# Patient Record
Sex: MALE | Race: WHITE | NOT HISPANIC OR LATINO | URBAN - METROPOLITAN AREA
[De-identification: names, ages, dates, MRNs, and addresses within clinical notes are randomized per-mention and may not be internally consistent; named-entity substitution may affect disease eponyms.]

---

## 2023-07-20 ENCOUNTER — TELEPHONE (OUTPATIENT)
Dept: HEMATOLOGY/ONCOLOGY | Facility: CLINIC | Age: 68
End: 2023-07-20
Payer: MEDICARE

## 2023-07-24 ENCOUNTER — TELEPHONE (OUTPATIENT)
Dept: HEMATOLOGY/ONCOLOGY | Facility: CLINIC | Age: 68
End: 2023-07-24
Payer: MEDICARE

## 2023-07-24 DIAGNOSIS — C15.9 ESOPHAGEAL ADENOCARCINOMA: Primary | ICD-10-CM

## 2023-07-24 NOTE — NURSING
Spoke with patient and scheduled appointment for 07/31/2023 with Natalya Hampton and Enmanuel; Provided patient with appointment time and date, address of [facility] facility and direct line to navigator. All questions and concerns addressed.

## 2023-07-27 NOTE — PROGRESS NOTES
"MEDICAL ONCOLOGY - NEW PATIENT VISIT    Reason for visit: esophageal adenocarcinoma     Best Contact Phone Number(s): There are no phone numbers on file.     Cancer/Stage/TNM:    Cancer Staging   No matching staging information was found for the patient.     Oncology History    No history exists.      HPI:   67 y.o. male with HTN, HLD, tobacco use, dysphagia who presents for initial oncologic management of recently diagnosed esophageal adenocarcinoma. Presented to PCP a few months ago with dysphagia. Symptoms have improved since esophageal dilation during EUS. He has been avoiding large bites of steak and meat, and notes having trouble with Chinese egg fu mariaa. No issues with liquids. Does report frequently spitting up saliva, but generally does not regurgitate foods. No weight loss reported. Does not report any history of Puri's esophagus or symptomatic reflux.     He is co-partner of [employer]. 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 Currently smokes 1 ppd.     Reports his mother had breast cancer (caught early per patient) and "something in her throat" that was treated with chemoRT. History has been obtained by chart review and discussion with the patient.    ROS:   Review of Systems   Constitutional:  Negative for fever, malaise/fatigue and weight loss.   HENT:  Negative for nosebleeds.    Respiratory:  Negative for shortness of breath.    Cardiovascular:  Negative for chest pain and palpitations.   Gastrointestinal:  Negative for blood in stool, constipation, diarrhea, heartburn, nausea and vomiting.   Genitourinary:  Negative for hematuria.   Musculoskeletal:  Negative for falls.   Skin:  Negative for itching and rash.   Neurological:  Negative for dizziness, tingling, sensory change and weakness.   Psychiatric/Behavioral:  The patient is not nervous/anxious.      Past Medical History:   Past " "Medical History:   Diagnosis Date    HTN (hypertension)      Past Surgical History:   Past Surgical History:   Procedure Laterality Date    LAPAROSCOPIC REPAIR OF INGUINAL HERNIA Right      Family History:   History reviewed. No pertinent family history.     Social History:   Social History     Tobacco Use    Smoking status: Every Day     Current packs/day: 1.00     Types: Cigarettes    Smokeless tobacco: Never   Substance Use Topics    Alcohol use: Not on file      I have reviewed and updated the patient's past medical, surgical, family and social histories.    Allergies:   Review of patient's allergies indicates:  No Known Allergies     Medications:   Current Outpatient Medications   Medication Sig Dispense Refill    azelastine (ASTELIN) 137 mcg (0.1 %) nasal spray SPRAY 1 SPRAY BY NASAL ROUTE 2 TIMES DAILY USE IN EACH NOSTRIL AS DIRECTED.      etodolac (LODINE) 400 MG tablet Take 1 tablet by mouth 2 (two) times daily.      amLODIPine (NORVASC) 10 MG tablet Take 10 mg by mouth.      famotidine (PEPCID) 20 MG tablet Take 20 mg by mouth 2 (two) times daily.      fluticasone propionate (FLONASE) 50 mcg/actuation nasal spray SMARTSIG:3 Spray(s) Both Nares Every Morning      gabapentin (NEURONTIN) 300 MG capsule Take 300 mg by mouth.      pravastatin (PRAVACHOL) 40 MG tablet Take 40 mg by mouth.       No current facility-administered medications for this visit.      Physical Exam:   BP (!) 180/85 (BP Location: Left arm, Patient Position: Sitting, BP Method: Medium (Automatic))   Pulse 74   Temp 98 °F (36.7 °C) (Oral)   Resp 18   Ht 5' 7" (1.702 m)   Wt 77.7 kg (171 lb 6.5 oz)   SpO2 99%   BMI 26.85 kg/m²      ECOG Performance status: 0       Physical Exam  Vitals reviewed.   Constitutional:       General: He is not in acute distress.     Appearance: Normal appearance. He is not ill-appearing, toxic-appearing or diaphoretic.   HENT:      Head: Normocephalic and atraumatic.      Right Ear: External ear normal.     "  Left Ear: External ear normal.      Nose: Nose normal. No congestion.      Mouth/Throat:      Pharynx: Oropharynx is clear. No oropharyngeal exudate.   Eyes:      General: No scleral icterus.     Conjunctiva/sclera: Conjunctivae normal.   Cardiovascular:      Rate and Rhythm: Normal rate.   Pulmonary:      Effort: Pulmonary effort is normal. No respiratory distress.      Breath sounds: No wheezing.   Abdominal:      General: There is no distension.   Musculoskeletal:      Cervical back: Normal range of motion.      Right lower leg: No edema.      Left lower leg: No edema.   Skin:     General: Skin is warm and dry.      Coloration: Skin is not jaundiced or pale.      Findings: No bruising, erythema or rash.   Neurological:      General: No focal deficit present.      Mental Status: He is alert and oriented to person, place, and time.      Cranial Nerves: No cranial nerve deficit.      Motor: No weakness.      Gait: Gait normal.   Psychiatric:         Mood and Affect: Mood normal.         Behavior: Behavior normal.         Labs:   Recent Results (from the past 48 hour(s))   Comprehensive Metabolic Panel    Collection Time: 07/31/23  9:05 AM   Result Value Ref Range    Sodium 142 136 - 145 mmol/L    Potassium 3.4 (L) 3.5 - 5.1 mmol/L    Chloride 108 95 - 110 mmol/L    CO2 22 (L) 23 - 29 mmol/L    Glucose 115 (H) 70 - 110 mg/dL    BUN 16 8 - 23 mg/dL    Creatinine 1.0 0.5 - 1.4 mg/dL    Calcium 9.9 8.7 - 10.5 mg/dL    Total Protein 6.9 6.0 - 8.4 g/dL    Albumin 4.0 3.5 - 5.2 g/dL    Total Bilirubin 0.6 0.1 - 1.0 mg/dL    Alkaline Phosphatase 102 55 - 135 U/L    AST 26 10 - 40 U/L    ALT 40 10 - 44 U/L    eGFR >60.0 >60 mL/min/1.73 m^2    Anion Gap 12 8 - 16 mmol/L   CBC Auto Differential    Collection Time: 07/31/23  9:05 AM   Result Value Ref Range    WBC 9.36 3.90 - 12.70 K/uL    RBC 4.75 4.60 - 6.20 M/uL    Hemoglobin 14.7 14.0 - 18.0 g/dL    Hematocrit 41.5 40.0 - 54.0 %    MCV 87 82 - 98 fL    MCH 30.9 27.0 - 31.0  pg    MCHC 35.4 32.0 - 36.0 g/dL    RDW 12.3 11.5 - 14.5 %    Platelets 253 150 - 450 K/uL    MPV 9.4 9.2 - 12.9 fL    Immature Granulocytes 0.5 0.0 - 0.5 %    Gran # (ANC) 6.9 1.8 - 7.7 K/uL    Immature Grans (Abs) 0.05 (H) 0.00 - 0.04 K/uL    Lymph # 1.6 1.0 - 4.8 K/uL    Mono # 0.6 0.3 - 1.0 K/uL    Eos # 0.1 0.0 - 0.5 K/uL    Baso # 0.04 0.00 - 0.20 K/uL    nRBC 0 0 /100 WBC    Gran % 73.8 (H) 38.0 - 73.0 %    Lymph % 17.5 (L) 18.0 - 48.0 %    Mono % 6.7 4.0 - 15.0 %    Eosinophil % 1.1 0.0 - 8.0 %    Basophil % 0.4 0.0 - 1.9 %    Differential Method Automated    Prealbumin    Collection Time: 07/31/23  9:05 AM   Result Value Ref Range    Prealbumin 29 20 - 43 mg/dL      I have reviewed the pertinent labs. CBC, CMP unremarkable.     Imaging:    Esophagram - 6/15/2023  Impression:  - Irregularity and narrowing of the distal esophagus. Findings are suspicious for underlying neoplasm versus varices. Recommend endoscopy.  - Mild presbyesophagus     CT Chest, CT A/P - 7/3/2023   1.   There is an eccentric mass in the lower thoracic esophagus extending in close proximity to the gastric cardia.   2.   There is no evidence of metastatic disease in the thorax.   3.   There is a tiny subcapsular hypodensity in the right hepatic lobe which is too small to characterize. This likely represents a cyst, although a metastatic lesion is not excluded. There is also a hyperenhancing mass in the right hepatic lobe which likely reflects a hemangioma but is also nonspecific. A short-term follow-up exam or an MRI of the abdomen would likely be of benefit.   4.   There is an apparent 1.8 cm mass in the upper pole of the left kidney anteriorly. This does not have the appearance of a simple cyst. There are no precontrast images to compare and evaluate for definite enhancement although this appears to enhance. This could simply represent normal renal parenchyma, although a solid tumor is suspected, concerning for renal cell carcinoma.  This could be evaluated further with a dedicated renal mass CT versus an MRI of the abdomen without and with contrast.     EUS - 7/11/2023  Impression:   - Normal first portion of the duodenum and second portion of the duodenum. No specimens collected.   - Mucosal nodule found in the duodenum. Biopsied.   - Normal stomach. No specimens collected.   - Partially obstructing, likely malignant esophageal tumor was found in the distal esophagus. Biopsied.   - A mass was found in the thoracic esophagus. Fine need biopsy performed.   - Malignant-appearing esophageal stenosis. Dilated.   - Normal cricopharyngeus, upper third of esophagus and middle third of esophagus. No specimens collected.     I have personally reviewed the imaging which is notable for distal esophageal tumor and dilation.     Path:   7/11/2023 -   DIAGNOSIS:                                    1. 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 ESOPHAGUS, DISTAL MASS, BIOPSY:   - INVASIVE, MODERATELY DIFFERENTIATED ADENOCARCINOMA WITH MUCINOUS DIFFERENTIATION                                    3. ESOPHAGUS MASS, FINE NEEDLE ASPIRATION:   - ADENOCARCINOMA WITH MUCINOUS DIFFERENTIATION    Assessment:       1. Esophageal adenocarcinoma    2. Immunodeficiency secondary to neoplasm    3. Hypertension, unspecified type    4. Hyperlipidemia, unspecified hyperlipidemia type    5. Tobacco use        Plan:        # Esophageal adenocarcinoma  Mr. Lugo is a 67 y.o. male who presents to clinic for evaluation of newly diagnosed esophageal cancer. He initially presented with dysphagia, now improved s/p esophageal dilation during EUS. Biopsy confirmed adenocarcinoma.     We reviewed his diagnosis, prognosis, and treatment options with him during our initial visit today. Discussed with him the high likelihood that cancer would recur if he was taken straight to surgery without neoadjuvant  treatment. Our recommendation is to proceed with weekly carboplatin + taxol in conjunction with daily radiation (M-F, no holidays) for a total of 5-5.5 weeks. Handouts provided to patient on both chemotherapy medications.     Plan to repeat imaging ~4 weeks after completion of chemoRT followed by potential esophagectomy. Should there be evidence of residual cancer after surgery, we reviewed the SOC recommendation for adjuvant nivolumab. He would prefer to have treatments in the afternoon to accommodate his work schedule.     He will need PET/CT to complete staging, scheduled August 8th.   Will need a consult visit with rad onc as well. Referral placed.     # HTN, HLD  BP elevated in clinic. Asymptomatic.   Continue medical management.   Following with PCP.    # Tobacco use  Encouraged cessation prior to potential esophagectomy.     Follow up: 2-3 weeks to start chemoRT     Patient is in agreement with the proposed treatment plan. All questions were answered to the patient's satisfaction. Pt knows to call clinic if anything is needed before the next clinic visit.    Patient discussed with and also seen by collaborating physician, Dr. Singer.    At least 60 minutes were spent today on this encounter including face to face time with the patient, data gathering/interpretation and documentation.       Aleshia Giraldo, MSN, APRN, ACCNS-AG  Hematology and Medical Oncology  Clinical Nurse Specialist to Dr. Singer, Dr. Wheeler & Dr. Petersen Onc Chart Routing  Urgent    Follow up with physician . Rtc in 2-3 weeks with labs (CBC CMP) to see Dr. Singer to consent for/receive cycle 1 carbo/taxol and radiation   Follow up with [facility]    Infusion scheduling note New or changed treatment   weekly carbo/taxol - needs to start with radiation (pending consult visit) - please help with insurance authorization for treatment   Injection scheduling note    Labs CBC and CMP   Scheduling:  Preferred lab:  Lab interval: once a week      Imaging    Pharmacy appointment    Other referrals     Additional referrals needed  referred to rad onc - needs to see dr. morrow for treatment planning in order to start chemo with radiation

## 2023-07-31 ENCOUNTER — LAB VISIT (OUTPATIENT)
Dept: LAB | Facility: HOSPITAL | Age: 68
End: 2023-07-31
Attending: STUDENT IN AN ORGANIZED HEALTH CARE EDUCATION/TRAINING PROGRAM
Payer: MEDICARE

## 2023-07-31 ENCOUNTER — OFFICE VISIT (OUTPATIENT)
Dept: SURGERY | Facility: CLINIC | Age: 68
End: 2023-07-31
Payer: MEDICARE

## 2023-07-31 ENCOUNTER — OFFICE VISIT (OUTPATIENT)
Dept: HEMATOLOGY/ONCOLOGY | Facility: CLINIC | Age: 68
End: 2023-07-31
Payer: MEDICARE

## 2023-07-31 VITALS
BODY MASS INDEX: 26.91 KG/M2 | WEIGHT: 171.44 LBS | OXYGEN SATURATION: 98 % | DIASTOLIC BLOOD PRESSURE: 87 MMHG | HEART RATE: 81 BPM | SYSTOLIC BLOOD PRESSURE: 181 MMHG | HEIGHT: 67 IN

## 2023-07-31 VITALS
HEART RATE: 74 BPM | DIASTOLIC BLOOD PRESSURE: 85 MMHG | SYSTOLIC BLOOD PRESSURE: 180 MMHG | TEMPERATURE: 98 F | HEIGHT: 67 IN | RESPIRATION RATE: 18 BRPM | BODY MASS INDEX: 26.91 KG/M2 | OXYGEN SATURATION: 99 % | WEIGHT: 171.44 LBS

## 2023-07-31 DIAGNOSIS — E78.5 HYPERLIPIDEMIA, UNSPECIFIED HYPERLIPIDEMIA TYPE: ICD-10-CM

## 2023-07-31 DIAGNOSIS — I10 HYPERTENSION, UNSPECIFIED TYPE: ICD-10-CM

## 2023-07-31 DIAGNOSIS — D84.81 IMMUNODEFICIENCY SECONDARY TO NEOPLASM: ICD-10-CM

## 2023-07-31 DIAGNOSIS — C15.9 ESOPHAGEAL ADENOCARCINOMA: Primary | ICD-10-CM

## 2023-07-31 DIAGNOSIS — C15.9 ESOPHAGEAL ADENOCARCINOMA: ICD-10-CM

## 2023-07-31 DIAGNOSIS — D49.9 IMMUNODEFICIENCY SECONDARY TO NEOPLASM: ICD-10-CM

## 2023-07-31 DIAGNOSIS — Z72.0 TOBACCO USE: ICD-10-CM

## 2023-07-31 LAB
ALBUMIN SERPL BCP-MCNC: 4 G/DL (ref 3.5–5.2)
ALP SERPL-CCNC: 102 U/L (ref 55–135)
ALT SERPL W/O P-5'-P-CCNC: 40 U/L (ref 10–44)
ANION GAP SERPL CALC-SCNC: 12 MMOL/L (ref 8–16)
AST SERPL-CCNC: 26 U/L (ref 10–40)
BASOPHILS # BLD AUTO: 0.04 K/UL (ref 0–0.2)
BASOPHILS NFR BLD: 0.4 % (ref 0–1.9)
BILIRUB SERPL-MCNC: 0.6 MG/DL (ref 0.1–1)
BUN SERPL-MCNC: 16 MG/DL (ref 8–23)
CALCIUM SERPL-MCNC: 9.9 MG/DL (ref 8.7–10.5)
CHLORIDE SERPL-SCNC: 108 MMOL/L (ref 95–110)
CO2 SERPL-SCNC: 22 MMOL/L (ref 23–29)
CREAT SERPL-MCNC: 1 MG/DL (ref 0.5–1.4)
DIFFERENTIAL METHOD: ABNORMAL
EOSINOPHIL # BLD AUTO: 0.1 K/UL (ref 0–0.5)
EOSINOPHIL NFR BLD: 1.1 % (ref 0–8)
ERYTHROCYTE [DISTWIDTH] IN BLOOD BY AUTOMATED COUNT: 12.3 % (ref 11.5–14.5)
EST. GFR  (NO RACE VARIABLE): >60 ML/MIN/1.73 M^2
GLUCOSE SERPL-MCNC: 115 MG/DL (ref 70–110)
HCT VFR BLD AUTO: 41.5 % (ref 40–54)
HGB BLD-MCNC: 14.7 G/DL (ref 14–18)
IMM GRANULOCYTES # BLD AUTO: 0.05 K/UL (ref 0–0.04)
IMM GRANULOCYTES NFR BLD AUTO: 0.5 % (ref 0–0.5)
LYMPHOCYTES # BLD AUTO: 1.6 K/UL (ref 1–4.8)
LYMPHOCYTES NFR BLD: 17.5 % (ref 18–48)
MCH RBC QN AUTO: 30.9 PG (ref 27–31)
MCHC RBC AUTO-ENTMCNC: 35.4 G/DL (ref 32–36)
MCV RBC AUTO: 87 FL (ref 82–98)
MONOCYTES # BLD AUTO: 0.6 K/UL (ref 0.3–1)
MONOCYTES NFR BLD: 6.7 % (ref 4–15)
NEUTROPHILS # BLD AUTO: 6.9 K/UL (ref 1.8–7.7)
NEUTROPHILS NFR BLD: 73.8 % (ref 38–73)
NRBC BLD-RTO: 0 /100 WBC
PLATELET # BLD AUTO: 253 K/UL (ref 150–450)
PMV BLD AUTO: 9.4 FL (ref 9.2–12.9)
POTASSIUM SERPL-SCNC: 3.4 MMOL/L (ref 3.5–5.1)
PREALB SERPL-MCNC: 29 MG/DL (ref 20–43)
PROT SERPL-MCNC: 6.9 G/DL (ref 6–8.4)
RBC # BLD AUTO: 4.75 M/UL (ref 4.6–6.2)
SODIUM SERPL-SCNC: 142 MMOL/L (ref 136–145)
WBC # BLD AUTO: 9.36 K/UL (ref 3.9–12.7)

## 2023-07-31 PROCEDURE — 85025 COMPLETE CBC W/AUTO DIFF WBC: CPT | Performed by: STUDENT IN AN ORGANIZED HEALTH CARE EDUCATION/TRAINING PROGRAM

## 2023-07-31 PROCEDURE — 99213 OFFICE O/P EST LOW 20 MIN: CPT | Mod: PBBFAC | Performed by: STUDENT IN AN ORGANIZED HEALTH CARE EDUCATION/TRAINING PROGRAM

## 2023-07-31 PROCEDURE — 36415 COLL VENOUS BLD VENIPUNCTURE: CPT | Performed by: STUDENT IN AN ORGANIZED HEALTH CARE EDUCATION/TRAINING PROGRAM

## 2023-07-31 PROCEDURE — 99214 OFFICE O/P EST MOD 30 MIN: CPT | Mod: PBBFAC,27 | Performed by: INTERNAL MEDICINE

## 2023-07-31 PROCEDURE — 99999 PR PBB SHADOW E&M-EST. PATIENT-LVL IV: ICD-10-PCS | Mod: PBBFAC,,, | Performed by: INTERNAL MEDICINE

## 2023-07-31 PROCEDURE — 99999 PR PBB SHADOW E&M-EST. PATIENT-LVL III: ICD-10-PCS | Mod: PBBFAC,,, | Performed by: STUDENT IN AN ORGANIZED HEALTH CARE EDUCATION/TRAINING PROGRAM

## 2023-07-31 PROCEDURE — 99999 PR PBB SHADOW E&M-EST. PATIENT-LVL III: CPT | Mod: PBBFAC,,, | Performed by: STUDENT IN AN ORGANIZED HEALTH CARE EDUCATION/TRAINING PROGRAM

## 2023-07-31 PROCEDURE — 99205 OFFICE O/P NEW HI 60 MIN: CPT | Mod: S$PBB,,, | Performed by: INTERNAL MEDICINE

## 2023-07-31 PROCEDURE — 99999 PR PBB SHADOW E&M-EST. PATIENT-LVL IV: CPT | Mod: PBBFAC,,, | Performed by: INTERNAL MEDICINE

## 2023-07-31 PROCEDURE — 84134 ASSAY OF PREALBUMIN: CPT | Performed by: STUDENT IN AN ORGANIZED HEALTH CARE EDUCATION/TRAINING PROGRAM

## 2023-07-31 PROCEDURE — 99205 PR OFFICE/OUTPT VISIT, NEW, LEVL V, 60-74 MIN: ICD-10-PCS | Mod: S$PBB,,, | Performed by: STUDENT IN AN ORGANIZED HEALTH CARE EDUCATION/TRAINING PROGRAM

## 2023-07-31 PROCEDURE — 99205 OFFICE O/P NEW HI 60 MIN: CPT | Mod: S$PBB,,, | Performed by: STUDENT IN AN ORGANIZED HEALTH CARE EDUCATION/TRAINING PROGRAM

## 2023-07-31 PROCEDURE — 99205 PR OFFICE/OUTPT VISIT, NEW, LEVL V, 60-74 MIN: ICD-10-PCS | Mod: S$PBB,,, | Performed by: INTERNAL MEDICINE

## 2023-07-31 PROCEDURE — 80053 COMPREHEN METABOLIC PANEL: CPT | Performed by: STUDENT IN AN ORGANIZED HEALTH CARE EDUCATION/TRAINING PROGRAM

## 2023-07-31 RX ORDER — ETODOLAC 400 MG/1
1 TABLET, FILM COATED ORAL 2 TIMES DAILY
COMMUNITY
Start: 2023-05-02

## 2023-07-31 RX ORDER — FLUTICASONE PROPIONATE 50 MCG
SPRAY, SUSPENSION (ML) NASAL
COMMUNITY
Start: 2023-06-28

## 2023-07-31 RX ORDER — AZELASTINE 1 MG/ML
SPRAY, METERED NASAL
COMMUNITY
Start: 2023-05-17

## 2023-07-31 RX ORDER — GABAPENTIN 300 MG/1
300 CAPSULE ORAL
COMMUNITY
Start: 2023-07-25

## 2023-07-31 RX ORDER — AMLODIPINE BESYLATE 10 MG/1
10 TABLET ORAL
COMMUNITY
Start: 2023-07-30

## 2023-07-31 RX ORDER — PRAVASTATIN SODIUM 40 MG/1
40 TABLET ORAL
COMMUNITY
Start: 2023-07-16

## 2023-07-31 RX ORDER — FAMOTIDINE 20 MG/1
20 TABLET, FILM COATED ORAL 2 TIMES DAILY
COMMUNITY
Start: 2023-06-01

## 2023-07-31 NOTE — PROGRESS NOTES
SURGICAL ONCOLOGY CLINIC NOTE    Jone Lugo is a 67 y.o. male with Hx of HTN (on amlodipine) who presents to clinic for evaluation for esophageal adenocarcinoma. Patient states that he initially presented to his PCP a few months ago with difficulty swallowing. He notes that he had trouble swallowing and would feel the need to spit up, has only had trouble with solids. Had the sensation of choking. He underwent an EGD (7/11/23) which found a mass at the distal portion of his esophagus. He follows with Dr. Lacey Hampton in GI. The mass was biopsied. He did have a dilation of his esophagus and his swallowing has improved since that time.    No history of MI or stroke. Not on anticoagulation. He is able to walk 2 blocks and up 1 flight of stairs without getting short of breath.    PSH: R inguinal hernia repair (laparoscopic, Jan 2023), laparoscopic cholecystectomy  Family history: mother with breast cancer  Social: current smoker (1ppd), denies EtOH use, denies drug use      Review of Systems   Constitutional:  Negative for chills, fever and weight loss.   HENT:          Difficulty swallowing, frequent spit ups   Respiratory:  Negative for shortness of breath.    Cardiovascular:  Negative for chest pain.   Gastrointestinal:  Negative for nausea and vomiting.   Genitourinary:  Negative for dysuria and hematuria.   Musculoskeletal:  Negative for myalgias.   Neurological:  Negative for dizziness and headaches.   Psychiatric/Behavioral:  Negative for depression.        Past Medical History:   Diagnosis Date    HTN (hypertension)        Past Surgical History:   Procedure Laterality Date    LAPAROSCOPIC REPAIR OF INGUINAL HERNIA Right        Social History     Socioeconomic History    Marital status: Single       Review of patient's allergies indicates:  Not on File      PHYSICAL EXAM:  Vitals:    07/31/23 0915   BP: (!) 181/87   Pulse: 81       Physical Exam  Constitutional:       Appearance: Normal appearance.    HENT:      Head: Normocephalic and atraumatic.   Cardiovascular:      Rate and Rhythm: Normal rate.   Pulmonary:      Effort: Pulmonary effort is normal. No respiratory distress.   Abdominal:      General: There is no distension.      Palpations: Abdomen is soft.      Tenderness: There is no abdominal tenderness.      Hernia: A hernia (umbilical hernia) is present.      Comments: Well healed incisional scars   Skin:     General: Skin is warm and dry.      Capillary Refill: Capillary refill takes less than 2 seconds.   Neurological:      General: No focal deficit present.      Mental Status: He is alert.     PERTINENT LABS:  CBC, CMP, prealbumin pending      PERTINENT IMAGING:  Reviewed.   EGD 7/11/23      CT chest/abdomen 7/3/23:  Findings:   Thorax:   There is an eccentric mass along the right lateral wall of the lower thoracic esophagus just proximal to the GE junction. This causes fairly significant luminal narrowing. The mass extends over a length of approximately 5 cm. There is no upstream dilatation or air-fluid level to suggest obstruction. This appears to extend in close proximity to the proximal stomach.     There is calcified and noncalcified plaque throughout the thoracic aorta. There is no aneurysmal dilatation or dissection.   The heart size is normal. There is no pericardial effusion. There is prominent coronary artery calcification.   There is no central pulmonary embolus.   There is no mediastinal or hilar adenopathy.     The lungs are well aerated. There are calcified granulomata in both lungs. There are no suspicious pulmonary parenchymal nodules.   There is no focal lung consolidation. There is no pleural effusion or pneumothorax.   There are no suspicious osseous lesions.     Abdomen:   The background liver parenchyma appears normal. There is no evidence of cirrhosis.   There is a small subcapsular hypodensity in the posterior right hepatic lobe measuring 8 mm. This is too small to accurately  characterize.   There is a hyperenhancing mass in segment VI measuring 1.4 cm. The CT appearance is nonspecific. No additional liver lesions are seen.     The gallbladder is surgically absent. There is no biliary ductal dilatation.   The spleen is normal. The pancreas is atrophic.   The adrenal glands are normal.     There is a contour deformity along the anterior medial upper pole of the left kidney measuring approximately 1.8 cm, axial image 32, sagittal image 89. This has the appearance of a cortical mass appearing similar in attenuation to the remainder of the left kidney. The attenuation is 143 Hounsfield units.   There are tiny cortical cysts in the lower pole of the right kidney.     The distal stomach is normal. There is a tiny periampullary duodenal diverticulum.   The upper abdominal mesenteric small bowel is normal. There are scattered colonic diverticula.   There is no ascites or adenopathy.   There is extensive calcified plaque throughout the abdominal aorta.     Pelvis:   The urinary bladder is mildly thick-walled but incompletely distended.   The prostate gland and seminal vesicles are normal.     The rectum is normal. There is sigmoid diverticulosis.   The pelvic small bowel and appendix are normal. There is no pelvic ascites.   There is a fat-containing umbilical hernia. There are bilateral fat-containing inguinal hernias.    PATHOLOGY:  DIAGNOSIS:                                    1. 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 ESOPHAGUS, DISTAL MASS, BIOPSY:   - INVASIVE, MODERATELY DIFFERENTIATED ADENOCARCINOMA WITH MUCINOUS    DIFFERENTIATION                                    3. ESOPHAGUS MASS, FINE NEEDLE ASPIRATION:   - ADENOCARCINOMA WITH MUCINOUS DIFFERENTIATION     ASSESSMENT/PLAN:  67 y.o. male with newly diagnosed thoracic esophageal mass, biopsy positive for adenocarcinoma    - patient to follow up with  medical oncology for neoadjuvant chemotherapy, has appointment today; referral to radiation oncology for neoadjuvant radiation therapy  - will order PET to complete staging, has two prominent paraesophageal nodes and liver lesion  - will restage after neoadjuvant chemotherapy  - referral to smoking cessation clinic  - follow up in clinic after restaging for further surgical planning      Chray Webster, PGY-5  General Surgery  [phone]  7/31/2023 9:22 AM

## 2023-08-04 LAB
ONEOME COMMENT: NORMAL
ONEOME METHOD: NORMAL

## 2023-08-08 ENCOUNTER — HOSPITAL ENCOUNTER (OUTPATIENT)
Dept: RADIOLOGY | Facility: HOSPITAL | Age: 68
Discharge: HOME | End: 2023-08-08
Attending: STUDENT IN AN ORGANIZED HEALTH CARE EDUCATION/TRAINING PROGRAM
Payer: MEDICARE

## 2023-08-08 DIAGNOSIS — C15.9 ESOPHAGEAL ADENOCARCINOMA: ICD-10-CM

## 2023-08-08 LAB — POCT GLUCOSE: 62 MG/DL (ref 70–110)

## 2023-08-08 PROCEDURE — 78815 NM PET CT ROUTINE: ICD-10-PCS | Mod: 26,PI,, | Performed by: STUDENT IN AN ORGANIZED HEALTH CARE EDUCATION/TRAINING PROGRAM

## 2023-08-08 PROCEDURE — A9552 F18 FDG: HCPCS

## 2023-08-08 PROCEDURE — 78815 PET IMAGE W/CT SKULL-THIGH: CPT | Mod: 26,PI,, | Performed by: STUDENT IN AN ORGANIZED HEALTH CARE EDUCATION/TRAINING PROGRAM

## 2023-08-09 ENCOUNTER — OFFICE VISIT (OUTPATIENT)
Dept: RADIATION ONCOLOGY | Facility: CLINIC | Age: 68
End: 2023-08-09
Payer: MEDICARE

## 2023-08-09 VITALS
WEIGHT: 170.38 LBS | RESPIRATION RATE: 20 BRPM | OXYGEN SATURATION: 97 % | SYSTOLIC BLOOD PRESSURE: 162 MMHG | BODY MASS INDEX: 26.69 KG/M2 | HEART RATE: 89 BPM | DIASTOLIC BLOOD PRESSURE: 74 MMHG

## 2023-08-09 DIAGNOSIS — C15.9 ESOPHAGEAL ADENOCARCINOMA: ICD-10-CM

## 2023-08-09 PROCEDURE — 99205 OFFICE O/P NEW HI 60 MIN: CPT | Mod: S$PBB,,, | Performed by: STUDENT IN AN ORGANIZED HEALTH CARE EDUCATION/TRAINING PROGRAM

## 2023-08-09 PROCEDURE — 99999 PR PBB SHADOW E&M-EST. PATIENT-LVL III: ICD-10-PCS | Mod: PBBFAC,,, | Performed by: STUDENT IN AN ORGANIZED HEALTH CARE EDUCATION/TRAINING PROGRAM

## 2023-08-09 PROCEDURE — 99999 PR PBB SHADOW E&M-EST. PATIENT-LVL III: CPT | Mod: PBBFAC,,, | Performed by: STUDENT IN AN ORGANIZED HEALTH CARE EDUCATION/TRAINING PROGRAM

## 2023-08-09 PROCEDURE — 99205 PR OFFICE/OUTPT VISIT, NEW, LEVL V, 60-74 MIN: ICD-10-PCS | Mod: S$PBB,,, | Performed by: STUDENT IN AN ORGANIZED HEALTH CARE EDUCATION/TRAINING PROGRAM

## 2023-08-09 PROCEDURE — 99213 OFFICE O/P EST LOW 20 MIN: CPT | Mod: PBBFAC | Performed by: STUDENT IN AN ORGANIZED HEALTH CARE EDUCATION/TRAINING PROGRAM

## 2023-08-09 NOTE — PROGRESS NOTES
Radiation Oncology Consult Note        Date of Service: 8/9/2023     Chief Complaint: adenocarcinoma of the distal 1/3 esophagus     Reason for visit: consideration for radiation to the abdomen     Referring Physician: Dr Hampton (surgery)     Implantable devices: denies     Therapy to Date:  No radiation     Diagnosis/Assessment:   Jone Lugo is a 67 y.o. man with Stage III (cT2, cN1, cM0, G2) adenocarcinoma of the distal 1/3 esophagus, s/p upper EUS, biopsy and dilatation ([place], 7/11/2023 Dr Rowley), no evidence of distant mets on PET/CT.    PMH of HTN, HLD, and pack a day smoker    ECOG 0    Dysphagia has improved since esophageal dilation during EUS, but is worsening again now  Seen by Dr Hampton, surgical candidate.    Plan   We discussed treatment options per NCCN guidelines v2023  - preoperative chemoradiation followed by surgery +/- adjuvant nivolumab (residual yp disease)  - definitive chemoradiation       Best outcome is with therapy involving surgery using preop CRT.   EBRT would consist of daily radiation treatments M-F, to the esophagus and involved regional lymph nodes to a dose of 41.4-50.4 Gy in 23-28 fractions at 1.8 Gy per fraction delivered daily, using IMRT.  Risks, benefits, and side effects of radiotherapy were discussed.   Side effects include but are not limited to fatigue, dysphagia, and pneumonitis.        The patient signed RT informed consent after I answered questions to their apparent satisfaction.    - RT consent signed today  - CT sim 8/10/2023 NPO x 4hrs     HPI:   Jone Lugo is a 67 y.o. man with recent diagnosis of esophageal cancer after presenting with dysphagia    Oncologic history:    6/23/2023 upper EGD (Dr Berta Stewart)             7/11/2023 upper EUS (Dr Halley Stewart)          Pathology   ESOPHAGUS, DISTAL MASS, BIOPSY: INVASIVE, MODERATELY DIFFERENTIATED ADENOCARCINOMA WITH MUCINOUS DIFFERENTIATION               ESOPHAGUS MASS, FNA:  "ADENOCARCINOMA WITH MUCINOUS DIFFERENTIATION     7/31/2023 PET CT  hypermetabolic wall thickening of distal esophagus extending to GE junction SUVm  5.7  no pathologically enlarged or hypermetabolic lymph nodes         7/31/2023 surgery (Dr Hampton)  recommend proceeding with neoadjuvant chemoradiation     7/31/2023 medond visit (Aleshia Giraldo NP/ Dr. Singer)   weekly carboplatin + taxol in conjunction with daily radiation (M-F, no holidays) for a total of 5-5.5 weeks.     Subjective:   In clinic the patient is alone    The patient reports feeling OK overall, very nervous but happy that his PET showed no evidence of mets.  He reports that his dysphagia which had improved after dilation, is now slowly worsening again.  He otherwise denies any GERD, N/V, abdominal pain, chest pain, SOB, diarrhea, constipation or blood in his stools      The patient denies other major complaints.     The patient denies any history of radiation therapy, implantable cardiac devices, or connective tissue disease.      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 Currently smokes 1 ppd.     Family history  Mother had breast cancer (caught early per patient) and "something in her throat" that was treated with chemoRT    Current Outpatient Medications on File Prior to Visit   Medication Sig Dispense Refill    amLODIPine (NORVASC) 10 MG tablet Take 10 mg by mouth.      azelastine (ASTELIN) 137 mcg (0.1 %) nasal spray SPRAY 1 SPRAY BY NASAL ROUTE 2 TIMES DAILY USE IN EACH NOSTRIL AS DIRECTED.      etodolac (LODINE) 400 MG tablet Take 1 tablet by mouth 2 (two) times daily.      famotidine (PEPCID) 20 MG tablet Take 20 mg by mouth 2 (two) times daily.      fluticasone propionate (FLONASE) 50 mcg/actuation nasal spray SMARTSIG:3 Spray(s) Both Nares Every Morning      gabapentin (NEURONTIN) 300 MG " capsule Take 300 mg by mouth.      pravastatin (PRAVACHOL) 40 MG tablet Take 40 mg by mouth.       No current facility-administered medications on file prior to visit.       Review of patient's allergies indicates:  No Known Allergies        Past Surgical History:   Procedure Laterality Date    LAPAROSCOPIC REPAIR OF INGUINAL HERNIA Right        Past Medical History:   Diagnosis Date    HTN (hypertension)      Review of Systems   Negative unless as stated above    Objective:   Physical Exam  Vitals reviewed.     Constitutional:       Appearance: Normal appearance.   HENT:      Head: Normocephalic and atraumatic.   Pulmonary:      Effort: Pulmonary effort is normal.   Abdominal:      General: There is no distension.   Musculoskeletal:         General: Normal range of motion.   Neurological:      General: No focal deficit present.      Mental Status: Alert and oriented  Psychiatric:         Mood and Affect: Mood normal.         Behavior: Behavior normal.      Imaging: I have personally reviewed the patient's available images and reports and summarized pertinent findings above in HPI.      Pathology: I have personally reviewed the patient's available pathology and summarized pertinent findings above in HPI.      I spent approximately 60 minutes reviewing the available records and evaluating the patient, out of which over 50% of the time was spent face to face with the patient in counseling and coordinating this patient's care.        Anshul Neri MD/PhD

## 2023-08-10 ENCOUNTER — HOSPITAL ENCOUNTER (OUTPATIENT)
Dept: RADIATION THERAPY | Facility: HOSPITAL | Age: 68
Discharge: HOME | End: 2023-08-10
Attending: STUDENT IN AN ORGANIZED HEALTH CARE EDUCATION/TRAINING PROGRAM
Payer: MEDICARE

## 2023-08-10 ENCOUNTER — PATIENT MESSAGE (OUTPATIENT)
Dept: SURGERY | Facility: CLINIC | Age: 68
End: 2023-08-10
Payer: MEDICARE

## 2023-08-10 PROCEDURE — 77263 PR  RADIATION THERAPY PLAN COMPLEX: ICD-10-PCS | Mod: ,,, | Performed by: STUDENT IN AN ORGANIZED HEALTH CARE EDUCATION/TRAINING PROGRAM

## 2023-08-10 PROCEDURE — 77014 PR  CT GUIDANCE PLACEMENT RAD THERAPY FIELDS: CPT | Mod: 26,,, | Performed by: STUDENT IN AN ORGANIZED HEALTH CARE EDUCATION/TRAINING PROGRAM

## 2023-08-10 PROCEDURE — 77014 PR  CT GUIDANCE PLACEMENT RAD THERAPY FIELDS: ICD-10-PCS | Mod: 26,,, | Performed by: STUDENT IN AN ORGANIZED HEALTH CARE EDUCATION/TRAINING PROGRAM

## 2023-08-10 PROCEDURE — 77263 THER RADIOLOGY TX PLNG CPLX: CPT | Mod: ,,, | Performed by: STUDENT IN AN ORGANIZED HEALTH CARE EDUCATION/TRAINING PROGRAM

## 2023-08-10 PROCEDURE — 77334 RADIATION TREATMENT AID(S): CPT | Mod: TC | Performed by: STUDENT IN AN ORGANIZED HEALTH CARE EDUCATION/TRAINING PROGRAM

## 2023-08-10 PROCEDURE — 77334 PR  RADN TREATMENT AID(S) COMPLX: ICD-10-PCS | Mod: 26,,, | Performed by: STUDENT IN AN ORGANIZED HEALTH CARE EDUCATION/TRAINING PROGRAM

## 2023-08-10 PROCEDURE — 77334 RADIATION TREATMENT AID(S): CPT | Mod: 26,,, | Performed by: STUDENT IN AN ORGANIZED HEALTH CARE EDUCATION/TRAINING PROGRAM

## 2023-08-10 PROCEDURE — 77014 HC CT GUIDANCE RADIATION THERAPY FLDS PLACEMENT: CPT | Mod: TC | Performed by: STUDENT IN AN ORGANIZED HEALTH CARE EDUCATION/TRAINING PROGRAM

## 2023-08-11 DIAGNOSIS — C15.9 ESOPHAGEAL ADENOCARCINOMA: Primary | ICD-10-CM

## 2023-08-15 ENCOUNTER — OFFICE VISIT (OUTPATIENT)
Dept: SURGERY | Facility: CLINIC | Age: 68
End: 2023-08-15
Payer: MEDICARE

## 2023-08-15 VITALS
SYSTOLIC BLOOD PRESSURE: 167 MMHG | HEIGHT: 67 IN | BODY MASS INDEX: 26.8 KG/M2 | WEIGHT: 170.75 LBS | OXYGEN SATURATION: 91 % | HEART RATE: 83 BPM | DIASTOLIC BLOOD PRESSURE: 73 MMHG

## 2023-08-15 DIAGNOSIS — C15.9 ESOPHAGEAL ADENOCARCINOMA: Primary | ICD-10-CM

## 2023-08-15 DIAGNOSIS — Z87.820 H/O TRAUMATIC BRAIN INJURY: ICD-10-CM

## 2023-08-15 DIAGNOSIS — Z72.0 TOBACCO ABUSE: ICD-10-CM

## 2023-08-15 PROCEDURE — 99215 PR OFFICE/OUTPT VISIT, EST, LEVL V, 40-54 MIN: ICD-10-PCS | Mod: S$PBB,,, | Performed by: SURGERY

## 2023-08-15 PROCEDURE — 99213 OFFICE O/P EST LOW 20 MIN: CPT | Mod: PBBFAC | Performed by: SURGERY

## 2023-08-15 PROCEDURE — 99999 PR PBB SHADOW E&M-EST. PATIENT-LVL III: ICD-10-PCS | Mod: PBBFAC,,, | Performed by: SURGERY

## 2023-08-15 PROCEDURE — 99999 PR PBB SHADOW E&M-EST. PATIENT-LVL III: CPT | Mod: PBBFAC,,, | Performed by: SURGERY

## 2023-08-15 PROCEDURE — 99215 OFFICE O/P EST HI 40 MIN: CPT | Mod: S$PBB,,, | Performed by: SURGERY

## 2023-08-15 RX ORDER — OMEPRAZOLE 40 MG/1
1 CAPSULE, DELAYED RELEASE ORAL EVERY MORNING
COMMUNITY
Start: 2023-06-28 | End: 2024-06-27

## 2023-08-15 RX ORDER — UBIDECARENONE 30 MG
30 CAPSULE ORAL 3 TIMES DAILY
COMMUNITY

## 2023-08-15 NOTE — Clinical Note
Anshul/Tom--I think we should use definitive dose chemoRT, I think higher chance he won't be a candidate than he will.  Maral--prehab project, he needs to get off the smokes and understand what's going on.  Prob needs psych too.  Use all your juarez.

## 2023-08-16 PROBLEM — Z72.0 TOBACCO ABUSE: Status: ACTIVE | Noted: 2023-08-16

## 2023-08-16 PROBLEM — Z87.820 H/O TRAUMATIC BRAIN INJURY: Status: ACTIVE | Noted: 2023-08-16

## 2023-08-16 PROCEDURE — 77301 PR  INTEN MOD RADIOTHER PLAN W/DOSE VOL HIST: ICD-10-PCS | Mod: 26,,, | Performed by: STUDENT IN AN ORGANIZED HEALTH CARE EDUCATION/TRAINING PROGRAM

## 2023-08-16 PROCEDURE — 77301 RADIOTHERAPY DOSE PLAN IMRT: CPT | Mod: 26,,, | Performed by: STUDENT IN AN ORGANIZED HEALTH CARE EDUCATION/TRAINING PROGRAM

## 2023-08-16 PROCEDURE — 77301 RADIOTHERAPY DOSE PLAN IMRT: CPT | Mod: TC | Performed by: STUDENT IN AN ORGANIZED HEALTH CARE EDUCATION/TRAINING PROGRAM
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
## 2023-08-17 PROCEDURE — 77300 RADIATION THERAPY DOSE PLAN: CPT | Mod: TC | Performed by: STUDENT IN AN ORGANIZED HEALTH CARE EDUCATION/TRAINING PROGRAM

## 2023-08-17 PROCEDURE — 77338 DESIGN MLC DEVICE FOR IMRT: CPT | Mod: TC | Performed by: STUDENT IN AN ORGANIZED HEALTH CARE EDUCATION/TRAINING PROGRAM

## 2023-08-17 PROCEDURE — 77470 SPECIAL RADIATION TREATMENT: CPT | Mod: 26,59,, | Performed by: STUDENT IN AN ORGANIZED HEALTH CARE EDUCATION/TRAINING PROGRAM

## 2023-08-17 PROCEDURE — 77470 PR  SPECIAL RADIATION TREATMENT: ICD-10-PCS | Mod: 26,59,, | Performed by: STUDENT IN AN ORGANIZED HEALTH CARE EDUCATION/TRAINING PROGRAM

## 2023-08-17 PROCEDURE — 77338 PR  MLC IMRT DESIGN & CONSTRUCTION PER IMRT PLAN: ICD-10-PCS | Mod: 26,,, | Performed by: STUDENT IN AN ORGANIZED HEALTH CARE EDUCATION/TRAINING PROGRAM

## 2023-08-17 PROCEDURE — 77300 PR RADIATION THERAPY,DOSIMETRY PLAN: ICD-10-PCS | Mod: 26,,, | Performed by: STUDENT IN AN ORGANIZED HEALTH CARE EDUCATION/TRAINING PROGRAM

## 2023-08-17 PROCEDURE — 77300 RADIATION THERAPY DOSE PLAN: CPT | Mod: 26,,, | Performed by: STUDENT IN AN ORGANIZED HEALTH CARE EDUCATION/TRAINING PROGRAM

## 2023-08-17 PROCEDURE — 77338 DESIGN MLC DEVICE FOR IMRT: CPT | Mod: 26,,, | Performed by: STUDENT IN AN ORGANIZED HEALTH CARE EDUCATION/TRAINING PROGRAM

## 2023-08-17 PROCEDURE — 77470 SPECIAL RADIATION TREATMENT: CPT | Mod: 59,TC | Performed by: STUDENT IN AN ORGANIZED HEALTH CARE EDUCATION/TRAINING PROGRAM

## 2023-08-23 ENCOUNTER — CLINICAL SUPPORT (OUTPATIENT)
Dept: SMOKING CESSATION | Facility: CLINIC | Age: 68
End: 2023-08-23

## 2023-08-23 DIAGNOSIS — F17.200 NICOTINE DEPENDENCE: Primary | ICD-10-CM

## 2023-08-23 PROCEDURE — 99404 PR PREVENT COUNSEL,INDIV,60 MIN: ICD-10-PCS | Mod: S$GLB,,,

## 2023-08-23 PROCEDURE — 99999 PR PBB SHADOW E&M-EST. PATIENT-LVL II: CPT | Mod: PBBFAC,,,

## 2023-08-23 PROCEDURE — 99999 PR PBB SHADOW E&M-EST. PATIENT-LVL II: ICD-10-PCS | Mod: PBBFAC,,,

## 2023-08-23 PROCEDURE — 99404 PREV MED CNSL INDIV APPRX 60: CPT | Mod: S$GLB,,,

## 2023-08-23 RX ORDER — DM/P-EPHED/ACETAMINOPH/DOXYLAM 30-7.5/3
2 LIQUID (ML) ORAL
Qty: 72 LOZENGE | Refills: 0 | Status: SHIPPED | OUTPATIENT
Start: 2023-08-23

## 2023-08-23 RX ORDER — IBUPROFEN 200 MG
1 TABLET ORAL DAILY
Qty: 14 PATCH | Refills: 0 | Status: SHIPPED | OUTPATIENT
Start: 2023-08-23

## 2023-08-23 NOTE — PROGRESS NOTES
Patient was seen in clinic today and reports smoking a little over a pack of cigarettes per day. He will begin biweekly tobacco cessation sessions and a tobacco cessation medication regimen of 21mg nicotine patch and 2mg nicotine gum. Patient reports smoking within his home, suggested that patient attempt to smoke outside moving forward. Discussed other tips and strategies to assist with quit attempt. The patient will continue with  therapy sessions and medication monitoring by CTTS. Prescribed medication management will be by physician.

## 2023-08-29 ENCOUNTER — CLINICAL SUPPORT (OUTPATIENT)
Dept: HEMATOLOGY/ONCOLOGY | Facility: CLINIC | Age: 68
End: 2023-08-29
Payer: MEDICARE

## 2023-08-29 ENCOUNTER — DOCUMENTATION ONLY (OUTPATIENT)
Dept: RADIATION ONCOLOGY | Facility: CLINIC | Age: 68
End: 2023-08-29
Payer: MEDICARE

## 2023-08-29 VITALS
HEIGHT: 67 IN | RESPIRATION RATE: 18 BRPM | BODY MASS INDEX: 26.75 KG/M2 | TEMPERATURE: 98 F | HEART RATE: 76 BPM | SYSTOLIC BLOOD PRESSURE: 157 MMHG | WEIGHT: 170.44 LBS | OXYGEN SATURATION: 96 % | DIASTOLIC BLOOD PRESSURE: 74 MMHG

## 2023-08-29 DIAGNOSIS — D84.81 IMMUNODEFICIENCY SECONDARY TO NEOPLASM: ICD-10-CM

## 2023-08-29 DIAGNOSIS — D49.9 IMMUNODEFICIENCY SECONDARY TO NEOPLASM: ICD-10-CM

## 2023-08-29 DIAGNOSIS — C15.9 ESOPHAGEAL ADENOCARCINOMA: Primary | ICD-10-CM

## 2023-08-29 DIAGNOSIS — I10 HYPERTENSION, UNSPECIFIED TYPE: ICD-10-CM

## 2023-08-29 DIAGNOSIS — E78.5 HYPERLIPIDEMIA, UNSPECIFIED HYPERLIPIDEMIA TYPE: ICD-10-CM

## 2023-08-29 PROCEDURE — 99999 PR PBB SHADOW E&M-EST. PATIENT-LVL III: CPT | Mod: PBBFAC,,, | Performed by: PHYSICIAN ASSISTANT

## 2023-08-29 PROCEDURE — 99999 PR PBB SHADOW E&M-EST. PATIENT-LVL III: ICD-10-PCS | Mod: PBBFAC,,, | Performed by: PHYSICIAN ASSISTANT

## 2023-08-29 PROCEDURE — 99215 PR OFFICE/OUTPT VISIT, EST, LEVL V, 40-54 MIN: ICD-10-PCS | Mod: S$PBB,,, | Performed by: PHYSICIAN ASSISTANT

## 2023-08-29 PROCEDURE — 99215 OFFICE O/P EST HI 40 MIN: CPT | Mod: S$PBB,,, | Performed by: PHYSICIAN ASSISTANT

## 2023-08-29 PROCEDURE — 99213 OFFICE O/P EST LOW 20 MIN: CPT | Mod: PBBFAC | Performed by: PHYSICIAN ASSISTANT

## 2023-08-29 NOTE — PROGRESS NOTES
I just spoke to the patient over the phone who told me he is very apprehensive of esophagectomy which he discussed in details both with Dr Hampton and with Dr Velez.    He told me he is declining surgical intervention, therefore we have to plan for definitive chemoRT which is done to 50Gy in 25fx instead of 41.3Gy in 23fx (preop CROSS protocol).  We are re planning his radiation treatment as I am writing this documentation..    His chemo is scheduled for Friday 9/1/2023, not ideal at all since labor day weekend is 3 days and chemoRT should always begin early in the week.  I will notify Dr Singer and Jenna Funk to help reschedule early in the week , hopefully 9/5/2023    Thanks  Anshul Neri MD/PhD  Radiation Oncology

## 2023-08-29 NOTE — PROGRESS NOTES
MEDICAL ONCOLOGY - NEW PATIENT VISIT    Reason for visit: esophageal adenocarcinoma     Best Contact Phone Number(s): There are no phone numbers on file.     Cancer/Stage/TNM:    Cancer Staging   Esophageal adenocarcinoma  Staging form: Esophagus - Adenocarcinoma, AJCC 8th Edition  - Clinical: Stage III (cT2, cN1, cM0, G2) - Signed by Miguel Angel Hampton Jr., MD on 7/31/2023       Oncology History   Esophageal adenocarcinoma   7/31/2023 Initial Diagnosis    Esophageal adenocarcinoma     7/31/2023 Cancer Staged    Staging form: Esophagus - Adenocarcinoma, AJCC 8th Edition  - Clinical: Stage III (cT2, cN1, cM0, G2)     8/15/2023 -  Chemotherapy    Treatment Summary   Plan Name: OP ESOPHAGEAL PACLITAXEL CARBOPLATIN WEEKLY  Treatment Goal: Curative  Status: Active  Start Date: 8/15/2023 (Planned)  End Date: 9/12/2023 (Planned)  Provider: Sahil Singer MD  Chemotherapy: CARBOplatin (PARAPLATIN) in sodium chloride 0.9% 250 mL chemo infusion,  (original dose ), Intravenous, Clinic/HOD 1 time, 0 of 1 cycle  Dose modification:   (Cycle 1)  PACLitaxeL (TAXOL) 50 mg/m2 = 96 mg in sodium chloride 0.9% 250 mL chemo infusion, 50 mg/m2, Intravenous, Clinic/HOD 1 time, 0 of 1 cycle        Oncological History copied from medical chart:   67 y.o. male with HTN, HLD, tobacco use, dysphagia who presents for initial oncologic management of recently diagnosed esophageal adenocarcinoma. Presented to PCP a few months ago with dysphagia. Symptoms have improved since esophageal dilation during EUS. He has been avoiding large bites of steak and meat, and notes having trouble with Chinese egg fu mariaa. No issues with liquids. Does report frequently spitting up saliva, but generally does not regurgitate foods. No weight loss reported. Does not report any history of Puri's esophagus or symptomatic reflux.     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 Currently smokes 1 ppd.     Reports his mother had breast cancer (caught early per patient) and "something in her throat" that was treated with chemoRT. History has been obtained by chart review and   discussion with the patient.    Interval History: Mr. Lugo returns to the clinic for chemotherapy education for Carbo/Taxol. He is due to start chemoRT with Dr. Neri next week.     ROS:   Review of Systems   Constitutional:  Positive for weight loss. Negative for fever and malaise/fatigue.   HENT:  Negative for nosebleeds.         Dysphagia, odynophagia   Respiratory:  Negative for shortness of breath.    Cardiovascular:  Negative for chest pain and palpitations.   Gastrointestinal:  Negative for blood in stool, constipation, diarrhea, heartburn, nausea and vomiting.   Genitourinary:  Negative for hematuria.   Musculoskeletal:  Negative for falls.   Skin:  Negative for itching and rash.   Neurological:  Negative for dizziness, tingling, sensory change and weakness.   Psychiatric/Behavioral:  The patient is not nervous/anxious.      Past Medical History:   Past Medical History:   Diagnosis Date    HTN (hypertension)      Past Surgical History:   Past Surgical History:   Procedure Laterality Date    LAPAROSCOPIC REPAIR OF INGUINAL HERNIA Right      Family History:   No family history on file.     Social History:   Social History     Tobacco Use    Smoking status: Every Day     Current packs/day: 1.00     Types: Cigarettes    Smokeless tobacco: Never   Substance Use Topics    Alcohol use: Not on file      I have reviewed and updated the patient's past medical, surgical, family and social histories.    Allergies:   Review of patient's allergies indicates:  No Known Allergies     Medications:   Current Outpatient Medications   Medication Sig Dispense Refill    amLODIPine (NORVASC) 10 MG tablet Take 10 mg by mouth.      azelastine (ASTELIN) 137 mcg (0.1 %) nasal " "spray SPRAY 1 SPRAY BY NASAL ROUTE 2 TIMES DAILY USE IN EACH NOSTRIL AS DIRECTED.      co-enzyme Q-10 30 mg capsule Take 30 mg by mouth 3 (three) times daily.      etodolac (LODINE) 400 MG tablet Take 1 tablet by mouth 2 (two) times daily.      famotidine (PEPCID) 20 MG tablet Take 20 mg by mouth 2 (two) times daily.      fluticasone propionate (FLONASE) 50 mcg/actuation nasal spray SMARTSIG:3 Spray(s) Both Nares Every Morning      gabapentin (NEURONTIN) 300 MG capsule Take 300 mg by mouth.      nicotine (NICODERM CQ) 21 mg/24 hr Place 1 patch onto the skin once daily. 14 patch 0    nicotine polacrilex 2 MG Lozg Take 1 lozenge (2 mg total) by mouth as needed (in place of a cigarette). 72 lozenge 0    omeprazole (PRILOSEC) 40 MG capsule Take 1 capsule by mouth every morning.      pravastatin (PRAVACHOL) 40 MG tablet Take 40 mg by mouth.       No current facility-administered medications for this visit.      Physical Exam:   BP (!) 157/74 (BP Location: Left arm, Patient Position: Sitting, BP Method: Medium (Automatic))   Pulse 76   Temp 98 °F (36.7 °C) (Oral)   Resp 18   Ht 5' 7" (1.702 m)   Wt 77.3 kg (170 lb 6.7 oz)   SpO2 96%   BMI 26.69 kg/m²      ECOG Performance status: 0       Physical Exam  Vitals reviewed.   Constitutional:       General: He is not in acute distress.     Appearance: Normal appearance. He is not ill-appearing, toxic-appearing or diaphoretic.   HENT:      Head: Normocephalic and atraumatic.      Right Ear: External ear normal.      Left Ear: External ear normal.      Nose: Nose normal. No congestion.      Mouth/Throat:      Pharynx: Oropharynx is clear. No oropharyngeal exudate.   Eyes:      General: No scleral icterus.     Conjunctiva/sclera: Conjunctivae normal.   Cardiovascular:      Rate and Rhythm: Normal rate.   Pulmonary:      Effort: Pulmonary effort is normal. No respiratory distress.      Breath sounds: No wheezing.   Abdominal:      General: There is no distension. "   Musculoskeletal:      Cervical back: Normal range of motion.      Right lower leg: No edema.      Left lower leg: No edema.   Skin:     General: Skin is warm and dry.      Coloration: Skin is not jaundiced or pale.      Findings: No bruising, erythema or rash.   Neurological:      General: No focal deficit present.      Mental Status: He is alert and oriented to person, place, and time.      Cranial Nerves: No cranial nerve deficit.      Motor: No weakness.      Gait: Gait normal.   Psychiatric:         Mood and Affect: Mood normal.         Behavior: Behavior normal.         Labs:      I have reviewed the pertinent labs. CBC, CMP unremarkable.     Imaging:    Esophagram - 6/15/2023  Impression:  - Irregularity and narrowing of the distal esophagus. Findings are suspicious for underlying neoplasm versus varices. Recommend endoscopy.  - Mild presbyesophagus     CT Chest, CT A/P - 7/3/2023   1.   There is an eccentric mass in the lower thoracic esophagus extending in close proximity to the gastric cardia.   2.   There is no evidence of metastatic disease in the thorax.   3.   There is a tiny subcapsular hypodensity in the right hepatic lobe which is too small to characterize. This likely represents a cyst, although a metastatic lesion is not excluded. There is also a hyperenhancing mass in the right hepatic lobe which likely reflects a hemangioma but is also nonspecific. A short-term follow-up exam or an MRI of the abdomen would likely be of benefit.   4.   There is an apparent 1.8 cm mass in the upper pole of the left kidney anteriorly. This does not have the appearance of a simple cyst. There are no precontrast images to compare and evaluate for definite enhancement although this appears to enhance. This could simply represent normal renal parenchyma, although a solid tumor is suspected, concerning for renal cell carcinoma. This could be evaluated further with a dedicated renal mass CT versus an MRI of the abdomen  without and with contrast.     EUS - 7/11/2023  Impression:   - Normal first portion of the duodenum and second portion of the duodenum. No specimens collected.   - Mucosal nodule found in the duodenum. Biopsied.   - Normal stomach. No specimens collected.   - Partially obstructing, likely malignant esophageal tumor was found in the distal esophagus. Biopsied.   - A mass was found in the thoracic esophagus. Fine need biopsy performed.   - Malignant-appearing esophageal stenosis. Dilated.   - Normal cricopharyngeus, upper third of esophagus and middle third of esophagus. No specimens collected.     I have personally reviewed the imaging which is notable for distal esophageal tumor and dilation.     Path:   7/11/2023 -   DIAGNOSIS:                                    1. 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 ESOPHAGUS, DISTAL MASS, BIOPSY:   - INVASIVE, MODERATELY DIFFERENTIATED ADENOCARCINOMA WITH MUCINOUS DIFFERENTIATION                                    3. ESOPHAGUS MASS, FINE NEEDLE ASPIRATION:   - ADENOCARCINOMA WITH MUCINOUS DIFFERENTIATION    Assessment:       1. Esophageal adenocarcinoma    2. Immunodeficiency secondary to neoplasm    3. Hypertension, unspecified type    4. Hyperlipidemia, unspecified hyperlipidemia type          Plan:        # Esophageal adenocarcinoma  Mr. Lguo is a 67 y.o. male who presents to clinic for evaluation of newly diagnosed esophageal cancer. He initially presented with dysphagia, now improved s/p esophageal dilation during EUS. Biopsy confirmed adenocarcinoma.     We reviewed his diagnosis, prognosis, and treatment options with him during our initial visit today. Discussed with him the high likelihood that cancer would recur if he was taken straight to surgery without neoadjuvant treatment. Our recommendation is to proceed with weekly carboplatin + taxol in conjunction with daily radiation  (M-F, no holidays) for a total of 5-5.5 weeks. Handouts provided to patient on both chemotherapy medications.     Plan to repeat imaging ~4 weeks after completion of chemoRT followed by potential esophagectomy. Should there be evidence of residual cancer after surgery, we reviewed the SOC recommendation for adjuvant nivolumab. He would prefer to have treatments in the afternoon to accommodate his work schedule.     He will need PET/CT to complete staging, scheduled August 8th.   Had a very in-depth discussion with the patient and starting chemoRT with carbo/taxol. However, he would like to discuss his treatment plan further with Dr. Singer and Dr. Neri prior to signing consent for chemotherapy today. Side effects were not discussed in-depth due to patient concerns regarding his treatment plan  Clinically, he is stable. He does have dysphagia to meat and dense foods. He is able to tolerate liquids and very soft foods. He is not having significant pain at this time. After discussion, I feel that it is reasonable to have him see Dr. Neri and Dr. Singer to solidify his treatment plan. He is scheduled to see Dr. Singer this Thursday to potentially start chemotherapy on Friday; however he is not scheduled to start RT yet. Will consider rescheduling his chemotherapy to next week pending start date of RT. Pt is agreeable.   Will discuss further with Dr. Singer and Dr. Neri.     RTC as scheduled to see Dr. Singer on Thursday with lab work    # HTN, HLD  BP elevated in clinic. Asymptomatic.   Continue medical management.   Following with PCP.    # Tobacco use  Encouraged cessation prior to potential esophagectomy.     Follow up: RTC on Thursday for consent and treatment discussion. RTC in 1 week to start chemoRT     Patient is in agreement with the proposed treatment plan. All questions were answered to the patient's satisfaction. Pt knows to call clinic if anything is needed before the next clinic  visit.    Patient discussed with and also seen by collaborating physician, Dr. Singer.    At least 70 minutes were spent today on this encounter including face to face time with the patient, data gathering/interpretation and documentation.       Med Onc Chart Routing      Follow up with physician . See Dr Singer as scheduled with lab work and to start cycle 1 of weekly chemoRT with carboplatin plus taxol   Follow up with [facility]    Infusion scheduling note    Injection scheduling note    Labs CBC and CMP   Scheduling:  Preferred lab:  Lab interval: once a week     Imaging    Pharmacy appointment    Other referrals

## 2023-08-30 ENCOUNTER — PATIENT MESSAGE (OUTPATIENT)
Dept: RADIATION ONCOLOGY | Facility: CLINIC | Age: 68
End: 2023-08-30
Payer: MEDICARE

## 2023-08-30 DIAGNOSIS — C15.9 ESOPHAGEAL ADENOCARCINOMA: Primary | ICD-10-CM

## 2023-08-31 ENCOUNTER — OFFICE VISIT (OUTPATIENT)
Dept: HEMATOLOGY/ONCOLOGY | Facility: CLINIC | Age: 68
End: 2023-08-31
Payer: MEDICARE

## 2023-08-31 ENCOUNTER — LAB VISIT (OUTPATIENT)
Dept: LAB | Facility: HOSPITAL | Age: 68
End: 2023-08-31
Payer: MEDICARE

## 2023-08-31 VITALS
HEART RATE: 75 BPM | WEIGHT: 170.44 LBS | RESPIRATION RATE: 18 BRPM | HEIGHT: 67 IN | BODY MASS INDEX: 26.75 KG/M2 | SYSTOLIC BLOOD PRESSURE: 159 MMHG | OXYGEN SATURATION: 96 % | TEMPERATURE: 98 F | DIASTOLIC BLOOD PRESSURE: 77 MMHG

## 2023-08-31 DIAGNOSIS — Z72.0 TOBACCO USE: ICD-10-CM

## 2023-08-31 DIAGNOSIS — C15.9 ESOPHAGEAL ADENOCARCINOMA: Primary | ICD-10-CM

## 2023-08-31 DIAGNOSIS — I10 HYPERTENSION, UNSPECIFIED TYPE: ICD-10-CM

## 2023-08-31 DIAGNOSIS — D49.9 IMMUNODEFICIENCY SECONDARY TO NEOPLASM: ICD-10-CM

## 2023-08-31 DIAGNOSIS — D84.81 IMMUNODEFICIENCY SECONDARY TO NEOPLASM: ICD-10-CM

## 2023-08-31 DIAGNOSIS — C15.9 ESOPHAGEAL ADENOCARCINOMA: ICD-10-CM

## 2023-08-31 DIAGNOSIS — E78.5 HYPERLIPIDEMIA, UNSPECIFIED HYPERLIPIDEMIA TYPE: ICD-10-CM

## 2023-08-31 LAB
ALBUMIN SERPL BCP-MCNC: 4.2 G/DL (ref 3.5–5.2)
ALP SERPL-CCNC: 91 U/L (ref 55–135)
ALT SERPL W/O P-5'-P-CCNC: 49 U/L (ref 10–44)
ANION GAP SERPL CALC-SCNC: 12 MMOL/L (ref 8–16)
AST SERPL-CCNC: 33 U/L (ref 10–40)
BILIRUB SERPL-MCNC: 0.8 MG/DL (ref 0.1–1)
BUN SERPL-MCNC: 15 MG/DL (ref 8–23)
CALCIUM SERPL-MCNC: 9.9 MG/DL (ref 8.7–10.5)
CHLORIDE SERPL-SCNC: 107 MMOL/L (ref 95–110)
CO2 SERPL-SCNC: 23 MMOL/L (ref 23–29)
CREAT SERPL-MCNC: 1.1 MG/DL (ref 0.5–1.4)
ERYTHROCYTE [DISTWIDTH] IN BLOOD BY AUTOMATED COUNT: 12 % (ref 11.5–14.5)
EST. GFR  (NO RACE VARIABLE): >60 ML/MIN/1.73 M^2
GLUCOSE SERPL-MCNC: 97 MG/DL (ref 70–110)
HCT VFR BLD AUTO: 44.7 % (ref 40–54)
HGB BLD-MCNC: 15.6 G/DL (ref 14–18)
IMM GRANULOCYTES # BLD AUTO: 0.04 K/UL (ref 0–0.04)
MCH RBC QN AUTO: 30.1 PG (ref 27–31)
MCHC RBC AUTO-ENTMCNC: 34.9 G/DL (ref 32–36)
MCV RBC AUTO: 86 FL (ref 82–98)
NEUTROPHILS # BLD AUTO: 6.9 K/UL (ref 1.8–7.7)
PLATELET # BLD AUTO: 234 K/UL (ref 150–450)
PMV BLD AUTO: 9.1 FL (ref 9.2–12.9)
POTASSIUM SERPL-SCNC: 3.6 MMOL/L (ref 3.5–5.1)
PROT SERPL-MCNC: 7 G/DL (ref 6–8.4)
RBC # BLD AUTO: 5.18 M/UL (ref 4.6–6.2)
SODIUM SERPL-SCNC: 142 MMOL/L (ref 136–145)
WBC # BLD AUTO: 9.62 K/UL (ref 3.9–12.7)

## 2023-08-31 PROCEDURE — 99214 OFFICE O/P EST MOD 30 MIN: CPT | Mod: PBBFAC | Performed by: INTERNAL MEDICINE

## 2023-08-31 PROCEDURE — 85027 COMPLETE CBC AUTOMATED: CPT | Performed by: PHYSICIAN ASSISTANT

## 2023-08-31 PROCEDURE — 99999 PR PBB SHADOW E&M-EST. PATIENT-LVL IV: ICD-10-PCS | Mod: PBBFAC,,, | Performed by: INTERNAL MEDICINE

## 2023-08-31 PROCEDURE — 99215 PR OFFICE/OUTPT VISIT, EST, LEVL V, 40-54 MIN: ICD-10-PCS | Mod: S$PBB,,, | Performed by: INTERNAL MEDICINE

## 2023-08-31 PROCEDURE — 99215 OFFICE O/P EST HI 40 MIN: CPT | Mod: S$PBB,,, | Performed by: INTERNAL MEDICINE

## 2023-08-31 PROCEDURE — 99999 PR PBB SHADOW E&M-EST. PATIENT-LVL IV: CPT | Mod: PBBFAC,,, | Performed by: INTERNAL MEDICINE

## 2023-08-31 PROCEDURE — 80053 COMPREHEN METABOLIC PANEL: CPT | Performed by: PHYSICIAN ASSISTANT

## 2023-08-31 PROCEDURE — 36415 COLL VENOUS BLD VENIPUNCTURE: CPT | Performed by: PHYSICIAN ASSISTANT

## 2023-08-31 RX ORDER — FAMOTIDINE 10 MG/ML
20 INJECTION INTRAVENOUS
Status: CANCELLED | OUTPATIENT
Start: 2023-08-31 | End: 2023-08-31

## 2023-08-31 RX ORDER — SODIUM CHLORIDE 0.9 % (FLUSH) 0.9 %
10 SYRINGE (ML) INJECTION
Status: CANCELLED | OUTPATIENT
Start: 2023-08-31

## 2023-08-31 RX ORDER — ONDANSETRON HYDROCHLORIDE 8 MG/1
8 TABLET, FILM COATED ORAL EVERY 8 HOURS PRN
Qty: 60 TABLET | Refills: 2 | Status: SHIPPED | OUTPATIENT
Start: 2023-08-31 | End: 2023-12-13

## 2023-08-31 RX ORDER — EPINEPHRINE 0.3 MG/.3ML
0.3 INJECTION SUBCUTANEOUS ONCE AS NEEDED
Status: CANCELLED | OUTPATIENT
Start: 2023-08-31

## 2023-08-31 RX ORDER — DIPHENHYDRAMINE HYDROCHLORIDE 50 MG/ML
50 INJECTION INTRAMUSCULAR; INTRAVENOUS ONCE AS NEEDED
Status: CANCELLED | OUTPATIENT
Start: 2023-08-31

## 2023-08-31 RX ORDER — HEPARIN 100 UNIT/ML
500 SYRINGE INTRAVENOUS
Status: CANCELLED | OUTPATIENT
Start: 2023-08-31

## 2023-08-31 NOTE — PROGRESS NOTES
MEDICAL ONCOLOGY - ESTABLISHED PATIENT VISIT    Reason for visit: esophageal adenocarcinoma     Best Contact Phone Number(s): There are no phone numbers on file.     Cancer/Stage/TNM:    Cancer Staging   Esophageal adenocarcinoma  Staging form: Esophagus - Adenocarcinoma, AJCC 8th Edition  - Clinical: Stage III (cT2, cN1, cM0, G2) - Signed by Miguel Angel Hampton Jr., MD on 7/31/2023       Oncology History   Esophageal adenocarcinoma   7/31/2023 Initial Diagnosis    Esophageal adenocarcinoma     7/31/2023 Cancer Staged    Staging form: Esophagus - Adenocarcinoma, AJCC 8th Edition  - Clinical: Stage III (cT2, cN1, cM0, G2)     8/15/2023 -  Chemotherapy    Treatment Summary   Plan Name: OP ESOPHAGEAL PACLITAXEL CARBOPLATIN WEEKLY  Treatment Goal: Curative  Status: Active  Start Date: 8/15/2023 (Planned)  End Date: 9/12/2023 (Planned)  Provider: Sahil Singer MD  Chemotherapy: CARBOplatin (PARAPLATIN) 195 mg in sodium chloride 0.9% 269.5 mL chemo infusion, 195 mg (original dose ), Intravenous, Clinic/HOD 1 time, 0 of 1 cycle  Dose modification:   (Cycle 1)  PACLitaxeL (TAXOL) 50 mg/m2 = 96 mg in sodium chloride 0.9% 250 mL chemo infusion, 50 mg/m2 = 96 mg, Intravenous, Clinic/HOD 1 time, 0 of 1 cycle        Interim History:  67 y.o. male with esophageal adenocarcinoma who presents for follow-up prior to starting definitive chemoradiation. His swallowing is stable.  Denies odynophagia.     ROS:   Review of Systems   Constitutional:  Negative for fever, malaise/fatigue and weight loss.   HENT:  Negative for nosebleeds.         Dysphagia   Respiratory:  Negative for shortness of breath.    Cardiovascular:  Negative for chest pain and palpitations.   Gastrointestinal:  Negative for blood in stool, constipation, diarrhea, heartburn, nausea and vomiting.   Genitourinary:  Negative for hematuria.   Musculoskeletal:  Negative for falls.   Skin:  Negative for itching and rash.   Neurological:  Negative for dizziness,  tingling, sensory change and weakness.   Psychiatric/Behavioral:  The patient is not nervous/anxious.      Past Medical History:   Past Medical History:   Diagnosis Date    HTN (hypertension)      Past Surgical History:   Past Surgical History:   Procedure Laterality Date    LAPAROSCOPIC REPAIR OF INGUINAL HERNIA Right      Family History:   No family history on file.     Social History:   Social History     Tobacco Use    Smoking status: Every Day     Current packs/day: 1.00     Types: Cigarettes    Smokeless tobacco: Never   Substance Use Topics    Alcohol use: Not on file      I have reviewed and updated the patient's past medical, surgical, family and social histories.    Allergies:   Review of patient's allergies indicates:  No Known Allergies     Medications:   Current Outpatient Medications   Medication Sig Dispense Refill    amLODIPine (NORVASC) 10 MG tablet Take 10 mg by mouth.      azelastine (ASTELIN) 137 mcg (0.1 %) nasal spray SPRAY 1 SPRAY BY NASAL ROUTE 2 TIMES DAILY USE IN EACH NOSTRIL AS DIRECTED.      co-enzyme Q-10 30 mg capsule Take 30 mg by mouth 3 (three) times daily.      etodolac (LODINE) 400 MG tablet Take 1 tablet by mouth 2 (two) times daily.      famotidine (PEPCID) 20 MG tablet Take 20 mg by mouth 2 (two) times daily.      fluticasone propionate (FLONASE) 50 mcg/actuation nasal spray SMARTSIG:3 Spray(s) Both Nares Every Morning      gabapentin (NEURONTIN) 300 MG capsule Take 300 mg by mouth.      nicotine (NICODERM CQ) 21 mg/24 hr Place 1 patch onto the skin once daily. 14 patch 0    nicotine polacrilex 2 MG Lozg Take 1 lozenge (2 mg total) by mouth as needed (in place of a cigarette). 72 lozenge 0    omeprazole (PRILOSEC) 40 MG capsule Take 1 capsule by mouth every morning.      ondansetron (ZOFRAN) 8 MG tablet Take 1 tablet (8 mg total) by mouth every 8 (eight) hours as needed for Nausea. 60 tablet 2    pravastatin (PRAVACHOL) 40 MG tablet Take 40 mg by mouth.       No current  "facility-administered medications for this visit.      Physical Exam:   BP (!) 159/77 (BP Location: Left arm, Patient Position: Sitting, BP Method: Medium (Automatic))   Pulse 75   Temp 98.4 °F (36.9 °C) (Oral)   Resp 18   Ht 5' 7" (1.702 m)   Wt 77.3 kg (170 lb 6.7 oz)   SpO2 96%   BMI 26.69 kg/m²      ECOG Performance status: 0       Physical Exam  Vitals reviewed.   Constitutional:       General: He is not in acute distress.     Appearance: Normal appearance. He is not ill-appearing, toxic-appearing or diaphoretic.   HENT:      Head: Normocephalic and atraumatic.      Right Ear: External ear normal.      Left Ear: External ear normal.      Nose: Nose normal. No congestion.      Mouth/Throat:      Pharynx: Oropharynx is clear. No oropharyngeal exudate.   Eyes:      General: No scleral icterus.     Conjunctiva/sclera: Conjunctivae normal.   Cardiovascular:      Rate and Rhythm: Normal rate.   Pulmonary:      Effort: Pulmonary effort is normal. No respiratory distress.      Breath sounds: No wheezing.   Abdominal:      General: There is no distension.   Musculoskeletal:      Cervical back: Normal range of motion.      Right lower leg: No edema.      Left lower leg: No edema.   Skin:     General: Skin is warm and dry.      Coloration: Skin is not jaundiced or pale.      Findings: No bruising, erythema or rash.   Neurological:      General: No focal deficit present.      Mental Status: He is alert and oriented to person, place, and time.      Cranial Nerves: No cranial nerve deficit.      Motor: No weakness.      Gait: Gait normal.   Psychiatric:         Mood and Affect: Mood normal.         Behavior: Behavior normal.         Labs:      I have reviewed the pertinent labs from today. CBC, CMP unremarkable.     Imaging:    Esophagram - 6/15/2023  Impression:  - Irregularity and narrowing of the distal esophagus. Findings are suspicious for underlying neoplasm versus varices. Recommend endoscopy.  - Mild " presbyesophagus     CT Chest, CT A/P - 7/3/2023   1.   There is an eccentric mass in the lower thoracic esophagus extending in close proximity to the gastric cardia.   2.   There is no evidence of metastatic disease in the thorax.   3.   There is a tiny subcapsular hypodensity in the right hepatic lobe which is too small to characterize. This likely represents a cyst, although a metastatic lesion is not excluded. There is also a hyperenhancing mass in the right hepatic lobe which likely reflects a hemangioma but is also nonspecific. A short-term follow-up exam or an MRI of the abdomen would likely be of benefit.   4.   There is an apparent 1.8 cm mass in the upper pole of the left kidney anteriorly. This does not have the appearance of a simple cyst. There are no precontrast images to compare and evaluate for definite enhancement although this appears to enhance. This could simply represent normal renal parenchyma, although a solid tumor is suspected, concerning for renal cell carcinoma. This could be evaluated further with a dedicated renal mass CT versus an MRI of the abdomen without and with contrast.     EUS - 7/11/2023  Impression:   - Normal first portion of the duodenum and second portion of the duodenum. No specimens collected.   - Mucosal nodule found in the duodenum. Biopsied.   - Normal stomach. No specimens collected.   - Partially obstructing, likely malignant esophageal tumor was found in the distal esophagus. Biopsied.   - A mass was found in the thoracic esophagus. Fine need biopsy performed.   - Malignant-appearing esophageal stenosis. Dilated.   - Normal cricopharyngeus, upper third of esophagus and middle third of esophagus. No specimens collected.     I have personally reviewed the imaging which is notable for distal esophageal tumor and dilation.     Path:   7/11/2023 -   DIAGNOSIS:                                    1. DUODENAL BULB NODULE, BIOPSY:   - DUODENAL VILLOUS MUCOSA WITH GASTRIC  FOVEOLAR METAPLASIA                                    2. ESOPHAGUS, DISTAL MASS, BIOPSY:   - INVASIVE, MODERATELY DIFFERENTIATED ADENOCARCINOMA WITH MUCINOUS DIFFERENTIATION                                    3. ESOPHAGUS MASS, FINE NEEDLE ASPIRATION:   - ADENOCARCINOMA WITH MUCINOUS DIFFERENTIATION    Assessment:       1. Esophageal adenocarcinoma    2. Immunodeficiency secondary to neoplasm    3. Hypertension, unspecified type    4. Hyperlipidemia, unspecified hyperlipidemia type    5. Tobacco use            Plan:        # Esophageal adenocarcinoma  Mr. Lguo is a 67 y.o. male who presents to clinic for evaluation of newly diagnosed esophageal cancer. He initially presented with dysphagia, now improved s/p esophageal dilation during EUS. Biopsy confirmed adenocarcinoma.     We reviewed his diagnosis, prognosis, and treatment options with him during our initial visit today. Discussed with him the high likelihood that cancer would recur if he was taken straight to surgery without neoadjuvant treatment. Our recommendation is to proceed with weekly carboplatin + taxol in conjunction with daily radiation (M-F, no holidays) for a total of 5-5.5 weeks. Handouts provided to patient on both chemotherapy medications.     Plan to repeat imaging ~4 weeks after completion of chemoRT followed by potential esophagectomy. Should there be evidence of residual cancer after surgery, we reviewed the SOC recommendation for adjuvant nivolumab. He would prefer to have treatments in the afternoon to accommodate his work schedule.     PET/CT on 8/8/23 shows hypermetabolic distal esophageal tumor without clear evidence of metastatic disease.  He is quite reluctant to commit to surgery so he has opted for definitive chemoradiation. Discussed with Dr. Velez and Dr. Neri.    He is scheduled to start chemoradiation with weekly carboplatin + paclitaxel on 9/5/23.  Carboplatin AUC 2 + paclitaxel 50 mg/m2.  Plan for 5 treatments.  Labs today  adequate for treatment.  Consent obtained.  RTC in one week prior to cycle 2 of weekly chemotherapy.    # HTN, HLD  BP elevated in clinic. Asymptomatic.   Continue medical management.   Following with PCP.    # Tobacco use  Encouraged cessation.    Follow up: RTC 1 week.    Patient is in agreement with the proposed treatment plan. All questions were answered to the patient's satisfaction. Pt knows to call clinic if anything is needed before the next clinic visit.    Sahil Singer MD  Hematology/Oncology  [facility]        Med Onc Chart Routing      Follow up with physician 2 weeks. as scheduled for carboplatin + taxol   Follow up with [facility] 1 week. as scheduled for carboplatin + taxol   Infusion scheduling note    Injection scheduling note    Labs CBC and CMP   Scheduling:  Preferred lab:  Lab interval:     Imaging    Pharmacy appointment    Other referrals

## 2023-09-01 ENCOUNTER — HOSPITAL ENCOUNTER (OUTPATIENT)
Dept: RADIATION THERAPY | Facility: HOSPITAL | Age: 68
Discharge: HOME | End: 2023-09-01
Attending: STUDENT IN AN ORGANIZED HEALTH CARE EDUCATION/TRAINING PROGRAM
Payer: MEDICARE

## 2023-09-05 ENCOUNTER — INFUSION (OUTPATIENT)
Dept: INFUSION THERAPY | Facility: HOSPITAL | Age: 68
End: 2023-09-05
Payer: MEDICARE

## 2023-09-05 VITALS
TEMPERATURE: 98 F | BODY MASS INDEX: 26.75 KG/M2 | RESPIRATION RATE: 18 BRPM | HEART RATE: 86 BPM | DIASTOLIC BLOOD PRESSURE: 77 MMHG | OXYGEN SATURATION: 96 % | SYSTOLIC BLOOD PRESSURE: 142 MMHG | WEIGHT: 170.44 LBS | HEIGHT: 67 IN

## 2023-09-05 DIAGNOSIS — C15.9 ESOPHAGEAL ADENOCARCINOMA: Primary | ICD-10-CM

## 2023-09-05 PROCEDURE — 77427 RADIATION TX MANAGEMENT X5: CPT | Mod: ,,, | Performed by: STUDENT IN AN ORGANIZED HEALTH CARE EDUCATION/TRAINING PROGRAM

## 2023-09-05 PROCEDURE — 96367 TX/PROPH/DG ADDL SEQ IV INF: CPT

## 2023-09-05 PROCEDURE — 25000003 PHARM REV CODE 250: Performed by: INTERNAL MEDICINE

## 2023-09-05 PROCEDURE — 63600175 PHARM REV CODE 636 W HCPCS: Performed by: INTERNAL MEDICINE

## 2023-09-05 PROCEDURE — 77427 PR CHG RADIATION,MANGEMENT,5 TX'S: ICD-10-PCS | Mod: ,,, | Performed by: STUDENT IN AN ORGANIZED HEALTH CARE EDUCATION/TRAINING PROGRAM

## 2023-09-05 PROCEDURE — 77014 PR  CT GUIDANCE PLACEMENT RAD THERAPY FIELDS: ICD-10-PCS | Mod: 26,,, | Performed by: STUDENT IN AN ORGANIZED HEALTH CARE EDUCATION/TRAINING PROGRAM

## 2023-09-05 PROCEDURE — 96413 CHEMO IV INFUSION 1 HR: CPT

## 2023-09-05 PROCEDURE — 77014 PR  CT GUIDANCE PLACEMENT RAD THERAPY FIELDS: CPT | Mod: 26,,, | Performed by: STUDENT IN AN ORGANIZED HEALTH CARE EDUCATION/TRAINING PROGRAM

## 2023-09-05 PROCEDURE — 96415 CHEMO IV INFUSION ADDL HR: CPT

## 2023-09-05 PROCEDURE — 77386 HC IMRT, COMPLEX: CPT | Performed by: STUDENT IN AN ORGANIZED HEALTH CARE EDUCATION/TRAINING PROGRAM

## 2023-09-05 PROCEDURE — 96375 TX/PRO/DX INJ NEW DRUG ADDON: CPT

## 2023-09-05 PROCEDURE — 96417 CHEMO IV INFUS EACH ADDL SEQ: CPT

## 2023-09-05 RX ORDER — DIPHENHYDRAMINE HYDROCHLORIDE 50 MG/ML
50 INJECTION INTRAMUSCULAR; INTRAVENOUS ONCE AS NEEDED
Status: DISCONTINUED | OUTPATIENT
Start: 2023-09-05 | End: 2023-09-05 | Stop reason: HOSPADM

## 2023-09-05 RX ORDER — HEPARIN 100 UNIT/ML
500 SYRINGE INTRAVENOUS
Status: DISCONTINUED | OUTPATIENT
Start: 2023-09-05 | End: 2023-09-05 | Stop reason: HOSPADM

## 2023-09-05 RX ORDER — SODIUM CHLORIDE 0.9 % (FLUSH) 0.9 %
10 SYRINGE (ML) INJECTION
Status: DISCONTINUED | OUTPATIENT
Start: 2023-09-05 | End: 2023-09-05 | Stop reason: HOSPADM

## 2023-09-05 RX ORDER — EPINEPHRINE 0.3 MG/.3ML
0.3 INJECTION SUBCUTANEOUS ONCE AS NEEDED
Status: DISCONTINUED | OUTPATIENT
Start: 2023-09-05 | End: 2023-09-05 | Stop reason: HOSPADM

## 2023-09-05 RX ORDER — LORAZEPAM 0.5 MG/1
0.5 TABLET ORAL EVERY 6 HOURS PRN
Status: COMPLETED | OUTPATIENT
Start: 2023-09-05 | End: 2023-09-05

## 2023-09-05 RX ORDER — FAMOTIDINE 10 MG/ML
20 INJECTION INTRAVENOUS
Status: COMPLETED | OUTPATIENT
Start: 2023-09-05 | End: 2023-09-05

## 2023-09-05 RX ADMIN — DEXAMETHASONE SODIUM PHOSPHATE 0.25 MG: 4 INJECTION, SOLUTION INTRA-ARTICULAR; INTRALESIONAL; INTRAMUSCULAR; INTRAVENOUS; SOFT TISSUE at 08:09

## 2023-09-05 RX ADMIN — CARBOPLATIN 195 MG: 10 INJECTION, SOLUTION INTRAVENOUS at 10:09

## 2023-09-05 RX ADMIN — DIPHENHYDRAMINE HYDROCHLORIDE 50 MG: 50 INJECTION, SOLUTION INTRAMUSCULAR; INTRAVENOUS at 08:09

## 2023-09-05 RX ADMIN — PACLITAXEL 96 MG: 6 INJECTION, SOLUTION, CONCENTRATE INTRAVENOUS at 08:09

## 2023-09-05 RX ADMIN — LORAZEPAM 0.5 MG: 0.5 TABLET ORAL at 09:09

## 2023-09-05 RX ADMIN — SODIUM CHLORIDE: 0.9 INJECTION, SOLUTION INTRAVENOUS at 07:09

## 2023-09-05 RX ADMIN — FAMOTIDINE 20 MG: 10 INJECTION INTRAVENOUS at 08:09

## 2023-09-05 NOTE — PLAN OF CARE
Pt received Day 1 Taxol & Carboplatin today and tolerated well, without complications. VSS throughout infusion. Educated patient about Taxol & Carboplatin, & Day1  (indications, side effects, possible reactions, chemotherapy precautions) and verbalized understanding. PIV positive for blood return, saline locked and removed prior to DC, catheter tip intact. Pt DC with no distress noted, ambulated off of unit, via self, w/o event, pleased. States now hungry.

## 2023-09-05 NOTE — NURSING
Pt left, and went to radiation tx. Pt states he told the techs he feels dizzy. They instructed him to return to chemo. Pt states slightly dizzy, VSS. Dr Singer notified. Discussed the effects of Ativan with pt. States he has never had it before. He states he is still anxious after having 1st infusion and radiation txs, even though done. States he thinks maybe he needs to eat. Instructed we can get him a WC to go to cafe' and have lunch. Thankful. Pleased. Also instructed any further issues to be seen in the ER. Ambulated off unit w/ steady gait.

## 2023-09-06 PROCEDURE — 77014 PR  CT GUIDANCE PLACEMENT RAD THERAPY FIELDS: CPT | Mod: 26,,, | Performed by: STUDENT IN AN ORGANIZED HEALTH CARE EDUCATION/TRAINING PROGRAM

## 2023-09-06 PROCEDURE — 77386 HC IMRT, COMPLEX: CPT | Performed by: STUDENT IN AN ORGANIZED HEALTH CARE EDUCATION/TRAINING PROGRAM

## 2023-09-06 PROCEDURE — 77014 PR  CT GUIDANCE PLACEMENT RAD THERAPY FIELDS: ICD-10-PCS | Mod: 26,,, | Performed by: STUDENT IN AN ORGANIZED HEALTH CARE EDUCATION/TRAINING PROGRAM

## 2023-09-07 ENCOUNTER — CLINICAL SUPPORT (OUTPATIENT)
Dept: SMOKING CESSATION | Facility: CLINIC | Age: 68
End: 2023-09-07

## 2023-09-07 DIAGNOSIS — F17.200 NICOTINE DEPENDENCE: Primary | ICD-10-CM

## 2023-09-07 PROCEDURE — 77386 HC IMRT, COMPLEX: CPT | Performed by: STUDENT IN AN ORGANIZED HEALTH CARE EDUCATION/TRAINING PROGRAM

## 2023-09-07 PROCEDURE — 77014 PR  CT GUIDANCE PLACEMENT RAD THERAPY FIELDS: CPT | Mod: 26,,, | Performed by: STUDENT IN AN ORGANIZED HEALTH CARE EDUCATION/TRAINING PROGRAM

## 2023-09-07 PROCEDURE — 77014 PR  CT GUIDANCE PLACEMENT RAD THERAPY FIELDS: ICD-10-PCS | Mod: 26,,, | Performed by: STUDENT IN AN ORGANIZED HEALTH CARE EDUCATION/TRAINING PROGRAM

## 2023-09-07 PROCEDURE — 99404 PR PREVENT COUNSEL,INDIV,60 MIN: ICD-10-PCS | Mod: S$GLB,,,

## 2023-09-07 PROCEDURE — 99404 PREV MED CNSL INDIV APPRX 60: CPT | Mod: S$GLB,,,

## 2023-09-07 PROCEDURE — 99999 PR PBB SHADOW E&M-EST. PATIENT-LVL I: ICD-10-PCS | Mod: PBBFAC,,,

## 2023-09-07 PROCEDURE — 99999 PR PBB SHADOW E&M-EST. PATIENT-LVL I: CPT | Mod: PBBFAC,,,

## 2023-09-07 NOTE — PROGRESS NOTES
Individual Follow-Up Form    9/7/2023    Quit Date:     Clinical Status of Patient: Outpatient    Continuing Medication: yes  Patches or Nicotine gum    Other Medications: none     Target Symptoms: Withdrawal and medication side effects. The following were  rated moderate (3) to severe (4) on TCRS:  Moderate (3): none  Severe (4): none    Comments: Patient was seen in clinic today and reports smoking 15-18 cigarettes per day. He remains on a tobacco cessation medication regimen of 21mg nicotine patch and 2mg nicotine gum. Patient states that he purchases his patches OTC and doesn't need a refill. No abnormal behaviors or mental changes reported at this time. Patient reports that he currently is going through Chemotherapy and Radiation and  is expecting in a few weeks that his throat will be too sore to smoke. Encouraged patient to continue to try his best to cut down as much as possible before that starts to happen. Suggested that patient not purchase any more cartons of cigarettes, and just get one pack at a time. Also, suggested that patient take 1 cigarette when going out to smoke. He says that he has gotten better with going out to smoke instead of smoking in his house. Reviewed strategies, cues, and triggers. Introduced the negative impact of tobacco on health, the health advantages of discontinuing the use of tobacco, time line improved health changes after a quit, withdrawal issues to expect from nicotine and habit, and ways to achieve the goal of a quit. The patient will continue with  therapy sessions and medication monitoring by CTTS. Prescribed medication management will be by physician.     Diagnosis: F17.200    Next Visit: 9/27/2023

## 2023-09-08 ENCOUNTER — DOCUMENTATION ONLY (OUTPATIENT)
Dept: RADIATION ONCOLOGY | Facility: CLINIC | Age: 68
End: 2023-09-08
Payer: MEDICARE

## 2023-09-08 PROCEDURE — 77014 PR  CT GUIDANCE PLACEMENT RAD THERAPY FIELDS: ICD-10-PCS | Mod: 26,,, | Performed by: STUDENT IN AN ORGANIZED HEALTH CARE EDUCATION/TRAINING PROGRAM

## 2023-09-08 PROCEDURE — 77014 PR  CT GUIDANCE PLACEMENT RAD THERAPY FIELDS: CPT | Mod: 26,,, | Performed by: STUDENT IN AN ORGANIZED HEALTH CARE EDUCATION/TRAINING PROGRAM

## 2023-09-08 PROCEDURE — 77386 HC IMRT, COMPLEX: CPT | Performed by: STUDENT IN AN ORGANIZED HEALTH CARE EDUCATION/TRAINING PROGRAM

## 2023-09-11 PROCEDURE — 77336 RADIATION PHYSICS CONSULT: CPT | Performed by: STUDENT IN AN ORGANIZED HEALTH CARE EDUCATION/TRAINING PROGRAM

## 2023-09-11 PROCEDURE — 77014 PR  CT GUIDANCE PLACEMENT RAD THERAPY FIELDS: CPT | Mod: 26,,, | Performed by: STUDENT IN AN ORGANIZED HEALTH CARE EDUCATION/TRAINING PROGRAM

## 2023-09-11 PROCEDURE — 77014 PR  CT GUIDANCE PLACEMENT RAD THERAPY FIELDS: ICD-10-PCS | Mod: 26,,, | Performed by: STUDENT IN AN ORGANIZED HEALTH CARE EDUCATION/TRAINING PROGRAM

## 2023-09-11 PROCEDURE — 77386 HC IMRT, COMPLEX: CPT | Performed by: STUDENT IN AN ORGANIZED HEALTH CARE EDUCATION/TRAINING PROGRAM

## 2023-09-12 ENCOUNTER — INFUSION (OUTPATIENT)
Dept: INFUSION THERAPY | Facility: HOSPITAL | Age: 68
End: 2023-09-12
Payer: MEDICARE

## 2023-09-12 ENCOUNTER — OFFICE VISIT (OUTPATIENT)
Dept: HEMATOLOGY/ONCOLOGY | Facility: CLINIC | Age: 68
End: 2023-09-12
Payer: MEDICARE

## 2023-09-12 VITALS
DIASTOLIC BLOOD PRESSURE: 68 MMHG | HEART RATE: 81 BPM | BODY MASS INDEX: 26.8 KG/M2 | SYSTOLIC BLOOD PRESSURE: 145 MMHG | TEMPERATURE: 98 F | RESPIRATION RATE: 18 BRPM | HEIGHT: 67 IN | WEIGHT: 170.75 LBS

## 2023-09-12 VITALS
SYSTOLIC BLOOD PRESSURE: 152 MMHG | BODY MASS INDEX: 26.8 KG/M2 | RESPIRATION RATE: 18 BRPM | DIASTOLIC BLOOD PRESSURE: 72 MMHG | HEART RATE: 76 BPM | HEIGHT: 67 IN | OXYGEN SATURATION: 98 % | WEIGHT: 170.75 LBS | TEMPERATURE: 99 F

## 2023-09-12 DIAGNOSIS — E78.5 HYPERLIPIDEMIA, UNSPECIFIED HYPERLIPIDEMIA TYPE: ICD-10-CM

## 2023-09-12 DIAGNOSIS — C15.9 ESOPHAGEAL ADENOCARCINOMA: Primary | ICD-10-CM

## 2023-09-12 DIAGNOSIS — D49.9 IMMUNODEFICIENCY SECONDARY TO NEOPLASM: ICD-10-CM

## 2023-09-12 DIAGNOSIS — I10 HYPERTENSION, UNSPECIFIED TYPE: ICD-10-CM

## 2023-09-12 DIAGNOSIS — Z72.0 TOBACCO USE: ICD-10-CM

## 2023-09-12 DIAGNOSIS — D84.81 IMMUNODEFICIENCY SECONDARY TO NEOPLASM: ICD-10-CM

## 2023-09-12 PROCEDURE — 99999 PR PBB SHADOW E&M-EST. PATIENT-LVL IV: ICD-10-PCS | Mod: PBBFAC,,, | Performed by: PHYSICIAN ASSISTANT

## 2023-09-12 PROCEDURE — 99999 PR PBB SHADOW E&M-EST. PATIENT-LVL IV: CPT | Mod: PBBFAC,,, | Performed by: PHYSICIAN ASSISTANT

## 2023-09-12 PROCEDURE — 99215 PR OFFICE/OUTPT VISIT, EST, LEVL V, 40-54 MIN: ICD-10-PCS | Mod: S$PBB,,, | Performed by: PHYSICIAN ASSISTANT

## 2023-09-12 PROCEDURE — 77014 PR  CT GUIDANCE PLACEMENT RAD THERAPY FIELDS: CPT | Mod: 26,,, | Performed by: STUDENT IN AN ORGANIZED HEALTH CARE EDUCATION/TRAINING PROGRAM

## 2023-09-12 PROCEDURE — 77014 PR  CT GUIDANCE PLACEMENT RAD THERAPY FIELDS: ICD-10-PCS | Mod: 26,,, | Performed by: STUDENT IN AN ORGANIZED HEALTH CARE EDUCATION/TRAINING PROGRAM

## 2023-09-12 PROCEDURE — 77427 RADIATION TX MANAGEMENT X5: CPT | Mod: ,,, | Performed by: STUDENT IN AN ORGANIZED HEALTH CARE EDUCATION/TRAINING PROGRAM

## 2023-09-12 PROCEDURE — 77386 HC IMRT, COMPLEX: CPT | Performed by: STUDENT IN AN ORGANIZED HEALTH CARE EDUCATION/TRAINING PROGRAM

## 2023-09-12 PROCEDURE — 63600175 PHARM REV CODE 636 W HCPCS: Performed by: PHYSICIAN ASSISTANT

## 2023-09-12 PROCEDURE — 99215 OFFICE O/P EST HI 40 MIN: CPT | Mod: S$PBB,,, | Performed by: PHYSICIAN ASSISTANT

## 2023-09-12 PROCEDURE — 25000003 PHARM REV CODE 250: Performed by: PHYSICIAN ASSISTANT

## 2023-09-12 PROCEDURE — 96413 CHEMO IV INFUSION 1 HR: CPT

## 2023-09-12 PROCEDURE — 77427 PR CHG RADIATION,MANGEMENT,5 TX'S: ICD-10-PCS | Mod: ,,, | Performed by: STUDENT IN AN ORGANIZED HEALTH CARE EDUCATION/TRAINING PROGRAM

## 2023-09-12 PROCEDURE — 96375 TX/PRO/DX INJ NEW DRUG ADDON: CPT

## 2023-09-12 PROCEDURE — 99214 OFFICE O/P EST MOD 30 MIN: CPT | Mod: PBBFAC,25 | Performed by: PHYSICIAN ASSISTANT

## 2023-09-12 PROCEDURE — 96417 CHEMO IV INFUS EACH ADDL SEQ: CPT

## 2023-09-12 PROCEDURE — 96367 TX/PROPH/DG ADDL SEQ IV INF: CPT

## 2023-09-12 RX ORDER — FAMOTIDINE 10 MG/ML
20 INJECTION INTRAVENOUS
Status: CANCELLED | OUTPATIENT
Start: 2023-09-12 | End: 2023-09-12

## 2023-09-12 RX ORDER — SODIUM CHLORIDE 0.9 % (FLUSH) 0.9 %
10 SYRINGE (ML) INJECTION
Status: DISCONTINUED | OUTPATIENT
Start: 2023-09-12 | End: 2023-09-12 | Stop reason: HOSPADM

## 2023-09-12 RX ORDER — HEPARIN 100 UNIT/ML
500 SYRINGE INTRAVENOUS
Status: DISCONTINUED | OUTPATIENT
Start: 2023-09-12 | End: 2023-09-12 | Stop reason: HOSPADM

## 2023-09-12 RX ORDER — FAMOTIDINE 10 MG/ML
20 INJECTION INTRAVENOUS
Status: COMPLETED | OUTPATIENT
Start: 2023-09-12 | End: 2023-09-12

## 2023-09-12 RX ORDER — HEPARIN 100 UNIT/ML
500 SYRINGE INTRAVENOUS
Status: CANCELLED | OUTPATIENT
Start: 2023-09-12

## 2023-09-12 RX ORDER — EPINEPHRINE 0.3 MG/.3ML
0.3 INJECTION SUBCUTANEOUS ONCE AS NEEDED
Status: CANCELLED | OUTPATIENT
Start: 2023-09-12

## 2023-09-12 RX ORDER — DIPHENHYDRAMINE HYDROCHLORIDE 50 MG/ML
50 INJECTION INTRAMUSCULAR; INTRAVENOUS ONCE AS NEEDED
Status: DISCONTINUED | OUTPATIENT
Start: 2023-09-12 | End: 2023-09-12 | Stop reason: HOSPADM

## 2023-09-12 RX ORDER — SODIUM CHLORIDE 0.9 % (FLUSH) 0.9 %
10 SYRINGE (ML) INJECTION
Status: CANCELLED | OUTPATIENT
Start: 2023-09-12

## 2023-09-12 RX ORDER — EPINEPHRINE 0.3 MG/.3ML
0.3 INJECTION SUBCUTANEOUS ONCE AS NEEDED
Status: DISCONTINUED | OUTPATIENT
Start: 2023-09-12 | End: 2023-09-12 | Stop reason: HOSPADM

## 2023-09-12 RX ORDER — DIPHENHYDRAMINE HYDROCHLORIDE 50 MG/ML
50 INJECTION INTRAMUSCULAR; INTRAVENOUS ONCE AS NEEDED
Status: CANCELLED | OUTPATIENT
Start: 2023-09-12

## 2023-09-12 RX ADMIN — DIPHENHYDRAMINE HYDROCHLORIDE 50 MG: 50 INJECTION, SOLUTION INTRAMUSCULAR; INTRAVENOUS at 09:09

## 2023-09-12 RX ADMIN — PACLITAXEL 96 MG: 6 INJECTION, SOLUTION, CONCENTRATE INTRAVENOUS at 10:09

## 2023-09-12 RX ADMIN — FAMOTIDINE 20 MG: 10 INJECTION INTRAVENOUS at 09:09

## 2023-09-12 RX ADMIN — DEXAMETHASONE SODIUM PHOSPHATE 0.25 MG: 4 INJECTION, SOLUTION INTRA-ARTICULAR; INTRALESIONAL; INTRAMUSCULAR; INTRAVENOUS; SOFT TISSUE at 09:09

## 2023-09-12 RX ADMIN — CARBOPLATIN 225 MG: 10 INJECTION, SOLUTION INTRAVENOUS at 11:09

## 2023-09-12 RX ADMIN — SODIUM CHLORIDE: 9 INJECTION, SOLUTION INTRAVENOUS at 09:09

## 2023-09-12 NOTE — PROGRESS NOTES
Oncology Nutrition Assessment for Medical Nutrition Therapy  Initial Visit    Jone Lugo   1955    Referring Provider: Sahil Singer MD      Reason for Visit: nutrition counseling and education    The patient location is: [place]  The chief complaint leading to consultation is: nutrition referral    Visit type: audiovisual    Face to Face time with patient: 11 minutes  15 minutes of total time spent on the encounter, which includes face to face time and non-face to face time preparing to see the patient (eg, review of tests), Obtaining and/or reviewing separately obtained history, Documenting clinical information in the electronic or other health record, Independently interpreting results (not separately reported) and communicating results to the patient/family/caregiver, or Care coordination (not separately reported).     Each patient to whom he or she provides medical services by telemedicine is:  (1) informed of the relationship between the physician and patient and the respective role of any other health care provider with respect to management of the patient; and (2) notified that he or she may decline to receive medical services by telemedicine and may withdraw from such care at any time.    PMHx:   Past Medical History:   Diagnosis Date    HTN (hypertension)        Nutrition Assessment    This is a 67 y.o.male with a medical diagnosis of esophageal adenocarcinoma, currently getting cycle 2 neoadjuvant chemoradiation with Taxol + Carboplatin. He reports that his appetite is pretty good but is struggling with early satiety sometimes. He reports having pretty well balanced meals, including protein with most meals (likes all kinds). He is trying to snack some, hadn't before. He reports working to drink more water. He reports he loves to drink milk. He reports he recently purchased Boost and [place] Instant Breakfast.    Weight: 77.4 kg (170 lb 11.9 oz) - 9/12 clinic weight  Height: 5'  "7" (1.702 m)   BMI: 26.7    Usual BW: 170lb  Weight Change: stable    Allergies: Patient has no known allergies.    Current Medications:  Current Outpatient Medications:     amLODIPine (NORVASC) 10 MG tablet, Take 10 mg by mouth., Disp: , Rfl:     azelastine (ASTELIN) 137 mcg (0.1 %) nasal spray, SPRAY 1 SPRAY BY NASAL ROUTE 2 TIMES DAILY USE IN EACH NOSTRIL AS DIRECTED., Disp: , Rfl:     co-enzyme Q-10 30 mg capsule, Take 30 mg by mouth 3 (three) times daily., Disp: , Rfl:     etodolac (LODINE) 400 MG tablet, Take 1 tablet by mouth 2 (two) times daily., Disp: , Rfl:     famotidine (PEPCID) 20 MG tablet, Take 20 mg by mouth 2 (two) times daily., Disp: , Rfl:     fluticasone propionate (FLONASE) 50 mcg/actuation nasal spray, SMARTSIG:3 Spray(s) Both Nares Every Morning, Disp: , Rfl:     gabapentin (NEURONTIN) 300 MG capsule, Take 300 mg by mouth., Disp: , Rfl:     nicotine (NICODERM CQ) 21 mg/24 hr, Place 1 patch onto the skin once daily., Disp: 14 patch, Rfl: 0    nicotine polacrilex 2 MG Lozg, Take 1 lozenge (2 mg total) by mouth as needed (in place of a cigarette)., Disp: 72 lozenge, Rfl: 0    omeprazole (PRILOSEC) 40 MG capsule, Take 1 capsule by mouth every morning., Disp: , Rfl:     ondansetron (ZOFRAN) 8 MG tablet, Take 1 tablet (8 mg total) by mouth every 8 (eight) hours as needed for Nausea., Disp: 60 tablet, Rfl: 2    pravastatin (PRAVACHOL) 40 MG tablet, Take 40 mg by mouth., Disp: , Rfl:   No current facility-administered medications for this visit.    Facility-Administered Medications Ordered in Other Visits:     alteplase injection 2 mg, 2 mg, Intra-Catheter, PRN, Jenna Funk PA-C    diphenhydrAMINE injection 50 mg, 50 mg, Intravenous, Once PRN, Jenna Funk PA-C    EPINEPHrine (EPIPEN) 0.3 mg/0.3 mL pen injection 0.3 mg, 0.3 mg, Intramuscular, Once PRN, Jenna Funk, DAMIEN    heparin, porcine (PF) 100 unit/mL injection flush 500 Units, 500 Units, Intravenous, PRN, Jenna Funk, PASANJUANA   "  hydrocortisone sodium succinate injection 100 mg, 100 mg, Intravenous, Once PRN, Jenna Funk PA-C    sodium chloride 0.9% flush 10 mL, 10 mL, Intravenous, PRN, Jenna Funk PA-C    Food/medication interactions noted: none    Vitamins/Supplements: MVI    Labs: Reviewed    Nutrition Diagnosis    Problem: nutrition-related knowledge deficit  Etiology: lack of prior need for nutrition education  Signs/Symptoms: new esophageal cancer diagnosis    Nutrition Intervention    Nutrition Prescription   1935 kcals (25kcal/kg)  85g protein (1.1g/kg)   1935mL fluid (25mL/kg)    Recommendations:  Eat small frequent meals/snacks  Make meals/snacks high in calories and protein - examples reviewed  Drink at least 64oz fluid/day  Being supplementing with Boost/Ensure if appetite decreases    Materials Provided/Reviewed: none    Nutrition Monitoring and Evaluation    Monitor: diet education needs, energy intake, and weight status    Goals: weight maintenance    Follow up: Patient provided with contact information and advised to call/message with questions or to make future appointment if further intervention is needed.    Communication to referring provider/care team: note available in chart    Counseling time: 11 minutes    Karina Floey MS, RD, LDN

## 2023-09-12 NOTE — PLAN OF CARE
DAY 4 of outpatient radiation to esophagus. Information/teaching sheet given. Denies N/V. Tolerating treatment.

## 2023-09-12 NOTE — PROGRESS NOTES
MEDICAL ONCOLOGY - ESTABLISHED PATIENT VISIT    Reason for visit: esophageal adenocarcinoma     Best Contact Phone Number(s): There are no phone numbers on file.     Cancer/Stage/TNM:    Cancer Staging   Esophageal adenocarcinoma  Staging form: Esophagus - Adenocarcinoma, AJCC 8th Edition  - Clinical: Stage III (cT2, cN1, cM0, G2) - Signed by Miguel Angel Hampton Jr., MD on 7/31/2023       Oncology History   Esophageal adenocarcinoma   7/31/2023 Initial Diagnosis    Esophageal adenocarcinoma     7/31/2023 Cancer Staged    Staging form: Esophagus - Adenocarcinoma, AJCC 8th Edition  - Clinical: Stage III (cT2, cN1, cM0, G2)     9/5/2023 -  Chemotherapy    Treatment Summary   Plan Name: OP ESOPHAGEAL PACLITAXEL CARBOPLATIN WEEKLY  Treatment Goal: Curative  Status: Active  Start Date: 9/5/2023  End Date: 9/27/2023 (Planned)  Provider: Sahil Singer MD  Chemotherapy: CARBOplatin (PARAPLATIN) 195 mg in sodium chloride 0.9% 304.5 mL chemo infusion, 195 mg (100 % of original dose 193.4 mg), Intravenous, Clinic/HOD 1 time, 1 of 1 cycle  Dose modification:   (original dose 193.4 mg, Cycle 1), 193.4 mg (original dose 193.4 mg, Cycle 1),   (original dose 193.4 mg, Cycle 1, Reason: MD Discretion)  Administration: 195 mg (9/5/2023)  PACLitaxeL (TAXOL) 50 mg/m2 = 96 mg in sodium chloride 0.9% 250 mL chemo infusion, 50 mg/m2 = 96 mg, Intravenous, Clinic/HOD 1 time, 1 of 1 cycle  Administration: 96 mg (9/5/2023)        Interim History:  67 y.o. male with esophageal adenocarcinoma who presents for follow-up prior to starting definitive chemoradiation. He is s/p his first week of chemoRT with carbo/taxol. He tolerated his first cycle fairly well without significant complication. His swallowing is stable.  Denies odynophagia. No neuropathy at this time. No nausea, vomiting, diarrhea, fever, constipation.     Presents alone today. ECOG status is 1.     ROS:   Review of Systems   Constitutional:  Negative for fever, malaise/fatigue  and weight loss.   HENT:  Negative for nosebleeds.         Dysphagia   Respiratory:  Negative for shortness of breath.    Cardiovascular:  Negative for chest pain and palpitations.   Gastrointestinal:  Negative for blood in stool, constipation, diarrhea, heartburn, nausea and vomiting.   Genitourinary:  Negative for hematuria.   Musculoskeletal:  Negative for falls.   Skin:  Negative for itching and rash.   Neurological:  Negative for dizziness, tingling, sensory change and weakness.   Psychiatric/Behavioral:  The patient is not nervous/anxious.      Past Medical History:   Past Medical History:   Diagnosis Date    HTN (hypertension)      Past Surgical History:   Past Surgical History:   Procedure Laterality Date    LAPAROSCOPIC REPAIR OF INGUINAL HERNIA Right      Family History:   History reviewed. No pertinent family history.     Social History:   Social History     Tobacco Use    Smoking status: Every Day     Current packs/day: 1.00     Types: Cigarettes    Smokeless tobacco: Never   Substance Use Topics    Alcohol use: Not on file      I have reviewed and updated the patient's past medical, surgical, family and social histories.    Allergies:   Review of patient's allergies indicates:  No Known Allergies     Medications:   Current Outpatient Medications   Medication Sig Dispense Refill    amLODIPine (NORVASC) 10 MG tablet Take 10 mg by mouth.      azelastine (ASTELIN) 137 mcg (0.1 %) nasal spray SPRAY 1 SPRAY BY NASAL ROUTE 2 TIMES DAILY USE IN EACH NOSTRIL AS DIRECTED.      co-enzyme Q-10 30 mg capsule Take 30 mg by mouth 3 (three) times daily.      etodolac (LODINE) 400 MG tablet Take 1 tablet by mouth 2 (two) times daily.      famotidine (PEPCID) 20 MG tablet Take 20 mg by mouth 2 (two) times daily.      fluticasone propionate (FLONASE) 50 mcg/actuation nasal spray SMARTSIG:3 Spray(s) Both Nares Every Morning      gabapentin (NEURONTIN) 300 MG capsule Take 300 mg by mouth.      nicotine (NICODERM CQ) 21  mg/24 hr Place 1 patch onto the skin once daily. 14 patch 0    nicotine polacrilex 2 MG Lozg Take 1 lozenge (2 mg total) by mouth as needed (in place of a cigarette). 72 lozenge 0    omeprazole (PRILOSEC) 40 MG capsule Take 1 capsule by mouth every morning.      ondansetron (ZOFRAN) 8 MG tablet Take 1 tablet (8 mg total) by mouth every 8 (eight) hours as needed for Nausea. 60 tablet 2    pravastatin (PRAVACHOL) 40 MG tablet Take 40 mg by mouth.       No current facility-administered medications for this visit.     Facility-Administered Medications Ordered in Other Visits   Medication Dose Route Frequency Provider Last Rate Last Admin    alteplase injection 2 mg  2 mg Intra-Catheter PRN Jenna Funk PA-C        CARBOplatin (PARAPLATIN) 225 mg in sodium chloride 0.9% 307.5 mL chemo infusion  225 mg Intravenous 1 time in Clinic/HOD Jenna Funk PA-C        diphenhydrAMINE (BENADRYL) 50 mg in NS 50 mL IVPB  50 mg Intravenous 1 time in Clinic/HOD Jenna Funk PA-C        diphenhydrAMINE injection 50 mg  50 mg Intravenous Once PRN Jenna Funk PA-C        EPINEPHrine (EPIPEN) 0.3 mg/0.3 mL pen injection 0.3 mg  0.3 mg Intramuscular Once PRN Jenna Funk PA-C        heparin, porcine (PF) 100 unit/mL injection flush 500 Units  500 Units Intravenous PRN Jenna Funk PA-C        hydrocortisone sodium succinate injection 100 mg  100 mg Intravenous Once PRN Jenna Funk PA-C        PACLitaxeL (TAXOL) 50 mg/m2 = 96 mg in sodium chloride 0.9% 250 mL chemo infusion  50 mg/m2 (Treatment Plan Recorded) Intravenous 1 time in Clinic/HOD Jenna Funk PA-C        palonosetron 0.25mg/dexAMETHasone 12mg in NS IVPB 0.25 mg 50 mL  0.25 mg Intravenous 1 time in Clinic/HOD Jenna Funk PA-C 150 mL/hr at 09/12/23 0932 0.25 mg at 09/12/23 0932    sodium chloride 0.9% flush 10 mL  10 mL Intravenous PRN Jenna Funk, PAAfshanC          Physical Exam:   BP (!) 152/72 (BP Location: Left arm, Patient  "Position: Sitting, BP Method: Large (Automatic))   Pulse 76   Temp 98.6 °F (37 °C) (Oral)   Resp 18   Ht 5' 7" (1.702 m)   Wt 77.4 kg (170 lb 11.9 oz)   SpO2 98%   BMI 26.74 kg/m²      ECOG Performance status: 0       Physical Exam  Vitals reviewed.   Constitutional:       General: He is not in acute distress.     Appearance: Normal appearance. He is not ill-appearing, toxic-appearing or diaphoretic.   HENT:      Head: Normocephalic and atraumatic.      Right Ear: External ear normal.      Left Ear: External ear normal.      Nose: Nose normal. No congestion.      Mouth/Throat:      Pharynx: Oropharynx is clear. No oropharyngeal exudate.   Eyes:      General: No scleral icterus.     Conjunctiva/sclera: Conjunctivae normal.   Cardiovascular:      Rate and Rhythm: Normal rate.   Pulmonary:      Effort: Pulmonary effort is normal. No respiratory distress.      Breath sounds: No wheezing.   Abdominal:      General: There is no distension.   Musculoskeletal:      Cervical back: Normal range of motion.      Right lower leg: No edema.      Left lower leg: No edema.   Skin:     General: Skin is warm and dry.      Coloration: Skin is not jaundiced or pale.      Findings: No bruising, erythema or rash.   Neurological:      General: No focal deficit present.      Mental Status: He is alert and oriented to person, place, and time.      Cranial Nerves: No cranial nerve deficit.      Motor: No weakness.      Gait: Gait normal.   Psychiatric:         Mood and Affect: Mood normal.         Behavior: Behavior normal.         Labs:      I have reviewed the pertinent labs from today. CBC, CMP unremarkable.     Imaging:    Esophagram - 6/15/2023  Impression:  - Irregularity and narrowing of the distal esophagus. Findings are suspicious for underlying neoplasm versus varices. Recommend endoscopy.  - Mild presbyesophagus     CT Chest, CT A/P - 7/3/2023   1.   There is an eccentric mass in the lower thoracic esophagus extending in " close proximity to the gastric cardia.   2.   There is no evidence of metastatic disease in the thorax.   3.   There is a tiny subcapsular hypodensity in the right hepatic lobe which is too small to characterize. This likely represents a cyst, although a metastatic lesion is not excluded. There is also a hyperenhancing mass in the right hepatic lobe which likely reflects a hemangioma but is also nonspecific. A short-term follow-up exam or an MRI of the abdomen would likely be of benefit.   4.   There is an apparent 1.8 cm mass in the upper pole of the left kidney anteriorly. This does not have the appearance of a simple cyst. There are no precontrast images to compare and evaluate for definite enhancement although this appears to enhance. This could simply represent normal renal parenchyma, although a solid tumor is suspected, concerning for renal cell carcinoma. This could be evaluated further with a dedicated renal mass CT versus an MRI of the abdomen without and with contrast.     EUS - 7/11/2023  Impression:   - Normal first portion of the duodenum and second portion of the duodenum. No specimens collected.   - Mucosal nodule found in the duodenum. Biopsied.   - Normal stomach. No specimens collected.   - Partially obstructing, likely malignant esophageal tumor was found in the distal esophagus. Biopsied.   - A mass was found in the thoracic esophagus. Fine need biopsy performed.   - Malignant-appearing esophageal stenosis. Dilated.   - Normal cricopharyngeus, upper third of esophagus and middle third of esophagus. No specimens collected.     I have personally reviewed the imaging which is notable for distal esophageal tumor and dilation.     Path:   7/11/2023 -   DIAGNOSIS:                                    1. 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 ESOPHAGUS, DISTAL MASS, BIOPSY:   - INVASIVE, MODERATELY DIFFERENTIATED  ADENOCARCINOMA WITH MUCINOUS DIFFERENTIATION                                    3. ESOPHAGUS MASS, FINE NEEDLE ASPIRATION:   - ADENOCARCINOMA WITH MUCINOUS DIFFERENTIATION    Assessment:       1. Esophageal adenocarcinoma    2. Immunodeficiency secondary to neoplasm    3. Hypertension, unspecified type    4. Hyperlipidemia, unspecified hyperlipidemia type    5. Tobacco use              Plan:        # Esophageal adenocarcinoma  Mr. Lugo is a 67 y.o. male who presents to clinic for evaluation of newly diagnosed esophageal cancer. He initially presented with dysphagia, now improved s/p esophageal dilation during EUS. Biopsy confirmed adenocarcinoma.     We reviewed his diagnosis, prognosis, and treatment options with him during our initial visit today. Discussed with him the high likelihood that cancer would recur if he was taken straight to surgery without neoadjuvant treatment. Our recommendation is to proceed with weekly carboplatin + taxol in conjunction with daily radiation (M-F, no holidays) for a total of 5-5.5 weeks. Handouts provided to patient on both chemotherapy medications.     Plan to repeat imaging ~4 weeks after completion of chemoRT followed by potential esophagectomy. Should there be evidence of residual cancer after surgery, we reviewed the SOC recommendation for adjuvant nivolumab. He would prefer to have treatments in the afternoon to accommodate his work schedule.     PET/CT on 8/8/23 shows hypermetabolic distal esophageal tumor without clear evidence of metastatic disease.  He is quite reluctant to commit to surgery so he has opted for definitive chemoradiation. Discussed with Dr. Velez and Dr. Neri.    Carboplatin AUC 2 + paclitaxel 50 mg/m2.  Plan for 5 treatments. He is s/p cycle 1 and did very well.   He feels fairly well today. His swallowing is stable and lab work is adequate to proceed today.   Proceed with cycle 2 today of chemoRT    RTC in one week prior to cycle 3 of weekly  chemotherapy.    # HTN, HLD  BP elevated in clinic. Asymptomatic.   Continue medical management.   Following with PCP.    # Tobacco use  Encouraged cessation.    Follow up: RTC 1 week.    Patient is in agreement with the proposed treatment plan. All questions were answered to the patient's satisfaction. Pt knows to call clinic if anything is needed before the next clinic visit.      LUIS F Michelle, PA-C  Physician Assistant Certified  Dept of Hematology/Oncology  PA-C to Dr. Cooper, Dr. Singer and Dr. Waite       Med Onc Chart Routing      Follow up with physician 1 week. RTC in 1 week for cycle 3 of chemoRT   Follow up with [facility] 2 weeks. See [facility] in 2 weeks for cycle 4 of chemoRT   Infusion scheduling note    Injection scheduling note    Labs CBC and CMP   Scheduling:  Preferred lab:  Lab interval: once a week     Imaging    Pharmacy appointment    Other referrals

## 2023-09-12 NOTE — PLAN OF CARE
Patient tolerated Taxol/Carbo with no complications. VSS. Pt instructed to call MD with any problems. Pt discharged home independently.

## 2023-09-13 ENCOUNTER — CLINICAL SUPPORT (OUTPATIENT)
Dept: HEMATOLOGY/ONCOLOGY | Facility: CLINIC | Age: 68
End: 2023-09-13
Payer: MEDICARE

## 2023-09-13 DIAGNOSIS — Z71.3 NUTRITIONAL COUNSELING: Primary | ICD-10-CM

## 2023-09-13 DIAGNOSIS — C15.9 ESOPHAGEAL ADENOCARCINOMA: ICD-10-CM

## 2023-09-13 PROCEDURE — 77014 PR  CT GUIDANCE PLACEMENT RAD THERAPY FIELDS: CPT | Mod: 26,,, | Performed by: STUDENT IN AN ORGANIZED HEALTH CARE EDUCATION/TRAINING PROGRAM

## 2023-09-13 PROCEDURE — 97802 MEDICAL NUTRITION INDIV IN: CPT | Mod: 95,,, | Performed by: DIETITIAN, REGISTERED

## 2023-09-13 PROCEDURE — 77014 PR  CT GUIDANCE PLACEMENT RAD THERAPY FIELDS: ICD-10-PCS | Mod: 26,,, | Performed by: STUDENT IN AN ORGANIZED HEALTH CARE EDUCATION/TRAINING PROGRAM

## 2023-09-13 PROCEDURE — 77386 HC IMRT, COMPLEX: CPT | Performed by: STUDENT IN AN ORGANIZED HEALTH CARE EDUCATION/TRAINING PROGRAM

## 2023-09-13 PROCEDURE — 97802 PR MED NUTR THER, 1ST, INDIV, EA 15 MIN: ICD-10-PCS | Mod: 95,,, | Performed by: DIETITIAN, REGISTERED

## 2023-09-14 PROCEDURE — 77386 HC IMRT, COMPLEX: CPT | Performed by: STUDENT IN AN ORGANIZED HEALTH CARE EDUCATION/TRAINING PROGRAM

## 2023-09-14 PROCEDURE — 77014 PR  CT GUIDANCE PLACEMENT RAD THERAPY FIELDS: CPT | Mod: 26,,, | Performed by: STUDENT IN AN ORGANIZED HEALTH CARE EDUCATION/TRAINING PROGRAM

## 2023-09-14 PROCEDURE — 77014 PR  CT GUIDANCE PLACEMENT RAD THERAPY FIELDS: ICD-10-PCS | Mod: 26,,, | Performed by: STUDENT IN AN ORGANIZED HEALTH CARE EDUCATION/TRAINING PROGRAM

## 2023-09-15 ENCOUNTER — DOCUMENTATION ONLY (OUTPATIENT)
Dept: RADIATION ONCOLOGY | Facility: CLINIC | Age: 68
End: 2023-09-15
Payer: MEDICARE

## 2023-09-15 PROCEDURE — 77386 HC IMRT, COMPLEX: CPT | Performed by: STUDENT IN AN ORGANIZED HEALTH CARE EDUCATION/TRAINING PROGRAM

## 2023-09-15 PROCEDURE — 77014 PR  CT GUIDANCE PLACEMENT RAD THERAPY FIELDS: ICD-10-PCS | Mod: 26,,, | Performed by: STUDENT IN AN ORGANIZED HEALTH CARE EDUCATION/TRAINING PROGRAM

## 2023-09-15 PROCEDURE — 77014 PR  CT GUIDANCE PLACEMENT RAD THERAPY FIELDS: CPT | Mod: 26,,, | Performed by: STUDENT IN AN ORGANIZED HEALTH CARE EDUCATION/TRAINING PROGRAM

## 2023-09-15 NOTE — PLAN OF CARE
Day 9 of outpatient radiation to esophagus. States he is fatigued and have a stomach ache. Have  prescription for nausea meds (Zofran) States some diarrhea and occasionally burping.

## 2023-09-18 PROCEDURE — 77386 HC IMRT, COMPLEX: CPT | Performed by: STUDENT IN AN ORGANIZED HEALTH CARE EDUCATION/TRAINING PROGRAM

## 2023-09-18 PROCEDURE — 77014 PR  CT GUIDANCE PLACEMENT RAD THERAPY FIELDS: CPT | Mod: 26,,, | Performed by: STUDENT IN AN ORGANIZED HEALTH CARE EDUCATION/TRAINING PROGRAM

## 2023-09-18 PROCEDURE — 77014 PR  CT GUIDANCE PLACEMENT RAD THERAPY FIELDS: ICD-10-PCS | Mod: 26,,, | Performed by: STUDENT IN AN ORGANIZED HEALTH CARE EDUCATION/TRAINING PROGRAM

## 2023-09-19 ENCOUNTER — OFFICE VISIT (OUTPATIENT)
Dept: HEMATOLOGY/ONCOLOGY | Facility: CLINIC | Age: 68
End: 2023-09-19
Payer: MEDICARE

## 2023-09-19 ENCOUNTER — INFUSION (OUTPATIENT)
Dept: INFUSION THERAPY | Facility: HOSPITAL | Age: 68
End: 2023-09-19
Payer: MEDICARE

## 2023-09-19 VITALS
HEART RATE: 75 BPM | WEIGHT: 170.44 LBS | OXYGEN SATURATION: 98 % | SYSTOLIC BLOOD PRESSURE: 149 MMHG | DIASTOLIC BLOOD PRESSURE: 70 MMHG | HEIGHT: 67 IN | RESPIRATION RATE: 18 BRPM | BODY MASS INDEX: 26.75 KG/M2 | TEMPERATURE: 98 F

## 2023-09-19 VITALS
HEIGHT: 67 IN | WEIGHT: 170.44 LBS | OXYGEN SATURATION: 97 % | SYSTOLIC BLOOD PRESSURE: 135 MMHG | RESPIRATION RATE: 18 BRPM | BODY MASS INDEX: 26.75 KG/M2 | HEART RATE: 76 BPM | DIASTOLIC BLOOD PRESSURE: 75 MMHG | TEMPERATURE: 98 F

## 2023-09-19 DIAGNOSIS — T45.1X5A CHEMOTHERAPY-INDUCED NAUSEA: ICD-10-CM

## 2023-09-19 DIAGNOSIS — Z79.899 IMMUNODEFICIENCY SECONDARY TO CHEMOTHERAPY: ICD-10-CM

## 2023-09-19 DIAGNOSIS — C15.9 ESOPHAGEAL ADENOCARCINOMA: Primary | ICD-10-CM

## 2023-09-19 DIAGNOSIS — D84.821 IMMUNODEFICIENCY SECONDARY TO CHEMOTHERAPY: ICD-10-CM

## 2023-09-19 DIAGNOSIS — I10 HYPERTENSION, UNSPECIFIED TYPE: ICD-10-CM

## 2023-09-19 DIAGNOSIS — R11.0 CHEMOTHERAPY-INDUCED NAUSEA: ICD-10-CM

## 2023-09-19 DIAGNOSIS — D84.81 IMMUNODEFICIENCY SECONDARY TO NEOPLASM: ICD-10-CM

## 2023-09-19 DIAGNOSIS — Z72.0 TOBACCO USE: ICD-10-CM

## 2023-09-19 DIAGNOSIS — D49.9 IMMUNODEFICIENCY SECONDARY TO NEOPLASM: ICD-10-CM

## 2023-09-19 DIAGNOSIS — T45.1X5A IMMUNODEFICIENCY SECONDARY TO CHEMOTHERAPY: ICD-10-CM

## 2023-09-19 DIAGNOSIS — E78.5 HYPERLIPIDEMIA, UNSPECIFIED HYPERLIPIDEMIA TYPE: ICD-10-CM

## 2023-09-19 PROCEDURE — 63600175 PHARM REV CODE 636 W HCPCS: Performed by: REGISTERED NURSE

## 2023-09-19 PROCEDURE — 96417 CHEMO IV INFUS EACH ADDL SEQ: CPT

## 2023-09-19 PROCEDURE — 99999 PR PBB SHADOW E&M-EST. PATIENT-LVL IV: ICD-10-PCS | Mod: PBBFAC,,, | Performed by: REGISTERED NURSE

## 2023-09-19 PROCEDURE — 96367 TX/PROPH/DG ADDL SEQ IV INF: CPT

## 2023-09-19 PROCEDURE — 77427 PR CHG RADIATION,MANGEMENT,5 TX'S: ICD-10-PCS | Mod: ,,, | Performed by: STUDENT IN AN ORGANIZED HEALTH CARE EDUCATION/TRAINING PROGRAM

## 2023-09-19 PROCEDURE — 77336 RADIATION PHYSICS CONSULT: CPT | Performed by: STUDENT IN AN ORGANIZED HEALTH CARE EDUCATION/TRAINING PROGRAM

## 2023-09-19 PROCEDURE — 99214 OFFICE O/P EST MOD 30 MIN: CPT | Mod: PBBFAC,25 | Performed by: REGISTERED NURSE

## 2023-09-19 PROCEDURE — 99215 OFFICE O/P EST HI 40 MIN: CPT | Mod: S$PBB,,, | Performed by: REGISTERED NURSE

## 2023-09-19 PROCEDURE — 25000003 PHARM REV CODE 250: Performed by: REGISTERED NURSE

## 2023-09-19 PROCEDURE — 77014 PR  CT GUIDANCE PLACEMENT RAD THERAPY FIELDS: ICD-10-PCS | Mod: 26,,, | Performed by: STUDENT IN AN ORGANIZED HEALTH CARE EDUCATION/TRAINING PROGRAM

## 2023-09-19 PROCEDURE — 99215 PR OFFICE/OUTPT VISIT, EST, LEVL V, 40-54 MIN: ICD-10-PCS | Mod: S$PBB,,, | Performed by: REGISTERED NURSE

## 2023-09-19 PROCEDURE — 77427 RADIATION TX MANAGEMENT X5: CPT | Mod: ,,, | Performed by: STUDENT IN AN ORGANIZED HEALTH CARE EDUCATION/TRAINING PROGRAM

## 2023-09-19 PROCEDURE — 96413 CHEMO IV INFUSION 1 HR: CPT

## 2023-09-19 PROCEDURE — 77014 PR  CT GUIDANCE PLACEMENT RAD THERAPY FIELDS: CPT | Mod: 26,,, | Performed by: STUDENT IN AN ORGANIZED HEALTH CARE EDUCATION/TRAINING PROGRAM

## 2023-09-19 PROCEDURE — 99999 PR PBB SHADOW E&M-EST. PATIENT-LVL IV: CPT | Mod: PBBFAC,,, | Performed by: REGISTERED NURSE

## 2023-09-19 PROCEDURE — 96376 TX/PRO/DX INJ SAME DRUG ADON: CPT

## 2023-09-19 PROCEDURE — 77386 HC IMRT, COMPLEX: CPT | Performed by: STUDENT IN AN ORGANIZED HEALTH CARE EDUCATION/TRAINING PROGRAM

## 2023-09-19 RX ORDER — SODIUM CHLORIDE 0.9 % (FLUSH) 0.9 %
10 SYRINGE (ML) INJECTION
Status: DISCONTINUED | OUTPATIENT
Start: 2023-09-19 | End: 2023-09-19 | Stop reason: HOSPADM

## 2023-09-19 RX ORDER — EPINEPHRINE 0.3 MG/.3ML
0.3 INJECTION SUBCUTANEOUS ONCE AS NEEDED
Status: DISCONTINUED | OUTPATIENT
Start: 2023-09-19 | End: 2023-09-19 | Stop reason: HOSPADM

## 2023-09-19 RX ORDER — EPINEPHRINE 0.3 MG/.3ML
0.3 INJECTION SUBCUTANEOUS ONCE AS NEEDED
Status: CANCELLED | OUTPATIENT
Start: 2023-09-19

## 2023-09-19 RX ORDER — DIPHENHYDRAMINE HYDROCHLORIDE 50 MG/ML
50 INJECTION INTRAMUSCULAR; INTRAVENOUS ONCE AS NEEDED
Status: CANCELLED | OUTPATIENT
Start: 2023-09-19

## 2023-09-19 RX ORDER — FAMOTIDINE 10 MG/ML
20 INJECTION INTRAVENOUS
Status: COMPLETED | OUTPATIENT
Start: 2023-09-19 | End: 2023-09-19

## 2023-09-19 RX ORDER — FAMOTIDINE 10 MG/ML
20 INJECTION INTRAVENOUS
Status: CANCELLED | OUTPATIENT
Start: 2023-09-19 | End: 2023-09-19

## 2023-09-19 RX ORDER — HEPARIN 100 UNIT/ML
500 SYRINGE INTRAVENOUS
Status: DISCONTINUED | OUTPATIENT
Start: 2023-09-19 | End: 2023-09-19 | Stop reason: HOSPADM

## 2023-09-19 RX ORDER — DIPHENHYDRAMINE HYDROCHLORIDE 50 MG/ML
50 INJECTION INTRAMUSCULAR; INTRAVENOUS ONCE AS NEEDED
Status: DISCONTINUED | OUTPATIENT
Start: 2023-09-19 | End: 2023-09-19 | Stop reason: HOSPADM

## 2023-09-19 RX ORDER — SODIUM CHLORIDE 0.9 % (FLUSH) 0.9 %
10 SYRINGE (ML) INJECTION
Status: CANCELLED | OUTPATIENT
Start: 2023-09-19

## 2023-09-19 RX ORDER — HEPARIN 100 UNIT/ML
500 SYRINGE INTRAVENOUS
Status: CANCELLED | OUTPATIENT
Start: 2023-09-19

## 2023-09-19 RX ADMIN — PACLITAXEL 96 MG: 6 INJECTION, SOLUTION INTRAVENOUS at 09:09

## 2023-09-19 RX ADMIN — CARBOPLATIN 210 MG: 10 INJECTION, SOLUTION INTRAVENOUS at 11:09

## 2023-09-19 RX ADMIN — FAMOTIDINE 20 MG: 10 INJECTION INTRAVENOUS at 08:09

## 2023-09-19 RX ADMIN — SODIUM CHLORIDE: 9 INJECTION, SOLUTION INTRAVENOUS at 08:09

## 2023-09-19 RX ADMIN — DEXAMETHASONE SODIUM PHOSPHATE 0.25 MG: 4 INJECTION, SOLUTION INTRA-ARTICULAR; INTRALESIONAL; INTRAMUSCULAR; INTRAVENOUS; SOFT TISSUE at 08:09

## 2023-09-19 RX ADMIN — DIPHENHYDRAMINE HYDROCHLORIDE 50 MG: 50 INJECTION, SOLUTION INTRAMUSCULAR; INTRAVENOUS at 09:09

## 2023-09-19 NOTE — PROGRESS NOTES
"MEDICAL ONCOLOGY - ESTABLISHED PATIENT VISIT    Reason for visit: esophageal adenocarcinoma     Best Contact Phone Number(s): There are no phone numbers on file.     Cancer/Stage/TNM:    Cancer Staging   Esophageal adenocarcinoma  Staging form: Esophagus - Adenocarcinoma, AJCC 8th Edition  - Clinical: Stage III (cT2, cN1, cM0, G2) - Signed by Miguel Angel Hampton Jr., MD on 7/31/2023       Oncology History   Esophageal adenocarcinoma   7/31/2023 Initial Diagnosis    Esophageal adenocarcinoma     7/31/2023 Cancer Staged    Staging form: Esophagus - Adenocarcinoma, AJCC 8th Edition  - Clinical: Stage III (cT2, cN1, cM0, G2)     9/5/2023 -  Chemotherapy    Treatment Summary   Plan Name: OP ESOPHAGEAL PACLITAXEL CARBOPLATIN WEEKLY  Treatment Goal: Curative  Status: Active  Start Date: 9/5/2023  End Date: 9/27/2023 (Planned)  Provider: Sahil Singer MD  Chemotherapy: CARBOplatin (PARAPLATIN) 195 mg in sodium chloride 0.9% 304.5 mL chemo infusion, 195 mg (100 % of original dose 193.4 mg), Intravenous, Clinic/HOD 1 time, 1 of 1 cycle  Dose modification:   (original dose 193.4 mg, Cycle 1), 193.4 mg (original dose 193.4 mg, Cycle 1),   (original dose 193.4 mg, Cycle 1, Reason: MD Discretion)  Administration: 195 mg (9/5/2023), 225 mg (9/12/2023)  PACLitaxeL (TAXOL) 50 mg/m2 = 96 mg in sodium chloride 0.9% 250 mL chemo infusion, 50 mg/m2 = 96 mg, Intravenous, Clinic/HOD 1 time, 1 of 1 cycle  Administration: 96 mg (9/5/2023), 96 mg (9/12/2023)        Interim History:  67 y.o. male with esophageal adenocarcinoma who presents for follow-up prior to cycle 3 of weekly chemoRT. Doing well overall. Continues to work without significant difficulty. Notes for a few days each week he "feels bad." Swallowing remains stable and eating well. Weight unchanged. Had mild diarrhea over the weekend which resolved. Has mild nausea and states his "stomach just feels bad" which improves with zofran use. Dr. Neri instructed him to use this " every 12 hours. No neuropathy or fevers.     Presents alone today. ECOG status is 1.     ROS:   Review of Systems   Constitutional:  Negative for fever, malaise/fatigue and weight loss.   HENT:  Negative for nosebleeds.    Respiratory:  Negative for shortness of breath.    Cardiovascular:  Negative for chest pain and palpitations.   Gastrointestinal:  Positive for nausea. Negative for blood in stool, constipation, diarrhea, heartburn and vomiting.   Genitourinary:  Negative for hematuria.   Musculoskeletal:  Negative for falls.   Skin:  Negative for itching and rash.   Neurological:  Negative for dizziness, tingling, sensory change and weakness.   Psychiatric/Behavioral:  The patient is not nervous/anxious.      Past Medical History:   Past Medical History:   Diagnosis Date    HTN (hypertension)      Past Surgical History:   Past Surgical History:   Procedure Laterality Date    LAPAROSCOPIC REPAIR OF INGUINAL HERNIA Right      Family History:   History reviewed. No pertinent family history.     Social History:   Social History     Tobacco Use    Smoking status: Every Day     Current packs/day: 1.00     Types: Cigarettes    Smokeless tobacco: Never   Substance Use Topics    Alcohol use: Not on file      I have reviewed and updated the patient's past medical, surgical, family and social histories.    Allergies:   Review of patient's allergies indicates:  No Known Allergies     Medications:   Current Outpatient Medications   Medication Sig Dispense Refill    amLODIPine (NORVASC) 10 MG tablet Take 10 mg by mouth.      azelastine (ASTELIN) 137 mcg (0.1 %) nasal spray SPRAY 1 SPRAY BY NASAL ROUTE 2 TIMES DAILY USE IN EACH NOSTRIL AS DIRECTED.      co-enzyme Q-10 30 mg capsule Take 30 mg by mouth 3 (three) times daily.      etodolac (LODINE) 400 MG tablet Take 1 tablet by mouth 2 (two) times daily.      famotidine (PEPCID) 20 MG tablet Take 20 mg by mouth 2 (two) times daily.      fluticasone propionate (FLONASE) 50  "mcg/actuation nasal spray SMARTSIG:3 Spray(s) Both Nares Every Morning      gabapentin (NEURONTIN) 300 MG capsule Take 300 mg by mouth.      nicotine (NICODERM CQ) 21 mg/24 hr Place 1 patch onto the skin once daily. 14 patch 0    nicotine polacrilex 2 MG Lozg Take 1 lozenge (2 mg total) by mouth as needed (in place of a cigarette). 72 lozenge 0    omeprazole (PRILOSEC) 40 MG capsule Take 1 capsule by mouth every morning.      ondansetron (ZOFRAN) 8 MG tablet Take 1 tablet (8 mg total) by mouth every 8 (eight) hours as needed for Nausea. 60 tablet 2    pravastatin (PRAVACHOL) 40 MG tablet Take 40 mg by mouth.       No current facility-administered medications for this visit.      Physical Exam:   /75 (BP Location: Right arm, Patient Position: Sitting, BP Method: Medium (Automatic))   Pulse 76   Temp 98.4 °F (36.9 °C) (Oral)   Resp 18   Ht 5' 7" (1.702 m)   Wt 77.3 kg (170 lb 6.7 oz)   SpO2 97%   BMI 26.69 kg/m²      ECOG Performance status: 0       Physical Exam  Vitals reviewed.   Constitutional:       General: He is not in acute distress.     Appearance: Normal appearance. He is not ill-appearing, toxic-appearing or diaphoretic.   HENT:      Head: Normocephalic and atraumatic.      Right Ear: External ear normal.      Left Ear: External ear normal.      Nose: Nose normal. No congestion.      Mouth/Throat:      Pharynx: Oropharynx is clear. No oropharyngeal exudate.   Eyes:      General: No scleral icterus.     Conjunctiva/sclera: Conjunctivae normal.   Cardiovascular:      Rate and Rhythm: Normal rate.   Pulmonary:      Effort: Pulmonary effort is normal. No respiratory distress.      Breath sounds: No wheezing.   Abdominal:      General: There is no distension.   Musculoskeletal:      Cervical back: Normal range of motion.      Right lower leg: No edema.      Left lower leg: No edema.   Skin:     General: Skin is warm and dry.      Coloration: Skin is not jaundiced or pale.      Findings: No bruising, " erythema or rash.   Neurological:      General: No focal deficit present.      Mental Status: He is alert and oriented to person, place, and time.      Cranial Nerves: No cranial nerve deficit.      Motor: No weakness.      Gait: Gait normal.   Psychiatric:         Mood and Affect: Mood normal.         Behavior: Behavior normal.         Labs:      I have reviewed the pertinent labs from today. CBC unremarkable. CMP pending at time of visit.     Imaging:    Esophagram - 6/15/2023  Impression:  - Irregularity and narrowing of the distal esophagus. Findings are suspicious for underlying neoplasm versus varices. Recommend endoscopy.  - Mild presbyesophagus     CT Chest, CT A/P - 7/3/2023   1.   There is an eccentric mass in the lower thoracic esophagus extending in close proximity to the gastric cardia.   2.   There is no evidence of metastatic disease in the thorax.   3.   There is a tiny subcapsular hypodensity in the right hepatic lobe which is too small to characterize. This likely represents a cyst, although a metastatic lesion is not excluded. There is also a hyperenhancing mass in the right hepatic lobe which likely reflects a hemangioma but is also nonspecific. A short-term follow-up exam or an MRI of the abdomen would likely be of benefit.   4.   There is an apparent 1.8 cm mass in the upper pole of the left kidney anteriorly. This does not have the appearance of a simple cyst. There are no precontrast images to compare and evaluate for definite enhancement although this appears to enhance. This could simply represent normal renal parenchyma, although a solid tumor is suspected, concerning for renal cell carcinoma. This could be evaluated further with a dedicated renal mass CT versus an MRI of the abdomen without and with contrast.     EUS - 7/11/2023  Impression:   - Normal first portion of the duodenum and second portion of the duodenum. No specimens collected.   - Mucosal nodule found in the duodenum.  Biopsied.   - Normal stomach. No specimens collected.   - Partially obstructing, likely malignant esophageal tumor was found in the distal esophagus. Biopsied.   - A mass was found in the thoracic esophagus. Fine need biopsy performed.   - Malignant-appearing esophageal stenosis. Dilated.   - Normal cricopharyngeus, upper third of esophagus and middle third of esophagus. No specimens collected.     I have personally reviewed the imaging which is notable for distal esophageal tumor and dilation.     Path:   7/11/2023 -   DIAGNOSIS:                                    1. 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 ESOPHAGUS, DISTAL MASS, BIOPSY:   - INVASIVE, MODERATELY DIFFERENTIATED ADENOCARCINOMA WITH MUCINOUS DIFFERENTIATION                                    3. ESOPHAGUS MASS, FINE NEEDLE ASPIRATION:   - ADENOCARCINOMA WITH MUCINOUS DIFFERENTIATION    Assessment:       1. Esophageal adenocarcinoma    2. 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 Chemotherapy-induced nausea    5. Hypertension, unspecified type    6. Hyperlipidemia, unspecified hyperlipidemia type    7. Tobacco use        Plan:        # Esophageal adenocarcinoma  Mr. Lugo is a 67 y.o. male who presents to clinic for evaluation of newly diagnosed esophageal cancer. He initially presented with dysphagia, now improved s/p esophageal dilation during EUS. Biopsy confirmed adenocarcinoma.     We reviewed his diagnosis, prognosis, and treatment options with him during our initial visit. Discussed with him the high likelihood that cancer would recur if he was taken straight to surgery without neoadjuvant treatment. Our recommendation is to proceed with weekly carboplatin + taxol in conjunction with daily radiation (M-F, no holidays) for a total of 5-5.5 weeks. Handouts provided to patient on both chemotherapy medications.      Plan to repeat imaging ~4 weeks after completion of chemoRT followed by potential esophagectomy. Should there be evidence of residual cancer after surgery, we reviewed the SOC recommendation for adjuvant nivolumab. He would prefer to have treatments in the afternoon to accommodate his work schedule.     PET/CT on 8/8/23 shows hypermetabolic distal esophageal tumor without clear evidence of metastatic disease.  He is quite reluctant to commit to surgery so he has opted for definitive chemoradiation. Discussed with Dr. Velez and Dr. Neri.    Carboplatin AUC 2 + paclitaxel 50 mg/m2. Plan for 5 treatments. He is s/p cycle 2 and has done well overall.   Lab work reviewed and adequate to proceed today.   Proceed with cycle 3 today of chemoRT    RTC in one week prior to cycle 4 of weekly chemotherapy.    # HTN, HLD  BP elevated in clinic. Asymptomatic.   Continue medical management.   Following with PCP.    # Tobacco use  Encouraged cessation.  Enrolled in smoking cessation clinic.     Follow up: RTC 1 week.    Patient is in agreement with the proposed treatment plan. All questions were answered to the patient's satisfaction. Pt knows to call clinic if anything is needed before the next clinic visit.    Patient discussed with collaborating physician, Dr. Singer.    At least 40 minutes were spent today on this encounter including face to face time with the patient, data gathering/interpretation and documentation.       Aleshia Giraldo, MSN, APRN, ACCNS-AG  Hematology and Medical Oncology  Clinical Nurse Specialist to Dr. Singer, Dr. Wheeler & Dr. Petersen Onc Chart Routing  Urgent    Follow up with physician 1 week. needs labs then to see Dr. Singer 9/26 with chemo cycle 4 to follow   Follow up with [facility] 2 weeks. rtc 10/3 with labs to see [facility] for cycle 5   Infusion scheduling note   treatment given weekly x 5 weeks   Injection scheduling note    Labs CMP and CBC   Scheduling:  Preferred lab:  Lab interval: once a  week     Imaging    Pharmacy appointment    Other referrals

## 2023-09-19 NOTE — NURSING
Pt tolerated C1D15 without complication or complaint. VSS. Pt aware of next appointment. Pt d/c from unit ambulatory and in stable condition.

## 2023-09-20 PROCEDURE — 77386 HC IMRT, COMPLEX: CPT | Performed by: STUDENT IN AN ORGANIZED HEALTH CARE EDUCATION/TRAINING PROGRAM

## 2023-09-20 PROCEDURE — 77014 PR  CT GUIDANCE PLACEMENT RAD THERAPY FIELDS: ICD-10-PCS | Mod: 26,,, | Performed by: STUDENT IN AN ORGANIZED HEALTH CARE EDUCATION/TRAINING PROGRAM

## 2023-09-20 PROCEDURE — 77014 PR  CT GUIDANCE PLACEMENT RAD THERAPY FIELDS: CPT | Mod: 26,,, | Performed by: STUDENT IN AN ORGANIZED HEALTH CARE EDUCATION/TRAINING PROGRAM

## 2023-09-21 PROCEDURE — 77386 HC IMRT, COMPLEX: CPT | Performed by: STUDENT IN AN ORGANIZED HEALTH CARE EDUCATION/TRAINING PROGRAM

## 2023-09-21 PROCEDURE — 77014 PR  CT GUIDANCE PLACEMENT RAD THERAPY FIELDS: CPT | Mod: 26,,, | Performed by: STUDENT IN AN ORGANIZED HEALTH CARE EDUCATION/TRAINING PROGRAM

## 2023-09-21 PROCEDURE — 77014 PR  CT GUIDANCE PLACEMENT RAD THERAPY FIELDS: ICD-10-PCS | Mod: 26,,, | Performed by: STUDENT IN AN ORGANIZED HEALTH CARE EDUCATION/TRAINING PROGRAM

## 2023-09-22 ENCOUNTER — DOCUMENTATION ONLY (OUTPATIENT)
Dept: RADIATION ONCOLOGY | Facility: CLINIC | Age: 68
End: 2023-09-22
Payer: MEDICARE

## 2023-09-22 PROCEDURE — 77014 PR  CT GUIDANCE PLACEMENT RAD THERAPY FIELDS: ICD-10-PCS | Mod: 26,,, | Performed by: STUDENT IN AN ORGANIZED HEALTH CARE EDUCATION/TRAINING PROGRAM

## 2023-09-22 PROCEDURE — 77014 PR  CT GUIDANCE PLACEMENT RAD THERAPY FIELDS: CPT | Mod: 26,,, | Performed by: STUDENT IN AN ORGANIZED HEALTH CARE EDUCATION/TRAINING PROGRAM

## 2023-09-22 PROCEDURE — 77386 HC IMRT, COMPLEX: CPT | Performed by: STUDENT IN AN ORGANIZED HEALTH CARE EDUCATION/TRAINING PROGRAM

## 2023-09-22 NOTE — PLAN OF CARE
Day 14 of outpatient radiation to the esophagus. Mild dysphagia. Nausea, controlled with zofran. Still with diarrhea. Encouraged to use Imodium-AD if zofran doesn't help.

## 2023-09-25 PROCEDURE — 77014 PR  CT GUIDANCE PLACEMENT RAD THERAPY FIELDS: CPT | Mod: 26,,, | Performed by: STUDENT IN AN ORGANIZED HEALTH CARE EDUCATION/TRAINING PROGRAM

## 2023-09-25 PROCEDURE — 77014 PR  CT GUIDANCE PLACEMENT RAD THERAPY FIELDS: ICD-10-PCS | Mod: 26,,, | Performed by: STUDENT IN AN ORGANIZED HEALTH CARE EDUCATION/TRAINING PROGRAM

## 2023-09-25 PROCEDURE — 77336 RADIATION PHYSICS CONSULT: CPT | Performed by: STUDENT IN AN ORGANIZED HEALTH CARE EDUCATION/TRAINING PROGRAM

## 2023-09-25 PROCEDURE — 77386 HC IMRT, COMPLEX: CPT | Performed by: STUDENT IN AN ORGANIZED HEALTH CARE EDUCATION/TRAINING PROGRAM

## 2023-09-26 ENCOUNTER — INFUSION (OUTPATIENT)
Dept: INFUSION THERAPY | Facility: HOSPITAL | Age: 68
End: 2023-09-26
Payer: MEDICARE

## 2023-09-26 VITALS
BODY MASS INDEX: 26.75 KG/M2 | RESPIRATION RATE: 18 BRPM | WEIGHT: 170.44 LBS | SYSTOLIC BLOOD PRESSURE: 139 MMHG | DIASTOLIC BLOOD PRESSURE: 59 MMHG | HEIGHT: 67 IN | TEMPERATURE: 98 F | HEART RATE: 75 BPM

## 2023-09-26 DIAGNOSIS — C15.9 ESOPHAGEAL ADENOCARCINOMA: Primary | ICD-10-CM

## 2023-09-26 PROCEDURE — 25000003 PHARM REV CODE 250: Performed by: INTERNAL MEDICINE

## 2023-09-26 PROCEDURE — 96413 CHEMO IV INFUSION 1 HR: CPT

## 2023-09-26 PROCEDURE — 77427 PR CHG RADIATION,MANGEMENT,5 TX'S: ICD-10-PCS | Mod: ,,, | Performed by: STUDENT IN AN ORGANIZED HEALTH CARE EDUCATION/TRAINING PROGRAM

## 2023-09-26 PROCEDURE — 96367 TX/PROPH/DG ADDL SEQ IV INF: CPT

## 2023-09-26 PROCEDURE — 77014 PR  CT GUIDANCE PLACEMENT RAD THERAPY FIELDS: CPT | Mod: 26,,, | Performed by: STUDENT IN AN ORGANIZED HEALTH CARE EDUCATION/TRAINING PROGRAM

## 2023-09-26 PROCEDURE — 96375 TX/PRO/DX INJ NEW DRUG ADDON: CPT

## 2023-09-26 PROCEDURE — 77427 RADIATION TX MANAGEMENT X5: CPT | Mod: ,,, | Performed by: STUDENT IN AN ORGANIZED HEALTH CARE EDUCATION/TRAINING PROGRAM

## 2023-09-26 PROCEDURE — 77014 PR  CT GUIDANCE PLACEMENT RAD THERAPY FIELDS: ICD-10-PCS | Mod: 26,,, | Performed by: STUDENT IN AN ORGANIZED HEALTH CARE EDUCATION/TRAINING PROGRAM

## 2023-09-26 PROCEDURE — 77386 HC IMRT, COMPLEX: CPT | Performed by: STUDENT IN AN ORGANIZED HEALTH CARE EDUCATION/TRAINING PROGRAM

## 2023-09-26 PROCEDURE — 63600175 PHARM REV CODE 636 W HCPCS: Performed by: INTERNAL MEDICINE

## 2023-09-26 PROCEDURE — 96417 CHEMO IV INFUS EACH ADDL SEQ: CPT

## 2023-09-26 RX ORDER — DIPHENHYDRAMINE HYDROCHLORIDE 50 MG/ML
50 INJECTION INTRAMUSCULAR; INTRAVENOUS ONCE AS NEEDED
Status: DISCONTINUED | OUTPATIENT
Start: 2023-09-26 | End: 2023-09-26 | Stop reason: HOSPADM

## 2023-09-26 RX ORDER — SODIUM CHLORIDE 0.9 % (FLUSH) 0.9 %
10 SYRINGE (ML) INJECTION
Status: DISCONTINUED | OUTPATIENT
Start: 2023-09-26 | End: 2023-09-26 | Stop reason: HOSPADM

## 2023-09-26 RX ORDER — EPINEPHRINE 0.3 MG/.3ML
0.3 INJECTION SUBCUTANEOUS ONCE AS NEEDED
Status: DISCONTINUED | OUTPATIENT
Start: 2023-09-26 | End: 2023-09-26 | Stop reason: HOSPADM

## 2023-09-26 RX ORDER — FAMOTIDINE 10 MG/ML
20 INJECTION INTRAVENOUS
Status: COMPLETED | OUTPATIENT
Start: 2023-09-26 | End: 2023-09-26

## 2023-09-26 RX ORDER — HEPARIN 100 UNIT/ML
500 SYRINGE INTRAVENOUS
Status: DISCONTINUED | OUTPATIENT
Start: 2023-09-26 | End: 2023-09-26 | Stop reason: HOSPADM

## 2023-09-26 RX ADMIN — CARBOPLATIN 180 MG: 10 INJECTION, SOLUTION INTRAVENOUS at 10:09

## 2023-09-26 RX ADMIN — DEXAMETHASONE SODIUM PHOSPHATE 0.25 MG: 4 INJECTION, SOLUTION INTRA-ARTICULAR; INTRALESIONAL; INTRAMUSCULAR; INTRAVENOUS; SOFT TISSUE at 08:09

## 2023-09-26 RX ADMIN — FAMOTIDINE 20 MG: 10 INJECTION INTRAVENOUS at 08:09

## 2023-09-26 RX ADMIN — SODIUM CHLORIDE: 0.9 INJECTION, SOLUTION INTRAVENOUS at 08:09

## 2023-09-26 RX ADMIN — PACLITAXEL 96 MG: 6 INJECTION, SOLUTION, CONCENTRATE INTRAVENOUS at 09:09

## 2023-09-26 RX ADMIN — DIPHENHYDRAMINE HYDROCHLORIDE 50 MG: 50 INJECTION, SOLUTION INTRAMUSCULAR; INTRAVENOUS at 08:09

## 2023-09-27 PROCEDURE — 77386 HC IMRT, COMPLEX: CPT | Performed by: STUDENT IN AN ORGANIZED HEALTH CARE EDUCATION/TRAINING PROGRAM

## 2023-09-27 PROCEDURE — 77014 PR  CT GUIDANCE PLACEMENT RAD THERAPY FIELDS: CPT | Mod: 26,,, | Performed by: STUDENT IN AN ORGANIZED HEALTH CARE EDUCATION/TRAINING PROGRAM

## 2023-09-27 PROCEDURE — 77014 PR  CT GUIDANCE PLACEMENT RAD THERAPY FIELDS: ICD-10-PCS | Mod: 26,,, | Performed by: STUDENT IN AN ORGANIZED HEALTH CARE EDUCATION/TRAINING PROGRAM

## 2023-09-28 ENCOUNTER — OFFICE VISIT (OUTPATIENT)
Dept: HEMATOLOGY/ONCOLOGY | Facility: CLINIC | Age: 68
End: 2023-09-28
Payer: MEDICARE

## 2023-09-28 VITALS
SYSTOLIC BLOOD PRESSURE: 158 MMHG | DIASTOLIC BLOOD PRESSURE: 74 MMHG | BODY MASS INDEX: 26.42 KG/M2 | RESPIRATION RATE: 18 BRPM | TEMPERATURE: 98 F | HEART RATE: 70 BPM | OXYGEN SATURATION: 98 % | HEIGHT: 67 IN | WEIGHT: 168.31 LBS

## 2023-09-28 DIAGNOSIS — D84.81 IMMUNODEFICIENCY SECONDARY TO NEOPLASM: ICD-10-CM

## 2023-09-28 DIAGNOSIS — T45.1X5A CHEMOTHERAPY-INDUCED THROMBOCYTOPENIA: ICD-10-CM

## 2023-09-28 DIAGNOSIS — D69.59 CHEMOTHERAPY-INDUCED THROMBOCYTOPENIA: ICD-10-CM

## 2023-09-28 DIAGNOSIS — R11.0 CHEMOTHERAPY-INDUCED NAUSEA: ICD-10-CM

## 2023-09-28 DIAGNOSIS — D49.9 IMMUNODEFICIENCY SECONDARY TO NEOPLASM: ICD-10-CM

## 2023-09-28 DIAGNOSIS — Z72.0 TOBACCO USE: ICD-10-CM

## 2023-09-28 DIAGNOSIS — Z79.899 IMMUNODEFICIENCY SECONDARY TO CHEMOTHERAPY: ICD-10-CM

## 2023-09-28 DIAGNOSIS — T45.1X5A CHEMOTHERAPY-INDUCED NAUSEA: ICD-10-CM

## 2023-09-28 DIAGNOSIS — I10 HYPERTENSION, UNSPECIFIED TYPE: ICD-10-CM

## 2023-09-28 DIAGNOSIS — C15.9 ESOPHAGEAL ADENOCARCINOMA: Primary | ICD-10-CM

## 2023-09-28 DIAGNOSIS — T45.1X5A IMMUNODEFICIENCY SECONDARY TO CHEMOTHERAPY: ICD-10-CM

## 2023-09-28 DIAGNOSIS — E78.5 HYPERLIPIDEMIA, UNSPECIFIED HYPERLIPIDEMIA TYPE: ICD-10-CM

## 2023-09-28 DIAGNOSIS — D84.821 IMMUNODEFICIENCY SECONDARY TO CHEMOTHERAPY: ICD-10-CM

## 2023-09-28 PROCEDURE — 99215 PR OFFICE/OUTPT VISIT, EST, LEVL V, 40-54 MIN: ICD-10-PCS | Mod: S$PBB,,, | Performed by: INTERNAL MEDICINE

## 2023-09-28 PROCEDURE — 99214 OFFICE O/P EST MOD 30 MIN: CPT | Mod: PBBFAC,25 | Performed by: INTERNAL MEDICINE

## 2023-09-28 PROCEDURE — 99999 PR PBB SHADOW E&M-EST. PATIENT-LVL IV: ICD-10-PCS | Mod: PBBFAC,,, | Performed by: INTERNAL MEDICINE

## 2023-09-28 PROCEDURE — 77014 PR  CT GUIDANCE PLACEMENT RAD THERAPY FIELDS: ICD-10-PCS | Mod: 26,,, | Performed by: STUDENT IN AN ORGANIZED HEALTH CARE EDUCATION/TRAINING PROGRAM

## 2023-09-28 PROCEDURE — 99999 PR PBB SHADOW E&M-EST. PATIENT-LVL IV: CPT | Mod: PBBFAC,,, | Performed by: INTERNAL MEDICINE

## 2023-09-28 PROCEDURE — 99215 OFFICE O/P EST HI 40 MIN: CPT | Mod: S$PBB,,, | Performed by: INTERNAL MEDICINE

## 2023-09-28 PROCEDURE — 77386 HC IMRT, COMPLEX: CPT | Performed by: STUDENT IN AN ORGANIZED HEALTH CARE EDUCATION/TRAINING PROGRAM

## 2023-09-28 PROCEDURE — 77014 PR  CT GUIDANCE PLACEMENT RAD THERAPY FIELDS: CPT | Mod: 26,,, | Performed by: STUDENT IN AN ORGANIZED HEALTH CARE EDUCATION/TRAINING PROGRAM

## 2023-09-28 RX ORDER — PROCHLORPERAZINE MALEATE 10 MG
10 TABLET ORAL EVERY 6 HOURS PRN
Qty: 60 TABLET | Refills: 2 | Status: SHIPPED | OUTPATIENT
Start: 2023-09-28 | End: 2024-09-27

## 2023-09-28 NOTE — PROGRESS NOTES
"MEDICAL ONCOLOGY - ESTABLISHED PATIENT VISIT    Reason for visit: esophageal adenocarcinoma     Best Contact Phone Number(s): There are no phone numbers on file.     Cancer/Stage/TNM:    Cancer Staging   Esophageal adenocarcinoma  Staging form: Esophagus - Adenocarcinoma, AJCC 8th Edition  - Clinical: Stage III (cT2, cN1, cM0, G2) - Signed by Miguel Angel Hampton Jr., MD on 7/31/2023       Oncology History   Esophageal adenocarcinoma   7/31/2023 Initial Diagnosis    Esophageal adenocarcinoma     7/31/2023 Cancer Staged    Staging form: Esophagus - Adenocarcinoma, AJCC 8th Edition  - Clinical: Stage III (cT2, cN1, cM0, G2)     9/5/2023 -  Chemotherapy    Treatment Summary   Plan Name: OP ESOPHAGEAL PACLITAXEL CARBOPLATIN WEEKLY  Treatment Goal: Curative  Status: Active  Start Date: 9/5/2023  End Date: 9/27/2023 (Planned)  Provider: Sahil Singer MD  Chemotherapy: CARBOplatin (PARAPLATIN) 195 mg in sodium chloride 0.9% 304.5 mL chemo infusion, 195 mg (100 % of original dose 193.4 mg), Intravenous, Clinic/HOD 1 time, 1 of 1 cycle  Dose modification:   (original dose 193.4 mg, Cycle 1), 193.4 mg (original dose 193.4 mg, Cycle 1),   (original dose 193.4 mg, Cycle 1, Reason: MD Discretion)  Administration: 195 mg (9/5/2023), 225 mg (9/12/2023), 210 mg (9/19/2023)  PACLitaxeL (TAXOL) 50 mg/m2 = 96 mg in sodium chloride 0.9% 250 mL chemo infusion, 50 mg/m2 = 96 mg, Intravenous, Clinic/HOD 1 time, 1 of 1 cycle  Administration: 96 mg (9/5/2023), 96 mg (9/12/2023), 96 mg (9/19/2023)        Interim History:  67 y.o. male with esophageal adenocarcinoma who presents for follow-up prior to cycle 5 of weekly chemoRT. He is feeling well today.  Received his fourth dose two days ago.  He had been experiencing worsening nausea, now using Zofran q8 hours which has improved his nausea.  He is having more frequent stools than he previously did. Low volume and formed.  Energy level has worsened, describes it as "marginal."  Eating is " normal; has Boost but has not taken it yet.    Presents alone today. ECOG status is 1.     ROS:   Review of Systems   Constitutional:  Positive for malaise/fatigue. Negative for fever and weight loss.   HENT:  Negative for nosebleeds.    Respiratory:  Negative for shortness of breath.    Cardiovascular:  Negative for chest pain and palpitations.   Gastrointestinal:  Positive for nausea. Negative for blood in stool, constipation, diarrhea, heartburn and vomiting.   Genitourinary:  Negative for hematuria.   Musculoskeletal:  Negative for falls.   Skin:  Negative for itching and rash.   Neurological:  Negative for dizziness, tingling, sensory change and weakness.   Psychiatric/Behavioral:  The patient is not nervous/anxious.      Past Medical History:   Past Medical History:   Diagnosis Date    HTN (hypertension)      Past Surgical History:   Past Surgical History:   Procedure Laterality Date    LAPAROSCOPIC REPAIR OF INGUINAL HERNIA Right      Family History:   No family history on file.     Social History:   Social History     Tobacco Use    Smoking status: Every Day     Current packs/day: 1.00     Types: Cigarettes    Smokeless tobacco: Never   Substance Use Topics    Alcohol use: Not on file      I have reviewed and updated the patient's past medical, surgical, family and social histories.    Allergies:   Review of patient's allergies indicates:  No Known Allergies     Medications:   Current Outpatient Medications   Medication Sig Dispense Refill    amLODIPine (NORVASC) 10 MG tablet Take 10 mg by mouth.      azelastine (ASTELIN) 137 mcg (0.1 %) nasal spray SPRAY 1 SPRAY BY NASAL ROUTE 2 TIMES DAILY USE IN EACH NOSTRIL AS DIRECTED.      co-enzyme Q-10 30 mg capsule Take 30 mg by mouth 3 (three) times daily.      etodolac (LODINE) 400 MG tablet Take 1 tablet by mouth 2 (two) times daily.      famotidine (PEPCID) 20 MG tablet Take 20 mg by mouth 2 (two) times daily.      fluticasone propionate (FLONASE) 50  "mcg/actuation nasal spray SMARTSIG:3 Spray(s) Both Nares Every Morning      gabapentin (NEURONTIN) 300 MG capsule Take 300 mg by mouth.      nicotine (NICODERM CQ) 21 mg/24 hr Place 1 patch onto the skin once daily. 14 patch 0    nicotine polacrilex 2 MG Lozg Take 1 lozenge (2 mg total) by mouth as needed (in place of a cigarette). 72 lozenge 0    omeprazole (PRILOSEC) 40 MG capsule Take 1 capsule by mouth every morning.      ondansetron (ZOFRAN) 8 MG tablet Take 1 tablet (8 mg total) by mouth every 8 (eight) hours as needed for Nausea. 60 tablet 2    pravastatin (PRAVACHOL) 40 MG tablet Take 40 mg by mouth.      prochlorperazine (COMPAZINE) 10 MG tablet Take 1 tablet (10 mg total) by mouth every 6 (six) hours as needed. 60 tablet 2     No current facility-administered medications for this visit.      Physical Exam:   BP (!) 158/74 (BP Location: Left arm, Patient Position: Sitting, BP Method: Medium (Automatic))   Pulse 70   Temp 97.9 °F (36.6 °C) (Oral)   Resp 18   Ht 5' 7" (1.702 m)   Wt 76.4 kg (168 lb 5.1 oz)   SpO2 98%   BMI 26.36 kg/m²      ECOG Performance status: 0       Physical Exam  Vitals reviewed.   Constitutional:       General: He is not in acute distress.     Appearance: Normal appearance. He is not ill-appearing, toxic-appearing or diaphoretic.   HENT:      Head: Normocephalic and atraumatic.      Right Ear: External ear normal.      Left Ear: External ear normal.      Nose: Nose normal. No congestion.      Mouth/Throat:      Pharynx: Oropharynx is clear. No oropharyngeal exudate.   Eyes:      General: No scleral icterus.     Conjunctiva/sclera: Conjunctivae normal.   Cardiovascular:      Rate and Rhythm: Normal rate.   Pulmonary:      Effort: Pulmonary effort is normal. No respiratory distress.      Breath sounds: No wheezing.   Abdominal:      General: There is no distension.   Musculoskeletal:      Cervical back: Normal range of motion.      Right lower leg: No edema.      Left lower leg: " No edema.   Skin:     General: Skin is warm and dry.      Coloration: Skin is not jaundiced or pale.      Findings: No bruising, erythema or rash.   Neurological:      General: No focal deficit present.      Mental Status: He is alert and oriented to person, place, and time.      Cranial Nerves: No cranial nerve deficit.      Motor: No weakness.      Gait: Gait normal.   Psychiatric:         Mood and Affect: Mood normal.         Behavior: Behavior normal.         Labs:      I have reviewed the pertinent labs from 9/26/23. Mild thrombocytopenia and leukopenia. Mild ALT elevation.    Imaging:    Esophagram - 6/15/2023  Impression:  - Irregularity and narrowing of the distal esophagus. Findings are suspicious for underlying neoplasm versus varices. Recommend endoscopy.  - Mild presbyesophagus     CT Chest, CT A/P - 7/3/2023   1.   There is an eccentric mass in the lower thoracic esophagus extending in close proximity to the gastric cardia.   2.   There is no evidence of metastatic disease in the thorax.   3.   There is a tiny subcapsular hypodensity in the right hepatic lobe which is too small to characterize. This likely represents a cyst, although a metastatic lesion is not excluded. There is also a hyperenhancing mass in the right hepatic lobe which likely reflects a hemangioma but is also nonspecific. A short-term follow-up exam or an MRI of the abdomen would likely be of benefit.   4.   There is an apparent 1.8 cm mass in the upper pole of the left kidney anteriorly. This does not have the appearance of a simple cyst. There are no precontrast images to compare and evaluate for definite enhancement although this appears to enhance. This could simply represent normal renal parenchyma, although a solid tumor is suspected, concerning for renal cell carcinoma. This could be evaluated further with a dedicated renal mass CT versus an MRI of the abdomen without and with contrast.     EUS - 7/11/2023  Impression:   -  Normal first portion of the duodenum and second portion of the duodenum. No specimens collected.   - Mucosal nodule found in the duodenum. Biopsied.   - Normal stomach. No specimens collected.   - Partially obstructing, likely malignant esophageal tumor was found in the distal esophagus. Biopsied.   - A mass was found in the thoracic esophagus. Fine need biopsy performed.   - Malignant-appearing esophageal stenosis. Dilated.   - Normal cricopharyngeus, upper third of esophagus and middle third of esophagus. No specimens collected.     I have personally reviewed the imaging which is notable for distal esophageal tumor and dilation.     Path:   7/11/2023 -   DIAGNOSIS:                                    1. 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 ESOPHAGUS, DISTAL MASS, BIOPSY:   - INVASIVE, MODERATELY DIFFERENTIATED ADENOCARCINOMA WITH MUCINOUS DIFFERENTIATION                                    3. ESOPHAGUS MASS, FINE NEEDLE ASPIRATION:   - ADENOCARCINOMA WITH MUCINOUS DIFFERENTIATION    Assessment:       1. Esophageal adenocarcinoma    2. 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 Chemotherapy-induced nausea    5. Chemotherapy-induced thrombocytopenia    6. Hypertension, unspecified type    7. Hyperlipidemia, unspecified hyperlipidemia type    8. Tobacco use          Plan:        # Esophageal adenocarcinoma  Mr. Lugo is a 67 y.o. male who presents to clinic for evaluation of newly diagnosed esophageal cancer. He initially presented with dysphagia, now improved s/p esophageal dilation during EUS. Biopsy confirmed adenocarcinoma.     We reviewed his diagnosis, prognosis, and treatment options with him during our initial visit. Discussed with him the high likelihood that cancer would recur if he was taken straight to surgery without neoadjuvant treatment. Our recommendation is to  proceed with weekly carboplatin + taxol in conjunction with daily radiation (M-F, no holidays) for a total of 5-5.5 weeks. Handouts provided to patient on both chemotherapy medications.     Plan to repeat imaging ~4 weeks after completion of chemoRT followed by potential esophagectomy. Should there be evidence of residual cancer after surgery, we reviewed the SOC recommendation for adjuvant nivolumab. He would prefer to have treatments in the afternoon to accommodate his work schedule.     PET/CT on 8/8/23 shows hypermetabolic distal esophageal tumor without clear evidence of metastatic disease.  He is quite reluctant to commit to surgery so he has opted for definitive chemoradiation. Discussed with Dr. Velez and Dr. Neri.    Carboplatin AUC 2 + paclitaxel 50 mg/m2. Plan for 5 treatments. He is s/p cycle 4 and has done well overall. Nausea is controlled with Zofran.    Will return next week with labs for fifth dose of carboplatin + paclitaxel.    RTC in five weeks with PET/CT.    # HTN, HLD  BP elevated in clinic. Asymptomatic.   Continue medical management.   Following with PCP.    # Tobacco use  Encouraged cessation.  Enrolled in smoking cessation clinic.     Follow up: RTC 5 weeks.    Patient is in agreement with the proposed treatment plan. All questions were answered to the patient's satisfaction. Pt knows to call clinic if anything is needed before the next clinic visit.    Sahil Singer MD  Hematology/Oncology  [facility]        Med Onc Chart Routing  Urgent    Follow up with physician . Week of 11/6 or 11/13 with me with PET & labs one day prior   Follow up with [facility]    Infusion scheduling note   needs chemo next week for carbo + paclitaxel!   Injection scheduling note    Labs CBC and CMP   Scheduling:  Preferred lab:  Lab interval:  CBC, CMP next week prior to final cycle of chemo; same labs in 5-6 weeks on return   Imaging PET scan      Pharmacy appointment    Other referrals

## 2023-09-29 ENCOUNTER — DOCUMENTATION ONLY (OUTPATIENT)
Dept: RADIATION ONCOLOGY | Facility: CLINIC | Age: 68
End: 2023-09-29
Payer: MEDICARE

## 2023-09-29 PROCEDURE — 77014 PR  CT GUIDANCE PLACEMENT RAD THERAPY FIELDS: ICD-10-PCS | Mod: 26,,, | Performed by: STUDENT IN AN ORGANIZED HEALTH CARE EDUCATION/TRAINING PROGRAM

## 2023-09-29 PROCEDURE — 77014 PR  CT GUIDANCE PLACEMENT RAD THERAPY FIELDS: CPT | Mod: 26,,, | Performed by: STUDENT IN AN ORGANIZED HEALTH CARE EDUCATION/TRAINING PROGRAM

## 2023-09-29 PROCEDURE — 77386 HC IMRT, COMPLEX: CPT | Performed by: STUDENT IN AN ORGANIZED HEALTH CARE EDUCATION/TRAINING PROGRAM

## 2023-09-30 ENCOUNTER — PATIENT MESSAGE (OUTPATIENT)
Dept: HEMATOLOGY/ONCOLOGY | Facility: CLINIC | Age: 68
End: 2023-09-30
Payer: MEDICARE

## 2023-10-02 ENCOUNTER — HOSPITAL ENCOUNTER (OUTPATIENT)
Dept: RADIATION THERAPY | Facility: HOSPITAL | Age: 68
Discharge: HOME | End: 2023-10-02
Attending: STUDENT IN AN ORGANIZED HEALTH CARE EDUCATION/TRAINING PROGRAM
Payer: MEDICARE

## 2023-10-02 ENCOUNTER — TELEPHONE (OUTPATIENT)
Dept: HEMATOLOGY/ONCOLOGY | Facility: CLINIC | Age: 68
End: 2023-10-02
Payer: MEDICARE

## 2023-10-02 DIAGNOSIS — C15.9 ESOPHAGEAL ADENOCARCINOMA: Primary | ICD-10-CM

## 2023-10-02 PROCEDURE — 77386 HC IMRT, COMPLEX: CPT | Performed by: STUDENT IN AN ORGANIZED HEALTH CARE EDUCATION/TRAINING PROGRAM

## 2023-10-02 PROCEDURE — 77014 PR  CT GUIDANCE PLACEMENT RAD THERAPY FIELDS: ICD-10-PCS | Mod: 26,,, | Performed by: STUDENT IN AN ORGANIZED HEALTH CARE EDUCATION/TRAINING PROGRAM

## 2023-10-02 PROCEDURE — 77014 PR  CT GUIDANCE PLACEMENT RAD THERAPY FIELDS: CPT | Mod: 26,,, | Performed by: STUDENT IN AN ORGANIZED HEALTH CARE EDUCATION/TRAINING PROGRAM

## 2023-10-02 PROCEDURE — 77336 RADIATION PHYSICS CONSULT: CPT | Performed by: STUDENT IN AN ORGANIZED HEALTH CARE EDUCATION/TRAINING PROGRAM

## 2023-10-03 PROCEDURE — 77427 RADIATION TX MANAGEMENT X5: CPT | Mod: ,,, | Performed by: STUDENT IN AN ORGANIZED HEALTH CARE EDUCATION/TRAINING PROGRAM

## 2023-10-03 PROCEDURE — 77427 PR CHG RADIATION,MANGEMENT,5 TX'S: ICD-10-PCS | Mod: ,,, | Performed by: STUDENT IN AN ORGANIZED HEALTH CARE EDUCATION/TRAINING PROGRAM

## 2023-10-03 PROCEDURE — 77014 PR  CT GUIDANCE PLACEMENT RAD THERAPY FIELDS: CPT | Mod: 26,,, | Performed by: STUDENT IN AN ORGANIZED HEALTH CARE EDUCATION/TRAINING PROGRAM

## 2023-10-03 PROCEDURE — 77386 HC IMRT, COMPLEX: CPT | Performed by: STUDENT IN AN ORGANIZED HEALTH CARE EDUCATION/TRAINING PROGRAM

## 2023-10-03 PROCEDURE — 77014 PR  CT GUIDANCE PLACEMENT RAD THERAPY FIELDS: ICD-10-PCS | Mod: 26,,, | Performed by: STUDENT IN AN ORGANIZED HEALTH CARE EDUCATION/TRAINING PROGRAM

## 2023-10-04 PROCEDURE — 77014 PR  CT GUIDANCE PLACEMENT RAD THERAPY FIELDS: ICD-10-PCS | Mod: 26,,, | Performed by: STUDENT IN AN ORGANIZED HEALTH CARE EDUCATION/TRAINING PROGRAM

## 2023-10-04 PROCEDURE — 77014 PR  CT GUIDANCE PLACEMENT RAD THERAPY FIELDS: CPT | Mod: 26,,, | Performed by: STUDENT IN AN ORGANIZED HEALTH CARE EDUCATION/TRAINING PROGRAM

## 2023-10-04 PROCEDURE — 77386 HC IMRT, COMPLEX: CPT | Performed by: STUDENT IN AN ORGANIZED HEALTH CARE EDUCATION/TRAINING PROGRAM

## 2023-10-05 ENCOUNTER — INFUSION (OUTPATIENT)
Dept: INFUSION THERAPY | Facility: HOSPITAL | Age: 68
End: 2023-10-05
Payer: MEDICARE

## 2023-10-05 VITALS
WEIGHT: 166 LBS | DIASTOLIC BLOOD PRESSURE: 79 MMHG | SYSTOLIC BLOOD PRESSURE: 158 MMHG | TEMPERATURE: 98 F | RESPIRATION RATE: 18 BRPM | BODY MASS INDEX: 26.06 KG/M2 | HEART RATE: 78 BPM | HEIGHT: 67 IN

## 2023-10-05 DIAGNOSIS — C15.9 ESOPHAGEAL ADENOCARCINOMA: Primary | ICD-10-CM

## 2023-10-05 PROCEDURE — 96375 TX/PRO/DX INJ NEW DRUG ADDON: CPT

## 2023-10-05 PROCEDURE — 77014 PR  CT GUIDANCE PLACEMENT RAD THERAPY FIELDS: CPT | Mod: 26,,, | Performed by: STUDENT IN AN ORGANIZED HEALTH CARE EDUCATION/TRAINING PROGRAM

## 2023-10-05 PROCEDURE — 25000003 PHARM REV CODE 250: Performed by: INTERNAL MEDICINE

## 2023-10-05 PROCEDURE — 96367 TX/PROPH/DG ADDL SEQ IV INF: CPT

## 2023-10-05 PROCEDURE — 96413 CHEMO IV INFUSION 1 HR: CPT

## 2023-10-05 PROCEDURE — 63600175 PHARM REV CODE 636 W HCPCS: Performed by: INTERNAL MEDICINE

## 2023-10-05 PROCEDURE — 77014 PR  CT GUIDANCE PLACEMENT RAD THERAPY FIELDS: ICD-10-PCS | Mod: 26,,, | Performed by: STUDENT IN AN ORGANIZED HEALTH CARE EDUCATION/TRAINING PROGRAM

## 2023-10-05 PROCEDURE — 77386 HC IMRT, COMPLEX: CPT | Performed by: STUDENT IN AN ORGANIZED HEALTH CARE EDUCATION/TRAINING PROGRAM

## 2023-10-05 PROCEDURE — 96417 CHEMO IV INFUS EACH ADDL SEQ: CPT

## 2023-10-05 RX ORDER — SODIUM CHLORIDE 0.9 % (FLUSH) 0.9 %
10 SYRINGE (ML) INJECTION
Status: DISCONTINUED | OUTPATIENT
Start: 2023-10-05 | End: 2023-10-05 | Stop reason: HOSPADM

## 2023-10-05 RX ORDER — HEPARIN 100 UNIT/ML
500 SYRINGE INTRAVENOUS
Status: DISCONTINUED | OUTPATIENT
Start: 2023-10-05 | End: 2023-10-05 | Stop reason: HOSPADM

## 2023-10-05 RX ORDER — DIPHENHYDRAMINE HYDROCHLORIDE 50 MG/ML
50 INJECTION INTRAMUSCULAR; INTRAVENOUS ONCE AS NEEDED
Status: DISCONTINUED | OUTPATIENT
Start: 2023-10-05 | End: 2023-10-05 | Stop reason: HOSPADM

## 2023-10-05 RX ORDER — EPINEPHRINE 0.3 MG/.3ML
0.3 INJECTION SUBCUTANEOUS ONCE AS NEEDED
Status: DISCONTINUED | OUTPATIENT
Start: 2023-10-05 | End: 2023-10-05 | Stop reason: HOSPADM

## 2023-10-05 RX ORDER — FAMOTIDINE 10 MG/ML
20 INJECTION INTRAVENOUS
Status: COMPLETED | OUTPATIENT
Start: 2023-10-05 | End: 2023-10-05

## 2023-10-05 RX ADMIN — PACLITAXEL 96 MG: 6 INJECTION, SOLUTION, CONCENTRATE INTRAVENOUS at 10:10

## 2023-10-05 RX ADMIN — CARBOPLATIN 195 MG: 10 INJECTION, SOLUTION INTRAVENOUS at 11:10

## 2023-10-05 RX ADMIN — FAMOTIDINE 20 MG: 10 INJECTION INTRAVENOUS at 09:10

## 2023-10-05 RX ADMIN — DEXAMETHASONE SODIUM PHOSPHATE 0.25 MG: 4 INJECTION, SOLUTION INTRA-ARTICULAR; INTRALESIONAL; INTRAMUSCULAR; INTRAVENOUS; SOFT TISSUE at 09:10

## 2023-10-05 RX ADMIN — SODIUM CHLORIDE: 9 INJECTION, SOLUTION INTRAVENOUS at 08:10

## 2023-10-05 RX ADMIN — DIPHENHYDRAMINE HYDROCHLORIDE 50 MG: 50 INJECTION, SOLUTION INTRAMUSCULAR; INTRAVENOUS at 09:10

## 2023-10-05 NOTE — PLAN OF CARE
1140- Pt tolerated Taxol/Carbo infusions well, no complications or side effects, POC and meds discussed with pt, pt aware of upcoming appts, pt knows to call MD with any questions or concerns. Pt ambulated off unit, no distress noted.

## 2023-10-06 ENCOUNTER — DOCUMENTATION ONLY (OUTPATIENT)
Dept: RADIATION ONCOLOGY | Facility: CLINIC | Age: 68
End: 2023-10-06
Payer: MEDICARE

## 2023-10-06 PROCEDURE — 77014 PR  CT GUIDANCE PLACEMENT RAD THERAPY FIELDS: ICD-10-PCS | Mod: 26,,, | Performed by: STUDENT IN AN ORGANIZED HEALTH CARE EDUCATION/TRAINING PROGRAM

## 2023-10-06 PROCEDURE — 77014 PR  CT GUIDANCE PLACEMENT RAD THERAPY FIELDS: CPT | Mod: 26,,, | Performed by: STUDENT IN AN ORGANIZED HEALTH CARE EDUCATION/TRAINING PROGRAM

## 2023-10-06 PROCEDURE — 77386 HC IMRT, COMPLEX: CPT | Performed by: STUDENT IN AN ORGANIZED HEALTH CARE EDUCATION/TRAINING PROGRAM

## 2023-10-09 ENCOUNTER — TELEPHONE (OUTPATIENT)
Dept: HEMATOLOGY/ONCOLOGY | Facility: CLINIC | Age: 68
End: 2023-10-09
Payer: MEDICARE

## 2023-10-09 PROCEDURE — 77014 PR  CT GUIDANCE PLACEMENT RAD THERAPY FIELDS: ICD-10-PCS | Mod: 26,,, | Performed by: STUDENT IN AN ORGANIZED HEALTH CARE EDUCATION/TRAINING PROGRAM

## 2023-10-09 PROCEDURE — 77014 PR  CT GUIDANCE PLACEMENT RAD THERAPY FIELDS: CPT | Mod: 26,,, | Performed by: STUDENT IN AN ORGANIZED HEALTH CARE EDUCATION/TRAINING PROGRAM

## 2023-10-09 PROCEDURE — 77386 HC IMRT, COMPLEX: CPT | Performed by: STUDENT IN AN ORGANIZED HEALTH CARE EDUCATION/TRAINING PROGRAM

## 2023-10-09 PROCEDURE — 77336 RADIATION PHYSICS CONSULT: CPT | Performed by: STUDENT IN AN ORGANIZED HEALTH CARE EDUCATION/TRAINING PROGRAM

## 2023-10-09 NOTE — PLAN OF CARE
Day 24 of outpatient radiation to the esophagus. Tolerating therapy well. Still with dysphagia. Voiced feels better today.

## 2023-10-09 NOTE — NURSING
Spoke with patient and scheduled appointment for 11/13/23 with Dr. Singer; he will have his PET scan same day. Mr Lugo declined scheduling with a surgical oncologist at this time. He stated he is unsure if he's interested in surgery and would like his PET and med-onc visit 1st . Provided patient with appointment time and date, address of [facility] facility and direct line to navigator. All questions and concerns addressed.

## 2023-10-11 ENCOUNTER — PATIENT MESSAGE (OUTPATIENT)
Dept: HEMATOLOGY/ONCOLOGY | Facility: CLINIC | Age: 68
End: 2023-10-11
Payer: MEDICARE

## 2023-10-12 ENCOUNTER — DOCUMENTATION ONLY (OUTPATIENT)
Dept: RADIATION ONCOLOGY | Facility: CLINIC | Age: 68
End: 2023-10-12
Payer: MEDICARE

## 2023-10-12 NOTE — PLAN OF CARE
Completed 25/25 of outpatient radiation to the distal esophagus on 10/9/23. Tolerated therapy well.

## 2023-10-16 ENCOUNTER — CLINICAL SUPPORT (OUTPATIENT)
Dept: SMOKING CESSATION | Facility: CLINIC | Age: 68
End: 2023-10-16
Payer: COMMERCIAL

## 2023-10-16 DIAGNOSIS — F17.200 NICOTINE DEPENDENCE: Primary | ICD-10-CM

## 2023-10-16 PROCEDURE — 99406 PR TOBACCO USE CESSATION INTERMEDIATE 3-10 MINUTES: ICD-10-PCS | Mod: S$PBB,,,

## 2023-10-16 PROCEDURE — 99406 BEHAV CHNG SMOKING 3-10 MIN: CPT | Mod: S$PBB,,,

## 2023-10-16 NOTE — PROGRESS NOTES
Called patient to follow-up. Patient reports that he has been feeling extremely tired due to chemotherapy. He reports being able to cut down to 10 cigarettes without using any tobacco cessation medication at this time. Commended patient on his hard work. Patient states that he has had to cut back on working so many hours at this time. Encouraged patient to continue drinking lots of water and staying hydrated as he continues to cut down on the cigarettes. Patient reminded of CTTS contact information and understands that he can reach out as needed.

## 2023-10-23 ENCOUNTER — PATIENT MESSAGE (OUTPATIENT)
Dept: HEMATOLOGY/ONCOLOGY | Facility: CLINIC | Age: 68
End: 2023-10-23
Payer: MEDICARE

## 2023-11-08 ENCOUNTER — CLINICAL SUPPORT (OUTPATIENT)
Dept: SMOKING CESSATION | Facility: CLINIC | Age: 68
End: 2023-11-08

## 2023-11-08 DIAGNOSIS — F17.200 NICOTINE DEPENDENCE: Primary | ICD-10-CM

## 2023-11-08 PROCEDURE — 99407 PR TOBACCO USE CESSATION INTENSIVE >10 MINUTES: ICD-10-PCS | Mod: S$GLB,,,

## 2023-11-08 PROCEDURE — 99999 PR PBB SHADOW E&M-EST. PATIENT-LVL I: ICD-10-PCS | Mod: PBBFAC,,,

## 2023-11-08 PROCEDURE — 99407 BEHAV CHNG SMOKING > 10 MIN: CPT | Mod: S$GLB,,,

## 2023-11-08 PROCEDURE — 99999 PR PBB SHADOW E&M-EST. PATIENT-LVL I: CPT | Mod: PBBFAC,,,

## 2023-11-08 NOTE — PROGRESS NOTES
Called pt to f/u on his 3 month smoking cessation quit status. Pt stated he is still smoking, had to stop using NRT's due to negative side effects and going through chemo treatments. Informed him he has benefits available and is able to continue to with program. Pt not ready to make appointment. He will call back when ready. Informed him of benefit period, phone follow ups, and contact information. Will complete smart form and will continue to follow up on quit #1 episode.

## 2023-11-13 ENCOUNTER — TELEPHONE (OUTPATIENT)
Dept: HEMATOLOGY/ONCOLOGY | Facility: CLINIC | Age: 68
End: 2023-11-13
Payer: MEDICARE

## 2023-11-14 ENCOUNTER — HOSPITAL ENCOUNTER (OUTPATIENT)
Dept: RADIOLOGY | Facility: HOSPITAL | Age: 68
Discharge: HOME | End: 2023-11-14
Attending: INTERNAL MEDICINE
Payer: MEDICARE

## 2023-11-14 DIAGNOSIS — C15.9 ESOPHAGEAL ADENOCARCINOMA: ICD-10-CM

## 2023-11-14 LAB — POCT GLUCOSE: 114 MG/DL (ref 70–110)

## 2023-11-14 PROCEDURE — 78815 NM PET CT ROUTINE: ICD-10-PCS | Mod: 26,PS,, | Performed by: STUDENT IN AN ORGANIZED HEALTH CARE EDUCATION/TRAINING PROGRAM

## 2023-11-14 PROCEDURE — A9552 F18 FDG: HCPCS

## 2023-11-14 PROCEDURE — 78815 PET IMAGE W/CT SKULL-THIGH: CPT | Mod: 26,PS,, | Performed by: STUDENT IN AN ORGANIZED HEALTH CARE EDUCATION/TRAINING PROGRAM

## 2023-11-15 ENCOUNTER — OFFICE VISIT (OUTPATIENT)
Dept: HEMATOLOGY/ONCOLOGY | Facility: CLINIC | Age: 68
End: 2023-11-15
Payer: MEDICARE

## 2023-11-15 ENCOUNTER — OFFICE VISIT (OUTPATIENT)
Dept: RADIATION ONCOLOGY | Facility: CLINIC | Age: 68
End: 2023-11-15
Payer: MEDICARE

## 2023-11-15 VITALS
BODY MASS INDEX: 26 KG/M2 | SYSTOLIC BLOOD PRESSURE: 137 MMHG | HEIGHT: 67 IN | DIASTOLIC BLOOD PRESSURE: 67 MMHG | OXYGEN SATURATION: 98 % | HEART RATE: 77 BPM | TEMPERATURE: 98 F | RESPIRATION RATE: 18 BRPM

## 2023-11-15 DIAGNOSIS — Z79.899 IMMUNODEFICIENCY SECONDARY TO CHEMOTHERAPY: ICD-10-CM

## 2023-11-15 DIAGNOSIS — R11.0 CHEMOTHERAPY-INDUCED NAUSEA: ICD-10-CM

## 2023-11-15 DIAGNOSIS — C15.9 ESOPHAGEAL ADENOCARCINOMA: Primary | ICD-10-CM

## 2023-11-15 DIAGNOSIS — I10 HYPERTENSION, UNSPECIFIED TYPE: ICD-10-CM

## 2023-11-15 DIAGNOSIS — T45.1X5A IMMUNODEFICIENCY SECONDARY TO CHEMOTHERAPY: ICD-10-CM

## 2023-11-15 DIAGNOSIS — T45.1X5A CHEMOTHERAPY-INDUCED THROMBOCYTOPENIA: ICD-10-CM

## 2023-11-15 DIAGNOSIS — D49.9 IMMUNODEFICIENCY SECONDARY TO NEOPLASM: ICD-10-CM

## 2023-11-15 DIAGNOSIS — D69.59 CHEMOTHERAPY-INDUCED THROMBOCYTOPENIA: ICD-10-CM

## 2023-11-15 DIAGNOSIS — D84.821 IMMUNODEFICIENCY SECONDARY TO CHEMOTHERAPY: ICD-10-CM

## 2023-11-15 DIAGNOSIS — T45.1X5A CHEMOTHERAPY-INDUCED NAUSEA: ICD-10-CM

## 2023-11-15 DIAGNOSIS — Z72.0 TOBACCO USE: ICD-10-CM

## 2023-11-15 DIAGNOSIS — D84.81 IMMUNODEFICIENCY SECONDARY TO NEOPLASM: ICD-10-CM

## 2023-11-15 DIAGNOSIS — E78.5 HYPERLIPIDEMIA, UNSPECIFIED HYPERLIPIDEMIA TYPE: ICD-10-CM

## 2023-11-15 PROCEDURE — 99211 OFF/OP EST MAY X REQ PHY/QHP: CPT | Mod: PBBFAC | Performed by: STUDENT IN AN ORGANIZED HEALTH CARE EDUCATION/TRAINING PROGRAM

## 2023-11-15 PROCEDURE — 99214 PR OFFICE/OUTPT VISIT, EST, LEVL IV, 30-39 MIN: ICD-10-PCS | Mod: S$PBB,,, | Performed by: STUDENT IN AN ORGANIZED HEALTH CARE EDUCATION/TRAINING PROGRAM

## 2023-11-15 PROCEDURE — 99999 PR PBB SHADOW E&M-EST. PATIENT-LVL I: ICD-10-PCS | Mod: PBBFAC,,, | Performed by: STUDENT IN AN ORGANIZED HEALTH CARE EDUCATION/TRAINING PROGRAM

## 2023-11-15 PROCEDURE — 99999 PR PBB SHADOW E&M-EST. PATIENT-LVL IV: ICD-10-PCS | Mod: PBBFAC,,, | Performed by: INTERNAL MEDICINE

## 2023-11-15 PROCEDURE — 99214 OFFICE O/P EST MOD 30 MIN: CPT | Mod: S$PBB,,, | Performed by: STUDENT IN AN ORGANIZED HEALTH CARE EDUCATION/TRAINING PROGRAM

## 2023-11-15 PROCEDURE — 99999 PR PBB SHADOW E&M-EST. PATIENT-LVL IV: CPT | Mod: PBBFAC,,, | Performed by: INTERNAL MEDICINE

## 2023-11-15 PROCEDURE — 99214 OFFICE O/P EST MOD 30 MIN: CPT | Mod: PBBFAC,25 | Performed by: INTERNAL MEDICINE

## 2023-11-15 PROCEDURE — 99999 PR PBB SHADOW E&M-EST. PATIENT-LVL I: CPT | Mod: PBBFAC,,, | Performed by: STUDENT IN AN ORGANIZED HEALTH CARE EDUCATION/TRAINING PROGRAM

## 2023-11-15 PROCEDURE — 99214 PR OFFICE/OUTPT VISIT, EST, LEVL IV, 30-39 MIN: ICD-10-PCS | Mod: S$PBB,,, | Performed by: INTERNAL MEDICINE

## 2023-11-15 PROCEDURE — 99214 OFFICE O/P EST MOD 30 MIN: CPT | Mod: S$PBB,,, | Performed by: INTERNAL MEDICINE

## 2023-11-15 NOTE — PROGRESS NOTES
Radiation Oncology Follow-up Note        Date of Service: 11/15/2023     Chief Complaint: adenocarcinoma of the distal 1/3 esophagus, s/p def CRT     Reason for visit: post CRT toxicity check     Referring Physician: Dr Hampton (surgery)     Implantable devices: denies     Therapy to Date:  Course: C1 Thorax 2023   Treatment Site  Ref. ID  Energy  Dose/Fx (Gy)  #Fx  Dose Correction (Gy)  Total Dose (Gy)  Start Date  End Date  Elapsed Days    IM Esophagus  PTV_High  6X  2  25 / 25  0  50  9/5/2023  10/9/2023  34         Diagnosis/Assessment:   Jone Lugo is a 67 y.o. 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   PMH of HTN, HLD, and pack a day smoker   Dysphagia has improved since esophageal dilation during EUS, but is worsening again now   Seen by Dr Hampton, surgical candidate, but patient declined surgery after meeting with Dr Velez.   He is s/p definitive chemoradiation with concurrent carbo/taxol (Dr Singer) at a dose of 50Gy/25fx using IMRT completed 10/9/2023.      ECOG 1, no longer has dysphagia  Latest PET shows treatment response, also with  superior pole of left kidney  requiring workup     No RT related toxicities     Plan   - surveillance per Dr Singer  - return to radiation oncology PRN    Interval history     10/9/2023 Tolerated radiation therapy well with expected toxicities, without significant treatment delays or breaks.   continue zofran PRN   return in 2-4 weeks for toxicity check   followup with Dr Singer     11/14/2023 PET    Stable distal esophageal wall thickening and hypermetabolic activity in this patient with esophageal adenocarcinoma.  No evidence of hypermetabolic metastatic disease.     Non hypermetabolic soft tissue lesion arising from the superior pole of left kidney,        Subjective:   In clinic the patient is alone    The  "patient reports feeling much better overall.    Since CRT he is now able to eat anything he wants: denies any chest pain, hemoptysis, dysphagia , odynophagia or regurgitation. He denies any blood in his stools    Only complaint today is constipation and fatigue    The patient denies other major complaints.    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 Currently smokes 1 ppd.     Family history  Mother had breast cancer (caught early per patient) and "something in her throat" that was treated with chemoRT    Current Outpatient Medications on File Prior to Visit   Medication Sig Dispense Refill    amLODIPine (NORVASC) 10 MG tablet Take 10 mg by mouth.      azelastine (ASTELIN) 137 mcg (0.1 %) nasal spray SPRAY 1 SPRAY BY NASAL ROUTE 2 TIMES DAILY USE IN EACH NOSTRIL AS DIRECTED.      co-enzyme Q-10 30 mg capsule Take 30 mg by mouth 3 (three) times daily.      etodolac (LODINE) 400 MG tablet Take 1 tablet by mouth 2 (two) times daily.      famotidine (PEPCID) 20 MG tablet Take 20 mg by mouth 2 (two) times daily.      fluticasone propionate (FLONASE) 50 mcg/actuation nasal spray SMARTSIG:3 Spray(s) Both Nares Every Morning      gabapentin (NEURONTIN) 300 MG capsule Take 300 mg by mouth.      nicotine (NICODERM CQ) 21 mg/24 hr Place 1 patch onto the skin once daily. 14 patch 0    nicotine polacrilex 2 MG Lozg Take 1 lozenge (2 mg total) by mouth as needed (in place of a cigarette). 72 lozenge 0    omeprazole (PRILOSEC) 40 MG capsule Take 1 capsule by mouth every morning.      ondansetron (ZOFRAN) 8 MG tablet Take 1 tablet (8 mg total) by mouth every 8 (eight) hours as needed for Nausea. 60 tablet 2    pravastatin (PRAVACHOL) 40 MG tablet Take 40 mg by mouth.      prochlorperazine (COMPAZINE) 10 MG tablet Take 1 tablet (10 mg total) by mouth every 6 (six) hours as needed. " 60 tablet 2     No current facility-administered medications on file prior to visit.       Review of patient's allergies indicates:  No Known Allergies            Review of Systems   Negative unless as stated above        HPI:   Jone Lugo is a 67 y.o. man with recent diagnosis of esophageal cancer after presenting with dysphagia     Oncologic history:     6/23/2023 upper EGD (Dr Berta Stewart)                          7/11/2023 upper EUS (Dr Halley Stewart)          Pathology   ESOPHAGUS, DISTAL MASS, BIOPSY: INVASIVE, MODERATELY DIFFERENTIATED ADENOCARCINOMA WITH MUCINOUS DIFFERENTIATION               ESOPHAGUS MASS, FNA: ADENOCARCINOMA WITH MUCINOUS DIFFERENTIATION      7/31/2023 PET CT  hypermetabolic wall thickening of distal esophagus extending to GE junction SUVm  5.7  no pathologically enlarged or hypermetabolic lymph nodes                      7/31/2023 surgery (Dr Hampton)  recommend proceeding with neoadjuvant chemoradiation      7/31/2023 medond visit (Aleshia Giraldo NP/ Dr. Singer)              weekly carboplatin + taxol in conjunction with daily radiation (M-F, no holidays) for a total of 5-5.5 weeks.     Objective:   Physical Exam  Vitals reviewed.     Constitutional:       Appearance: Normal appearance.   HENT:      Head: Normocephalic and atraumatic.   Pulmonary:      Effort: Pulmonary effort is normal.   Abdominal:      General: There is no distension.   Musculoskeletal:         General: Normal range of motion.   Neurological:      General: No focal deficit present.      Mental Status: Alert and oriented  Psychiatric:         Mood and Affect: Mood normal.         Behavior: Behavior normal.      Imaging: I have personally reviewed the patient's available images and reports and summarized pertinent findings above in HPI.     I spent approximately 30 minutes reviewing the available records and evaluating the patient, out of which over 50% of the time was spent face to face with the  patient in counseling and coordinating this patient's care.        Anshul Neri MD/PhD

## 2023-11-16 NOTE — PROGRESS NOTES
MEDICAL ONCOLOGY - ESTABLISHED PATIENT VISIT    Reason for visit: esophageal adenocarcinoma     Best Contact Phone Number(s): There are no phone numbers on file.     Cancer/Stage/TNM:    Cancer Staging   Esophageal adenocarcinoma  Staging form: Esophagus - Adenocarcinoma, AJCC 8th Edition  - Clinical: Stage III (cT2, cN1, cM0, G2) - Signed by Miguel Angel Hampton Jr., MD on 7/31/2023       Oncology History   Esophageal adenocarcinoma   7/31/2023 Initial Diagnosis    Esophageal adenocarcinoma     7/31/2023 Cancer Staged    Staging form: Esophagus - Adenocarcinoma, AJCC 8th Edition  - Clinical: Stage III (cT2, cN1, cM0, G2)     9/5/2023 -  Chemotherapy    Treatment Summary   Plan Name: OP ESOPHAGEAL PACLITAXEL CARBOPLATIN WEEKLY  Treatment Goal: Curative  Status: Active  Start Date: 9/5/2023  End Date: 10/5/2023  Provider: Sahil Singer MD  Chemotherapy: CARBOplatin (PARAPLATIN) 195 mg in sodium chloride 0.9% 304.5 mL chemo infusion, 195 mg (100 % of original dose 193.4 mg), Intravenous, Clinic/HOD 1 time, 1 of 1 cycle  Dose modification:   (original dose 193.4 mg, Cycle 1), 193.4 mg (original dose 193.4 mg, Cycle 1),   (original dose 193.4 mg, Cycle 1, Reason: MD Discretion)  Administration: 195 mg (9/5/2023), 225 mg (9/12/2023), 210 mg (9/19/2023), 180 mg (9/26/2023), 195 mg (10/5/2023)  PACLitaxeL (TAXOL) 50 mg/m2 = 96 mg in sodium chloride 0.9% 250 mL chemo infusion, 50 mg/m2 = 96 mg, Intravenous, Clinic/HOD 1 time, 1 of 1 cycle  Administration: 96 mg (9/5/2023), 96 mg (9/12/2023), 96 mg (9/19/2023), 96 mg (9/26/2023), 96 mg (10/5/2023)     9/5/2023 - 10/9/2023 Radiation Therapy    Treating physician: Anshul Neri    Course: C1 Thorax 2023    Treatment Site Ref. ID Energy Dose/Fx (Gy) #Fx Dose Correction (Gy) Total Dose (Gy) Start Date End Date Elapsed Days   IM Esophagus PTV_High 6X 2 25 / 25 0 50 9/5/2023 10/9/2023 34             Interim History:  67 y.o. male with esophageal adenocarcinoma who  presents for follow-up following completion for definitive chemoRT. He is feeling well today, however he reports having acculated symptoms for about a week after his last chemo. He states that swallowing is better. No vomiting. Eating back to normal.  Lost a bit of weight but regained.     Presents alone today. ECOG status is 1.     ROS:   Review of Systems   Constitutional:  Negative for fever, malaise/fatigue and weight loss.   HENT:  Negative for nosebleeds.    Respiratory:  Negative for shortness of breath.    Cardiovascular:  Negative for chest pain and palpitations.   Gastrointestinal:  Negative for blood in stool, constipation, diarrhea, heartburn, nausea and vomiting.   Genitourinary:  Negative for hematuria.   Musculoskeletal:  Negative for falls.   Skin:  Negative for itching and rash.   Neurological:  Negative for dizziness, tingling, sensory change and weakness.   Psychiatric/Behavioral:  The patient is not nervous/anxious.      Past Medical History:   Past Medical History:   Diagnosis Date    HTN (hypertension)      Past Surgical History:   Past Surgical History:   Procedure Laterality Date    LAPAROSCOPIC REPAIR OF INGUINAL HERNIA Right      Family History:   No family history on file.     Social History:   Social History     Tobacco Use    Smoking status: Every Day     Current packs/day: 1.00     Types: Cigarettes    Smokeless tobacco: Never   Substance Use Topics    Alcohol use: Not on file      I have reviewed and updated the patient's past medical, surgical, family and social histories.    Allergies:   Review of patient's allergies indicates:  No Known Allergies     Medications:   Current Outpatient Medications   Medication Sig Dispense Refill    amLODIPine (NORVASC) 10 MG tablet Take 10 mg by mouth.      azelastine (ASTELIN) 137 mcg (0.1 %) nasal spray SPRAY 1 SPRAY BY NASAL ROUTE 2 TIMES DAILY USE IN EACH NOSTRIL AS DIRECTED.      co-enzyme Q-10 30 mg capsule Take 30 mg by mouth 3 (three) times  "daily.      etodolac (LODINE) 400 MG tablet Take 1 tablet by mouth 2 (two) times daily.      famotidine (PEPCID) 20 MG tablet Take 20 mg by mouth 2 (two) times daily.      fluticasone propionate (FLONASE) 50 mcg/actuation nasal spray SMARTSIG:3 Spray(s) Both Nares Every Morning      gabapentin (NEURONTIN) 300 MG capsule Take 300 mg by mouth.      nicotine (NICODERM CQ) 21 mg/24 hr Place 1 patch onto the skin once daily. 14 patch 0    nicotine polacrilex 2 MG Lozg Take 1 lozenge (2 mg total) by mouth as needed (in place of a cigarette). 72 lozenge 0    omeprazole (PRILOSEC) 40 MG capsule Take 1 capsule by mouth every morning.      ondansetron (ZOFRAN) 8 MG tablet Take 1 tablet (8 mg total) by mouth every 8 (eight) hours as needed for Nausea. 60 tablet 2    pravastatin (PRAVACHOL) 40 MG tablet Take 40 mg by mouth.      prochlorperazine (COMPAZINE) 10 MG tablet Take 1 tablet (10 mg total) by mouth every 6 (six) hours as needed. 60 tablet 2     No current facility-administered medications for this visit.      Physical Exam:   /67 (BP Location: Left arm, Patient Position: Sitting, BP Method: Medium (Automatic))   Pulse 77   Temp 98.2 °F (36.8 °C) (Oral)   Resp 18   Ht 5' 7" (1.702 m)   SpO2 98%   BMI 26.00 kg/m²      ECOG Performance status: 0       Physical Exam  Vitals reviewed.   Constitutional:       General: He is not in acute distress.     Appearance: Normal appearance. He is not ill-appearing, toxic-appearing or diaphoretic.   HENT:      Head: Normocephalic and atraumatic.      Right Ear: External ear normal.      Left Ear: External ear normal.      Nose: Nose normal. No congestion.      Mouth/Throat:      Pharynx: Oropharynx is clear. No oropharyngeal exudate.   Eyes:      General: No scleral icterus.     Conjunctiva/sclera: Conjunctivae normal.   Cardiovascular:      Rate and Rhythm: Normal rate.   Pulmonary:      Effort: Pulmonary effort is normal. No respiratory distress.      Breath sounds: No " wheezing.   Abdominal:      General: There is no distension.   Musculoskeletal:      Cervical back: Normal range of motion.      Right lower leg: No edema.      Left lower leg: No edema.   Skin:     General: Skin is warm and dry.      Coloration: Skin is not jaundiced or pale.      Findings: No bruising, erythema or rash.   Neurological:      General: No focal deficit present.      Mental Status: He is alert and oriented to person, place, and time.      Cranial Nerves: No cranial nerve deficit.      Motor: No weakness.      Gait: Gait normal.   Psychiatric:         Mood and Affect: Mood normal.         Behavior: Behavior normal.         Labs:      I have reviewed the pertinent labs from 9/26/23. Mild thrombocytopenia and leukopenia. Mild ALT elevation.    Imaging:    Esophagram - 6/15/2023  Impression:  - Irregularity and narrowing of the distal esophagus. Findings are suspicious for underlying neoplasm versus varices. Recommend endoscopy.  - Mild presbyesophagus     CT Chest, CT A/P - 7/3/2023   1.   There is an eccentric mass in the lower thoracic esophagus extending in close proximity to the gastric cardia.   2.   There is no evidence of metastatic disease in the thorax.   3.   There is a tiny subcapsular hypodensity in the right hepatic lobe which is too small to characterize. This likely represents a cyst, although a metastatic lesion is not excluded. There is also a hyperenhancing mass in the right hepatic lobe which likely reflects a hemangioma but is also nonspecific. A short-term follow-up exam or an MRI of the abdomen would likely be of benefit.   4.   There is an apparent 1.8 cm mass in the upper pole of the left kidney anteriorly. This does not have the appearance of a simple cyst. There are no precontrast images to compare and evaluate for definite enhancement although this appears to enhance. This could simply represent normal renal parenchyma, although a solid tumor is suspected, concerning for renal  cell carcinoma. This could be evaluated further with a dedicated renal mass CT versus an MRI of the abdomen without and with contrast.     EUS - 7/11/2023  Impression:   - Normal first portion of the duodenum and second portion of the duodenum. No specimens collected.   - Mucosal nodule found in the duodenum. Biopsied.   - Normal stomach. No specimens collected.   - Partially obstructing, likely malignant esophageal tumor was found in the distal esophagus. Biopsied.   - A mass was found in the thoracic esophagus. Fine need biopsy performed.   - Malignant-appearing esophageal stenosis. Dilated.   - Normal cricopharyngeus, upper third of esophagus and middle third of esophagus. No specimens collected.     I have personally reviewed the imaging which is notable for distal esophageal tumor and dilation.     Path:   7/11/2023 -   DIAGNOSIS:                                    1. 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 ESOPHAGUS, DISTAL MASS, BIOPSY:   - INVASIVE, MODERATELY DIFFERENTIATED ADENOCARCINOMA WITH MUCINOUS DIFFERENTIATION                                    3. ESOPHAGUS MASS, FINE NEEDLE ASPIRATION:   - ADENOCARCINOMA WITH MUCINOUS DIFFERENTIATION    Assessment:       1. Esophageal adenocarcinoma    2. 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 Chemotherapy-induced nausea    5. Chemotherapy-induced thrombocytopenia    6. Hypertension, unspecified type    7. Hyperlipidemia, unspecified hyperlipidemia type    8. Tobacco use          Plan:        # Esophageal adenocarcinoma  Mr. Lugo is a 67 y.o. male who presents to clinic for evaluation of newly diagnosed esophageal cancer. He initially presented with dysphagia, now improved s/p esophageal dilation during EUS. Biopsy confirmed adenocarcinoma.     We reviewed his diagnosis, prognosis, and treatment options with him during  our initial visit. Discussed with him the high likelihood that cancer would recur if he was taken straight to surgery without neoadjuvant treatment. Our recommendation is to proceed with weekly carboplatin + taxol in conjunction with daily radiation (M-F, no holidays) for a total of 5-5.5 weeks. Handouts provided to patient on both chemotherapy medications.     He has completed Carboplatin AUC 2 + paclitaxel 50 mg/m2 for 5 treatments. Also completed concurrent radiation for definitive non-surgical treatment.    Repeat PET/CT on 11/15/23 shows persistent distal  hypermetabolic esophageal tumor (decreasing SUV) without clear evidence of metastatic disease. This is reassuring, however we will re-obtain another PET/CT in 2 -3 months.    Subsequent plan for follow-up and surveillance will be a combination of CT chest/abdomen w/contrast and EGD every 3 - 6 months given high risk of local recurrence within 24 months.    In the interim, we will obtain PET/CT in 3 months.  Advised to call office with dysphagia, odynophagia or weight loss.    # HTN, HLD  BP elevated in clinic. Asymptomatic.   Continue medical management.   Following with PCP.    # Tobacco use  Encouraged cessation.  Enrolled in smoking cessation clinic.     Follow up: RTC 12  weeks.    Patient is in agreement with the proposed treatment plan. All questions were answered to the patient's satisfaction. Pt knows to call clinic if anything is needed before the next clinic visit.    Sahil Singer MD  Hematology/Oncology  [facility]        Med Onc Chart Routing      Follow up with physician 3 months.   Follow up with LEO    Infusion scheduling note    Injection scheduling note    Labs CBC and CMP   Scheduling:  Preferred lab:  Lab interval:     Imaging PET scan   prior to f/u   Pharmacy appointment    Other referrals

## 2023-12-09 DIAGNOSIS — C15.9 ESOPHAGEAL ADENOCARCINOMA: ICD-10-CM

## 2023-12-13 RX ORDER — ONDANSETRON HYDROCHLORIDE 8 MG/1
8 TABLET, FILM COATED ORAL EVERY 8 HOURS PRN
Qty: 60 TABLET | Refills: 2 | Status: SHIPPED | OUTPATIENT
Start: 2023-12-13

## 2023-12-28 ENCOUNTER — PATIENT MESSAGE (OUTPATIENT)
Dept: HEMATOLOGY/ONCOLOGY | Facility: CLINIC | Age: 68
End: 2023-12-28
Payer: MEDICARE

## 2024-02-19 ENCOUNTER — CLINICAL SUPPORT (OUTPATIENT)
Dept: SMOKING CESSATION | Facility: CLINIC | Age: 69
End: 2024-02-19

## 2024-02-19 ENCOUNTER — TELEPHONE (OUTPATIENT)
Dept: SMOKING CESSATION | Facility: CLINIC | Age: 69
End: 2024-02-19
Payer: MEDICARE

## 2024-02-19 DIAGNOSIS — F17.200 NICOTINE DEPENDENCE: Primary | ICD-10-CM

## 2024-02-19 NOTE — PROGRESS NOTES
Spoke with patient today in regard to smoking cessation progress for 6 month telephone follow up, he states not tobacco free. Patient states he has cut down on smoking and has no interest in returning to the program at this time. Informed patient of benefit period, future follow ups, and contact information if any further help or support is needed. Will complete smart form for 6 month follow up on Quit attempt #1.

## 2024-02-21 ENCOUNTER — HOSPITAL ENCOUNTER (OUTPATIENT)
Dept: RADIOLOGY | Facility: HOSPITAL | Age: 69
Discharge: HOME | End: 2024-02-21
Attending: INTERNAL MEDICINE
Payer: MEDICARE

## 2024-02-21 DIAGNOSIS — C15.9 ESOPHAGEAL ADENOCARCINOMA: ICD-10-CM

## 2024-02-21 LAB — POCT GLUCOSE: 86 MG/DL (ref 70–110)

## 2024-02-21 PROCEDURE — A9552 F18 FDG: HCPCS | Performed by: INTERNAL MEDICINE

## 2024-02-21 PROCEDURE — 78815 PET IMAGE W/CT SKULL-THIGH: CPT | Mod: 26,PS,, | Performed by: STUDENT IN AN ORGANIZED HEALTH CARE EDUCATION/TRAINING PROGRAM

## 2024-02-21 PROCEDURE — 78815 PET IMAGE W/CT SKULL-THIGH: CPT | Mod: TC,PS

## 2024-02-21 RX ORDER — FLUDEOXYGLUCOSE F18 500 MCI/ML
10 INJECTION INTRAVENOUS
Status: COMPLETED | OUTPATIENT
Start: 2024-02-21 | End: 2024-02-21

## 2024-02-21 RX ADMIN — FLUDEOXYGLUCOSE F-18 11.37 MILLICURIE: 500 INJECTION INTRAVENOUS at 08:02

## 2024-02-22 ENCOUNTER — LAB VISIT (OUTPATIENT)
Dept: LAB | Facility: HOSPITAL | Age: 69
End: 2024-02-22
Attending: PHYSICIAN ASSISTANT
Payer: MEDICARE

## 2024-02-22 ENCOUNTER — OFFICE VISIT (OUTPATIENT)
Dept: HEMATOLOGY/ONCOLOGY | Facility: CLINIC | Age: 69
End: 2024-02-22
Payer: MEDICARE

## 2024-02-22 VITALS
SYSTOLIC BLOOD PRESSURE: 137 MMHG | BODY MASS INDEX: 26.28 KG/M2 | HEART RATE: 80 BPM | HEIGHT: 67 IN | TEMPERATURE: 99 F | DIASTOLIC BLOOD PRESSURE: 71 MMHG | WEIGHT: 167.44 LBS | OXYGEN SATURATION: 96 %

## 2024-02-22 DIAGNOSIS — Z72.0 TOBACCO USE: ICD-10-CM

## 2024-02-22 DIAGNOSIS — C15.9 ESOPHAGEAL ADENOCARCINOMA: Primary | ICD-10-CM

## 2024-02-22 DIAGNOSIS — D69.59 CHEMOTHERAPY-INDUCED THROMBOCYTOPENIA: ICD-10-CM

## 2024-02-22 DIAGNOSIS — D84.821 IMMUNODEFICIENCY SECONDARY TO CHEMOTHERAPY: ICD-10-CM

## 2024-02-22 DIAGNOSIS — D84.81 IMMUNODEFICIENCY SECONDARY TO NEOPLASM: ICD-10-CM

## 2024-02-22 DIAGNOSIS — Z79.899 IMMUNODEFICIENCY SECONDARY TO CHEMOTHERAPY: ICD-10-CM

## 2024-02-22 DIAGNOSIS — N28.89 LEFT RENAL MASS: ICD-10-CM

## 2024-02-22 DIAGNOSIS — T45.1X5A CHEMOTHERAPY-INDUCED THROMBOCYTOPENIA: ICD-10-CM

## 2024-02-22 DIAGNOSIS — I10 HYPERTENSION, UNSPECIFIED TYPE: ICD-10-CM

## 2024-02-22 DIAGNOSIS — R11.0 CHEMOTHERAPY-INDUCED NAUSEA: ICD-10-CM

## 2024-02-22 DIAGNOSIS — C15.9 ESOPHAGEAL ADENOCARCINOMA: ICD-10-CM

## 2024-02-22 DIAGNOSIS — T45.1X5A IMMUNODEFICIENCY SECONDARY TO CHEMOTHERAPY: ICD-10-CM

## 2024-02-22 DIAGNOSIS — E78.5 HYPERLIPIDEMIA, UNSPECIFIED HYPERLIPIDEMIA TYPE: ICD-10-CM

## 2024-02-22 DIAGNOSIS — T45.1X5A CHEMOTHERAPY-INDUCED NAUSEA: ICD-10-CM

## 2024-02-22 DIAGNOSIS — D49.9 IMMUNODEFICIENCY SECONDARY TO NEOPLASM: ICD-10-CM

## 2024-02-22 LAB
ALBUMIN SERPL BCP-MCNC: 3.9 G/DL (ref 3.5–5.2)
ALP SERPL-CCNC: 115 U/L (ref 55–135)
ALT SERPL W/O P-5'-P-CCNC: 31 U/L (ref 10–44)
ANION GAP SERPL CALC-SCNC: 9 MMOL/L (ref 8–16)
AST SERPL-CCNC: 24 U/L (ref 10–40)
BILIRUB SERPL-MCNC: 0.9 MG/DL (ref 0.1–1)
BUN SERPL-MCNC: 19 MG/DL (ref 8–23)
CALCIUM SERPL-MCNC: 9.9 MG/DL (ref 8.7–10.5)
CHLORIDE SERPL-SCNC: 108 MMOL/L (ref 95–110)
CO2 SERPL-SCNC: 23 MMOL/L (ref 23–29)
CREAT SERPL-MCNC: 1.1 MG/DL (ref 0.5–1.4)
ERYTHROCYTE [DISTWIDTH] IN BLOOD BY AUTOMATED COUNT: 12.6 % (ref 11.5–14.5)
EST. GFR  (NO RACE VARIABLE): >60 ML/MIN/1.73 M^2
GLUCOSE SERPL-MCNC: 130 MG/DL (ref 70–110)
HCT VFR BLD AUTO: 44.5 % (ref 40–54)
HGB BLD-MCNC: 15.6 G/DL (ref 14–18)
IMM GRANULOCYTES # BLD AUTO: 0.05 K/UL (ref 0–0.04)
MCH RBC QN AUTO: 29.9 PG (ref 27–31)
MCHC RBC AUTO-ENTMCNC: 35.1 G/DL (ref 32–36)
MCV RBC AUTO: 85 FL (ref 82–98)
NEUTROPHILS # BLD AUTO: 4.6 K/UL (ref 1.8–7.7)
PLATELET # BLD AUTO: 178 K/UL (ref 150–450)
PMV BLD AUTO: 8.6 FL (ref 9.2–12.9)
POTASSIUM SERPL-SCNC: 3.9 MMOL/L (ref 3.5–5.1)
PROT SERPL-MCNC: 6.8 G/DL (ref 6–8.4)
RBC # BLD AUTO: 5.22 M/UL (ref 4.6–6.2)
SODIUM SERPL-SCNC: 140 MMOL/L (ref 136–145)
WBC # BLD AUTO: 6.06 K/UL (ref 3.9–12.7)

## 2024-02-22 PROCEDURE — 80053 COMPREHEN METABOLIC PANEL: CPT | Performed by: PHYSICIAN ASSISTANT

## 2024-02-22 PROCEDURE — 36415 COLL VENOUS BLD VENIPUNCTURE: CPT | Performed by: PHYSICIAN ASSISTANT

## 2024-02-22 PROCEDURE — 99999 PR PBB SHADOW E&M-EST. PATIENT-LVL IV: CPT | Mod: PBBFAC,,, | Performed by: INTERNAL MEDICINE

## 2024-02-22 PROCEDURE — 99214 OFFICE O/P EST MOD 30 MIN: CPT | Mod: PBBFAC | Performed by: INTERNAL MEDICINE

## 2024-02-22 PROCEDURE — 85027 COMPLETE CBC AUTOMATED: CPT | Performed by: PHYSICIAN ASSISTANT

## 2024-02-22 PROCEDURE — 99214 OFFICE O/P EST MOD 30 MIN: CPT | Mod: S$PBB,,, | Performed by: INTERNAL MEDICINE

## 2024-02-22 RX ORDER — AMLODIPINE AND BENAZEPRIL HYDROCHLORIDE 10; 20 MG/1; MG/1
1 CAPSULE ORAL
COMMUNITY

## 2024-02-22 NOTE — PROGRESS NOTES
MEDICAL ONCOLOGY - ESTABLISHED PATIENT VISIT    Reason for visit: esophageal adenocarcinoma     Best Contact Phone Number(s): There are no phone numbers on file.     Cancer/Stage/TNM:    Cancer Staging   Esophageal adenocarcinoma  Staging form: Esophagus - Adenocarcinoma, AJCC 8th Edition  - Clinical: Stage III (cT2, cN1, cM0, G2) - Signed by Miguel Angel Hampton Jr., MD on 7/31/2023       Oncology History   Esophageal adenocarcinoma   7/31/2023 Initial Diagnosis    Esophageal adenocarcinoma     7/31/2023 Cancer Staged    Staging form: Esophagus - Adenocarcinoma, AJCC 8th Edition  - Clinical: Stage III (cT2, cN1, cM0, G2)     9/5/2023 -  Chemotherapy    Treatment Summary   Plan Name: OP ESOPHAGEAL PACLITAXEL CARBOPLATIN WEEKLY  Treatment Goal: Curative  Status: Active  Start Date: 9/5/2023  End Date: 10/5/2023  Provider: Sahil Singer MD  Chemotherapy: CARBOplatin (PARAPLATIN) 195 mg in sodium chloride 0.9% 304.5 mL chemo infusion, 195 mg (100 % of original dose 193.4 mg), Intravenous, Clinic/HOD 1 time, 1 of 1 cycle  Dose modification:   (original dose 193.4 mg, Cycle 1), 193.4 mg (original dose 193.4 mg, Cycle 1),   (original dose 193.4 mg, Cycle 1, Reason: MD Discretion)  Administration: 195 mg (9/5/2023), 225 mg (9/12/2023), 210 mg (9/19/2023), 180 mg (9/26/2023), 195 mg (10/5/2023)  PACLitaxeL (TAXOL) 50 mg/m2 = 96 mg in sodium chloride 0.9% 250 mL chemo infusion, 50 mg/m2 = 96 mg, Intravenous, Clinic/HOD 1 time, 1 of 1 cycle  Administration: 96 mg (9/5/2023), 96 mg (9/12/2023), 96 mg (9/19/2023), 96 mg (9/26/2023), 96 mg (10/5/2023)     9/5/2023 - 10/9/2023 Radiation Therapy    Treating physician: Anshul Neri    Course: C1 Thorax 2023    Treatment Site Ref. ID Energy Dose/Fx (Gy) #Fx Dose Correction (Gy) Total Dose (Gy) Start Date End Date Elapsed Days   IM Esophagus PTV_High 6X 2 25 / 25 0 50 9/5/2023 10/9/2023 34             Interim History:  68 y.o. male with esophageal adenocarcinoma who  presents for follow-up while on surveillance following completion for definitive chemoRT. He feels well.  No dysphagia.  Energy and appetite are good.  He is still smoking 3/4 pack per day of cigarettes.     Presents alone today. ECOG status is 0.     ROS:   Review of Systems   Constitutional:  Negative for fever, malaise/fatigue and weight loss.   HENT:  Negative for nosebleeds.    Respiratory:  Negative for shortness of breath.    Cardiovascular:  Negative for chest pain and palpitations.   Gastrointestinal:  Negative for blood in stool, constipation, diarrhea, heartburn, nausea and vomiting.   Genitourinary:  Negative for hematuria.   Musculoskeletal:  Negative for falls.   Skin:  Negative for itching and rash.   Neurological:  Negative for dizziness, tingling, sensory change and weakness.   Psychiatric/Behavioral:  The patient is not nervous/anxious.      Past Medical History:   Past Medical History:   Diagnosis Date    HTN (hypertension)      Past Surgical History:   Past Surgical History:   Procedure Laterality Date    LAPAROSCOPIC REPAIR OF INGUINAL HERNIA Right      Family History:   No family history on file.     Social History:   Social History     Tobacco Use    Smoking status: Every Day     Current packs/day: 1.00     Types: Cigarettes    Smokeless tobacco: Never   Substance Use Topics    Alcohol use: Not on file      I have reviewed and updated the patient's past medical, surgical, family and social histories.    Allergies:   Review of patient's allergies indicates:  No Known Allergies     Medications:   Current Outpatient Medications   Medication Sig Dispense Refill    amLODIPine (NORVASC) 10 MG tablet Take 10 mg by mouth.      amlodipine-benazepril 10-20mg (LOTREL) 10-20 mg per capsule Take 1 capsule by mouth.      azelastine (ASTELIN) 137 mcg (0.1 %) nasal spray SPRAY 1 SPRAY BY NASAL ROUTE 2 TIMES DAILY USE IN EACH NOSTRIL AS DIRECTED.      co-enzyme Q-10 30 mg capsule Take 30 mg by mouth 3 (three)  "times daily.      etodolac (LODINE) 400 MG tablet Take 1 tablet by mouth 2 (two) times daily.      fluticasone propionate (FLONASE) 50 mcg/actuation nasal spray SMARTSIG:3 Spray(s) Both Nares Every Morning      gabapentin (NEURONTIN) 300 MG capsule Take 300 mg by mouth.      ondansetron (ZOFRAN) 8 MG tablet TAKE 1 TABLET BY MOUTH EVERY 8 HOURS AS NEEDED FOR NAUSEA 60 tablet 2    pravastatin (PRAVACHOL) 40 MG tablet Take 40 mg by mouth.      TURMERIC ORAL Take by mouth.      famotidine (PEPCID) 20 MG tablet Take 20 mg by mouth 2 (two) times daily.      nicotine (NICODERM CQ) 21 mg/24 hr Place 1 patch onto the skin once daily. (Patient not taking: Reported on 2/22/2024) 14 patch 0    nicotine polacrilex 2 MG Lozg Take 1 lozenge (2 mg total) by mouth as needed (in place of a cigarette). (Patient not taking: Reported on 2/22/2024) 72 lozenge 0    omeprazole (PRILOSEC) 40 MG capsule Take 1 capsule by mouth every morning.      prochlorperazine (COMPAZINE) 10 MG tablet Take 1 tablet (10 mg total) by mouth every 6 (six) hours as needed. 60 tablet 2     No current facility-administered medications for this visit.      Physical Exam:   /71 (BP Location: Left arm, Patient Position: Sitting, BP Method: Medium (Automatic))   Pulse 80   Temp 98.9 °F (37.2 °C) (Oral)   Ht 5' 7" (1.702 m)   Wt 76 kg (167 lb 7 oz)   SpO2 96%   BMI 26.22 kg/m²      ECOG Performance status: 0       Physical Exam  Vitals reviewed.   Constitutional:       General: He is not in acute distress.     Appearance: Normal appearance. He is not ill-appearing, toxic-appearing or diaphoretic.   HENT:      Head: Normocephalic and atraumatic.      Right Ear: External ear normal.      Left Ear: External ear normal.      Nose: Nose normal. No congestion.      Mouth/Throat:      Pharynx: Oropharynx is clear. No oropharyngeal exudate.   Eyes:      General: No scleral icterus.     Conjunctiva/sclera: Conjunctivae normal.   Cardiovascular:      Rate and " Rhythm: Normal rate.   Pulmonary:      Effort: Pulmonary effort is normal. No respiratory distress.      Breath sounds: No wheezing.   Abdominal:      General: There is no distension.   Musculoskeletal:      Cervical back: Normal range of motion.      Right lower leg: No edema.      Left lower leg: No edema.   Skin:     General: Skin is warm and dry.      Coloration: Skin is not jaundiced or pale.      Findings: No bruising, erythema or rash.   Neurological:      General: No focal deficit present.      Mental Status: He is alert and oriented to person, place, and time.      Cranial Nerves: No cranial nerve deficit.      Motor: No weakness.      Gait: Gait normal.   Psychiatric:         Mood and Affect: Mood normal.         Behavior: Behavior normal.         Labs:      I have reviewed the pertinent labs from 24. Normal blood counts. CMP pending.    Imagin/22/24 - PET/CT:  Impression:     Stable distal esophageal wall thickening with interval decrease in hypermetabolic activity in this patient with esophageal adenocarcinoma.  No definite tracer avid metastasis.     Increased endplate irregularity and tracer uptake at left aspect of L3-L4 disc space, could represent discitis/osteomyelitis.  Other degenerative/inflammatory process not excluded.  Recommend clinical correlation.     Unchanged nonhypermetabolic soft tissue lesion arising from the superior pole of the left kidney, better characterized on CT 2023.  Consistent with prior recommendations, recommend additional evaluation with dedicated CT or MRI renal mass protocol if not already obtained.     This report was flagged in Epic as abnormal.    Path:   2023 -   DIAGNOSIS:                                    1. 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 ESOPHAGUS, DISTAL MASS, BIOPSY:   - INVASIVE, MODERATELY DIFFERENTIATED ADENOCARCINOMA WITH MUCINOUS  DIFFERENTIATION                                    3. ESOPHAGUS MASS, FINE NEEDLE ASPIRATION:   - ADENOCARCINOMA WITH MUCINOUS DIFFERENTIATION    Assessment:       1. Esophageal adenocarcinoma    2. 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 Chemotherapy-induced nausea    5. Chemotherapy-induced thrombocytopenia    6. Hypertension, unspecified type    7. Hyperlipidemia, unspecified hyperlipidemia type    8. Tobacco use    9. Left renal mass            Plan:        # Esophageal adenocarcinoma  Mr. Lugo is a 68 y.o. male who presents to clinic for evaluation of newly diagnosed esophageal cancer. He initially presented with dysphagia, now improved s/p esophageal dilation during EUS. Biopsy confirmed adenocarcinoma.     We reviewed his diagnosis, prognosis, and treatment options with him during our initial visit. Discussed with him the high likelihood that cancer would recur if he was taken straight to surgery without neoadjuvant treatment. Our recommendation is to proceed with weekly carboplatin + taxol in conjunction with daily radiation (M-F, no holidays) for a total of 5-5.5 weeks. Handouts provided to patient on both chemotherapy medications.     He has completed Carboplatin AUC 2 + paclitaxel 50 mg/m2 for 5 treatments. Also completed concurrent radiation for definitive non-surgical treatment.    Repeat PET/CT on 11/15/23 showed persistent distal hypermetabolic esophageal tumor (decreasing SUV) without clear evidence of metastatic disease.     Repeat PET/CT on 2/21/24 showed further improvement in hypermetabolism at distal esophagus.    Will obtain CT CAP in 6 months; f/u with labs in 3 months. EGD for any s/s concerning for disease progression/recurrence.     # HTN, HLD  BP normal in clinic. Asymptomatic.   Continue medical management.   Following with PCP.    # Tobacco use  Encouraged cessation.  Enrolled in smoking cessation clinic.     # Left renal  mass  Stable in size.  Will obtain CT renal mass protocol.    Follow up: RTC 12  weeks.    Patient is in agreement with the proposed treatment plan. All questions were answered to the patient's satisfaction. Pt knows to call clinic if anything is needed before the next clinic visit.    Sahil Singer MD  Hematology/Oncology  [facility]        Med Onc Chart Routing      Follow up with physician    Follow up with [facility] 3 months. with labs   Infusion scheduling note    Injection scheduling note    Labs CBC and CMP   Scheduling:  Preferred lab:  Lab interval:     Imaging   CT abdomen/pelvis in next couple weeks   Pharmacy appointment    Other referrals

## 2024-04-04 ENCOUNTER — PATIENT MESSAGE (OUTPATIENT)
Dept: HEMATOLOGY/ONCOLOGY | Facility: CLINIC | Age: 69
End: 2024-04-04
Payer: MEDICARE

## 2024-04-24 ENCOUNTER — HOSPITAL ENCOUNTER (OUTPATIENT)
Dept: RADIOLOGY | Facility: HOSPITAL | Age: 69
Discharge: HOME | End: 2024-04-24
Attending: INTERNAL MEDICINE
Payer: MEDICARE

## 2024-04-24 DIAGNOSIS — N28.89 LEFT RENAL MASS: ICD-10-CM

## 2024-04-24 PROCEDURE — 74177 CT ABD & PELVIS W/CONTRAST: CPT | Mod: 26,,, | Performed by: RADIOLOGY

## 2024-04-24 PROCEDURE — 25500020 PHARM REV CODE 255: Performed by: INTERNAL MEDICINE

## 2024-04-24 PROCEDURE — 74177 CT ABD & PELVIS W/CONTRAST: CPT | Mod: TC

## 2024-04-24 RX ADMIN — IOHEXOL 75 ML: 350 INJECTION, SOLUTION INTRAVENOUS at 09:04

## 2024-04-25 ENCOUNTER — TELEPHONE (OUTPATIENT)
Dept: HEMATOLOGY/ONCOLOGY | Facility: CLINIC | Age: 69
End: 2024-04-25
Payer: MEDICARE

## 2024-04-25 ENCOUNTER — PATIENT MESSAGE (OUTPATIENT)
Dept: HEMATOLOGY/ONCOLOGY | Facility: CLINIC | Age: 69
End: 2024-04-25
Payer: MEDICARE

## 2024-04-25 LAB
CREAT SERPL-MCNC: 1.2 MG/DL (ref 0.5–1.4)
SAMPLE: NORMAL

## 2024-04-25 NOTE — TELEPHONE ENCOUNTER
Spoke with pt. in regards to rescheduling original appt. on 5/21/24 to a different date per pt.'s request in [facility] portal message. Pt. advised that he will be accompanying his brother to an appt. and he will not be able to make this current appt. MA acknowledged and asked pt. which date and time would work best. Pt. advised that any date would work, but to try to keep original appt. time of 11:00 AM. MA offered a visit with BRAYAN Valentine on Thursday, May 23rd at 11:00 AM. Pt. asked if he was to come 1-hour prior to get his lab draw. MA assented. Pt. acknowledged.    BLD, MA Ext. [phone]

## 2024-05-22 NOTE — PROGRESS NOTES
MEDICAL ONCOLOGY - ESTABLISHED PATIENT VISIT    Reason for visit: esophageal adenocarcinoma     Best Contact Phone Number(s): There are no phone numbers on file.     Cancer/Stage/TNM:    Cancer Staging   Esophageal adenocarcinoma  Staging form: Esophagus - Adenocarcinoma, AJCC 8th Edition  - Clinical: Stage III (cT2, cN1, cM0, G2) - Signed by Miguel Angel Hampton Jr., MD on 7/31/2023       Oncology History   Esophageal adenocarcinoma   7/31/2023 Initial Diagnosis    Esophageal adenocarcinoma     7/31/2023 Cancer Staged    Staging form: Esophagus - Adenocarcinoma, AJCC 8th Edition  - Clinical: Stage III (cT2, cN1, cM0, G2)     9/5/2023 -  Chemotherapy    Treatment Summary   Plan Name: OP ESOPHAGEAL PACLITAXEL CARBOPLATIN WEEKLY  Treatment Goal: Curative  Status: Active  Start Date: 9/5/2023  End Date: 10/5/2023  Provider: Sahil Singer MD  Chemotherapy: CARBOplatin (PARAPLATIN) 195 mg in sodium chloride 0.9% 304.5 mL chemo infusion, 195 mg (100 % of original dose 193.4 mg), Intravenous, Clinic/HOD 1 time, 1 of 1 cycle  Dose modification:   (original dose 193.4 mg, Cycle 1), 193.4 mg (original dose 193.4 mg, Cycle 1),   (original dose 193.4 mg, Cycle 1, Reason: MD Discretion)  Administration: 195 mg (9/5/2023), 225 mg (9/12/2023), 210 mg (9/19/2023), 180 mg (9/26/2023), 195 mg (10/5/2023)  PACLitaxeL (TAXOL) 50 mg/m2 = 96 mg in sodium chloride 0.9% 250 mL chemo infusion, 50 mg/m2 = 96 mg, Intravenous, Clinic/HOD 1 time, 1 of 1 cycle  Administration: 96 mg (9/5/2023), 96 mg (9/12/2023), 96 mg (9/19/2023), 96 mg (9/26/2023), 96 mg (10/5/2023)     9/5/2023 - 10/9/2023 Radiation Therapy    Treating physician: Anshul Neri    Course: C1 Thorax 2023    Treatment Site Ref. ID Energy Dose/Fx (Gy) #Fx Dose Correction (Gy) Total Dose (Gy) Start Date End Date Elapsed Days   IM Esophagus PTV_High 6X 2 25 / 25 0 50 9/5/2023 10/9/2023 34               Interim History:  68 y.o. male with esophageal adenocarcinoma who  presents for follow-up while on surveillance following completion for definitive chemoRT. Doing well overall. No dysphagia and eating well. Energy is good. No new complaints.     Presents alone today. ECOG status is 0.     ROS:   Review of Systems   Constitutional:  Negative for fever, malaise/fatigue and weight loss.   HENT:  Negative for nosebleeds.    Respiratory:  Negative for shortness of breath.    Cardiovascular:  Negative for chest pain and palpitations.   Gastrointestinal:  Negative for blood in stool, constipation, diarrhea, heartburn, nausea and vomiting.   Genitourinary:  Negative for hematuria.   Musculoskeletal:  Negative for falls.   Skin:  Negative for itching and rash.   Neurological:  Negative for dizziness, tingling, sensory change and weakness.   Psychiatric/Behavioral:  The patient is not nervous/anxious.      Past Medical History:   Past Medical History:   Diagnosis Date    HTN (hypertension)      Past Surgical History:   Past Surgical History:   Procedure Laterality Date    LAPAROSCOPIC REPAIR OF INGUINAL HERNIA Right      Family History:   No family history on file.     Social History:   Social History     Tobacco Use    Smoking status: Every Day     Current packs/day: 1.00     Types: Cigarettes    Smokeless tobacco: Never   Substance Use Topics    Alcohol use: Not on file      I have reviewed and updated the patient's past medical, surgical, family and social histories.    Allergies:   Review of patient's allergies indicates:  No Known Allergies     Medications:   Current Outpatient Medications   Medication Sig Dispense Refill    amLODIPine (NORVASC) 10 MG tablet Take 10 mg by mouth.      amlodipine-benazepril 10-20mg (LOTREL) 10-20 mg per capsule Take 1 capsule by mouth.      azelastine (ASTELIN) 137 mcg (0.1 %) nasal spray SPRAY 1 SPRAY BY NASAL ROUTE 2 TIMES DAILY USE IN EACH NOSTRIL AS DIRECTED.      co-enzyme Q-10 30 mg capsule Take 30 mg by mouth 3 (three) times daily.      etodolac  "(LODINE) 400 MG tablet Take 1 tablet by mouth 2 (two) times daily.      famotidine (PEPCID) 20 MG tablet Take 20 mg by mouth 2 (two) times daily.      fluticasone propionate (FLONASE) 50 mcg/actuation nasal spray SMARTSIG:3 Spray(s) Both Nares Every Morning      gabapentin (NEURONTIN) 300 MG capsule Take 300 mg by mouth.      nicotine (NICODERM CQ) 21 mg/24 hr Place 1 patch onto the skin once daily. (Patient not taking: Reported on 2/22/2024) 14 patch 0    nicotine polacrilex 2 MG Lozg Take 1 lozenge (2 mg total) by mouth as needed (in place of a cigarette). (Patient not taking: Reported on 2/22/2024) 72 lozenge 0    omeprazole (PRILOSEC) 40 MG capsule Take 1 capsule by mouth every morning.      ondansetron (ZOFRAN) 8 MG tablet TAKE 1 TABLET BY MOUTH EVERY 8 HOURS AS NEEDED FOR NAUSEA 60 tablet 2    pravastatin (PRAVACHOL) 40 MG tablet Take 40 mg by mouth.      prochlorperazine (COMPAZINE) 10 MG tablet Take 1 tablet (10 mg total) by mouth every 6 (six) hours as needed. 60 tablet 2    TURMERIC ORAL Take by mouth.       No current facility-administered medications for this visit.      Physical Exam:   BP (!) 147/69 (BP Location: Left arm, Patient Position: Sitting, BP Method: Large (Automatic))   Pulse 77   Temp 98.3 °F (36.8 °C) (Oral)   Resp 18   Ht 5' 7" (1.702 m)   Wt 76.8 kg (169 lb 3.3 oz)   SpO2 98%   BMI 26.50 kg/m²      ECOG Performance status: 0       Physical Exam  Vitals reviewed.   Constitutional:       General: He is not in acute distress.     Appearance: Normal appearance. He is not ill-appearing, toxic-appearing or diaphoretic.   HENT:      Head: Normocephalic and atraumatic.      Right Ear: External ear normal.      Left Ear: External ear normal.      Nose: Nose normal. No congestion.      Mouth/Throat:      Pharynx: Oropharynx is clear. No oropharyngeal exudate.   Eyes:      General: No scleral icterus.     Conjunctiva/sclera: Conjunctivae normal.   Cardiovascular:      Rate and Rhythm: Normal " rate.   Pulmonary:      Effort: Pulmonary effort is normal. No respiratory distress.      Breath sounds: No wheezing.   Abdominal:      General: There is no distension.   Musculoskeletal:      Cervical back: Normal range of motion.      Right lower leg: No edema.      Left lower leg: No edema.   Skin:     General: Skin is warm and dry.      Coloration: Skin is not jaundiced or pale.      Findings: No bruising, erythema or rash.   Neurological:      General: No focal deficit present.      Mental Status: He is alert and oriented to person, place, and time.      Cranial Nerves: No cranial nerve deficit.      Motor: No weakness.      Gait: Gait normal.   Psychiatric:         Mood and Affect: Mood normal.         Behavior: Behavior normal.         Labs:      I have reviewed the pertinent labs from 24. CBC, CMP unremarkable.     Imagin/24/24 - CT A/P   Impression:  1. Enhancing solid exophytic mass in the upper pole of left kidney worrisome for neoplasm.  It is unchanged in size when compared to CT abdomen pelvis dated 2023.  2. Redemonstration of eccentric wall thickening of the distal esophagus in this patient with a history of esophageal cancer.  3. Endplate irregularity at left aspect of the L3-4 disc space appears similar to PET-CT 2024 but progressed compared to CT abdomen pelvis dated 2023.  Differential considerations include progressive degenerative change, disc osteomyelitis, and metastatic disease.  Correlation is advised.  4. Unchanged nonspecific subcentimeter hypodensity in the right hepatic lobe.  Additional hyperenhancing focus in the right hepatic lobe which is nonspecific but unchanged.  5. 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  6. Additional findings as above.    Path:   2023 -   DIAGNOSIS:                                    1. DUODENAL BULB NODULE, BIOPSY:   - DUODENAL VILLOUS MUCOSA WITH GASTRIC  FOVEOLAR METAPLASIA                                    2. ESOPHAGUS, DISTAL MASS, BIOPSY:   - INVASIVE, MODERATELY DIFFERENTIATED ADENOCARCINOMA WITH MUCINOUS DIFFERENTIATION                                    3. ESOPHAGUS MASS, FINE NEEDLE ASPIRATION:   - ADENOCARCINOMA WITH MUCINOUS DIFFERENTIATION    Assessment:       1. Esophageal adenocarcinoma    2. Hypertension, unspecified type    3. Hyperlipidemia, unspecified hyperlipidemia type    4. Tobacco use    5. Left renal mass        Plan:        # Esophageal adenocarcinoma  Mr. Lugo is a 68 y.o. male who presents to clinic for evaluation of newly diagnosed esophageal cancer. He initially presented with dysphagia, now improved s/p esophageal dilation during EUS. Biopsy confirmed adenocarcinoma.     We reviewed his diagnosis, prognosis, and treatment options with him during our initial visit. Discussed with him the high likelihood that cancer would recur if he was taken straight to surgery without neoadjuvant treatment. Our recommendation is to proceed with weekly carboplatin + taxol in conjunction with daily radiation (M-F, no holidays) for a total of 5-5.5 weeks. Handouts provided to patient on both chemotherapy medications.     He has completed Carboplatin AUC 2 + paclitaxel 50 mg/m2 for 5 treatments. Also completed concurrent radiation for definitive non-surgical treatment.    Repeat PET/CT on 11/15/23 showed persistent distal hypermetabolic esophageal tumor (decreasing SUV) without clear evidence of metastatic disease.     Repeat PET/CT on 2/21/24 showed further improvement in hypermetabolism at distal esophagus.    Repeat CT CAP on 4/24/24 showed stable thickening at distal esophagus. Stable left kidney mass c/f malignancy.     Will obtain CT CAP and repeat labs in 3 months. EGD for any s/s concerning for disease progression/recurrence.     # HTN, HLD  BP mildly elevated in clinic. Asymptomatic.   Continue medical management.   Following with PCP.    #  Tobacco use  Encouraged cessation.  Previously enrolled in smoking cessation clinic.     # Left renal mass  Relatively stable in size.   Referred to urology for evaluation.     Follow up: RTC 3 months.     Patient is in agreement with the proposed treatment plan. All questions were answered to the patient's satisfaction. Pt knows to call clinic if anything is needed before the next clinic visit.    Patient discussed with and seen in conjunction with collaborating physician, Dr. Singer.    At least 40 minutes were spent today on this encounter including face to face time with the patient, data gathering/interpretation and documentation.       Aleshia Giraldo, MSN, APRN, [facility]-  Hematology and Medical Oncology  Clinical Nurse Specialist to Dr. Singer, Dr. Wheeler & Dr. Petersen Onc Chart Routing      Follow up with physician 3 months. with labs and scans 1-2 days prior to visit with Dr. Singer   Follow up with [facility]    Infusion scheduling note    Injection scheduling note    Labs CBC and CMP   Scheduling:  Preferred lab:  Lab interval:     Imaging CT chest abdomen pelvis      Pharmacy appointment    Other referrals

## 2024-05-23 ENCOUNTER — LAB VISIT (OUTPATIENT)
Dept: LAB | Facility: HOSPITAL | Age: 69
End: 2024-05-23
Attending: PHYSICIAN ASSISTANT
Payer: MEDICARE

## 2024-05-23 ENCOUNTER — OFFICE VISIT (OUTPATIENT)
Dept: HEMATOLOGY/ONCOLOGY | Facility: CLINIC | Age: 69
End: 2024-05-23
Payer: MEDICARE

## 2024-05-23 VITALS
RESPIRATION RATE: 18 BRPM | BODY MASS INDEX: 26.55 KG/M2 | TEMPERATURE: 98 F | HEART RATE: 77 BPM | DIASTOLIC BLOOD PRESSURE: 69 MMHG | OXYGEN SATURATION: 98 % | WEIGHT: 169.19 LBS | HEIGHT: 67 IN | SYSTOLIC BLOOD PRESSURE: 147 MMHG

## 2024-05-23 DIAGNOSIS — N28.89 LEFT RENAL MASS: ICD-10-CM

## 2024-05-23 DIAGNOSIS — Z72.0 TOBACCO USE: ICD-10-CM

## 2024-05-23 DIAGNOSIS — I10 HYPERTENSION, UNSPECIFIED TYPE: ICD-10-CM

## 2024-05-23 DIAGNOSIS — E78.5 HYPERLIPIDEMIA, UNSPECIFIED HYPERLIPIDEMIA TYPE: ICD-10-CM

## 2024-05-23 DIAGNOSIS — C15.9 ESOPHAGEAL ADENOCARCINOMA: Primary | ICD-10-CM

## 2024-05-23 DIAGNOSIS — C15.9 ESOPHAGEAL ADENOCARCINOMA: ICD-10-CM

## 2024-05-23 LAB
ALBUMIN SERPL BCP-MCNC: 4 G/DL (ref 3.5–5.2)
ALP SERPL-CCNC: 127 U/L (ref 55–135)
ALT SERPL W/O P-5'-P-CCNC: 39 U/L (ref 10–44)
ANION GAP SERPL CALC-SCNC: 11 MMOL/L (ref 8–16)
AST SERPL-CCNC: 27 U/L (ref 10–40)
BILIRUB SERPL-MCNC: 0.9 MG/DL (ref 0.1–1)
BUN SERPL-MCNC: 18 MG/DL (ref 8–23)
CALCIUM SERPL-MCNC: 10.3 MG/DL (ref 8.7–10.5)
CHLORIDE SERPL-SCNC: 109 MMOL/L (ref 95–110)
CO2 SERPL-SCNC: 20 MMOL/L (ref 23–29)
CREAT SERPL-MCNC: 1.1 MG/DL (ref 0.5–1.4)
ERYTHROCYTE [DISTWIDTH] IN BLOOD BY AUTOMATED COUNT: 12.8 % (ref 11.5–14.5)
EST. GFR  (NO RACE VARIABLE): >60 ML/MIN/1.73 M^2
GLUCOSE SERPL-MCNC: 111 MG/DL (ref 70–110)
HCT VFR BLD AUTO: 44.3 % (ref 40–54)
HGB BLD-MCNC: 15.5 G/DL (ref 14–18)
IMM GRANULOCYTES # BLD AUTO: 0.04 K/UL (ref 0–0.04)
MCH RBC QN AUTO: 30.3 PG (ref 27–31)
MCHC RBC AUTO-ENTMCNC: 35 G/DL (ref 32–36)
MCV RBC AUTO: 87 FL (ref 82–98)
NEUTROPHILS # BLD AUTO: 7.8 K/UL (ref 1.8–7.7)
PLATELET # BLD AUTO: 201 K/UL (ref 150–450)
PMV BLD AUTO: 8.5 FL (ref 9.2–12.9)
POTASSIUM SERPL-SCNC: 4.5 MMOL/L (ref 3.5–5.1)
PROT SERPL-MCNC: 6.9 G/DL (ref 6–8.4)
RBC # BLD AUTO: 5.12 M/UL (ref 4.6–6.2)
SODIUM SERPL-SCNC: 140 MMOL/L (ref 136–145)
WBC # BLD AUTO: 9.55 K/UL (ref 3.9–12.7)

## 2024-05-23 PROCEDURE — 99999 PR PBB SHADOW E&M-EST. PATIENT-LVL V: CPT | Mod: PBBFAC,,, | Performed by: REGISTERED NURSE

## 2024-05-23 PROCEDURE — 99215 OFFICE O/P EST HI 40 MIN: CPT | Mod: PBBFAC | Performed by: REGISTERED NURSE

## 2024-05-23 PROCEDURE — 36415 COLL VENOUS BLD VENIPUNCTURE: CPT | Performed by: PHYSICIAN ASSISTANT

## 2024-05-23 PROCEDURE — G2211 COMPLEX E/M VISIT ADD ON: HCPCS | Mod: S$PBB,,, | Performed by: REGISTERED NURSE

## 2024-05-23 PROCEDURE — 99215 OFFICE O/P EST HI 40 MIN: CPT | Mod: S$PBB,,, | Performed by: REGISTERED NURSE

## 2024-05-23 PROCEDURE — 80053 COMPREHEN METABOLIC PANEL: CPT | Performed by: PHYSICIAN ASSISTANT

## 2024-05-23 PROCEDURE — 85027 COMPLETE CBC AUTOMATED: CPT | Performed by: PHYSICIAN ASSISTANT

## 2024-05-29 PROBLEM — N28.89 LEFT RENAL MASS: Status: ACTIVE | Noted: 2024-05-29

## 2024-05-29 NOTE — PROGRESS NOTES
Clinic Note  5/29/2024      Subjective:         Chief Complaint:   HPI  Jone Lugo is a 68 y.o. male with Stage III esophageal cancer who was referred for evaluation of a left renal mass.  CT Abd W WO-4/24/2024- 1.7 x 1.9 cm solid enancing LUP renal mass. Stable in size compared to CT scan on 7/3/2023. Denies hematuria. Current smoker.  RENAL score-4x.  Owns [employer].  Past Medical History:   Diagnosis Date    HTN (hypertension)      No family history on file.  Social History     Socioeconomic History    Marital status: Single   Tobacco Use    Smoking status: Every Day     Current packs/day: 1.00     Types: Cigarettes    Smokeless tobacco: Never     Social Determinants of Health     Financial Resource Strain: Low Risk  (2/19/2024)    Overall Financial Resource Strain (CARDIA)     Difficulty of Paying Living Expenses: Not hard at all   Food Insecurity: No Food Insecurity (2/19/2024)    Hunger Vital Sign     Worried About Running Out of Food in the Last Year: Never true     Ran Out of Food in the Last Year: Never true   Transportation Needs: No Transportation Needs (2/19/2024)    PRAPARE - Transportation     Lack of Transportation (Medical): No     Lack of Transportation (Non-Medical): No   Physical Activity: Insufficiently Active (2/19/2024)    Exercise Vital Sign     Days of Exercise per Week: 7 days     Minutes of Exercise per Session: 20 min   Stress: Patient Declined (2/19/2024)    [employer] [place] [employer] - Occupational Stress Questionnaire     Feeling of Stress : Patient declined   Housing Stability: Low Risk  (2/19/2024)    Housing Stability Vital Sign     Unable to Pay for Housing in the Last Year: No     Number of Places Lived in the Last Year: 1     Unstable Housing in the Last Year: No     Past Surgical History:   Procedure Laterality Date    LAPAROSCOPIC REPAIR OF INGUINAL HERNIA Right      Patient Active Problem List   Diagnosis    Esophageal adenocarcinoma    H/O  "traumatic brain injury    Tobacco abuse    Left renal mass     Review of Systems   Constitutional:  Negative for chills, fatigue and fever.   HENT:  Negative for sore throat and trouble swallowing.    Respiratory:  Negative for shortness of breath and wheezing.    Cardiovascular:  Negative for chest pain, palpitations and leg swelling.   Gastrointestinal:  Negative for abdominal pain, constipation, diarrhea, nausea and vomiting.   Genitourinary:         As per HPI   Musculoskeletal:  Negative for arthralgias and back pain.   Skin:  Negative for pallor and rash.   Neurological:  Negative for seizures and syncope.   Psychiatric/Behavioral:  Negative for hallucinations. The patient is not nervous/anxious.        Objective:      There were no vitals taken for this visit.  Estimated body mass index is 26.5 kg/m² as calculated from the following:    Height as of 5/23/24: 5' 7" (1.702 m).    Weight as of 5/23/24: 76.8 kg (169 lb 3.3 oz).  Physical Exam  Vitals and nursing note reviewed.   Constitutional:       Appearance: Normal appearance.   HENT:      Head: Atraumatic.      Nose: Nose normal.   Eyes:      Extraocular Movements: Extraocular movements intact.      Pupils: Pupils are equal, round, and reactive to light.   Cardiovascular:      Rate and Rhythm: Normal rate.   Pulmonary:      Effort: Pulmonary effort is normal.   Abdominal:      General: Abdomen is flat. There is no distension.      Tenderness: There is no abdominal tenderness. There is no right CVA tenderness or left CVA tenderness.   Musculoskeletal:         General: Normal range of motion.      Cervical back: Normal range of motion.   Skin:     Coloration: Skin is not jaundiced.   Neurological:      General: No focal deficit present.      Mental Status: He is alert and oriented to person, place, and time.   Psychiatric:         Mood and Affect: Mood normal.         Behavior: Behavior normal.       Assessment and Plan:           Problem List Items Addressed " This Visit       Esophageal adenocarcinoma    Left renal mass - Primary       Follow up:    Long discussion about management of SRMs. Discussed malignant vs benign histology.  Discussed surveillance and surveillance  protocol, biopsy, ablation techniques ( including cryoabalation and HFRA), and resection techniques including robotic and open partial.Also discussed open, lap and robotic nephrectomy. Discussed risks and complications of all procedures, risk of reoperation and risks of recurrence and need for additional therapy Answered questions and addressed concerns.     Plan for observation with CT a/p w.w.o and cbc/cmp in 12 months.    Fede Sainz

## 2024-05-30 ENCOUNTER — OFFICE VISIT (OUTPATIENT)
Dept: UROLOGY | Facility: CLINIC | Age: 69
End: 2024-05-30
Payer: MEDICARE

## 2024-05-30 VITALS
DIASTOLIC BLOOD PRESSURE: 82 MMHG | HEART RATE: 83 BPM | WEIGHT: 168.19 LBS | SYSTOLIC BLOOD PRESSURE: 139 MMHG | HEIGHT: 67 IN | BODY MASS INDEX: 26.4 KG/M2

## 2024-05-30 DIAGNOSIS — C15.9 ESOPHAGEAL ADENOCARCINOMA: ICD-10-CM

## 2024-05-30 DIAGNOSIS — N28.89 LEFT RENAL MASS: Primary | ICD-10-CM

## 2024-05-30 PROCEDURE — 99214 OFFICE O/P EST MOD 30 MIN: CPT | Mod: PBBFAC | Performed by: UROLOGY

## 2024-05-30 PROCEDURE — 99999 PR PBB SHADOW E&M-EST. PATIENT-LVL IV: CPT | Mod: PBBFAC,,, | Performed by: UROLOGY

## 2024-05-30 PROCEDURE — 99214 OFFICE O/P EST MOD 30 MIN: CPT | Mod: S$PBB,,, | Performed by: UROLOGY

## 2024-05-30 RX ORDER — GABAPENTIN 300 MG/1
1 CAPSULE ORAL NIGHTLY
COMMUNITY
Start: 2023-07-13

## 2024-05-30 RX ORDER — FAMOTIDINE 20 MG/1
1 TABLET, FILM COATED ORAL 2 TIMES DAILY
COMMUNITY

## 2024-05-30 RX ORDER — BUSPIRONE HYDROCHLORIDE 5 MG/1
5 TABLET ORAL
COMMUNITY

## 2024-05-30 RX ORDER — ONDANSETRON HYDROCHLORIDE 8 MG/1
1 TABLET, FILM COATED ORAL EVERY 8 HOURS PRN
COMMUNITY

## 2024-05-30 RX ORDER — AZELASTINE 1 MG/ML
2 SPRAY, METERED NASAL
COMMUNITY
Start: 2023-11-09

## 2024-05-30 RX ORDER — AMLODIPINE AND BENAZEPRIL HYDROCHLORIDE 10; 20 MG/1; MG/1
1 CAPSULE ORAL DAILY
COMMUNITY
Start: 2024-03-25

## 2024-05-30 RX ORDER — PRAVASTATIN SODIUM 40 MG/1
1 TABLET ORAL DAILY
COMMUNITY
Start: 2023-09-18

## 2024-05-30 RX ORDER — FLUTICASONE PROPIONATE 50 MCG
2 SPRAY, SUSPENSION (ML) NASAL
COMMUNITY

## 2024-05-30 NOTE — LETTER
May 30, 2024        Aleshia Giraldo, CNS  [address] [facility] [address]             [facility] - Urology [address] [place] [id number]  Phone: [phone]   Patient: Jone Lugo   MR Number: [id number]   YOB: 1955   Date of Visit: 5/30/2024       Dear Dr. Giraldo:    Thank you for referring Jone Lugo to me for evaluation. Attached you will find relevant portions of my assessment and plan of care.    If you have questions, please do not hesitate to call me. I look forward to following Jone Lugo along with you.    Sincerely,      Fede Sainz MD            CC  No Recipients    Enclosure

## 2024-08-05 ENCOUNTER — PATIENT MESSAGE (OUTPATIENT)
Dept: HEMATOLOGY/ONCOLOGY | Facility: CLINIC | Age: 69
End: 2024-08-05
Payer: MEDICARE

## 2024-08-20 ENCOUNTER — LAB VISIT (OUTPATIENT)
Dept: LAB | Facility: HOSPITAL | Age: 69
End: 2024-08-20
Payer: COMMERCIAL

## 2024-08-20 DIAGNOSIS — C15.9 ESOPHAGEAL ADENOCARCINOMA: ICD-10-CM

## 2024-08-20 LAB
ALBUMIN SERPL BCP-MCNC: 4 G/DL (ref 3.5–5.2)
ALP SERPL-CCNC: 138 U/L (ref 55–135)
ALT SERPL W/O P-5'-P-CCNC: 36 U/L (ref 10–44)
ANION GAP SERPL CALC-SCNC: 10 MMOL/L (ref 8–16)
AST SERPL-CCNC: 27 U/L (ref 10–40)
BILIRUB SERPL-MCNC: 0.8 MG/DL (ref 0.1–1)
BUN SERPL-MCNC: 17 MG/DL (ref 8–23)
CALCIUM SERPL-MCNC: 10.2 MG/DL (ref 8.7–10.5)
CHLORIDE SERPL-SCNC: 104 MMOL/L (ref 95–110)
CO2 SERPL-SCNC: 24 MMOL/L (ref 23–29)
CREAT SERPL-MCNC: 1.3 MG/DL (ref 0.5–1.4)
ERYTHROCYTE [DISTWIDTH] IN BLOOD BY AUTOMATED COUNT: 13.2 % (ref 11.5–14.5)
EST. GFR  (NO RACE VARIABLE): 59.8 ML/MIN/1.73 M^2
GLUCOSE SERPL-MCNC: 111 MG/DL (ref 70–110)
HCT VFR BLD AUTO: 46.3 % (ref 40–54)
HGB BLD-MCNC: 16 G/DL (ref 14–18)
IMM GRANULOCYTES # BLD AUTO: 0.04 K/UL (ref 0–0.04)
MCH RBC QN AUTO: 30 PG (ref 27–31)
MCHC RBC AUTO-ENTMCNC: 34.6 G/DL (ref 32–36)
MCV RBC AUTO: 87 FL (ref 82–98)
NEUTROPHILS # BLD AUTO: 7.9 K/UL (ref 1.8–7.7)
PLATELET # BLD AUTO: 210 K/UL (ref 150–450)
PMV BLD AUTO: 8.5 FL (ref 9.2–12.9)
POTASSIUM SERPL-SCNC: 4.4 MMOL/L (ref 3.5–5.1)
PROT SERPL-MCNC: 7 G/DL (ref 6–8.4)
RBC # BLD AUTO: 5.33 M/UL (ref 4.6–6.2)
SODIUM SERPL-SCNC: 138 MMOL/L (ref 136–145)
WBC # BLD AUTO: 9.63 K/UL (ref 3.9–12.7)

## 2024-08-20 PROCEDURE — 85027 COMPLETE CBC AUTOMATED: CPT | Performed by: REGISTERED NURSE

## 2024-08-20 PROCEDURE — 36415 COLL VENOUS BLD VENIPUNCTURE: CPT | Performed by: REGISTERED NURSE

## 2024-08-20 PROCEDURE — 80053 COMPREHEN METABOLIC PANEL: CPT | Performed by: REGISTERED NURSE

## 2024-08-22 ENCOUNTER — CLINICAL SUPPORT (OUTPATIENT)
Dept: SMOKING CESSATION | Facility: CLINIC | Age: 69
End: 2024-08-22

## 2024-08-22 DIAGNOSIS — F17.200 NICOTINE DEPENDENCE: Primary | ICD-10-CM

## 2024-08-22 PROCEDURE — 99999 PR PBB SHADOW E&M-EST. PATIENT-LVL I: CPT | Mod: PBBFAC,,,

## 2024-08-22 NOTE — PROGRESS NOTES
Spoke with patient today in regard to smoking cessation progress 12 month telephone follow up, he states that he is not tobacco free. Patient states he is smoking a pack of cigarettes daily.  Informed patient of benefit period, future follow up, and contact information if any further help or support is needed.   Patient not interested in scheduling appt at this time.  Will complete smart form for 12 month follow up on Quit attempt #1 and resolve episode.

## 2024-08-29 ENCOUNTER — OFFICE VISIT (OUTPATIENT)
Dept: HEMATOLOGY/ONCOLOGY | Facility: CLINIC | Age: 69
End: 2024-08-29
Payer: COMMERCIAL

## 2024-08-29 VITALS
SYSTOLIC BLOOD PRESSURE: 128 MMHG | WEIGHT: 164.44 LBS | HEART RATE: 74 BPM | BODY MASS INDEX: 25.81 KG/M2 | DIASTOLIC BLOOD PRESSURE: 73 MMHG | OXYGEN SATURATION: 98 % | TEMPERATURE: 98 F | HEIGHT: 67 IN

## 2024-08-29 DIAGNOSIS — C15.9 ESOPHAGEAL ADENOCARCINOMA: Primary | ICD-10-CM

## 2024-08-29 DIAGNOSIS — I10 HYPERTENSION, UNSPECIFIED TYPE: ICD-10-CM

## 2024-08-29 DIAGNOSIS — E78.5 HYPERLIPIDEMIA, UNSPECIFIED HYPERLIPIDEMIA TYPE: ICD-10-CM

## 2024-08-29 DIAGNOSIS — Z72.0 TOBACCO USE: ICD-10-CM

## 2024-08-29 DIAGNOSIS — N28.89 LEFT RENAL MASS: ICD-10-CM

## 2024-08-29 PROCEDURE — 3008F BODY MASS INDEX DOCD: CPT | Mod: CPTII,S$GLB,, | Performed by: INTERNAL MEDICINE

## 2024-08-29 PROCEDURE — 1159F MED LIST DOCD IN RCRD: CPT | Mod: CPTII,S$GLB,, | Performed by: INTERNAL MEDICINE

## 2024-08-29 PROCEDURE — 3288F FALL RISK ASSESSMENT DOCD: CPT | Mod: CPTII,S$GLB,, | Performed by: INTERNAL MEDICINE

## 2024-08-29 PROCEDURE — 99999 PR PBB SHADOW E&M-EST. PATIENT-LVL V: CPT | Mod: PBBFAC,,, | Performed by: INTERNAL MEDICINE

## 2024-08-29 PROCEDURE — 3044F HG A1C LEVEL LT 7.0%: CPT | Mod: CPTII,S$GLB,, | Performed by: INTERNAL MEDICINE

## 2024-08-29 PROCEDURE — 4010F ACE/ARB THERAPY RXD/TAKEN: CPT | Mod: CPTII,S$GLB,, | Performed by: INTERNAL MEDICINE

## 2024-08-29 PROCEDURE — 3074F SYST BP LT 130 MM HG: CPT | Mod: CPTII,S$GLB,, | Performed by: INTERNAL MEDICINE

## 2024-08-29 PROCEDURE — G2211 COMPLEX E/M VISIT ADD ON: HCPCS | Mod: S$GLB,,, | Performed by: INTERNAL MEDICINE

## 2024-08-29 PROCEDURE — 1126F AMNT PAIN NOTED NONE PRSNT: CPT | Mod: CPTII,S$GLB,, | Performed by: INTERNAL MEDICINE

## 2024-08-29 PROCEDURE — 99214 OFFICE O/P EST MOD 30 MIN: CPT | Mod: S$GLB,,, | Performed by: INTERNAL MEDICINE

## 2024-08-29 PROCEDURE — 1101F PT FALLS ASSESS-DOCD LE1/YR: CPT | Mod: CPTII,S$GLB,, | Performed by: INTERNAL MEDICINE

## 2024-08-29 PROCEDURE — 3078F DIAST BP <80 MM HG: CPT | Mod: CPTII,S$GLB,, | Performed by: INTERNAL MEDICINE

## 2024-08-29 NOTE — PROGRESS NOTES
MEDICAL ONCOLOGY - ESTABLISHED PATIENT VISIT    Reason for visit: esophageal adenocarcinoma     Best Contact Phone Number(s): There are no phone numbers on file.     Cancer/Stage/TNM:    Cancer Staging   Esophageal adenocarcinoma  Staging form: Esophagus - Adenocarcinoma, AJCC 8th Edition  - Clinical: Stage III (cT2, cN1, cM0, G2) - Signed by Miguel Angel Hampton Jr., MD on 7/31/2023       Oncology History   Esophageal adenocarcinoma   7/31/2023 Initial Diagnosis    Esophageal adenocarcinoma     7/31/2023 Cancer Staged    Staging form: Esophagus - Adenocarcinoma, AJCC 8th Edition  - Clinical: Stage III (cT2, cN1, cM0, G2)     9/5/2023 -  Chemotherapy    Treatment Summary   Plan Name: OP ESOPHAGEAL PACLITAXEL CARBOPLATIN WEEKLY  Treatment Goal: Curative  Status: Active  Start Date: 9/5/2023  End Date: 10/5/2023  Provider: Sahil Singer MD  Chemotherapy: CARBOplatin (PARAPLATIN) 195 mg in sodium chloride 0.9% 304.5 mL chemo infusion, 195 mg (100 % of original dose 193.4 mg), Intravenous, Clinic/HOD 1 time, 1 of 1 cycle  Dose modification:   (original dose 193.4 mg, Cycle 1), 193.4 mg (original dose 193.4 mg, Cycle 1),   (original dose 193.4 mg, Cycle 1, Reason: MD Discretion)  Administration: 195 mg (9/5/2023), 225 mg (9/12/2023), 210 mg (9/19/2023), 180 mg (9/26/2023), 195 mg (10/5/2023)  PACLitaxeL (TAXOL) 50 mg/m2 = 96 mg in sodium chloride 0.9% 250 mL chemo infusion, 50 mg/m2 = 96 mg, Intravenous, Clinic/HOD 1 time, 1 of 1 cycle  Administration: 96 mg (9/5/2023), 96 mg (9/12/2023), 96 mg (9/19/2023), 96 mg (9/26/2023), 96 mg (10/5/2023)     9/5/2023 - 10/9/2023 Radiation Therapy    Treating physician: Anshul Neri    Course: C1 Thorax 2023    Treatment Site Ref. ID Energy Dose/Fx (Gy) #Fx Dose Correction (Gy) Total Dose (Gy) Start Date End Date Elapsed Days   IM Esophagus PTV_High 6X 2 25 / 25 0 50 9/5/2023 10/9/2023 34               Interim History:  68 y.o. male with esophageal adenocarcinoma who  presents for follow-up while on surveillance following completion for definitive chemoRT. No new dysphagia or odynophagia.  He has been staying active, good energy.    Presents alone today. ECOG status is 0.     ROS:   Review of Systems   Constitutional:  Negative for fever, malaise/fatigue and weight loss.   HENT:  Negative for nosebleeds.    Respiratory:  Negative for shortness of breath.    Cardiovascular:  Negative for chest pain and palpitations.   Gastrointestinal:  Negative for blood in stool, constipation, diarrhea, heartburn, nausea and vomiting.   Genitourinary:  Negative for hematuria.   Musculoskeletal:  Negative for falls.   Skin:  Negative for itching and rash.   Neurological:  Negative for dizziness, tingling, sensory change and weakness.   Psychiatric/Behavioral:  The patient is not nervous/anxious.      Past Medical History:   Past Medical History:   Diagnosis Date    HTN (hypertension)      Past Surgical History:   Past Surgical History:   Procedure Laterality Date    LAPAROSCOPIC REPAIR OF INGUINAL HERNIA Right      Family History:   No family history on file.     Social History:   Social History     Tobacco Use    Smoking status: Every Day     Current packs/day: 1.00     Types: Cigarettes    Smokeless tobacco: Never   Substance Use Topics    Alcohol use: Not on file      I have reviewed and updated the patient's past medical, surgical, family and social histories.    Allergies:   Review of patient's allergies indicates:  No Known Allergies     Medications:   Current Outpatient Medications   Medication Sig Dispense Refill    amLODIPine (NORVASC) 10 MG tablet Take 10 mg by mouth.      amlodipine-benazepril 10-20mg (LOTREL) 10-20 mg per capsule Take 1 capsule by mouth.      amlodipine-benazepril 10-20mg (LOTREL) 10-20 mg per capsule Take 1 capsule by mouth once daily.      azelastine (ASTELIN) 137 mcg (0.1 %) nasal spray SPRAY 1 SPRAY BY NASAL ROUTE 2 TIMES DAILY USE IN EACH NOSTRIL AS DIRECTED.    "   azelastine (ASTELIN) 137 mcg (0.1 %) nasal spray 2 sprays by Nasal route.      busPIRone (BUSPAR) 5 MG Tab Take 5 mg by mouth.      co-enzyme Q-10 30 mg capsule Take 30 mg by mouth 3 (three) times daily.      etodolac (LODINE) 400 MG tablet Take 1 tablet by mouth 2 (two) times daily.      famotidine (PEPCID) 20 MG tablet Take 20 mg by mouth 2 (two) times daily.      famotidine (PEPCID) 20 MG tablet Take 1 tablet by mouth 2 (two) times daily.      fluticasone propionate (FLONASE) 50 mcg/actuation nasal spray SMARTSIG:3 Spray(s) Both Nares Every Morning      fluticasone propionate (FLONASE) 50 mcg/actuation nasal spray 2 sprays by Each Nostril route.      gabapentin (NEURONTIN) 300 MG capsule Take 300 mg by mouth.      gabapentin (NEURONTIN) 300 MG capsule Take 1 capsule by mouth every evening.      nicotine (NICODERM CQ) 21 mg/24 hr Place 1 patch onto the skin once daily. 14 patch 0    nicotine polacrilex 2 MG Lozg Take 1 lozenge (2 mg total) by mouth as needed (in place of a cigarette). 72 lozenge 0    ondansetron (ZOFRAN) 8 MG tablet TAKE 1 TABLET BY MOUTH EVERY 8 HOURS AS NEEDED FOR NAUSEA 60 tablet 2    ondansetron (ZOFRAN) 8 MG tablet Take 1 tablet by mouth every 8 (eight) hours as needed.      pravastatin (PRAVACHOL) 40 MG tablet Take 40 mg by mouth.      pravastatin (PRAVACHOL) 40 MG tablet Take 1 tablet by mouth once daily.      prochlorperazine (COMPAZINE) 10 MG tablet Take 1 tablet (10 mg total) by mouth every 6 (six) hours as needed. 60 tablet 2    TURMERIC ORAL Take by mouth.      omeprazole (PRILOSEC) 40 MG capsule Take 1 capsule by mouth every morning.       No current facility-administered medications for this visit.      Physical Exam:   /73 (BP Location: Left arm, Patient Position: Sitting, BP Method: Medium (Automatic))   Pulse 74   Temp 97.9 °F (36.6 °C) (Temporal)   Ht 5' 7" (1.702 m)   Wt 74.6 kg (164 lb 7.4 oz)   SpO2 98%   BMI 25.76 kg/m²           Physical Exam  Vitals reviewed. "   Constitutional:       General: He is not in acute distress.     Appearance: Normal appearance. He is not ill-appearing, toxic-appearing or diaphoretic.   HENT:      Head: Normocephalic and atraumatic.      Right Ear: External ear normal.      Left Ear: External ear normal.      Nose: Nose normal. No congestion.      Mouth/Throat:      Pharynx: Oropharynx is clear. No oropharyngeal exudate.   Eyes:      General: No scleral icterus.     Conjunctiva/sclera: Conjunctivae normal.   Cardiovascular:      Rate and Rhythm: Normal rate.   Pulmonary:      Effort: Pulmonary effort is normal. No respiratory distress.   Abdominal:      General: There is no distension.   Musculoskeletal:      Cervical back: Normal range of motion.      Right lower leg: No edema.      Left lower leg: No edema.   Skin:     General: Skin is warm and dry.      Coloration: Skin is not jaundiced or pale.      Findings: No bruising, erythema or rash.   Neurological:      General: No focal deficit present.      Mental Status: He is alert and oriented to person, place, and time.      Cranial Nerves: No cranial nerve deficit.      Motor: No weakness.      Gait: Gait normal.   Psychiatric:         Mood and Affect: Mood normal.         Behavior: Behavior normal.         Labs:      I have reviewed the pertinent labs from 24. CBC, CMP unremarkable.  Cr 1.3.    Imagin/24/24 - CT A/P   Impression:  1. Enhancing solid exophytic mass in the upper pole of left kidney worrisome for neoplasm.  It is unchanged in size when compared to CT abdomen pelvis dated 2023.  2. Redemonstration of eccentric wall thickening of the distal esophagus in this patient with a history of esophageal cancer.  3. Endplate irregularity at left aspect of the L3-4 disc space appears similar to PET-CT 2024 but progressed compared to CT abdomen pelvis dated 2023.  Differential considerations include progressive degenerative change, disc osteomyelitis, and  metastatic disease.  Correlation is advised.  4. Unchanged nonspecific subcentimeter hypodensity in the right hepatic lobe.  Additional hyperenhancing focus in the right hepatic lobe which is nonspecific but unchanged.  5. 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  6. Additional findings as above.    Path:   7/11/2023 -   DIAGNOSIS:                                    1. 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 ESOPHAGUS, DISTAL MASS, BIOPSY:   - INVASIVE, MODERATELY DIFFERENTIATED ADENOCARCINOMA WITH MUCINOUS DIFFERENTIATION                                    3. ESOPHAGUS MASS, FINE NEEDLE ASPIRATION:   - ADENOCARCINOMA WITH MUCINOUS DIFFERENTIATION    Assessment:       1. Esophageal adenocarcinoma    2. Hypertension, unspecified type    3. Hyperlipidemia, unspecified hyperlipidemia type    4. Tobacco use    5. Left renal mass          Plan:        # Esophageal adenocarcinoma  Mr. Lugo is a 68 y.o. male who presents to clinic for evaluation of newly diagnosed esophageal cancer. He initially presented with dysphagia, now improved s/p esophageal dilation during EUS. Biopsy confirmed adenocarcinoma.     We reviewed his diagnosis, prognosis, and treatment options with him during our initial visit. Discussed with him the high likelihood that cancer would recur if he was taken straight to surgery without neoadjuvant treatment. Our recommendation is to proceed with weekly carboplatin + taxol in conjunction with daily radiation (M-F, no holidays) for a total of 5-5.5 weeks. Handouts provided to patient on both chemotherapy medications.     He has completed Carboplatin AUC 2 + paclitaxel 50 mg/m2 for 5 treatments. Also completed concurrent radiation for definitive non-surgical treatment.    Repeat PET/CT on 11/15/23 showed persistent distal hypermetabolic esophageal tumor  (decreasing SUV) without clear evidence of metastatic disease.     Repeat PET/CT on 2/21/24 showed further improvement in hypermetabolism at distal esophagus.    Repeat CT CAP on 4/24/24 showed stable thickening at distal esophagus. Stable left kidney mass c/f malignancy.     He is doing well today at f/u.  He canceled his CT imaging by accident, so we will get this rescheduled.  Will call him with results.  I'll see him back in about 4 months.    EGD for any s/s concerning for disease progression/recurrence.     # HTN, HLD  BP normal in clinic today.    Continue medical management.   Following with PCP.    # Tobacco use  Encouraged cessation.  Previously enrolled in smoking cessation clinic.     # Left renal mass  Relatively stable in size.   Saw Dr. Sainz 5/2024 and agreed on surveillance with repeat imaging 5/2025.    Follow up: RTC 4 months.     Patient is in agreement with the proposed treatment plan. All questions were answered to the patient's satisfaction. Pt knows to call clinic if anything is needed before the next clinic visit.    Sahil Singer MD  Hematology/Oncology  [facility]        Med Onc Chart Routing  Urgent    Follow up with physician 4 months. with CT prior   Follow up with LEO    Infusion scheduling note    Injection scheduling note    Labs CBC and CMP   Scheduling:  Preferred lab:  Lab interval:     Imaging CT chest abdomen pelvis   CT in next 1-2 weeks and again in 4 months   Pharmacy appointment    Other referrals

## 2024-09-09 ENCOUNTER — HOSPITAL ENCOUNTER (OUTPATIENT)
Dept: RADIOLOGY | Facility: HOSPITAL | Age: 69
Discharge: HOME | End: 2024-09-09
Attending: INTERNAL MEDICINE
Payer: MEDICARE

## 2024-09-09 DIAGNOSIS — C15.9 ESOPHAGEAL ADENOCARCINOMA: ICD-10-CM

## 2024-09-09 PROCEDURE — 25500020 PHARM REV CODE 255: Performed by: INTERNAL MEDICINE

## 2024-09-09 PROCEDURE — 71260 CT THORAX DX C+: CPT | Mod: TC

## 2024-09-09 PROCEDURE — 74177 CT ABD & PELVIS W/CONTRAST: CPT | Mod: TC

## 2024-09-09 PROCEDURE — 71260 CT THORAX DX C+: CPT | Mod: 26,,, | Performed by: RADIOLOGY

## 2024-09-09 PROCEDURE — 74177 CT ABD & PELVIS W/CONTRAST: CPT | Mod: 26,,, | Performed by: RADIOLOGY

## 2024-09-09 PROCEDURE — A9698 NON-RAD CONTRAST MATERIALNOC: HCPCS | Performed by: INTERNAL MEDICINE

## 2024-09-09 RX ADMIN — IOHEXOL 75 ML: 350 INJECTION, SOLUTION INTRAVENOUS at 08:09

## 2024-09-09 RX ADMIN — BARIUM SULFATE 450 ML: 21 SUSPENSION ORAL at 08:09

## 2024-12-10 DIAGNOSIS — C15.9 ESOPHAGEAL ADENOCARCINOMA: Primary | ICD-10-CM

## 2024-12-12 ENCOUNTER — HOSPITAL ENCOUNTER (OUTPATIENT)
Dept: RADIOLOGY | Facility: HOSPITAL | Age: 69
Discharge: HOME | End: 2024-12-12
Attending: INTERNAL MEDICINE
Payer: MEDICARE

## 2024-12-12 DIAGNOSIS — C15.9 ESOPHAGEAL ADENOCARCINOMA: ICD-10-CM

## 2024-12-12 PROCEDURE — 71260 CT THORAX DX C+: CPT | Mod: 26,,, | Performed by: RADIOLOGY

## 2024-12-12 PROCEDURE — 25500020 PHARM REV CODE 255: Performed by: INTERNAL MEDICINE

## 2024-12-12 PROCEDURE — A9698 NON-RAD CONTRAST MATERIALNOC: HCPCS | Performed by: INTERNAL MEDICINE

## 2024-12-12 PROCEDURE — 74177 CT ABD & PELVIS W/CONTRAST: CPT | Mod: 26,,, | Performed by: RADIOLOGY

## 2024-12-12 PROCEDURE — 71260 CT THORAX DX C+: CPT | Mod: TC

## 2024-12-12 RX ADMIN — IOHEXOL 75 ML: 350 INJECTION, SOLUTION INTRAVENOUS at 12:12

## 2024-12-12 RX ADMIN — BARIUM SULFATE 450 ML: 20 SUSPENSION ORAL at 12:12

## 2024-12-16 ENCOUNTER — OFFICE VISIT (OUTPATIENT)
Dept: HEMATOLOGY/ONCOLOGY | Facility: CLINIC | Age: 69
End: 2024-12-16
Payer: MEDICARE

## 2024-12-16 ENCOUNTER — TELEPHONE (OUTPATIENT)
Dept: ENDOSCOPY | Facility: HOSPITAL | Age: 69
End: 2024-12-16
Payer: MEDICARE

## 2024-12-16 ENCOUNTER — PATIENT MESSAGE (OUTPATIENT)
Dept: HEMATOLOGY/ONCOLOGY | Facility: CLINIC | Age: 69
End: 2024-12-16

## 2024-12-16 VITALS
DIASTOLIC BLOOD PRESSURE: 65 MMHG | TEMPERATURE: 98 F | SYSTOLIC BLOOD PRESSURE: 112 MMHG | WEIGHT: 149.81 LBS | HEIGHT: 67 IN | BODY MASS INDEX: 23.51 KG/M2 | OXYGEN SATURATION: 99 %

## 2024-12-16 DIAGNOSIS — I10 HYPERTENSION, UNSPECIFIED TYPE: ICD-10-CM

## 2024-12-16 DIAGNOSIS — Z12.11 SPECIAL SCREENING FOR MALIGNANT NEOPLASMS, COLON: Primary | ICD-10-CM

## 2024-12-16 DIAGNOSIS — N28.89 LEFT RENAL MASS: ICD-10-CM

## 2024-12-16 DIAGNOSIS — C15.9 ESOPHAGEAL ADENOCARCINOMA: ICD-10-CM

## 2024-12-16 DIAGNOSIS — C15.9 ESOPHAGEAL ADENOCARCINOMA: Primary | ICD-10-CM

## 2024-12-16 DIAGNOSIS — R12 HEARTBURN: ICD-10-CM

## 2024-12-16 DIAGNOSIS — Z72.0 TOBACCO USE: ICD-10-CM

## 2024-12-16 DIAGNOSIS — E78.5 HYPERLIPIDEMIA, UNSPECIFIED HYPERLIPIDEMIA TYPE: ICD-10-CM

## 2024-12-16 PROCEDURE — 99214 OFFICE O/P EST MOD 30 MIN: CPT | Mod: S$PBB,,, | Performed by: INTERNAL MEDICINE

## 2024-12-16 PROCEDURE — G2211 COMPLEX E/M VISIT ADD ON: HCPCS | Mod: S$PBB,,, | Performed by: INTERNAL MEDICINE

## 2024-12-16 PROCEDURE — 99999 PR PBB SHADOW E&M-EST. PATIENT-LVL V: CPT | Mod: PBBFAC,,, | Performed by: INTERNAL MEDICINE

## 2024-12-16 PROCEDURE — 99215 OFFICE O/P EST HI 40 MIN: CPT | Mod: PBBFAC | Performed by: INTERNAL MEDICINE

## 2024-12-16 RX ORDER — PANTOPRAZOLE SODIUM 40 MG/1
40 TABLET, DELAYED RELEASE ORAL DAILY
Qty: 90 TABLET | Refills: 3 | Status: SHIPPED | OUTPATIENT
Start: 2024-12-16 | End: 2024-12-16 | Stop reason: ALTCHOICE

## 2024-12-16 RX ORDER — OMEPRAZOLE 40 MG/1
1 CAPSULE, DELAYED RELEASE ORAL EVERY MORNING
COMMUNITY
Start: 2024-06-11

## 2024-12-16 NOTE — TELEPHONE ENCOUNTER
Referral for procedure from  Workqueue referral (see Appts tab)      Spoke to patient to schedule procedure(s) Upper Endoscopy (EGD)       Physician to perform procedure(s) Dr. SHARDA Buenrostro  Date of Procedure (s) 12/19/24  Arrival Time 9:10 AM  Time of Procedure(s) 10:10 AM   Location of Procedure(s) [place] 4th Floor  Type of Rx Prep sent to patient: N/A  Instructions provided to patient via [facility]    Patient was informed on the following information and verbalized understanding. Screening questionnaire reviewed with patient and complete. If procedure requires anesthesia, a responsible adult needs to be present to accompany the patient home, patient cannot drive after receiving anesthesia. Appointment details are tentative, especially check-in time. Patient will receive a prep-op call 7 days prior to confirm check-in time for procedure. If applicable the patient should contact their pharmacy to verify Rx for procedure prep is ready for pick-up. Patient was advised to call the scheduling department at [phone] if pharmacy states no Rx is available. Patient was advised to call the endoscopy scheduling department if any questions or concerns arise.      [facility] Endoscopy Scheduling Department

## 2024-12-16 NOTE — Clinical Note
Gibson Farrell, Hope all is well.  I have a gentleman who had an esophageal adenocarcinoma treated with definitive chemoradiation (completed about 14 months ago).  He declined surgery.  His scan looks ok but he has worsening burping, some weight loss and queasiness.  I'm concerned of course given his high risk of recurrence.  His initial scopes were all at [facility] so he's never seen someone here.  Could you or one of your colleagues perhaps get him in for an EGD to evaluate please?  I did place the endo referral order. Thanks so much. Tom

## 2024-12-16 NOTE — TELEPHONE ENCOUNTER
EGD Procedure Prep Instructions      Date of procedure: 12/19/24 Arrive at: 9:10AM      Location of Department: [address] [place] [place] [id number]  Take the [facility] Elevators to 4th Floor Endoscopy Lab    How to prep:    Day Before Procedure: 12/18/24     You may have a light evening meal.   No solid food after 7:00 pm.   Continue drinking clear liquids.       Day of the Procedure:  12/19/24     You may have water/clear liquids until 2 hours before your procedure or as directed by the scheduling nurse  8:10AM. See below for list.    What You CANNOT do:   Do not drink milk or anything colored red.  Do not drink alcohol.  No gum chewing or candy morning of procedure.    Liquids That Are OK to Drink:   Water  Sports drinks (Gatorade, Power-Aid)  Coffee or tea (no cream or nondairy creamer)  Clear juices without pulp (apple, white grape)  Gelatin desserts (no fruit or toppings)  Clear soda (sprite, coke, ginger ale)  Chicken broth (until 12 midnight the night before procedure)      Comments:               IMPORTANT INFORMATION TO KNOW BEFORE YOUR PROCEDURE    [facility] 4th Floor         If your procedure requires the administration of anesthesia, it is necessary for a responsible adult to drive you home. (Medical Transportation, Uber, Lyft, Taxi, etc. may ONLY be used if a responsible adult is present to accompany you home.  The responsible adult CAN'T be the  of the service).      person must be available to return to pick you up within 15 minutes of being notified of discharge.       Please bring a picture ID, insurance card, & copayment      Take Medications as directed below:      If you begin taking any blood thinning medications, injectable weight loss/diabetes medications (other than insulin) , or Adipex (Phentermine) please contact the endoscopy scheduling department listed below as soon as possible.    If you are diabetic see the attached instruction sheet  regarding your medication.     If you take HEART, BLOOD PRESSURE, SEIZURE, PAIN, LUNG (including inhalers/nebulizers), ANTI-REJECTION (transplant patients), or PSYCHIATRIC medications, please take at your regular times with a sip of water or as directed by the scheduling nurse.     Important contact information:    Endoscopy [phone] Hours of operation Monday-Friday 8:00-4:30pm.    Questions about insurance or financial obligations call [phone] or [phone].    If you have questions regarding the prep or need to reschedule, please call [phone]. After hours questions requiring immediate assistance, contact [facility] On-Call nurse line at [phone] or [phone].   NOTE:     On occasion, unforeseen circumstances may cause a delay in your procedure start time. We respect your time and appreciate your patience during these circumstances.      Comments:

## 2024-12-16 NOTE — PROGRESS NOTES
MEDICAL ONCOLOGY - ESTABLISHED PATIENT VISIT    Reason for visit: esophageal adenocarcinoma     Best Contact Phone Number(s): There are no phone numbers on file.     Cancer/Stage/TNM:    Cancer Staging   Esophageal adenocarcinoma  Staging form: Esophagus - Adenocarcinoma, AJCC 8th Edition  - Clinical: Stage III (cT2, cN1, cM0, G2) - Signed by Miguel Angel Hampton Jr., MD on 7/31/2023       Oncology History   Esophageal adenocarcinoma   7/31/2023 Initial Diagnosis    Esophageal adenocarcinoma     7/31/2023 Cancer Staged    Staging form: Esophagus - Adenocarcinoma, AJCC 8th Edition  - Clinical: Stage III (cT2, cN1, cM0, G2)     9/5/2023 -  Chemotherapy    Treatment Summary   Plan Name: OP ESOPHAGEAL PACLITAXEL CARBOPLATIN WEEKLY  Treatment Goal: Curative  Status: Active  Start Date: 9/5/2023  End Date: 10/5/2023  Provider: Sahil Singer MD  Chemotherapy: CARBOplatin (PARAPLATIN) 195 mg in sodium chloride 0.9% 304.5 mL chemo infusion, 195 mg (100 % of original dose 193.4 mg), Intravenous, Clinic/HOD 1 time, 1 of 1 cycle  Dose modification:   (original dose 193.4 mg, Cycle 1), 193.4 mg (original dose 193.4 mg, Cycle 1),   (original dose 193.4 mg, Cycle 1, Reason: MD Discretion)  Administration: 195 mg (9/5/2023), 225 mg (9/12/2023), 210 mg (9/19/2023), 180 mg (9/26/2023), 195 mg (10/5/2023)  PACLitaxeL (TAXOL) 50 mg/m2 = 96 mg in sodium chloride 0.9% 250 mL chemo infusion, 50 mg/m2 = 96 mg, Intravenous, Clinic/HOD 1 time, 1 of 1 cycle  Administration: 96 mg (9/5/2023), 96 mg (9/12/2023), 96 mg (9/19/2023), 96 mg (9/26/2023), 96 mg (10/5/2023)     9/5/2023 - 10/9/2023 Radiation Therapy    Treating physician: Anshul Neri    Course: C1 Thorax 2023    Treatment Site Ref. ID Energy Dose/Fx (Gy) #Fx Dose Correction (Gy) Total Dose (Gy) Start Date End Date Elapsed Days   IM Esophagus PTV_High 6X 2 25 / 25 0 50 9/5/2023 10/9/2023 34               Interim History:  69 y.o. male with esophageal adenocarcinoma who  "presents for follow-up while on surveillance following completion for definitive chemoRT.  He completed radiation in early October 2023.  He complains of "upset stomach" x 2 weeks.  Not eating much but has increased eructations.  No dysphagia or odynophagia.  Has lost 15 pounds.  Does endorse increased stress.  Denies any vomiting.  Denies any blood in the stool.    Presents alone today. ECOG status is 1.     ROS:   Review of Systems   Constitutional:  Positive for malaise/fatigue and weight loss. Negative for fever.   HENT:  Negative for nosebleeds.    Respiratory:  Negative for shortness of breath.    Cardiovascular:  Negative for chest pain and palpitations.   Gastrointestinal:  Positive for heartburn. Negative for blood in stool, constipation, diarrhea, nausea and vomiting.   Genitourinary:  Negative for hematuria.   Musculoskeletal:  Negative for falls.   Skin:  Negative for itching and rash.   Neurological:  Negative for dizziness, tingling, sensory change and weakness.   Psychiatric/Behavioral:  The patient is not nervous/anxious.      Past Medical History:   Past Medical History:   Diagnosis Date    HTN (hypertension)      Past Surgical History:   Past Surgical History:   Procedure Laterality Date    LAPAROSCOPIC REPAIR OF INGUINAL HERNIA Right      Family History:   No family history on file.     Social History:   Social History     Tobacco Use    Smoking status: Every Day     Current packs/day: 1.00     Types: Cigarettes    Smokeless tobacco: Never   Substance Use Topics    Alcohol use: Not on file      I have reviewed and updated the patient's past medical, surgical, family and social histories.    Allergies:   Review of patient's allergies indicates:  No Known Allergies     Medications:   Current Outpatient Medications   Medication Sig Dispense Refill    amLODIPine (NORVASC) 10 MG tablet Take 10 mg by mouth.      amlodipine-benazepril 10-20mg (LOTREL) 10-20 mg per capsule Take 1 capsule by mouth.      " "amlodipine-benazepril 10-20mg (LOTREL) 10-20 mg per capsule Take 1 capsule by mouth once daily.      azelastine (ASTELIN) 137 mcg (0.1 %) nasal spray SPRAY 1 SPRAY BY NASAL ROUTE 2 TIMES DAILY USE IN EACH NOSTRIL AS DIRECTED.      azelastine (ASTELIN) 137 mcg (0.1 %) nasal spray 2 sprays by Nasal route.      busPIRone (BUSPAR) 5 MG Tab Take 5 mg by mouth.      co-enzyme Q-10 30 mg capsule Take 30 mg by mouth 3 (three) times daily.      etodolac (LODINE) 400 MG tablet Take 1 tablet by mouth 2 (two) times daily.      fluticasone propionate (FLONASE) 50 mcg/actuation nasal spray SMARTSIG:3 Spray(s) Both Nares Every Morning      fluticasone propionate (FLONASE) 50 mcg/actuation nasal spray 2 sprays by Each Nostril route.      gabapentin (NEURONTIN) 300 MG capsule Take 300 mg by mouth.      gabapentin (NEURONTIN) 300 MG capsule Take 1 capsule by mouth every evening.      nicotine (NICODERM CQ) 21 mg/24 hr Place 1 patch onto the skin once daily. 14 patch 0    nicotine polacrilex 2 MG Lozg Take 1 lozenge (2 mg total) by mouth as needed (in place of a cigarette). 72 lozenge 0    ondansetron (ZOFRAN) 8 MG tablet TAKE 1 TABLET BY MOUTH EVERY 8 HOURS AS NEEDED FOR NAUSEA 60 tablet 2    ondansetron (ZOFRAN) 8 MG tablet Take 1 tablet by mouth every 8 (eight) hours as needed.      pravastatin (PRAVACHOL) 40 MG tablet Take 40 mg by mouth.      pravastatin (PRAVACHOL) 40 MG tablet Take 1 tablet by mouth once daily.      TURMERIC ORAL Take by mouth.      pantoprazole (PROTONIX) 40 MG tablet Take 1 tablet (40 mg total) by mouth once daily. 90 tablet 3     No current facility-administered medications for this visit.      Physical Exam:   /65 (BP Location: Left arm, Patient Position: Sitting)   Pulse (P) 73   Temp 97.7 °F (36.5 °C) (Temporal)   Ht 5' 7" (1.702 m)   Wt 67.9 kg (149 lb 12.8 oz)   SpO2 99%   BMI 23.46 kg/m²           Physical Exam  Vitals reviewed.   Constitutional:       General: He is not in acute " distress.     Appearance: Normal appearance. He is not ill-appearing, toxic-appearing or diaphoretic.   HENT:      Head: Normocephalic and atraumatic.      Right Ear: External ear normal.      Left Ear: External ear normal.      Nose: Nose normal. No congestion.      Mouth/Throat:      Pharynx: Oropharynx is clear. No oropharyngeal exudate.   Eyes:      General: No scleral icterus.     Conjunctiva/sclera: Conjunctivae normal.   Cardiovascular:      Rate and Rhythm: Normal rate.   Pulmonary:      Effort: Pulmonary effort is normal. No respiratory distress.   Abdominal:      General: There is no distension.   Musculoskeletal:      Cervical back: Normal range of motion.      Right lower leg: No edema.      Left lower leg: No edema.   Skin:     General: Skin is warm and dry.      Coloration: Skin is not jaundiced or pale.      Findings: No bruising, erythema or rash.   Neurological:      General: No focal deficit present.      Mental Status: He is alert and oriented to person, place, and time.      Cranial Nerves: No cranial nerve deficit.      Motor: No weakness.      Gait: Gait normal.   Psychiatric:         Mood and Affect: Mood normal.         Behavior: Behavior normal.         Labs:      I have reviewed the pertinent labs from 24. CBC, CMP unremarkable.  Cr 1.2.    Imagin/12/24 - CT A/P   Impression:     History of esophageal cancer with persistent suspected thickening of the distal esophagus.  Probable hiatal hernia noted.     No convincing metastatic disease.     Stable pulmonary micro nodules.     Stable hepatic hypodensity and hyperenhancing nodule.  Attention on follow-up.     2 x 1.6 cm enhancing left renal mass.     8 mm hypodense nodule caudal aspect body of the pancreas.  Attention on follow-up.     Significant plaque in the aortoiliac system as above.     Additional findings above.       Path:   2023 -   DIAGNOSIS:                                    1. DUODENAL BULB NODULE, BIOPSY:    - DUODENAL VILLOUS MUCOSA WITH GASTRIC FOVEOLAR METAPLASIA                                    2. ESOPHAGUS, DISTAL MASS, BIOPSY:   - INVASIVE, MODERATELY DIFFERENTIATED ADENOCARCINOMA WITH MUCINOUS DIFFERENTIATION                                    3. ESOPHAGUS MASS, FINE NEEDLE ASPIRATION:   - ADENOCARCINOMA WITH MUCINOUS DIFFERENTIATION    Assessment:       1. Esophageal adenocarcinoma    2. Hypertension, unspecified type    3. Hyperlipidemia, unspecified hyperlipidemia type    4. Tobacco use    5. Left renal mass    6. Heartburn            Plan:        # Esophageal adenocarcinoma  Mr. Lugo is a 69 y.o. male who presents to clinic for evaluation of newly diagnosed esophageal cancer. He initially presented with dysphagia, now improved s/p esophageal dilation during EUS. Biopsy confirmed adenocarcinoma.     We reviewed his diagnosis, prognosis, and treatment options with him during our initial visit. Discussed with him the high likelihood that cancer would recur if he was taken straight to surgery without neoadjuvant treatment. Our recommendation is to proceed with weekly carboplatin + taxol in conjunction with daily radiation (M-F, no holidays) for a total of 5-5.5 weeks. Handouts provided to patient on both chemotherapy medications.     He has completed Carboplatin AUC 2 + paclitaxel 50 mg/m2 for 5 treatments. Also completed concurrent radiation for definitive non-surgical treatment.    Repeat PET/CT on 11/15/23 showed persistent distal hypermetabolic esophageal tumor (decreasing SUV) without clear evidence of metastatic disease.     Repeat PET/CT on 2/21/24 showed further improvement in hypermetabolism at distal esophagus.    Repeat CT CAP on 4/24/24 showed stable thickening at distal esophagus. Stable left kidney mass c/f malignancy.     Repeat CT CAP 9/9/24 showed stable thickening at distal esophagus.  Repeat CT CAP 12/12/24 showed stable findings.  L renal mass is slightly enlarged.     Given weight  "decrease, "upset stomach" and increased eructations, will get repeat EGD to assess for cancer recurrence.    # HTN, HLD  BP normal in clinic today.    Continue medical management.   Following with PCP.    # Tobacco use  Encouraged cessation.  Previously enrolled in smoking cessation clinic.     # Left renal mass  Slightly enlarged in size on 12/2024 scan.   Saw Dr. Sainz 5/2024 and agreed on surveillance with repeat imaging 5/2025.    Follow up: RTC 6 weeks.     Patient is in agreement with the proposed treatment plan. All questions were answered to the patient's satisfaction. Pt knows to call clinic if anything is needed before the next clinic visit.    Sahil Singer MD  Hematology/Oncology  [facility]        Med Onc Chart Routing      Follow up with physician 6 weeks.   Follow up with LEO    Infusion scheduling note    Injection scheduling note    Labs CBC and CMP   Scheduling:  Preferred lab:  Lab interval:     Imaging    Pharmacy appointment    Other referrals                    "

## 2024-12-19 ENCOUNTER — ANESTHESIA EVENT (OUTPATIENT)
Dept: ENDOSCOPY | Facility: HOSPITAL | Age: 69
End: 2024-12-19
Payer: MEDICARE

## 2024-12-19 ENCOUNTER — ANESTHESIA (OUTPATIENT)
Dept: ENDOSCOPY | Facility: HOSPITAL | Age: 69
End: 2024-12-19
Payer: MEDICARE

## 2024-12-19 ENCOUNTER — HOSPITAL ENCOUNTER (OUTPATIENT)
Facility: HOSPITAL | Age: 69
Discharge: HOME | End: 2024-12-19
Attending: INTERNAL MEDICINE | Admitting: INTERNAL MEDICINE
Payer: MEDICARE

## 2024-12-19 VITALS
DIASTOLIC BLOOD PRESSURE: 63 MMHG | RESPIRATION RATE: 18 BRPM | WEIGHT: 149 LBS | HEART RATE: 72 BPM | SYSTOLIC BLOOD PRESSURE: 114 MMHG | TEMPERATURE: 98 F | OXYGEN SATURATION: 97 % | BODY MASS INDEX: 23.39 KG/M2 | HEIGHT: 67 IN

## 2024-12-19 DIAGNOSIS — C15.9 ESOPHAGEAL CANCER: ICD-10-CM

## 2024-12-19 PROCEDURE — 63600175 PHARM REV CODE 636 W HCPCS: Performed by: NURSE ANESTHETIST, CERTIFIED REGISTERED

## 2024-12-19 PROCEDURE — 43239 EGD BIOPSY SINGLE/MULTIPLE: CPT | Mod: ,,, | Performed by: INTERNAL MEDICINE

## 2024-12-19 PROCEDURE — 37000009 HC ANESTHESIA EA ADD 15 MINS: Performed by: INTERNAL MEDICINE

## 2024-12-19 PROCEDURE — 25000003 PHARM REV CODE 250: Performed by: NURSE ANESTHETIST, CERTIFIED REGISTERED

## 2024-12-19 PROCEDURE — 27201012 HC FORCEPS, HOT/COLD, DISP: Performed by: INTERNAL MEDICINE

## 2024-12-19 PROCEDURE — 37000008 HC ANESTHESIA 1ST 15 MINUTES: Performed by: INTERNAL MEDICINE

## 2024-12-19 PROCEDURE — 25000003 PHARM REV CODE 250: Performed by: INTERNAL MEDICINE

## 2024-12-19 PROCEDURE — 43239 EGD BIOPSY SINGLE/MULTIPLE: CPT | Performed by: INTERNAL MEDICINE

## 2024-12-19 RX ORDER — LIDOCAINE HYDROCHLORIDE 20 MG/ML
INJECTION INTRAVENOUS
Status: DISCONTINUED | OUTPATIENT
Start: 2024-12-19 | End: 2024-12-19

## 2024-12-19 RX ORDER — SODIUM CHLORIDE 9 MG/ML
INJECTION, SOLUTION INTRAVENOUS CONTINUOUS
Status: DISCONTINUED | OUTPATIENT
Start: 2024-12-19 | End: 2024-12-19 | Stop reason: HOSPADM

## 2024-12-19 RX ORDER — DEXMEDETOMIDINE HYDROCHLORIDE 100 UG/ML
INJECTION, SOLUTION INTRAVENOUS
Status: DISCONTINUED | OUTPATIENT
Start: 2024-12-19 | End: 2024-12-19

## 2024-12-19 RX ORDER — PROPOFOL 10 MG/ML
VIAL (ML) INTRAVENOUS
Status: DISCONTINUED | OUTPATIENT
Start: 2024-12-19 | End: 2024-12-19

## 2024-12-19 RX ADMIN — PROPOFOL 90 MG: 10 INJECTION, EMULSION INTRAVENOUS at 11:12

## 2024-12-19 RX ADMIN — PROPOFOL 20 MG: 10 INJECTION, EMULSION INTRAVENOUS at 11:12

## 2024-12-19 RX ADMIN — LIDOCAINE HYDROCHLORIDE 75 MG: 20 INJECTION INTRAVENOUS at 11:12

## 2024-12-19 RX ADMIN — GLYCOPYRROLATE 0.1 MG: 0.2 INJECTION, SOLUTION INTRAMUSCULAR; INTRAVENOUS at 11:12

## 2024-12-19 RX ADMIN — SODIUM CHLORIDE: 0.9 INJECTION, SOLUTION INTRAVENOUS at 11:12

## 2024-12-19 RX ADMIN — BENZOCAINE 1 EACH: 200 SPRAY DENTAL; ORAL; PERIODONTAL at 11:12

## 2024-12-19 RX ADMIN — DEXMEDETOMIDINE 12 MCG: 100 INJECTION, SOLUTION, CONCENTRATE INTRAVENOUS at 11:12

## 2024-12-19 NOTE — ANESTHESIA POSTPROCEDURE EVALUATION
Anesthesia Post Evaluation    Patient: Jone Lugo    Procedure(s) Performed: Procedure(s) (LRB):  EGD (ESOPHAGOGASTRODUODENOSCOPY) (N/A)    Final Anesthesia Type: general      Patient location during evaluation: PACU  Patient participation: Yes- Able to Participate  Level of consciousness: awake and alert  Post-procedure vital signs: reviewed and stable  Pain management: adequate  Airway patency: patent    PONV status: None or treated.  Anesthetic complications: no      Cardiovascular status: hemodynamically stable  Respiratory status: spontaneous ventilation  Hydration status: euvolemic  Follow-up not needed.          Vitals Value Taken Time   /63 12/19/24 1234   Temp 36.5 °C (97.7 °F) 12/19/24 1202   Pulse 72 12/19/24 1234   Resp 18 12/19/24 1234   SpO2 97 % 12/19/24 1234         Event Time   Out of Recovery 12:41:17         Pain/Joselo Score: Joselo Score: 10 (12/19/2024 12:21 PM)

## 2024-12-19 NOTE — TRANSFER OF CARE
"Anesthesia Transfer of Care Note    Patient: Jone Lugo    Procedure(s) Performed: Procedure(s) (LRB):  EGD (ESOPHAGOGASTRODUODENOSCOPY) (N/A)    Patient location: GI    Anesthesia Type: general    Transport from OR: Transported from OR on room air with adequate spontaneous ventilation    Post pain: adequate analgesia    Post assessment: no apparent anesthetic complications and tolerated procedure well    Post vital signs: stable    Level of consciousness: lethargic and responds to stimulation    Nausea/Vomiting: no nausea/vomiting    Complications: none    Transfer of care protocol was followed    Last vitals: Visit Vitals  BP (!) 108/56 (BP Location: Left arm, Patient Position: Lying)   Pulse (!) 18   Temp 36.5 °C (97.7 °F) (Temporal)   Resp 18   Ht 5' 7" (1.702 m)   Wt 67.6 kg (149 lb)   SpO2 (!) 94%   BMI 23.34 kg/m²     "

## 2024-12-19 NOTE — PROVATION PATIENT INSTRUCTIONS
Discharge Summary/Instructions after an Endoscopic Procedure  Patient Name: Jone Lugo  Patient MRN: [id number]  Patient YOB: 1955 Thursday, December 19, 2024  Pete Buenrostro MD  Dear patient,  As a result of recent federal legislation (The Federal Cures Act), you may   receive lab or pathology results from your procedure in your [facility]   account before your physician is able to contact you. Your physician or   their representative will relay the results to you with their   recommendations at their soonest availability.  Thank you,  RESTRICTIONS:  During your procedure today, you received medications for sedation.  These   medications may affect your judgment, balance and coordination.  Therefore,   for 24 hours, you have the following restrictions:   - DO NOT drive a car, operate machinery, make legal/financial decisions,   sign important papers or drink alcohol.    ACTIVITY:  Today: no heavy lifting, straining or running due to procedural   sedation/anesthesia.  The following day: return to full activity including work.  DIET:  Eat and drink normally unless instructed otherwise.     TREATMENT FOR COMMON SIDE EFFECTS:  - Mild abdominal pain, nausea, belching, bloating or excessive gas:  rest,   eat lightly and use a heating pad.  - Sore Throat: treat with throat lozenges and/or gargle with warm salt   water.  - Because air was used during the procedure, expelling large amounts of air   from your rectum or belching is normal.  - If a bowel prep was taken, you may not have a bowel movement for 1-3 days.    This is normal.  SYMPTOMS TO WATCH FOR AND REPORT TO YOUR PHYSICIAN:  1. Abdominal pain or bloating, other than gas cramps.  2. Chest pain.  3. Back pain.  4. Signs of infection such as: chills or fever occurring within 24 hours   after the procedure.  5. Rectal bleeding, which would show as bright red, maroon, or black stools.   (A tablespoon of blood from the rectum is not serious, especially  if   hemorrhoids are present.)  6. Vomiting.  7. Weakness or dizziness.  GO DIRECTLY TO THE NEAREST EMERGENCY ROOM IF YOU HAVE ANY OF THE FOLLOWING:      Difficulty breathing              Chills and/or fever over 101 F   Persistent vomiting and/or vomiting blood   Severe abdominal pain   Severe chest pain   Black, tarry stools   Bleeding- more than one tablespoon   Any other symptom or condition that you feel may need urgent attention  Your doctor recommends these additional instructions:  If any biopsies were taken, your doctors clinic will contact you in 1 to 2   weeks with any results.  - Discharge patient to home.   - Resume previous diet today.   - Continue present medications.   - Await pathology results.   - Return to referring physician as previously scheduled.  For questions, problems or results please call your physician - Pete Buenrostro MD at Work:  [phone].  [facility], EMERGENCY ROOM PHONE NUMBER: [phone]  IF A COMPLICATION OR EMERGENCY SITUATION ARISES AND YOU ARE UNABLE TO REACH   YOUR PHYSICIAN - GO DIRECTLY TO THE EMERGENCY ROOM.  Pete Buenrostro MD  12/19/2024 11:59:06 AM  This report has been verified and signed electronically.  Dear patient,  As a result of recent federal legislation (The Federal Cures Act), you may   receive lab or pathology results from your procedure in your [facility]   account before your physician is able to contact you. Your physician or   their representative will relay the results to you with their   recommendations at their soonest availability.  Thank you,  PROVATION

## 2024-12-19 NOTE — ANESTHESIA PREPROCEDURE EVALUATION
12/19/2024  Jone Lugo is a 69 y.o., male.      Pre-op Assessment    I have reviewed the Patient Summary Reports.     I have reviewed the Nursing Notes. I have reviewed the NPO Status.   I have reviewed the Medications.     Review of Systems  Anesthesia Hx:  No problems with previous Anesthesia             Denies Family Hx of Anesthesia complications.    Denies Personal Hx of Anesthesia complications.                    Social:  Smoker       Hematology/Oncology:  Hematology Normal                                     Cardiovascular:     Hypertension                                    Hypertension         Hepatic/GI:    Esophageal / Stomach Disorders (esophageal carcinoma)           Musculoskeletal:  Musculoskeletal Normal                Neurological:  Neurology Normal                           Head Injury            Dermatological:  Skin Normal        Physical Exam  General: Cooperative, Alert and Oriented    Airway:  Mallampati: II   Mouth Opening: Normal  TM Distance: Normal  Tongue: Normal  Neck ROM: Normal ROM    Dental:  Intact    Anesthesia Plan  Type of Anesthesia, risks & benefits discussed:    Anesthesia Type: Gen Natural Airway  Intra-op Monitoring Plan: Standard ASA Monitors  Induction:  IV  Informed Consent: Informed consent signed with the Patient and all parties understand the risks and agree with anesthesia plan.  All questions answered. Patient consented to blood products? No  ASA Score: 3  Day of Surgery Review of History & Physical: H&P Update referred to the surgeon/provider.I have interviewed and examined the patient. I have reviewed the patient's H&P dated: There are no significant changes.     Ready For Surgery From Anesthesia Perspective.   .

## 2024-12-19 NOTE — H&P
Short Stay Endoscopy History and Physical    PCP - No, Primary Doctor    Procedure - EGD  ASA - 3  Mallampati - per anesthesia  History of Anesthesia problems - no  Family history Anesthesia problems -  no     HPI:  This is a 69 y.o. male here for evaluation of :     Reflux - no  Dysphagia - no  Abdominal pain - no  Diarrhea - no  Anemia - no  GI bleeding - no  Other - esophageal cancer, nausea, early satiety    ROS:  CONSTITUTIONAL: Denies weight change,  fatigue, fevers, chills, night sweats.  CARDIOVASCULAR: Denies chest pain, shortness of breath, orthopnea and edema.  RESPIRATORY: Denies cough, hemoptysis, dyspnea, and wheezing.  GI: See HPI.    Medical History:   Past Medical History:   Diagnosis Date    HTN (hypertension)        Surgical History:   Past Surgical History:   Procedure Laterality Date    LAPAROSCOPIC REPAIR OF INGUINAL HERNIA Right        Family History:  No family history on file.    Social History:   Social History     Tobacco Use    Smoking status: Every Day     Current packs/day: 1.00     Types: Cigarettes    Smokeless tobacco: Never   Substance Use Topics    Alcohol use: Yes     Alcohol/week: 1.0 standard drink of alcohol     Types: 1 Glasses of wine per week     Comment: rarely    Drug use: Never       Allergies: Reviewed    Medications:   No current facility-administered medications on file prior to encounter.     Current Outpatient Medications on File Prior to Encounter   Medication Sig Dispense Refill    amlodipine-benazepril 10-20mg (LOTREL) 10-20 mg per capsule Take 1 capsule by mouth.      azelastine (ASTELIN) 137 mcg (0.1 %) nasal spray 2 sprays by Nasal route.      etodolac (LODINE) 400 MG tablet Take 1 tablet by mouth 2 (two) times daily.      fluticasone propionate (FLONASE) 50 mcg/actuation nasal spray 2 sprays by Each Nostril route.      gabapentin (NEURONTIN) 300 MG capsule Take 1 capsule by mouth every evening.      multivitamin with minerals tablet Take 1 tablet by mouth  once daily. One A Day Multivitamin      pravastatin (PRAVACHOL) 40 MG tablet Take 1 tablet by mouth once daily.      TURMERIC ORAL Take by mouth.      amLODIPine (NORVASC) 10 MG tablet Take 10 mg by mouth.      amlodipine-benazepril 10-20mg (LOTREL) 10-20 mg per capsule Take 1 capsule by mouth once daily.      azelastine (ASTELIN) 137 mcg (0.1 %) nasal spray SPRAY 1 SPRAY BY NASAL ROUTE 2 TIMES DAILY USE IN EACH NOSTRIL AS DIRECTED.      busPIRone (BUSPAR) 5 MG Tab Take 5 mg by mouth.      co-enzyme Q-10 30 mg capsule Take 30 mg by mouth 3 (three) times daily.      fluticasone propionate (FLONASE) 50 mcg/actuation nasal spray SMARTSIG:3 Spray(s) Both Nares Every Morning      gabapentin (NEURONTIN) 300 MG capsule Take 300 mg by mouth.      nicotine (NICODERM CQ) 21 mg/24 hr Place 1 patch onto the skin once daily. 14 patch 0    nicotine polacrilex 2 MG Lozg Take 1 lozenge (2 mg total) by mouth as needed (in place of a cigarette). 72 lozenge 0    ondansetron (ZOFRAN) 8 MG tablet TAKE 1 TABLET BY MOUTH EVERY 8 HOURS AS NEEDED FOR NAUSEA 60 tablet 2    ondansetron (ZOFRAN) 8 MG tablet Take 1 tablet by mouth every 8 (eight) hours as needed.      pravastatin (PRAVACHOL) 40 MG tablet Take 40 mg by mouth.         Physical Exam:  Vital Signs:   Vitals:    12/19/24 1007   BP: 115/68   Pulse: 79   Resp: 17   Temp: 98.1 °F (36.7 °C)     General Appearance: Well appearing in no acute distress  ENT: OP clear  Chest: CTA B  CV: RRR, no m/r/g  Abd: s/nt/nd/nabs  Ext: no edema    Labs:  Reviewed    Plan:  I have explained the risks and benefits of endoscopy procedures to the patient including but not limited to bleeding, perforation, infection, and death. The patient wishes to proceed.

## 2024-12-22 ENCOUNTER — PATIENT MESSAGE (OUTPATIENT)
Dept: HEMATOLOGY/ONCOLOGY | Facility: CLINIC | Age: 69
End: 2024-12-22
Payer: MEDICARE

## 2024-12-22 LAB
FINAL PATHOLOGIC DIAGNOSIS: ABNORMAL
GROSS: ABNORMAL
Lab: ABNORMAL

## 2024-12-23 ENCOUNTER — PATIENT MESSAGE (OUTPATIENT)
Dept: HEMATOLOGY/ONCOLOGY | Facility: CLINIC | Age: 69
End: 2024-12-23
Payer: MEDICARE

## 2024-12-23 ENCOUNTER — PATIENT MESSAGE (OUTPATIENT)
Dept: GASTROENTEROLOGY | Facility: CLINIC | Age: 69
End: 2024-12-23
Payer: MEDICARE

## 2024-12-23 DIAGNOSIS — C15.9 ESOPHAGEAL ADENOCARCINOMA: ICD-10-CM

## 2024-12-24 DIAGNOSIS — C15.9 ESOPHAGEAL ADENOCARCINOMA: Primary | ICD-10-CM

## 2024-12-24 RX ORDER — ONDANSETRON HYDROCHLORIDE 8 MG/1
8 TABLET, FILM COATED ORAL EVERY 8 HOURS PRN
Qty: 60 TABLET | Refills: 2 | Status: SHIPPED | OUTPATIENT
Start: 2024-12-24

## 2025-01-01 ENCOUNTER — TELEPHONE (OUTPATIENT)
Dept: HEMATOLOGY/ONCOLOGY | Facility: CLINIC | Age: 70
End: 2025-01-01
Payer: MEDICARE

## 2025-01-01 ENCOUNTER — PATIENT MESSAGE (OUTPATIENT)
Dept: SURGERY | Facility: CLINIC | Age: 70
End: 2025-01-01
Payer: MEDICARE

## 2025-01-01 ENCOUNTER — HOSPITAL ENCOUNTER (INPATIENT)
Facility: HOSPITAL | Age: 70
LOS: 13 days | DRG: 682 | End: 2025-07-08
Attending: EMERGENCY MEDICINE | Admitting: HOSPITALIST
Payer: MEDICARE

## 2025-01-01 VITALS
WEIGHT: 123 LBS | DIASTOLIC BLOOD PRESSURE: 86 MMHG | TEMPERATURE: 97 F | SYSTOLIC BLOOD PRESSURE: 120 MMHG | HEIGHT: 67 IN | RESPIRATION RATE: 24 BRPM | HEART RATE: 55 BPM | BODY MASS INDEX: 19.3 KG/M2 | OXYGEN SATURATION: 94 %

## 2025-01-01 DIAGNOSIS — E87.79 OTHER HYPERVOLEMIA: ICD-10-CM

## 2025-01-01 DIAGNOSIS — N17.9 AKI (ACUTE KIDNEY INJURY): ICD-10-CM

## 2025-01-01 DIAGNOSIS — R19.7 DIARRHEA, UNSPECIFIED TYPE: ICD-10-CM

## 2025-01-01 DIAGNOSIS — R53.1 WEAKNESS: ICD-10-CM

## 2025-01-01 DIAGNOSIS — E83.39 HYPERPHOSPHATEMIA: ICD-10-CM

## 2025-01-01 DIAGNOSIS — N17.9 ACUTE RENAL FAILURE, UNSPECIFIED ACUTE RENAL FAILURE TYPE: ICD-10-CM

## 2025-01-01 DIAGNOSIS — Z71.89 ACP (ADVANCE CARE PLANNING): ICD-10-CM

## 2025-01-01 DIAGNOSIS — C15.9 ESOPHAGEAL ADENOCARCINOMA: ICD-10-CM

## 2025-01-01 DIAGNOSIS — N19 ACUTE PRERENAL AZOTEMIA: ICD-10-CM

## 2025-01-01 DIAGNOSIS — T45.1X5A ANTINEOPLASTIC CHEMOTHERAPY INDUCED PANCYTOPENIA: ICD-10-CM

## 2025-01-01 DIAGNOSIS — I95.9 HYPOTENSION, UNSPECIFIED HYPOTENSION TYPE: ICD-10-CM

## 2025-01-01 DIAGNOSIS — D61.810 ANTINEOPLASTIC CHEMOTHERAPY INDUCED PANCYTOPENIA: ICD-10-CM

## 2025-01-01 DIAGNOSIS — E87.21 ACUTE METABOLIC ACIDOSIS: ICD-10-CM

## 2025-01-01 DIAGNOSIS — K22.2 OBSTRUCTION OF ESOPHAGUS: ICD-10-CM

## 2025-01-01 DIAGNOSIS — R18.0 MALIGNANT ASCITES: Primary | ICD-10-CM

## 2025-01-01 DIAGNOSIS — Z13.6 SCREENING FOR CARDIOVASCULAR CONDITION: ICD-10-CM

## 2025-01-01 DIAGNOSIS — N19 RENAL FAILURE, UNSPECIFIED CHRONICITY: ICD-10-CM

## 2025-01-01 DIAGNOSIS — I50.30 (HFPEF) HEART FAILURE WITH PRESERVED EJECTION FRACTION: ICD-10-CM

## 2025-01-01 DIAGNOSIS — R07.9 CHEST PAIN: ICD-10-CM

## 2025-01-01 DIAGNOSIS — E87.70 FLUID OVERLOAD: ICD-10-CM

## 2025-01-01 LAB
ABO + RH BLD: NORMAL
ABO GROUP BLD: NORMAL
ABSOLUTE EOSINOPHIL (OHS): 0 K/UL
ABSOLUTE EOSINOPHIL (OHS): 0.01 K/UL
ABSOLUTE EOSINOPHIL (OHS): 0.02 K/UL
ABSOLUTE EOSINOPHIL (OHS): 0.04 K/UL
ABSOLUTE MONOCYTE (OHS): 0.97 K/UL (ref 0.3–1)
ABSOLUTE MONOCYTE (OHS): 1.02 K/UL (ref 0.3–1)
ABSOLUTE MONOCYTE (OHS): 1.08 K/UL (ref 0.3–1)
ABSOLUTE MONOCYTE (OHS): 1.27 K/UL (ref 0.3–1)
ABSOLUTE MONOCYTE (OHS): 1.37 K/UL (ref 0.3–1)
ABSOLUTE MONOCYTE (OHS): 1.53 K/UL (ref 0.3–1)
ABSOLUTE MONOCYTE (OHS): 1.57 K/UL (ref 0.3–1)
ABSOLUTE MONOCYTE (OHS): 1.64 K/UL (ref 0.3–1)
ABSOLUTE MONOCYTE (OHS): 1.95 K/UL (ref 0.3–1)
ABSOLUTE NEUTROPHIL COUNT (OHS): 10.31 K/UL (ref 1.8–7.7)
ABSOLUTE NEUTROPHIL COUNT (OHS): 16.74 K/UL (ref 1.8–7.7)
ABSOLUTE NEUTROPHIL COUNT (OHS): 17.62 K/UL (ref 1.8–7.7)
ABSOLUTE NEUTROPHIL COUNT (OHS): 17.8 K/UL (ref 1.8–7.7)
ABSOLUTE NEUTROPHIL COUNT (OHS): 19.17 K/UL (ref 1.8–7.7)
ABSOLUTE NEUTROPHIL COUNT (OHS): 23.29 K/UL (ref 1.8–7.7)
ABSOLUTE NEUTROPHIL COUNT (OHS): 26.34 K/UL (ref 1.8–7.7)
ABSOLUTE NEUTROPHIL COUNT (OHS): 7.2 K/UL (ref 1.8–7.7)
ABSOLUTE NEUTROPHIL COUNT (OHS): 7.63 K/UL (ref 1.8–7.7)
ABSOLUTE NEUTROPHIL MANUAL (OHS): 10.6 K/UL
ABSOLUTE NEUTROPHIL MANUAL (OHS): 11.5 K/UL
ABSOLUTE NEUTROPHIL MANUAL (OHS): 20.2 K/UL
ABSOLUTE NEUTROPHIL MANUAL (OHS): 6.7 K/UL
ABSOLUTE NEUTROPHIL MANUAL (OHS): 7.1 K/UL
ABSOLUTE NEUTROPHIL MANUAL (OHS): 8.5 K/UL
ALBUMIN FLD-MCNC: 0.7 G/DL
ALBUMIN FLD-MCNC: 1 G/DL
ALBUMIN SERPL BCP-MCNC: 1.4 G/DL (ref 3.5–5.2)
ALBUMIN SERPL BCP-MCNC: 1.5 G/DL (ref 3.5–5.2)
ALBUMIN SERPL BCP-MCNC: 1.6 G/DL (ref 3.5–5.2)
ALBUMIN SERPL BCP-MCNC: 1.7 G/DL (ref 3.5–5.2)
ALBUMIN SERPL BCP-MCNC: 1.8 G/DL (ref 3.5–5.2)
ALBUMIN SERPL BCP-MCNC: 1.9 G/DL (ref 3.5–5.2)
ALBUMIN SERPL BCP-MCNC: 1.9 G/DL (ref 3.5–5.2)
ALBUMIN SERPL BCP-MCNC: 2 G/DL (ref 3.5–5.2)
ALBUMIN SERPL BCP-MCNC: 2 G/DL (ref 3.5–5.2)
ALBUMIN SERPL BCP-MCNC: 2.1 G/DL (ref 3.5–5.2)
ALBUMIN SERPL BCP-MCNC: 2.1 G/DL (ref 3.5–5.2)
ALBUMIN SERPL BCP-MCNC: 2.4 G/DL (ref 3.5–5.2)
ALLENS TEST: ABNORMAL
ALP SERPL-CCNC: 151 UNIT/L (ref 40–150)
ALP SERPL-CCNC: 180 UNIT/L (ref 40–150)
ALP SERPL-CCNC: 235 UNIT/L (ref 40–150)
ALP SERPL-CCNC: 243 UNIT/L (ref 40–150)
ALP SERPL-CCNC: 275 UNIT/L (ref 40–150)
ALP SERPL-CCNC: 286 UNIT/L (ref 40–150)
ALP SERPL-CCNC: 315 UNIT/L (ref 40–150)
ALP SERPL-CCNC: 333 UNIT/L (ref 40–150)
ALP SERPL-CCNC: 335 UNIT/L (ref 40–150)
ALP SERPL-CCNC: 338 UNIT/L (ref 40–150)
ALP SERPL-CCNC: 351 UNIT/L (ref 40–150)
ALP SERPL-CCNC: 359 UNIT/L (ref 40–150)
ALP SERPL-CCNC: 361 UNIT/L (ref 40–150)
ALP SERPL-CCNC: 370 UNIT/L (ref 40–150)
ALP SERPL-CCNC: 377 UNIT/L (ref 40–150)
ALP SERPL-CCNC: 408 UNIT/L (ref 40–150)
ALP SERPL-CCNC: 410 UNIT/L (ref 40–150)
ALP SERPL-CCNC: 414 UNIT/L (ref 40–150)
ALP SERPL-CCNC: 423 UNIT/L (ref 40–150)
ALP SERPL-CCNC: 457 UNIT/L (ref 40–150)
ALP SERPL-CCNC: 658 UNIT/L (ref 40–150)
ALT SERPL W/O P-5'-P-CCNC: 121 UNIT/L (ref 10–44)
ALT SERPL W/O P-5'-P-CCNC: 122 UNIT/L (ref 10–44)
ALT SERPL W/O P-5'-P-CCNC: 123 UNIT/L (ref 10–44)
ALT SERPL W/O P-5'-P-CCNC: 135 UNIT/L (ref 10–44)
ALT SERPL W/O P-5'-P-CCNC: 136 UNIT/L (ref 10–44)
ALT SERPL W/O P-5'-P-CCNC: 145 UNIT/L (ref 10–44)
ALT SERPL W/O P-5'-P-CCNC: 146 UNIT/L (ref 10–44)
ALT SERPL W/O P-5'-P-CCNC: 28 UNIT/L (ref 10–44)
ALT SERPL W/O P-5'-P-CCNC: 34 UNIT/L (ref 10–44)
ALT SERPL W/O P-5'-P-CCNC: 48 UNIT/L (ref 10–44)
ALT SERPL W/O P-5'-P-CCNC: 49 UNIT/L (ref 10–44)
ALT SERPL W/O P-5'-P-CCNC: 50 UNIT/L (ref 10–44)
ALT SERPL W/O P-5'-P-CCNC: 59 UNIT/L (ref 10–44)
ALT SERPL W/O P-5'-P-CCNC: 63 UNIT/L (ref 10–44)
ALT SERPL W/O P-5'-P-CCNC: 65 UNIT/L (ref 10–44)
ALT SERPL W/O P-5'-P-CCNC: 78 UNIT/L (ref 10–44)
ALT SERPL W/O P-5'-P-CCNC: 81 UNIT/L (ref 10–44)
ALT SERPL W/O P-5'-P-CCNC: 92 UNIT/L (ref 10–44)
ALT SERPL W/O P-5'-P-CCNC: 93 UNIT/L (ref 10–44)
ALT SERPL W/O P-5'-P-CCNC: 94 UNIT/L (ref 10–44)
ALT SERPL W/O P-5'-P-CCNC: 95 UNIT/L (ref 10–44)
ANION GAP (OHS): 10 MMOL/L (ref 8–16)
ANION GAP (OHS): 11 MMOL/L (ref 8–16)
ANION GAP (OHS): 12 MMOL/L (ref 8–16)
ANION GAP (OHS): 13 MMOL/L (ref 8–16)
ANION GAP (OHS): 14 MMOL/L (ref 8–16)
ANION GAP (OHS): 14 MMOL/L (ref 8–16)
ANION GAP (OHS): 15 MMOL/L (ref 8–16)
ANION GAP (OHS): 16 MMOL/L (ref 8–16)
ANION GAP (OHS): 16 MMOL/L (ref 8–16)
ANION GAP (OHS): 20 MMOL/L (ref 8–16)
ANION GAP (OHS): 9 MMOL/L (ref 8–16)
ANISOCYTOSIS BLD QL SMEAR: SLIGHT
AORTIC SIZE INDEX (SOV): 1.6 CM/M2
APPEARANCE FLD: CLEAR
APPEARANCE FLD: CLEAR
APPEARANCE FLD: NORMAL
APTT PPP: 26.2 SECONDS (ref 21–32)
AST SERPL-CCNC: 108 UNIT/L (ref 11–45)
AST SERPL-CCNC: 112 UNIT/L (ref 11–45)
AST SERPL-CCNC: 128 UNIT/L (ref 11–45)
AST SERPL-CCNC: 133 UNIT/L (ref 11–45)
AST SERPL-CCNC: 149 UNIT/L (ref 11–45)
AST SERPL-CCNC: 22 UNIT/L (ref 11–45)
AST SERPL-CCNC: 26 UNIT/L (ref 11–45)
AST SERPL-CCNC: 28 UNIT/L (ref 11–45)
AST SERPL-CCNC: 34 UNIT/L (ref 11–45)
AST SERPL-CCNC: 34 UNIT/L (ref 11–45)
AST SERPL-CCNC: 35 UNIT/L (ref 11–45)
AST SERPL-CCNC: 42 UNIT/L (ref 11–45)
AST SERPL-CCNC: 47 UNIT/L (ref 11–45)
AST SERPL-CCNC: 48 UNIT/L (ref 11–45)
AST SERPL-CCNC: 49 UNIT/L (ref 11–45)
AST SERPL-CCNC: 57 UNIT/L (ref 11–45)
AST SERPL-CCNC: 80 UNIT/L (ref 11–45)
AST SERPL-CCNC: 84 UNIT/L (ref 11–45)
AST SERPL-CCNC: 84 UNIT/L (ref 11–45)
AST SERPL-CCNC: 85 UNIT/L (ref 11–45)
AST SERPL-CCNC: 88 UNIT/L (ref 11–45)
AV AREA BY CONTINUOUS VTI: 1.5 CM2
AV INDEX (PROSTH): 0.48
AV LVOT MEAN GRADIENT: 2 MMHG
AV LVOT PEAK GRADIENT: 2 MMHG
AV MEAN GRADIENT: 5 MMHG
AV PEAK GRADIENT: 9 MMHG
AV VALVE AREA BY VELOCITY RATIO: 1.7 CM²
AV VALVE AREA: 1.5 CM2
AV VELOCITY RATIO: 0.53
BACTERIA #/AREA URNS AUTO: ABNORMAL /HPF
BACTERIA #/AREA URNS AUTO: ABNORMAL /HPF
BACTERIA BLD CULT: NORMAL
BACTERIA FLD CULT: NORMAL
BACTERIA FLD CULT: NORMAL
BACTERIA STL CULT: NORMAL
BACTERIA UR CULT: NO GROWTH
BASOPHILS # BLD AUTO: 0.02 K/UL
BASOPHILS # BLD AUTO: 0.03 K/UL
BASOPHILS # BLD AUTO: 0.04 K/UL
BASOPHILS # BLD AUTO: 0.04 K/UL
BASOPHILS # BLD AUTO: 0.05 K/UL
BASOPHILS # BLD AUTO: 0.06 K/UL
BASOPHILS NFR BLD AUTO: 0.1 %
BASOPHILS NFR BLD AUTO: 0.1 %
BASOPHILS NFR BLD AUTO: 0.2 %
BASOPHILS NFR BLD AUTO: 0.3 %
BILIRUB SERPL-MCNC: 0.2 MG/DL (ref 0.1–1)
BILIRUB SERPL-MCNC: 0.3 MG/DL (ref 0.1–1)
BILIRUB SERPL-MCNC: 0.4 MG/DL (ref 0.1–1)
BILIRUB SERPL-MCNC: 0.5 MG/DL (ref 0.1–1)
BILIRUB SERPL-MCNC: 0.6 MG/DL (ref 0.1–1)
BILIRUB SERPL-MCNC: 0.9 MG/DL (ref 0.1–1)
BILIRUB UR QL STRIP.AUTO: ABNORMAL
BILIRUB UR QL STRIP.AUTO: NEGATIVE
BIPAP: 0
BIPAP: 0
BLD PROD TYP BPU: NORMAL
BLOOD UNIT EXPIRATION DATE: NORMAL
BLOOD UNIT TYPE CODE: 5100
BNP SERPL-MCNC: 37 PG/ML (ref 0–99)
BSA FOR ECHO PROCEDURE: 1.62 M2
BUN SERPL-MCNC: 46 MG/DL (ref 8–23)
BUN SERPL-MCNC: 47 MG/DL (ref 8–23)
BUN SERPL-MCNC: 47 MG/DL (ref 8–23)
BUN SERPL-MCNC: 49 MG/DL (ref 8–23)
BUN SERPL-MCNC: 50 MG/DL (ref 8–23)
BUN SERPL-MCNC: 50 MG/DL (ref 8–23)
BUN SERPL-MCNC: 51 MG/DL (ref 8–23)
BUN SERPL-MCNC: 51 MG/DL (ref 8–23)
BUN SERPL-MCNC: 52 MG/DL (ref 8–23)
BUN SERPL-MCNC: 53 MG/DL (ref 8–23)
BUN SERPL-MCNC: 53 MG/DL (ref 8–23)
BUN SERPL-MCNC: 54 MG/DL (ref 8–23)
BUN SERPL-MCNC: 55 MG/DL (ref 8–23)
BUN SERPL-MCNC: 56 MG/DL (ref 8–23)
BUN SERPL-MCNC: 58 MG/DL (ref 8–23)
BUN SERPL-MCNC: 59 MG/DL (ref 8–23)
BUN SERPL-MCNC: 60 MG/DL (ref 8–23)
BUN SERPL-MCNC: 60 MG/DL (ref 8–23)
BUN SERPL-MCNC: 61 MG/DL (ref 8–23)
BUN SERPL-MCNC: 62 MG/DL (ref 8–23)
BUN SERPL-MCNC: 63 MG/DL (ref 8–23)
BUN SERPL-MCNC: 64 MG/DL (ref 8–23)
BUN SERPL-MCNC: 65 MG/DL (ref 8–23)
BUN SERPL-MCNC: 66 MG/DL (ref 8–23)
BUN SERPL-MCNC: 67 MG/DL (ref 8–23)
BUN SERPL-MCNC: 68 MG/DL (ref 8–23)
BUN SERPL-MCNC: 68 MG/DL (ref 8–23)
BUN SERPL-MCNC: 69 MG/DL (ref 8–23)
BUN SERPL-MCNC: 70 MG/DL (ref 8–23)
BUN SERPL-MCNC: 70 MG/DL (ref 8–23)
BUN SERPL-MCNC: 72 MG/DL (ref 8–23)
BURR CELLS BLD QL SMEAR: ABNORMAL
BURR CELLS BLD QL SMEAR: ABNORMAL
C COLI+JEJ+UPSA DNA STL QL NAA+NON-PROBE: NEGATIVE
C DIFF GDH STL QL: NEGATIVE
C DIFF TOX A+B STL QL IA: NEGATIVE
CALCIUM SERPL-MCNC: 7.3 MG/DL (ref 8.7–10.5)
CALCIUM SERPL-MCNC: 8.7 MG/DL (ref 8.7–10.5)
CALCIUM SERPL-MCNC: 8.8 MG/DL (ref 8.7–10.5)
CALCIUM SERPL-MCNC: 8.9 MG/DL (ref 8.7–10.5)
CALCIUM SERPL-MCNC: 9 MG/DL (ref 8.7–10.5)
CALCIUM SERPL-MCNC: 9.1 MG/DL (ref 8.7–10.5)
CALCIUM SERPL-MCNC: 9.1 MG/DL (ref 8.7–10.5)
CALCIUM SERPL-MCNC: 9.2 MG/DL (ref 8.7–10.5)
CALCIUM SERPL-MCNC: 9.3 MG/DL (ref 8.7–10.5)
CALCIUM SERPL-MCNC: 9.4 MG/DL (ref 8.7–10.5)
CALCIUM SERPL-MCNC: 9.5 MG/DL (ref 8.7–10.5)
CALCIUM SERPL-MCNC: 9.5 MG/DL (ref 8.7–10.5)
CALCIUM SERPL-MCNC: 9.6 MG/DL (ref 8.7–10.5)
CALCIUM SERPL-MCNC: 9.6 MG/DL (ref 8.7–10.5)
CALCIUM SERPL-MCNC: 9.9 MG/DL (ref 8.7–10.5)
CHLORIDE SERPL-SCNC: 100 MMOL/L (ref 95–110)
CHLORIDE SERPL-SCNC: 101 MMOL/L (ref 95–110)
CHLORIDE SERPL-SCNC: 102 MMOL/L (ref 95–110)
CHLORIDE SERPL-SCNC: 103 MMOL/L (ref 95–110)
CHLORIDE SERPL-SCNC: 105 MMOL/L (ref 95–110)
CHLORIDE SERPL-SCNC: 106 MMOL/L (ref 95–110)
CHLORIDE SERPL-SCNC: 108 MMOL/L (ref 95–110)
CHLORIDE SERPL-SCNC: 111 MMOL/L (ref 95–110)
CHLORIDE SERPL-SCNC: 97 MMOL/L (ref 95–110)
CHLORIDE SERPL-SCNC: 98 MMOL/L (ref 95–110)
CHLORIDE SERPL-SCNC: 99 MMOL/L (ref 95–110)
CLARITY UR: ABNORMAL
CLARITY UR: ABNORMAL
CO2 SERPL-SCNC: 13 MMOL/L (ref 23–29)
CO2 SERPL-SCNC: 14 MMOL/L (ref 23–29)
CO2 SERPL-SCNC: 15 MMOL/L (ref 23–29)
CO2 SERPL-SCNC: 17 MMOL/L (ref 23–29)
CO2 SERPL-SCNC: 18 MMOL/L (ref 23–29)
CO2 SERPL-SCNC: 19 MMOL/L (ref 23–29)
CO2 SERPL-SCNC: 21 MMOL/L (ref 23–29)
CO2 SERPL-SCNC: 21 MMOL/L (ref 23–29)
CO2 SERPL-SCNC: 22 MMOL/L (ref 23–29)
CO2 SERPL-SCNC: 23 MMOL/L (ref 23–29)
CO2 SERPL-SCNC: 24 MMOL/L (ref 23–29)
CO2 SERPL-SCNC: 25 MMOL/L (ref 23–29)
CO2 SERPL-SCNC: 26 MMOL/L (ref 23–29)
CO2 SERPL-SCNC: 27 MMOL/L (ref 23–29)
COLOR FLD: YELLOW
COLOR UR AUTO: YELLOW
COLOR UR AUTO: YELLOW
CORRECTED TEMPERATURE (PCO2): 28.8 MMHG
CORRECTED TEMPERATURE (PH): 7.39
CORRECTED TEMPERATURE (PO2): 31.4 MMHG
CREAT SERPL-MCNC: 1.4 MG/DL (ref 0.5–1.4)
CREAT SERPL-MCNC: 1.5 MG/DL (ref 0.5–1.4)
CREAT SERPL-MCNC: 1.6 MG/DL (ref 0.5–1.4)
CREAT SERPL-MCNC: 1.7 MG/DL (ref 0.5–1.4)
CREAT SERPL-MCNC: 1.8 MG/DL (ref 0.5–1.4)
CREAT SERPL-MCNC: 1.9 MG/DL (ref 0.5–1.4)
CREAT SERPL-MCNC: 2 MG/DL (ref 0.5–1.4)
CREAT SERPL-MCNC: 2 MG/DL (ref 0.5–1.4)
CREAT SERPL-MCNC: 2.1 MG/DL (ref 0.5–1.4)
CREAT SERPL-MCNC: 2.1 MG/DL (ref 0.5–1.4)
CREAT SERPL-MCNC: 2.2 MG/DL (ref 0.5–1.4)
CREAT SERPL-MCNC: 2.3 MG/DL (ref 0.5–1.4)
CREAT SERPL-MCNC: 2.4 MG/DL (ref 0.5–1.4)
CREAT SERPL-MCNC: 2.5 MG/DL (ref 0.5–1.4)
CREAT SERPL-MCNC: 2.5 MG/DL (ref 0.5–1.4)
CREAT SERPL-MCNC: 2.6 MG/DL (ref 0.5–1.4)
CREAT SERPL-MCNC: 2.6 MG/DL (ref 0.5–1.4)
CREAT SERPL-MCNC: 2.7 MG/DL (ref 0.5–1.4)
CREAT SERPL-MCNC: 2.8 MG/DL (ref 0.5–1.4)
CREAT SERPL-MCNC: 2.9 MG/DL (ref 0.5–1.4)
CREAT SERPL-MCNC: 2.9 MG/DL (ref 0.5–1.4)
CREAT SERPL-MCNC: 4 MG/DL (ref 0.5–1.4)
CREAT SERPL-MCNC: 4.6 MG/DL (ref 0.5–1.4)
CREAT SERPL-MCNC: 5.8 MG/DL (ref 0.5–1.4)
CREAT SERPL-MCNC: 5.8 MG/DL (ref 0.5–1.4)
CREAT SERPL-MCNC: 6.5 MG/DL (ref 0.5–1.4)
CREAT SERPL-MCNC: 8 MG/DL (ref 0.5–1.4)
CREAT UR-MCNC: 139 MG/DL (ref 23–375)
CREAT UR-MCNC: 142 MG/DL (ref 23–375)
CROSSMATCH INTERPRETATION: NORMAL
CV ECHO LV RWT: 0.46 CM
DACRYOCYTES BLD QL SMEAR: ABNORMAL
DELSYS: ABNORMAL
DISPENSE STATUS: NORMAL
DOP CALC AO PEAK VEL: 1.5 M/S
DOP CALC AO VTI: 20.9 CM
DOP CALC LVOT AREA: 3.1 CM2
DOP CALC LVOT DIAMETER: 2 CM
DOP CALC LVOT PEAK VEL: 0.8 M/S
DOP CALC LVOT STROKE VOLUME: 31.7 CM3
DOP CALCLVOT PEAK VEL VTI: 10.1 CM
E COLI SXT1 STL QL IA: NEGATIVE
E COLI SXT2 STL QL IA: NEGATIVE
E WAVE DECELERATION TIME: 269 MS
E WAVE DECELERATION TIME: 281 MS
E/A RATIO: 0.64
E/E' RATIO: 4 M/S
ECHO EF ESTIMATED: 49 %
ECHO LV POSTERIOR WALL: 0.9 CM (ref 0.6–1.1)
EOSINOPHIL NFR BLD MANUAL: 1 % (ref 0–8)
EOSINOPHIL NFR FLD MANUAL: 1 %
ERYTHROCYTE [DISTWIDTH] IN BLOOD BY AUTOMATED COUNT: 15.2 % (ref 11.5–14.5)
ERYTHROCYTE [DISTWIDTH] IN BLOOD BY AUTOMATED COUNT: 15.3 % (ref 11.5–14.5)
ERYTHROCYTE [DISTWIDTH] IN BLOOD BY AUTOMATED COUNT: 15.4 % (ref 11.5–14.5)
ERYTHROCYTE [DISTWIDTH] IN BLOOD BY AUTOMATED COUNT: 15.4 % (ref 11.5–14.5)
ERYTHROCYTE [DISTWIDTH] IN BLOOD BY AUTOMATED COUNT: 15.5 % (ref 11.5–14.5)
ERYTHROCYTE [DISTWIDTH] IN BLOOD BY AUTOMATED COUNT: 15.6 % (ref 11.5–14.5)
ERYTHROCYTE [DISTWIDTH] IN BLOOD BY AUTOMATED COUNT: 15.7 % (ref 11.5–14.5)
ERYTHROCYTE [DISTWIDTH] IN BLOOD BY AUTOMATED COUNT: 15.7 % (ref 11.5–14.5)
ESTROGEN SERPL-MCNC: ABNORMAL PG/ML
FERRITIN SERPL-MCNC: 4190 NG/ML (ref 20–300)
FIO2: 21 %
FIO2: 21 %
FLOW: 5
FRACTIONAL SHORTENING: 23.1 % (ref 28–44)
GFR SERPLBLD CREATININE-BSD FMLA CKD-EPI: 10 ML/MIN/1.73/M2
GFR SERPLBLD CREATININE-BSD FMLA CKD-EPI: 10 ML/MIN/1.73/M2
GFR SERPLBLD CREATININE-BSD FMLA CKD-EPI: 13 ML/MIN/1.73/M2
GFR SERPLBLD CREATININE-BSD FMLA CKD-EPI: 15 ML/MIN/1.73/M2
GFR SERPLBLD CREATININE-BSD FMLA CKD-EPI: 23 ML/MIN/1.73/M2
GFR SERPLBLD CREATININE-BSD FMLA CKD-EPI: 23 ML/MIN/1.73/M2
GFR SERPLBLD CREATININE-BSD FMLA CKD-EPI: 24 ML/MIN/1.73/M2
GFR SERPLBLD CREATININE-BSD FMLA CKD-EPI: 25 ML/MIN/1.73/M2
GFR SERPLBLD CREATININE-BSD FMLA CKD-EPI: 26 ML/MIN/1.73/M2
GFR SERPLBLD CREATININE-BSD FMLA CKD-EPI: 26 ML/MIN/1.73/M2
GFR SERPLBLD CREATININE-BSD FMLA CKD-EPI: 27 ML/MIN/1.73/M2
GFR SERPLBLD CREATININE-BSD FMLA CKD-EPI: 27 ML/MIN/1.73/M2
GFR SERPLBLD CREATININE-BSD FMLA CKD-EPI: 28 ML/MIN/1.73/M2
GFR SERPLBLD CREATININE-BSD FMLA CKD-EPI: 30 ML/MIN/1.73/M2
GFR SERPLBLD CREATININE-BSD FMLA CKD-EPI: 32 ML/MIN/1.73/M2
GFR SERPLBLD CREATININE-BSD FMLA CKD-EPI: 33 ML/MIN/1.73/M2
GFR SERPLBLD CREATININE-BSD FMLA CKD-EPI: 33 ML/MIN/1.73/M2
GFR SERPLBLD CREATININE-BSD FMLA CKD-EPI: 35 ML/MIN/1.73/M2
GFR SERPLBLD CREATININE-BSD FMLA CKD-EPI: 35 ML/MIN/1.73/M2
GFR SERPLBLD CREATININE-BSD FMLA CKD-EPI: 38 ML/MIN/1.73/M2
GFR SERPLBLD CREATININE-BSD FMLA CKD-EPI: 40 ML/MIN/1.73/M2
GFR SERPLBLD CREATININE-BSD FMLA CKD-EPI: 43 ML/MIN/1.73/M2
GFR SERPLBLD CREATININE-BSD FMLA CKD-EPI: 46 ML/MIN/1.73/M2
GFR SERPLBLD CREATININE-BSD FMLA CKD-EPI: 50 ML/MIN/1.73/M2
GFR SERPLBLD CREATININE-BSD FMLA CKD-EPI: 54 ML/MIN/1.73/M2
GFR SERPLBLD CREATININE-BSD FMLA CKD-EPI: 7 ML/MIN/1.73/M2
GFR SERPLBLD CREATININE-BSD FMLA CKD-EPI: 9 ML/MIN/1.73/M2
GGT SERPL-CCNC: 502 U/L (ref 8–55)
GLUCOSE SERPL-MCNC: 103 MG/DL (ref 70–110)
GLUCOSE SERPL-MCNC: 104 MG/DL (ref 70–110)
GLUCOSE SERPL-MCNC: 108 MG/DL (ref 70–110)
GLUCOSE SERPL-MCNC: 108 MG/DL (ref 70–110)
GLUCOSE SERPL-MCNC: 109 MG/DL (ref 70–110)
GLUCOSE SERPL-MCNC: 114 MG/DL (ref 70–110)
GLUCOSE SERPL-MCNC: 116 MG/DL (ref 70–110)
GLUCOSE SERPL-MCNC: 116 MG/DL (ref 70–110)
GLUCOSE SERPL-MCNC: 117 MG/DL (ref 70–110)
GLUCOSE SERPL-MCNC: 117 MG/DL (ref 70–110)
GLUCOSE SERPL-MCNC: 118 MG/DL (ref 70–110)
GLUCOSE SERPL-MCNC: 118 MG/DL (ref 70–110)
GLUCOSE SERPL-MCNC: 121 MG/DL (ref 70–110)
GLUCOSE SERPL-MCNC: 125 MG/DL (ref 70–110)
GLUCOSE SERPL-MCNC: 126 MG/DL (ref 70–110)
GLUCOSE SERPL-MCNC: 127 MG/DL (ref 70–110)
GLUCOSE SERPL-MCNC: 131 MG/DL (ref 70–110)
GLUCOSE SERPL-MCNC: 138 MG/DL (ref 70–110)
GLUCOSE SERPL-MCNC: 138 MG/DL (ref 70–110)
GLUCOSE SERPL-MCNC: 140 MG/DL (ref 70–110)
GLUCOSE SERPL-MCNC: 144 MG/DL (ref 70–110)
GLUCOSE SERPL-MCNC: 145 MG/DL (ref 70–110)
GLUCOSE SERPL-MCNC: 148 MG/DL (ref 70–110)
GLUCOSE SERPL-MCNC: 150 MG/DL (ref 70–110)
GLUCOSE SERPL-MCNC: 151 MG/DL (ref 70–110)
GLUCOSE SERPL-MCNC: 151 MG/DL (ref 70–110)
GLUCOSE SERPL-MCNC: 155 MG/DL (ref 70–110)
GLUCOSE SERPL-MCNC: 156 MG/DL (ref 70–110)
GLUCOSE SERPL-MCNC: 161 MG/DL (ref 70–110)
GLUCOSE SERPL-MCNC: 163 MG/DL (ref 70–110)
GLUCOSE SERPL-MCNC: 163 MG/DL (ref 70–110)
GLUCOSE SERPL-MCNC: 171 MG/DL (ref 70–110)
GLUCOSE SERPL-MCNC: 177 MG/DL (ref 70–110)
GLUCOSE SERPL-MCNC: 177 MG/DL (ref 70–110)
GLUCOSE SERPL-MCNC: 184 MG/DL (ref 70–110)
GLUCOSE SERPL-MCNC: 184 MG/DL (ref 70–110)
GLUCOSE SERPL-MCNC: 188 MG/DL (ref 70–110)
GLUCOSE SERPL-MCNC: 189 MG/DL (ref 70–110)
GLUCOSE SERPL-MCNC: 193 MG/DL (ref 70–110)
GLUCOSE SERPL-MCNC: 194 MG/DL (ref 70–110)
GLUCOSE SERPL-MCNC: 198 MG/DL (ref 70–110)
GLUCOSE SERPL-MCNC: 201 MG/DL (ref 70–110)
GLUCOSE SERPL-MCNC: 219 MG/DL (ref 70–110)
GLUCOSE SERPL-MCNC: 81 MG/DL (ref 70–110)
GLUCOSE SERPL-MCNC: 90 MG/DL (ref 70–110)
GLUCOSE SERPL-MCNC: 90 MG/DL (ref 70–110)
GLUCOSE SERPL-MCNC: 91 MG/DL (ref 70–110)
GLUCOSE SERPL-MCNC: 92 MG/DL (ref 70–110)
GLUCOSE UR QL STRIP: NEGATIVE
GLUCOSE UR QL STRIP: NEGATIVE
GRAM STN SPEC: NORMAL
GRAN CASTS UR QL COMP ASSIST: 3 /LPF (ref ?–0)
HCO3 UR-SCNC: 8.6 MMOL/L (ref 24–28)
HCT VFR BLD AUTO: 20.1 % (ref 40–54)
HCT VFR BLD AUTO: 21.8 % (ref 40–54)
HCT VFR BLD AUTO: 22.3 % (ref 40–54)
HCT VFR BLD AUTO: 22.3 % (ref 40–54)
HCT VFR BLD AUTO: 23.2 % (ref 40–54)
HCT VFR BLD AUTO: 23.2 % (ref 40–54)
HCT VFR BLD AUTO: 23.5 % (ref 40–54)
HCT VFR BLD AUTO: 23.6 % (ref 40–54)
HCT VFR BLD AUTO: 23.6 % (ref 40–54)
HCT VFR BLD AUTO: 24.1 % (ref 40–54)
HCT VFR BLD AUTO: 24.5 % (ref 40–54)
HCT VFR BLD AUTO: 25 % (ref 40–54)
HCT VFR BLD AUTO: 25.3 % (ref 40–54)
HCT VFR BLD AUTO: 25.8 % (ref 40–54)
HCT VFR BLD AUTO: 27.9 % (ref 40–54)
HCT VFR BLD AUTO: 28.7 % (ref 40–54)
HCT VFR BLD CALC: 25 %PCV (ref 36–54)
HGB BLD-MCNC: 6.7 GM/DL (ref 14–18)
HGB BLD-MCNC: 7.1 GM/DL (ref 14–18)
HGB BLD-MCNC: 7.2 GM/DL (ref 14–18)
HGB BLD-MCNC: 7.3 GM/DL (ref 14–18)
HGB BLD-MCNC: 7.6 GM/DL (ref 14–18)
HGB BLD-MCNC: 7.8 GM/DL (ref 14–18)
HGB BLD-MCNC: 8.1 GM/DL (ref 14–18)
HGB BLD-MCNC: 8.2 GM/DL (ref 14–18)
HGB BLD-MCNC: 8.3 GM/DL (ref 14–18)
HGB BLD-MCNC: 8.4 GM/DL (ref 14–18)
HGB BLD-MCNC: 8.4 GM/DL (ref 14–18)
HGB BLD-MCNC: 9.1 GM/DL (ref 14–18)
HGB BLD-MCNC: 9.6 GM/DL (ref 14–18)
HGB UR QL STRIP: ABNORMAL
HGB UR QL STRIP: ABNORMAL
HOLD SPECIMEN: NORMAL
HOLD SPECIMEN: NORMAL
HYALINE CASTS UR QL AUTO: 3 /LPF (ref 0–1)
HYALINE CASTS UR QL AUTO: 3 /LPF (ref 0–1)
IMM GRANULOCYTES # BLD AUTO: 0.29 K/UL (ref 0–0.04)
IMM GRANULOCYTES # BLD AUTO: 0.43 K/UL (ref 0–0.04)
IMM GRANULOCYTES # BLD AUTO: 0.44 K/UL (ref 0–0.04)
IMM GRANULOCYTES # BLD AUTO: 0.44 K/UL (ref 0–0.04)
IMM GRANULOCYTES # BLD AUTO: 0.49 K/UL (ref 0–0.04)
IMM GRANULOCYTES # BLD AUTO: 0.5 K/UL (ref 0–0.04)
IMM GRANULOCYTES # BLD AUTO: 0.73 K/UL (ref 0–0.04)
IMM GRANULOCYTES # BLD AUTO: 0.74 K/UL (ref 0–0.04)
IMM GRANULOCYTES # BLD AUTO: 0.94 K/UL (ref 0–0.04)
IMM GRANULOCYTES NFR BLD AUTO: 2 % (ref 0–0.5)
IMM GRANULOCYTES NFR BLD AUTO: 2.2 % (ref 0–0.5)
IMM GRANULOCYTES NFR BLD AUTO: 2.3 % (ref 0–0.5)
IMM GRANULOCYTES NFR BLD AUTO: 2.5 % (ref 0–0.5)
IMM GRANULOCYTES NFR BLD AUTO: 3.3 % (ref 0–0.5)
IMM GRANULOCYTES NFR BLD AUTO: 3.5 % (ref 0–0.5)
IMM GRANULOCYTES NFR BLD AUTO: 3.6 % (ref 0–0.5)
IMM GRANULOCYTES NFR BLD AUTO: 4 % (ref 0–0.5)
IMM GRANULOCYTES NFR BLD AUTO: 4.8 % (ref 0–0.5)
INDIRECT COOMBS: NORMAL
INR PPP: 1.1 (ref 0.8–1.2)
INSULIN SERPL-ACNC: ABNORMAL U[IU]/ML
INTERVENTRICULAR SEPTUM: 0.6 CM (ref 0.6–1.1)
IRON SATN MFR SERPL: 12 % (ref 20–50)
IRON SERPL-MCNC: 24 UG/DL (ref 45–160)
KETONES UR QL STRIP: NEGATIVE
KETONES UR QL STRIP: NEGATIVE
LA MAJOR: 4.6 CM
LA MINOR: 4.6 CM
LA WIDTH: 4 CM
LAB AP COMMENTS: ABNORMAL
LAB AP GROSS DESCRIPTION: ABNORMAL
LAB AP NON-GYN INTERPRETATION SPECIMEN 1: ABNORMAL
LAB AP PERFORMING LOCATION(S): ABNORMAL
LACTATE SERPL-SCNC: 1.5 MMOL/L (ref 0.5–2.2)
LACTATE SERPL-SCNC: 2 MMOL/L (ref 0.5–2.2)
LACTATE SERPL-SCNC: 3 MMOL/L (ref 0.5–2.2)
LDH SERPL L TO P-CCNC: 1.8 MMOL/L (ref 0.5–2.2)
LEFT ATRIUM SIZE: 4.5 CM
LEFT ATRIUM VOLUME INDEX: 43 ML/M2
LEFT ATRIUM VOLUME: 70 CM3
LEFT INTERNAL DIMENSION IN SYSTOLE: 3 CM (ref 2.1–4)
LEFT VENTRICLE DIASTOLIC VOLUME INDEX: 39.63 ML/M2
LEFT VENTRICLE DIASTOLIC VOLUME: 65 ML
LEFT VENTRICLE MASS INDEX: 50.2 G/M2
LEFT VENTRICLE SYSTOLIC VOLUME INDEX: 20.1 ML/M2
LEFT VENTRICLE SYSTOLIC VOLUME: 33 ML
LEFT VENTRICULAR INTERNAL DIMENSION IN DIASTOLE: 3.9 CM (ref 3.5–6)
LEFT VENTRICULAR MASS: 82.3 G
LEUKOCYTE ESTERASE UR QL STRIP: ABNORMAL
LEUKOCYTE ESTERASE UR QL STRIP: NEGATIVE
LIPASE SERPL-CCNC: 6 U/L (ref 4–60)
LV LATERAL E/E' RATIO: 3.5
LV SEPTAL E/E' RATIO: 5.4
LYMPHOCYTES # BLD AUTO: 0.38 K/UL (ref 1–4.8)
LYMPHOCYTES # BLD AUTO: 0.61 K/UL (ref 1–4.8)
LYMPHOCYTES # BLD AUTO: 0.62 K/UL (ref 1–4.8)
LYMPHOCYTES # BLD AUTO: 0.68 K/UL (ref 1–4.8)
LYMPHOCYTES # BLD AUTO: 0.73 K/UL (ref 1–4.8)
LYMPHOCYTES # BLD AUTO: 0.77 K/UL (ref 1–4.8)
LYMPHOCYTES # BLD AUTO: 0.77 K/UL (ref 1–4.8)
LYMPHOCYTES # BLD AUTO: 0.85 K/UL (ref 1–4.8)
LYMPHOCYTES # BLD AUTO: 0.95 K/UL (ref 1–4.8)
LYMPHOCYTES NFR BLD MANUAL: 1 % (ref 18–48)
LYMPHOCYTES NFR BLD MANUAL: 1 % (ref 18–48)
LYMPHOCYTES NFR BLD MANUAL: 3 % (ref 18–48)
LYMPHOCYTES NFR BLD MANUAL: 5 % (ref 18–48)
LYMPHOCYTES NFR BLD MANUAL: 8 % (ref 18–48)
LYMPHOCYTES NFR BLD MANUAL: 8 % (ref 18–48)
LYMPHOCYTES NFR FLD MANUAL: 16 %
LYMPHOCYTES NFR FLD MANUAL: 31 %
LYMPHOCYTES NFR FLD MANUAL: 5 %
MAGNESIUM SERPL-MCNC: 1.4 MG/DL (ref 1.6–2.6)
MAGNESIUM SERPL-MCNC: 1.5 MG/DL (ref 1.6–2.6)
MAGNESIUM SERPL-MCNC: 1.6 MG/DL (ref 1.6–2.6)
MAGNESIUM SERPL-MCNC: 1.6 MG/DL (ref 1.6–2.6)
MAGNESIUM SERPL-MCNC: 1.7 MG/DL (ref 1.6–2.6)
MAGNESIUM SERPL-MCNC: 1.7 MG/DL (ref 1.6–2.6)
MAGNESIUM SERPL-MCNC: 1.8 MG/DL (ref 1.6–2.6)
MAGNESIUM SERPL-MCNC: 1.8 MG/DL (ref 1.6–2.6)
MAGNESIUM SERPL-MCNC: 2 MG/DL (ref 1.6–2.6)
MAGNESIUM SERPL-MCNC: 2 MG/DL (ref 1.6–2.6)
MAGNESIUM SERPL-MCNC: 2.2 MG/DL (ref 1.6–2.6)
MAGNESIUM SERPL-MCNC: 2.3 MG/DL (ref 1.6–2.6)
MAGNESIUM SERPL-MCNC: 2.3 MG/DL (ref 1.6–2.6)
MAGNESIUM SERPL-MCNC: 2.4 MG/DL (ref 1.6–2.6)
MCH RBC QN AUTO: 29.9 PG (ref 27–31)
MCH RBC QN AUTO: 30.1 PG (ref 27–31)
MCH RBC QN AUTO: 30.2 PG (ref 27–31)
MCH RBC QN AUTO: 30.5 PG (ref 27–31)
MCH RBC QN AUTO: 30.5 PG (ref 27–31)
MCH RBC QN AUTO: 30.6 PG (ref 27–31)
MCH RBC QN AUTO: 30.7 PG (ref 27–31)
MCH RBC QN AUTO: 31.2 PG (ref 27–31)
MCH RBC QN AUTO: 31.5 PG (ref 27–31)
MCHC RBC AUTO-ENTMCNC: 32.2 G/DL (ref 32–36)
MCHC RBC AUTO-ENTMCNC: 32.3 G/DL (ref 32–36)
MCHC RBC AUTO-ENTMCNC: 32.6 G/DL (ref 32–36)
MCHC RBC AUTO-ENTMCNC: 32.6 G/DL (ref 32–36)
MCHC RBC AUTO-ENTMCNC: 32.7 G/DL (ref 32–36)
MCHC RBC AUTO-ENTMCNC: 33.1 G/DL (ref 32–36)
MCHC RBC AUTO-ENTMCNC: 33.1 G/DL (ref 32–36)
MCHC RBC AUTO-ENTMCNC: 33.2 G/DL (ref 32–36)
MCHC RBC AUTO-ENTMCNC: 33.3 G/DL (ref 32–36)
MCHC RBC AUTO-ENTMCNC: 33.4 G/DL (ref 32–36)
MCHC RBC AUTO-ENTMCNC: 33.6 G/DL (ref 32–36)
MCHC RBC AUTO-ENTMCNC: 33.6 G/DL (ref 32–36)
MCHC RBC AUTO-ENTMCNC: 34 G/DL (ref 32–36)
MCHC RBC AUTO-ENTMCNC: 34.7 G/DL (ref 32–36)
MCHC RBC AUTO-ENTMCNC: 35 G/DL (ref 32–36)
MCHC RBC AUTO-ENTMCNC: 35.3 G/DL (ref 32–36)
MCV RBC AUTO: 88 FL (ref 82–98)
MCV RBC AUTO: 89 FL (ref 82–98)
MCV RBC AUTO: 89 FL (ref 82–98)
MCV RBC AUTO: 90 FL (ref 82–98)
MCV RBC AUTO: 90 FL (ref 82–98)
MCV RBC AUTO: 91 FL (ref 82–98)
MCV RBC AUTO: 92 FL (ref 82–98)
MCV RBC AUTO: 92 FL (ref 82–98)
MCV RBC AUTO: 93 FL (ref 82–98)
MCV RBC AUTO: 94 FL (ref 82–98)
MCV RBC AUTO: 94 FL (ref 82–98)
MESOTHL CELL NFR FLD MANUAL: 3 %
MESOTHL CELL NFR FLD MANUAL: ABNORMAL %
METAMYELOCYTES NFR BLD MANUAL: 1 %
METAMYELOCYTES NFR BLD MANUAL: 2 %
MICROSCOPIC COMMENT: ABNORMAL
MICROSCOPIC COMMENT: ABNORMAL
MODE: ABNORMAL
MONOCYTES NFR BLD MANUAL: 10 % (ref 4–15)
MONOCYTES NFR BLD MANUAL: 10 % (ref 4–15)
MONOCYTES NFR BLD MANUAL: 5 % (ref 4–15)
MONOCYTES NFR BLD MANUAL: 6 % (ref 4–15)
MONOCYTES NFR BLD MANUAL: 8 % (ref 4–15)
MONOCYTES NFR BLD MANUAL: 8 % (ref 4–15)
MONOS+MACROS NFR FLD MANUAL: 27 %
MONOS+MACROS NFR FLD MANUAL: 35 %
MONOS+MACROS NFR FLD MANUAL: 40 %
MV PEAK A VEL: 0.77 M/S
MV PEAK E VEL: 0.49 M/S
MYELOCYTES NFR BLD MANUAL: 1 %
MYELOCYTES NFR BLD MANUAL: 2 %
NEUTROPHILS NFR BLD MANUAL: 79 % (ref 38–73)
NEUTROPHILS NFR BLD MANUAL: 83 % (ref 38–73)
NEUTROPHILS NFR BLD MANUAL: 84 % (ref 38–73)
NEUTROPHILS NFR BLD MANUAL: 85 % (ref 38–73)
NEUTROPHILS NFR BLD MANUAL: 88 % (ref 38–73)
NEUTROPHILS NFR BLD MANUAL: 92 % (ref 38–73)
NEUTROPHILS NFR FLD MANUAL: 15 %
NEUTROPHILS NFR FLD MANUAL: 20 %
NEUTROPHILS NFR FLD MANUAL: 57 %
NEUTS BAND NFR BLD MANUAL: 1 %
NITRITE UR QL STRIP: NEGATIVE
NITRITE UR QL STRIP: NEGATIVE
NUCLEATED RBC (/100WBC) (OHS): 0 /100 WBC
NUCLEATED RBC (/100WBC) (OHS): 1 /100 WBC
OHS CV RV/LV RATIO: 0.85 CM
OHS QRS DURATION: 94 MS
OHS QTC CALCULATION: 437 MS
OTHER CELLS FLD MANUAL: 22 %
OTHER CELLS FLD MANUAL: 28 %
OVALOCYTES BLD QL SMEAR: ABNORMAL
PATHOLOGIST REVIEW - BODY FLUID (OHS): NORMAL
PATHOLOGIST REVIEW - BODY FLUID (OHS): NORMAL
PCO2 BLDA: 28.8 MMHG (ref 35–45)
PCO2 BLDA: 33.4 MMHG (ref 35–45)
PH SMN: 7.02 [PH] (ref 7.35–7.45)
PH SMN: 7.39 [PH] (ref 7.35–7.45)
PH UR STRIP: 5 [PH]
PH UR STRIP: 6 [PH]
PHOSPHATE SERPL-MCNC: 1.9 MG/DL (ref 2.7–4.5)
PHOSPHATE SERPL-MCNC: 2 MG/DL (ref 2.7–4.5)
PHOSPHATE SERPL-MCNC: 2.2 MG/DL (ref 2.7–4.5)
PHOSPHATE SERPL-MCNC: 2.3 MG/DL (ref 2.7–4.5)
PHOSPHATE SERPL-MCNC: 2.4 MG/DL (ref 2.7–4.5)
PHOSPHATE SERPL-MCNC: 2.5 MG/DL (ref 2.7–4.5)
PHOSPHATE SERPL-MCNC: 2.7 MG/DL (ref 2.7–4.5)
PHOSPHATE SERPL-MCNC: 2.8 MG/DL (ref 2.7–4.5)
PHOSPHATE SERPL-MCNC: 2.9 MG/DL (ref 2.7–4.5)
PHOSPHATE SERPL-MCNC: 3 MG/DL (ref 2.7–4.5)
PHOSPHATE SERPL-MCNC: 3.1 MG/DL (ref 2.7–4.5)
PHOSPHATE SERPL-MCNC: 3.2 MG/DL (ref 2.7–4.5)
PHOSPHATE SERPL-MCNC: 3.3 MG/DL (ref 2.7–4.5)
PHOSPHATE SERPL-MCNC: 3.3 MG/DL (ref 2.7–4.5)
PHOSPHATE SERPL-MCNC: 3.4 MG/DL (ref 2.7–4.5)
PHOSPHATE SERPL-MCNC: 3.5 MG/DL (ref 2.7–4.5)
PHOSPHATE SERPL-MCNC: 3.6 MG/DL (ref 2.7–4.5)
PHOSPHATE SERPL-MCNC: 3.7 MG/DL (ref 2.7–4.5)
PHOSPHATE SERPL-MCNC: 3.9 MG/DL (ref 2.7–4.5)
PHOSPHATE SERPL-MCNC: 3.9 MG/DL (ref 2.7–4.5)
PHOSPHATE SERPL-MCNC: 4.1 MG/DL (ref 2.7–4.5)
PHOSPHATE SERPL-MCNC: 4.7 MG/DL (ref 2.7–4.5)
PHOSPHATE SERPL-MCNC: 4.9 MG/DL (ref 2.7–4.5)
PHOSPHATE SERPL-MCNC: 5.7 MG/DL (ref 2.7–4.5)
PISA TR MAX VEL: 2.3 M/S
PLATELET # BLD AUTO: 292 K/UL (ref 150–450)
PLATELET # BLD AUTO: 299 K/UL (ref 150–450)
PLATELET # BLD AUTO: 344 K/UL (ref 150–450)
PLATELET # BLD AUTO: 347 K/UL (ref 150–450)
PLATELET # BLD AUTO: 371 K/UL (ref 150–450)
PLATELET # BLD AUTO: 375 K/UL (ref 150–450)
PLATELET # BLD AUTO: 379 K/UL (ref 150–450)
PLATELET # BLD AUTO: 386 K/UL (ref 150–450)
PLATELET # BLD AUTO: 392 K/UL (ref 150–450)
PLATELET # BLD AUTO: 402 K/UL (ref 150–450)
PLATELET # BLD AUTO: 413 K/UL (ref 150–450)
PLATELET # BLD AUTO: 455 K/UL (ref 150–450)
PLATELET # BLD AUTO: 457 K/UL (ref 150–450)
PLATELET # BLD AUTO: 482 K/UL (ref 150–450)
PLATELET # BLD AUTO: 505 K/UL (ref 150–450)
PLATELET # BLD AUTO: 565 K/UL (ref 150–450)
PLATELET BLD QL SMEAR: ABNORMAL
PMV BLD AUTO: 10 FL (ref 9.2–12.9)
PMV BLD AUTO: 8.9 FL (ref 9.2–12.9)
PMV BLD AUTO: 9 FL (ref 9.2–12.9)
PMV BLD AUTO: 9.2 FL (ref 9.2–12.9)
PMV BLD AUTO: 9.3 FL (ref 9.2–12.9)
PMV BLD AUTO: 9.3 FL (ref 9.2–12.9)
PMV BLD AUTO: 9.4 FL (ref 9.2–12.9)
PMV BLD AUTO: 9.5 FL (ref 9.2–12.9)
PMV BLD AUTO: 9.6 FL (ref 9.2–12.9)
PMV BLD AUTO: 9.6 FL (ref 9.2–12.9)
PMV BLD AUTO: 9.7 FL (ref 9.2–12.9)
PMV BLD AUTO: 9.8 FL (ref 9.2–12.9)
PMV BLD AUTO: 9.9 FL (ref 9.2–12.9)
PMV BLD AUTO: 9.9 FL (ref 9.2–12.9)
PO2 BLDA: 31.4 MMHG (ref 40–60)
PO2 BLDA: 62 MMHG (ref 80–100)
POC BASE DEFICIT: -6.5 MMOL/L
POC BE: -22 MMOL/L (ref -2–2)
POC HCO3: 18.6 MMOL/L (ref 24–28)
POC IONIZED CALCIUM: 1.3 MMOL/L (ref 1.06–1.42)
POC PERFORMED BY: ABNORMAL
POC PERFORMED BY: NORMAL
POC SATURATED O2: 78 % (ref 95–100)
POC TCO2: 10 MMOL/L (ref 23–27)
POC TEMPERATURE: 37 C
POC TEMPERATURE: 37 C
POCT GLUCOSE: 103 MG/DL (ref 70–110)
POCT GLUCOSE: 104 MG/DL (ref 70–110)
POCT GLUCOSE: 104 MG/DL (ref 70–110)
POCT GLUCOSE: 106 MG/DL (ref 70–110)
POCT GLUCOSE: 107 MG/DL (ref 70–110)
POCT GLUCOSE: 112 MG/DL (ref 70–110)
POCT GLUCOSE: 113 MG/DL (ref 70–110)
POCT GLUCOSE: 113 MG/DL (ref 70–110)
POCT GLUCOSE: 114 MG/DL (ref 70–110)
POCT GLUCOSE: 116 MG/DL (ref 70–110)
POCT GLUCOSE: 118 MG/DL (ref 70–110)
POCT GLUCOSE: 121 MG/DL (ref 70–110)
POCT GLUCOSE: 125 MG/DL (ref 70–110)
POCT GLUCOSE: 126 MG/DL (ref 70–110)
POCT GLUCOSE: 130 MG/DL (ref 70–110)
POCT GLUCOSE: 131 MG/DL (ref 70–110)
POCT GLUCOSE: 131 MG/DL (ref 70–110)
POCT GLUCOSE: 136 MG/DL (ref 70–110)
POCT GLUCOSE: 137 MG/DL (ref 70–110)
POCT GLUCOSE: 138 MG/DL (ref 70–110)
POCT GLUCOSE: 138 MG/DL (ref 70–110)
POCT GLUCOSE: 139 MG/DL (ref 70–110)
POCT GLUCOSE: 149 MG/DL (ref 70–110)
POCT GLUCOSE: 150 MG/DL (ref 70–110)
POCT GLUCOSE: 153 MG/DL (ref 70–110)
POCT GLUCOSE: 154 MG/DL (ref 70–110)
POCT GLUCOSE: 155 MG/DL (ref 70–110)
POCT GLUCOSE: 157 MG/DL (ref 70–110)
POCT GLUCOSE: 158 MG/DL (ref 70–110)
POCT GLUCOSE: 160 MG/DL (ref 70–110)
POCT GLUCOSE: 162 MG/DL (ref 70–110)
POCT GLUCOSE: 164 MG/DL (ref 70–110)
POCT GLUCOSE: 164 MG/DL (ref 70–110)
POCT GLUCOSE: 165 MG/DL (ref 70–110)
POCT GLUCOSE: 165 MG/DL (ref 70–110)
POCT GLUCOSE: 171 MG/DL (ref 70–110)
POCT GLUCOSE: 172 MG/DL (ref 70–110)
POCT GLUCOSE: 172 MG/DL (ref 70–110)
POCT GLUCOSE: 176 MG/DL (ref 70–110)
POCT GLUCOSE: 178 MG/DL (ref 70–110)
POCT GLUCOSE: 182 MG/DL (ref 70–110)
POCT GLUCOSE: 185 MG/DL (ref 70–110)
POCT GLUCOSE: 186 MG/DL (ref 70–110)
POCT GLUCOSE: 189 MG/DL (ref 70–110)
POCT GLUCOSE: 190 MG/DL (ref 70–110)
POCT GLUCOSE: 195 MG/DL (ref 70–110)
POCT GLUCOSE: 196 MG/DL (ref 70–110)
POCT GLUCOSE: 201 MG/DL (ref 70–110)
POCT GLUCOSE: 209 MG/DL (ref 70–110)
POCT GLUCOSE: 212 MG/DL (ref 70–110)
POCT GLUCOSE: 216 MG/DL (ref 70–110)
POCT GLUCOSE: 217 MG/DL (ref 70–110)
POCT GLUCOSE: 316 MG/DL (ref 70–110)
POCT GLUCOSE: 81 MG/DL (ref 70–110)
POIKILOCYTOSIS BLD QL SMEAR: SLIGHT
POTASSIUM BLD-SCNC: 6 MMOL/L (ref 3.5–5.1)
POTASSIUM SERPL-SCNC: 2.9 MMOL/L (ref 3.5–5.1)
POTASSIUM SERPL-SCNC: 3 MMOL/L (ref 3.5–5.1)
POTASSIUM SERPL-SCNC: 3 MMOL/L (ref 3.5–5.1)
POTASSIUM SERPL-SCNC: 3.1 MMOL/L (ref 3.5–5.1)
POTASSIUM SERPL-SCNC: 3.2 MMOL/L (ref 3.5–5.1)
POTASSIUM SERPL-SCNC: 3.3 MMOL/L (ref 3.5–5.1)
POTASSIUM SERPL-SCNC: 3.4 MMOL/L (ref 3.5–5.1)
POTASSIUM SERPL-SCNC: 3.5 MMOL/L (ref 3.5–5.1)
POTASSIUM SERPL-SCNC: 3.6 MMOL/L (ref 3.5–5.1)
POTASSIUM SERPL-SCNC: 3.7 MMOL/L (ref 3.5–5.1)
POTASSIUM SERPL-SCNC: 3.7 MMOL/L (ref 3.5–5.1)
POTASSIUM SERPL-SCNC: 3.8 MMOL/L (ref 3.5–5.1)
POTASSIUM SERPL-SCNC: 3.9 MMOL/L (ref 3.5–5.1)
POTASSIUM SERPL-SCNC: 3.9 MMOL/L (ref 3.5–5.1)
POTASSIUM SERPL-SCNC: 4 MMOL/L (ref 3.5–5.1)
POTASSIUM SERPL-SCNC: 4.1 MMOL/L (ref 3.5–5.1)
POTASSIUM SERPL-SCNC: 4.2 MMOL/L (ref 3.5–5.1)
POTASSIUM SERPL-SCNC: 4.3 MMOL/L (ref 3.5–5.1)
POTASSIUM UR-SCNC: 34 MMOL/L (ref 15–95)
PROCALCITONIN SERPL-MCNC: 0.77 NG/ML
PROCALCITONIN SERPL-MCNC: 1.63 NG/ML
PROT FLD-MCNC: 2.4 G/DL
PROT SERPL-MCNC: 3.6 GM/DL (ref 6–8.4)
PROT SERPL-MCNC: 4.2 GM/DL (ref 6–8.4)
PROT SERPL-MCNC: 4.2 GM/DL (ref 6–8.4)
PROT SERPL-MCNC: 4.3 GM/DL (ref 6–8.4)
PROT SERPL-MCNC: 4.3 GM/DL (ref 6–8.4)
PROT SERPL-MCNC: 4.4 GM/DL (ref 6–8.4)
PROT SERPL-MCNC: 4.4 GM/DL (ref 6–8.4)
PROT SERPL-MCNC: 4.5 GM/DL (ref 6–8.4)
PROT SERPL-MCNC: 4.6 GM/DL (ref 6–8.4)
PROT SERPL-MCNC: 4.6 GM/DL (ref 6–8.4)
PROT SERPL-MCNC: 4.7 GM/DL (ref 6–8.4)
PROT SERPL-MCNC: 4.8 GM/DL (ref 6–8.4)
PROT SERPL-MCNC: 4.9 GM/DL (ref 6–8.4)
PROT SERPL-MCNC: 5 GM/DL (ref 6–8.4)
PROT SERPL-MCNC: 5 GM/DL (ref 6–8.4)
PROT SERPL-MCNC: 5.1 GM/DL (ref 6–8.4)
PROT SERPL-MCNC: 5.2 GM/DL (ref 6–8.4)
PROT SERPL-MCNC: 5.2 GM/DL (ref 6–8.4)
PROT SERPL-MCNC: 7 GM/DL (ref 6–8.4)
PROT UR QL STRIP: ABNORMAL
PROT UR QL STRIP: ABNORMAL
PROT UR-MCNC: 78 MG/DL
PROT/CREAT UR: 0.56 MG/G{CREAT}
PROTHROMBIN TIME: 12.4 SECONDS (ref 9–12.5)
RA MAJOR: 3.74 CM
RA PRESSURE ESTIMATED: 3 MMHG
RA WIDTH: 3.13 CM
RBC # BLD AUTO: 2.19 M/UL (ref 4.6–6.2)
RBC # BLD AUTO: 2.32 M/UL (ref 4.6–6.2)
RBC # BLD AUTO: 2.44 M/UL (ref 4.6–6.2)
RBC # BLD AUTO: 2.5 M/UL (ref 4.6–6.2)
RBC # BLD AUTO: 2.52 M/UL (ref 4.6–6.2)
RBC # BLD AUTO: 2.55 M/UL (ref 4.6–6.2)
RBC # BLD AUTO: 2.58 M/UL (ref 4.6–6.2)
RBC # BLD AUTO: 2.6 M/UL (ref 4.6–6.2)
RBC # BLD AUTO: 2.65 M/UL (ref 4.6–6.2)
RBC # BLD AUTO: 2.68 M/UL (ref 4.6–6.2)
RBC # BLD AUTO: 2.69 M/UL (ref 4.6–6.2)
RBC # BLD AUTO: 2.74 M/UL (ref 4.6–6.2)
RBC # BLD AUTO: 2.75 M/UL (ref 4.6–6.2)
RBC # BLD AUTO: 2.78 M/UL (ref 4.6–6.2)
RBC # BLD AUTO: 3.02 M/UL (ref 4.6–6.2)
RBC # BLD AUTO: 3.15 M/UL (ref 4.6–6.2)
RBC #/AREA URNS AUTO: 1 /HPF (ref 0–4)
RBC #/AREA URNS AUTO: 13 /HPF (ref 0–4)
RBCS: NORMAL
RELATIVE EOSINOPHIL (OHS): 0 %
RELATIVE EOSINOPHIL (OHS): 0.1 %
RELATIVE EOSINOPHIL (OHS): 0.2 %
RELATIVE EOSINOPHIL (OHS): 0.2 %
RELATIVE EOSINOPHIL (OHS): 0.4 %
RELATIVE LYMPHOCYTE (OHS): 2.5 % (ref 18–48)
RELATIVE LYMPHOCYTE (OHS): 2.8 % (ref 18–48)
RELATIVE LYMPHOCYTE (OHS): 3.1 % (ref 18–48)
RELATIVE LYMPHOCYTE (OHS): 3.4 % (ref 18–48)
RELATIVE LYMPHOCYTE (OHS): 3.7 % (ref 18–48)
RELATIVE LYMPHOCYTE (OHS): 3.9 % (ref 18–48)
RELATIVE LYMPHOCYTE (OHS): 4.3 % (ref 18–48)
RELATIVE LYMPHOCYTE (OHS): 4.9 % (ref 18–48)
RELATIVE LYMPHOCYTE (OHS): 9.4 % (ref 18–48)
RELATIVE MONOCYTE (OHS): 10.1 % (ref 4–15)
RELATIVE MONOCYTE (OHS): 10.9 % (ref 4–15)
RELATIVE MONOCYTE (OHS): 5.3 % (ref 4–15)
RELATIVE MONOCYTE (OHS): 6.3 % (ref 4–15)
RELATIVE MONOCYTE (OHS): 6.3 % (ref 4–15)
RELATIVE MONOCYTE (OHS): 7.2 % (ref 4–15)
RELATIVE MONOCYTE (OHS): 7.8 % (ref 4–15)
RELATIVE MONOCYTE (OHS): 7.9 % (ref 4–15)
RELATIVE MONOCYTE (OHS): 8.6 % (ref 4–15)
RELATIVE NEUTROPHIL (OHS): 75.3 % (ref 38–73)
RELATIVE NEUTROPHIL (OHS): 80.9 % (ref 38–73)
RELATIVE NEUTROPHIL (OHS): 82 % (ref 38–73)
RELATIVE NEUTROPHIL (OHS): 84.6 % (ref 38–73)
RELATIVE NEUTROPHIL (OHS): 85.7 % (ref 38–73)
RELATIVE NEUTROPHIL (OHS): 86 % (ref 38–73)
RELATIVE NEUTROPHIL (OHS): 88 % (ref 38–73)
RELATIVE NEUTROPHIL (OHS): 88.8 % (ref 38–73)
RELATIVE NEUTROPHIL (OHS): 89.5 % (ref 38–73)
RH BLD: NORMAL
RH BLD: NORMAL
RIGHT VENTRICLE DIASTOLIC BASEL DIMENSION: 3.3 CM
RV TB RVSP: 5 MMHG
SAMPLE: ABNORMAL
SARS-COV-2 RDRP RESP QL NAA+PROBE: NEGATIVE
SARS-COV-2 RDRP RESP QL NAA+PROBE: NORMAL
SCHISTOCYTES BLD QL SMEAR: ABNORMAL
SCHISTOCYTES BLD QL SMEAR: PRESENT
SINUS: 2.7 CM
SITE: ABNORMAL
SODIUM BLD-SCNC: 125 MMOL/L (ref 136–145)
SODIUM SERPL-SCNC: 130 MMOL/L (ref 136–145)
SODIUM SERPL-SCNC: 132 MMOL/L (ref 136–145)
SODIUM SERPL-SCNC: 133 MMOL/L (ref 136–145)
SODIUM SERPL-SCNC: 134 MMOL/L (ref 136–145)
SODIUM SERPL-SCNC: 135 MMOL/L (ref 136–145)
SODIUM SERPL-SCNC: 136 MMOL/L (ref 136–145)
SODIUM SERPL-SCNC: 137 MMOL/L (ref 136–145)
SODIUM SERPL-SCNC: 138 MMOL/L (ref 136–145)
SODIUM SERPL-SCNC: 139 MMOL/L (ref 136–145)
SODIUM SERPL-SCNC: 140 MMOL/L (ref 136–145)
SP GR UR STRIP: 1.01
SP GR UR STRIP: 1.01
SPECIMEN OUTDATE: NORMAL
SPECIMEN SOURCE: ABNORMAL
SPECIMEN SOURCE: NORMAL
SQUAMOUS #/AREA URNS AUTO: 1 /HPF
STJ: 2.9 CM
TDI LATERAL: 0.14 M/S
TDI SEPTAL: 0.09 M/S
TDI: 0.12 M/S
TIBC SERPL-MCNC: 204 UG/DL (ref 250–450)
TOXIC GRANULES BLD QL SMEAR: PRESENT
TRANSFERRIN SERPL-MCNC: 138 MG/DL (ref 200–375)
TRIGL SERPL-MCNC: 226 MG/DL (ref 30–150)
TROPONIN I SERPL HS-MCNC: 22 NG/L
TROPONIN I SERPL HS-MCNC: 86 NG/L
TV PEAK GRADIENT: 27 MMHG
TV REST PULMONARY ARTERY PRESSURE: 24 MMHG
UNIT NUMBER: NORMAL
URATE SERPL-MCNC: 11.7 MG/DL (ref 3.4–7)
URATE SERPL-MCNC: 7.8 MG/DL (ref 3.4–7)
URATE SERPL-MCNC: 9.6 MG/DL (ref 3.4–7)
UROBILINOGEN UR STRIP-ACNC: NEGATIVE EU/DL
UROBILINOGEN UR STRIP-ACNC: NEGATIVE EU/DL
UUN UR-MCNC: 319 MG/DL (ref 140–1050)
WBC # BLD AUTO: 10.01 K/UL (ref 3.9–12.7)
WBC # BLD AUTO: 10.13 K/UL (ref 3.9–12.7)
WBC # BLD AUTO: 12.42 K/UL (ref 3.9–12.7)
WBC # BLD AUTO: 12.47 K/UL (ref 3.9–12.7)
WBC # BLD AUTO: 12.57 K/UL (ref 3.9–12.7)
WBC # BLD AUTO: 19.53 K/UL (ref 3.9–12.7)
WBC # BLD AUTO: 20.22 K/UL (ref 3.9–12.7)
WBC # BLD AUTO: 20.81 K/UL (ref 3.9–12.7)
WBC # BLD AUTO: 21.6 K/UL (ref 3.9–12.7)
WBC # BLD AUTO: 22.65 K/UL (ref 3.9–12.7)
WBC # BLD AUTO: 27.09 K/UL (ref 3.9–12.7)
WBC # BLD AUTO: 29.42 K/UL (ref 3.9–12.7)
WBC # BLD AUTO: 8.13 K/UL (ref 3.9–12.7)
WBC # BLD AUTO: 8.45 K/UL (ref 3.9–12.7)
WBC # BLD AUTO: 8.82 K/UL (ref 3.9–12.7)
WBC # BLD AUTO: 8.9 K/UL (ref 3.9–12.7)
WBC # FLD: 102 /CU MM
WBC # FLD: 147 /CU MM
WBC # FLD: 152 /CU MM
WBC #/AREA STL HPF: NORMAL /[HPF]
WBC #/AREA URNS AUTO: 1 /HPF (ref 0–5)
WBC #/AREA URNS AUTO: 12 /HPF (ref 0–5)
Z-SCORE OF LEFT VENTRICULAR DIMENSION IN END DIASTOLE: -1.68
Z-SCORE OF LEFT VENTRICULAR DIMENSION IN END SYSTOLE: 0.38

## 2025-01-01 PROCEDURE — 87040 BLOOD CULTURE FOR BACTERIA: CPT

## 2025-01-01 PROCEDURE — 87449 NOS EACH ORGANISM AG IA: CPT

## 2025-01-01 PROCEDURE — 88341 IMHCHEM/IMCYTCHM EA ADD ANTB: CPT | Mod: 26,,, | Performed by: PATHOLOGY

## 2025-01-01 PROCEDURE — 36415 COLL VENOUS BLD VENIPUNCTURE: CPT

## 2025-01-01 PROCEDURE — 92526 ORAL FUNCTION THERAPY: CPT

## 2025-01-01 PROCEDURE — 99900035 HC TECH TIME PER 15 MIN (STAT)

## 2025-01-01 PROCEDURE — 81001 URINALYSIS AUTO W/SCOPE: CPT | Performed by: EMERGENCY MEDICINE

## 2025-01-01 PROCEDURE — S4991 NICOTINE PATCH NONLEGEND: HCPCS | Performed by: NURSE PRACTITIONER

## 2025-01-01 PROCEDURE — 84484 ASSAY OF TROPONIN QUANT: CPT

## 2025-01-01 PROCEDURE — 63600175 PHARM REV CODE 636 W HCPCS

## 2025-01-01 PROCEDURE — 85007 BL SMEAR W/DIFF WBC COUNT: CPT

## 2025-01-01 PROCEDURE — 63600175 PHARM REV CODE 636 W HCPCS: Performed by: INTERNAL MEDICINE

## 2025-01-01 PROCEDURE — 82330 ASSAY OF CALCIUM: CPT

## 2025-01-01 PROCEDURE — 25000003 PHARM REV CODE 250

## 2025-01-01 PROCEDURE — 83735 ASSAY OF MAGNESIUM: CPT | Performed by: INTERNAL MEDICINE

## 2025-01-01 PROCEDURE — 84132 ASSAY OF SERUM POTASSIUM: CPT

## 2025-01-01 PROCEDURE — 85025 COMPLETE CBC W/AUTO DIFF WBC: CPT

## 2025-01-01 PROCEDURE — 92610 EVALUATE SWALLOWING FUNCTION: CPT

## 2025-01-01 PROCEDURE — 0W9G3ZZ DRAINAGE OF PERITONEAL CAVITY, PERCUTANEOUS APPROACH: ICD-10-PCS | Performed by: HOSPITALIST

## 2025-01-01 PROCEDURE — 84550 ASSAY OF BLOOD/URIC ACID: CPT

## 2025-01-01 PROCEDURE — 84133 ASSAY OF URINE POTASSIUM: CPT

## 2025-01-01 PROCEDURE — 88305 TISSUE EXAM BY PATHOLOGIST: CPT | Mod: 26,,, | Performed by: PATHOLOGY

## 2025-01-01 PROCEDURE — U0002 COVID-19 LAB TEST NON-CDC: HCPCS | Performed by: HOSPITALIST

## 2025-01-01 PROCEDURE — 49082 ABD PARACENTESIS: CPT

## 2025-01-01 PROCEDURE — 84100 ASSAY OF PHOSPHORUS: CPT | Performed by: INTERNAL MEDICINE

## 2025-01-01 PROCEDURE — 80053 COMPREHEN METABOLIC PANEL: CPT

## 2025-01-01 PROCEDURE — 94761 N-INVAS EAR/PLS OXIMETRY MLT: CPT

## 2025-01-01 PROCEDURE — 86901 BLOOD TYPING SEROLOGIC RH(D): CPT | Performed by: INTERNAL MEDICINE

## 2025-01-01 PROCEDURE — 89051 BODY FLUID CELL COUNT: CPT

## 2025-01-01 PROCEDURE — C1874 STENT, COATED/COV W/DEL SYS: HCPCS | Performed by: INTERNAL MEDICINE

## 2025-01-01 PROCEDURE — 88112 CYTOPATH CELL ENHANCE TECH: CPT | Mod: 26,,, | Performed by: PATHOLOGY

## 2025-01-01 PROCEDURE — 27000221 HC OXYGEN, UP TO 24 HOURS

## 2025-01-01 PROCEDURE — 0D738DZ DILATION OF LOWER ESOPHAGUS WITH INTRALUMINAL DEVICE, VIA NATURAL OR ARTIFICIAL OPENING ENDOSCOPIC: ICD-10-PCS | Performed by: INTERNAL MEDICINE

## 2025-01-01 PROCEDURE — 84145 PROCALCITONIN (PCT): CPT | Performed by: EMERGENCY MEDICINE

## 2025-01-01 PROCEDURE — 99285 EMERGENCY DEPT VISIT HI MDM: CPT | Mod: 25,27

## 2025-01-01 PROCEDURE — 99233 SBSQ HOSP IP/OBS HIGH 50: CPT | Mod: GC,,, | Performed by: STUDENT IN AN ORGANIZED HEALTH CARE EDUCATION/TRAINING PROGRAM

## 2025-01-01 PROCEDURE — 82310 ASSAY OF CALCIUM: CPT

## 2025-01-01 PROCEDURE — 84484 ASSAY OF TROPONIN QUANT: CPT | Performed by: EMERGENCY MEDICINE

## 2025-01-01 PROCEDURE — 88342 IMHCHEM/IMCYTCHM 1ST ANTB: CPT | Mod: 26,,, | Performed by: PATHOLOGY

## 2025-01-01 PROCEDURE — 20600001 HC STEP DOWN PRIVATE ROOM

## 2025-01-01 PROCEDURE — 99239 HOSP IP/OBS DSCHRG MGMT >30: CPT | Mod: ,,, | Performed by: HOSPITALIST

## 2025-01-01 PROCEDURE — 84157 ASSAY OF PROTEIN OTHER: CPT

## 2025-01-01 PROCEDURE — 97161 PT EVAL LOW COMPLEX 20 MIN: CPT

## 2025-01-01 PROCEDURE — 27000207 HC ISOLATION

## 2025-01-01 PROCEDURE — 84145 PROCALCITONIN (PCT): CPT

## 2025-01-01 PROCEDURE — 80069 RENAL FUNCTION PANEL: CPT

## 2025-01-01 PROCEDURE — 97535 SELF CARE MNGMENT TRAINING: CPT

## 2025-01-01 PROCEDURE — 84460 ALANINE AMINO (ALT) (SGPT): CPT

## 2025-01-01 PROCEDURE — 84100 ASSAY OF PHOSPHORUS: CPT

## 2025-01-01 PROCEDURE — 85730 THROMBOPLASTIN TIME PARTIAL: CPT

## 2025-01-01 PROCEDURE — 87205 SMEAR GRAM STAIN: CPT

## 2025-01-01 PROCEDURE — 82040 ASSAY OF SERUM ALBUMIN: CPT | Performed by: INTERNAL MEDICINE

## 2025-01-01 PROCEDURE — 25000003 PHARM REV CODE 250: Performed by: STUDENT IN AN ORGANIZED HEALTH CARE EDUCATION/TRAINING PROGRAM

## 2025-01-01 PROCEDURE — 97530 THERAPEUTIC ACTIVITIES: CPT | Mod: CQ

## 2025-01-01 PROCEDURE — 63600175 PHARM REV CODE 636 W HCPCS: Mod: JZ,TB | Performed by: STUDENT IN AN ORGANIZED HEALTH CARE EDUCATION/TRAINING PROGRAM

## 2025-01-01 PROCEDURE — 99233 SBSQ HOSP IP/OBS HIGH 50: CPT | Mod: ,,, | Performed by: STUDENT IN AN ORGANIZED HEALTH CARE EDUCATION/TRAINING PROGRAM

## 2025-01-01 PROCEDURE — 83735 ASSAY OF MAGNESIUM: CPT | Performed by: STUDENT IN AN ORGANIZED HEALTH CARE EDUCATION/TRAINING PROGRAM

## 2025-01-01 PROCEDURE — A4217 STERILE WATER/SALINE, 500 ML: HCPCS | Performed by: INTERNAL MEDICINE

## 2025-01-01 PROCEDURE — 83735 ASSAY OF MAGNESIUM: CPT

## 2025-01-01 PROCEDURE — 99233 SBSQ HOSP IP/OBS HIGH 50: CPT | Mod: GC,,, | Performed by: HOSPITALIST

## 2025-01-01 PROCEDURE — 93005 ELECTROCARDIOGRAM TRACING: CPT

## 2025-01-01 PROCEDURE — 74360 X-RAY GUIDE GI DILATION: CPT | Mod: 26,,, | Performed by: INTERNAL MEDICINE

## 2025-01-01 PROCEDURE — 94799 UNLISTED PULMONARY SVC/PX: CPT

## 2025-01-01 PROCEDURE — 86920 COMPATIBILITY TEST SPIN: CPT | Performed by: INTERNAL MEDICINE

## 2025-01-01 PROCEDURE — 83880 ASSAY OF NATRIURETIC PEPTIDE: CPT | Performed by: EMERGENCY MEDICINE

## 2025-01-01 PROCEDURE — 80053 COMPREHEN METABOLIC PANEL: CPT | Performed by: EMERGENCY MEDICINE

## 2025-01-01 PROCEDURE — 97530 THERAPEUTIC ACTIVITIES: CPT | Mod: CO

## 2025-01-01 PROCEDURE — 89055 LEUKOCYTE ASSESSMENT FECAL: CPT

## 2025-01-01 PROCEDURE — 87070 CULTURE OTHR SPECIMN AEROBIC: CPT

## 2025-01-01 PROCEDURE — 25000003 PHARM REV CODE 250: Performed by: NURSE PRACTITIONER

## 2025-01-01 PROCEDURE — 99222 1ST HOSP IP/OBS MODERATE 55: CPT | Mod: ,,, | Performed by: NURSE PRACTITIONER

## 2025-01-01 PROCEDURE — 82042 OTHER SOURCE ALBUMIN QUAN EA: CPT

## 2025-01-01 PROCEDURE — 82570 ASSAY OF URINE CREATININE: CPT

## 2025-01-01 PROCEDURE — 85018 HEMOGLOBIN: CPT

## 2025-01-01 PROCEDURE — 63600175 PHARM REV CODE 636 W HCPCS: Performed by: HOSPITALIST

## 2025-01-01 PROCEDURE — A4217 STERILE WATER/SALINE, 500 ML: HCPCS

## 2025-01-01 PROCEDURE — 87046 STOOL CULTR AEROBIC BACT EA: CPT

## 2025-01-01 PROCEDURE — 88108 CYTOPATH CONCENTRATE TECH: CPT | Mod: 26,,, | Performed by: PATHOLOGY

## 2025-01-01 PROCEDURE — 85025 COMPLETE CBC W/AUTO DIFF WBC: CPT | Performed by: EMERGENCY MEDICINE

## 2025-01-01 PROCEDURE — 84466 ASSAY OF TRANSFERRIN: CPT

## 2025-01-01 PROCEDURE — 97116 GAIT TRAINING THERAPY: CPT

## 2025-01-01 PROCEDURE — 43266 EGD ENDOSCOPIC STENT PLACE: CPT | Mod: ,,, | Performed by: INTERNAL MEDICINE

## 2025-01-01 PROCEDURE — A4217 STERILE WATER/SALINE, 500 ML: HCPCS | Performed by: STUDENT IN AN ORGANIZED HEALTH CARE EDUCATION/TRAINING PROGRAM

## 2025-01-01 PROCEDURE — 96366 THER/PROPH/DIAG IV INF ADDON: CPT

## 2025-01-01 PROCEDURE — 25000003 PHARM REV CODE 250: Performed by: INTERNAL MEDICINE

## 2025-01-01 PROCEDURE — 63600175 PHARM REV CODE 636 W HCPCS: Performed by: ANESTHESIOLOGY

## 2025-01-01 PROCEDURE — 37000009 HC ANESTHESIA EA ADD 15 MINS: Performed by: INTERNAL MEDICINE

## 2025-01-01 PROCEDURE — 83690 ASSAY OF LIPASE: CPT | Performed by: EMERGENCY MEDICINE

## 2025-01-01 PROCEDURE — 84478 ASSAY OF TRIGLYCERIDES: CPT

## 2025-01-01 PROCEDURE — 99223 1ST HOSP IP/OBS HIGH 75: CPT | Mod: GC,,, | Performed by: INTERNAL MEDICINE

## 2025-01-01 PROCEDURE — 88305 TISSUE EXAM BY PATHOLOGIST: CPT | Mod: TC

## 2025-01-01 PROCEDURE — 83605 ASSAY OF LACTIC ACID: CPT

## 2025-01-01 PROCEDURE — 97165 OT EVAL LOW COMPLEX 30 MIN: CPT

## 2025-01-01 PROCEDURE — 74360 X-RAY GUIDE GI DILATION: CPT | Mod: TC | Performed by: INTERNAL MEDICINE

## 2025-01-01 PROCEDURE — 96375 TX/PRO/DX INJ NEW DRUG ADDON: CPT

## 2025-01-01 PROCEDURE — 25000003 PHARM REV CODE 250: Performed by: EMERGENCY MEDICINE

## 2025-01-01 PROCEDURE — P9047 ALBUMIN (HUMAN), 25%, 50ML: HCPCS

## 2025-01-01 PROCEDURE — 82803 BLOOD GASES ANY COMBINATION: CPT

## 2025-01-01 PROCEDURE — 96365 THER/PROPH/DIAG IV INF INIT: CPT

## 2025-01-01 PROCEDURE — 37000008 HC ANESTHESIA 1ST 15 MINUTES: Performed by: INTERNAL MEDICINE

## 2025-01-01 PROCEDURE — C1769 GUIDE WIRE: HCPCS | Performed by: INTERNAL MEDICINE

## 2025-01-01 PROCEDURE — 88341 IMHCHEM/IMCYTCHM EA ADD ANTB: CPT | Mod: TC

## 2025-01-01 PROCEDURE — 85027 COMPLETE CBC AUTOMATED: CPT

## 2025-01-01 PROCEDURE — 83735 ASSAY OF MAGNESIUM: CPT | Performed by: EMERGENCY MEDICINE

## 2025-01-01 PROCEDURE — 63600175 PHARM REV CODE 636 W HCPCS: Performed by: EMERGENCY MEDICINE

## 2025-01-01 PROCEDURE — 84100 ASSAY OF PHOSPHORUS: CPT | Performed by: EMERGENCY MEDICINE

## 2025-01-01 PROCEDURE — 63600175 PHARM REV CODE 636 W HCPCS: Performed by: STUDENT IN AN ORGANIZED HEALTH CARE EDUCATION/TRAINING PROGRAM

## 2025-01-01 PROCEDURE — U0002 COVID-19 LAB TEST NON-CDC: HCPCS

## 2025-01-01 PROCEDURE — 36415 COLL VENOUS BLD VENIPUNCTURE: CPT | Performed by: INTERNAL MEDICINE

## 2025-01-01 PROCEDURE — 99232 SBSQ HOSP IP/OBS MODERATE 35: CPT | Mod: GC,,, | Performed by: INTERNAL MEDICINE

## 2025-01-01 PROCEDURE — 82800 BLOOD PH: CPT

## 2025-01-01 PROCEDURE — 96361 HYDRATE IV INFUSION ADD-ON: CPT

## 2025-01-01 PROCEDURE — 93010 ELECTROCARDIOGRAM REPORT: CPT | Mod: ,,, | Performed by: INTERNAL MEDICINE

## 2025-01-01 PROCEDURE — 87040 BLOOD CULTURE FOR BACTERIA: CPT | Performed by: EMERGENCY MEDICINE

## 2025-01-01 PROCEDURE — 86900 BLOOD TYPING SEROLOGIC ABO: CPT | Performed by: INTERNAL MEDICINE

## 2025-01-01 PROCEDURE — 99223 1ST HOSP IP/OBS HIGH 75: CPT | Mod: GC,,, | Performed by: HOSPITALIST

## 2025-01-01 PROCEDURE — 86850 RBC ANTIBODY SCREEN: CPT | Performed by: INTERNAL MEDICINE

## 2025-01-01 PROCEDURE — 85014 HEMATOCRIT: CPT

## 2025-01-01 PROCEDURE — 81003 URINALYSIS AUTO W/O SCOPE: CPT

## 2025-01-01 PROCEDURE — 87427 SHIGA-LIKE TOXIN AG IA: CPT

## 2025-01-01 PROCEDURE — 99232 SBSQ HOSP IP/OBS MODERATE 35: CPT | Mod: ,,,

## 2025-01-01 PROCEDURE — 82977 ASSAY OF GGT: CPT

## 2025-01-01 PROCEDURE — 84540 ASSAY OF URINE/UREA-N: CPT

## 2025-01-01 PROCEDURE — 43266 EGD ENDOSCOPIC STENT PLACE: CPT | Performed by: INTERNAL MEDICINE

## 2025-01-01 PROCEDURE — 36600 WITHDRAWAL OF ARTERIAL BLOOD: CPT

## 2025-01-01 PROCEDURE — 99223 1ST HOSP IP/OBS HIGH 75: CPT | Mod: AI,GC,, | Performed by: HOSPITALIST

## 2025-01-01 PROCEDURE — 85610 PROTHROMBIN TIME: CPT

## 2025-01-01 PROCEDURE — 86901 BLOOD TYPING SEROLOGIC RH(D): CPT | Mod: 91 | Performed by: STUDENT IN AN ORGANIZED HEALTH CARE EDUCATION/TRAINING PROGRAM

## 2025-01-01 PROCEDURE — 25500020 PHARM REV CODE 255: Performed by: STUDENT IN AN ORGANIZED HEALTH CARE EDUCATION/TRAINING PROGRAM

## 2025-01-01 PROCEDURE — 99223 1ST HOSP IP/OBS HIGH 75: CPT | Mod: ,,, | Performed by: INTERNAL MEDICINE

## 2025-01-01 PROCEDURE — 99233 SBSQ HOSP IP/OBS HIGH 50: CPT | Mod: ,,, | Performed by: INTERNAL MEDICINE

## 2025-01-01 PROCEDURE — 99223 1ST HOSP IP/OBS HIGH 75: CPT | Mod: ,,,

## 2025-01-01 PROCEDURE — 87086 URINE CULTURE/COLONY COUNT: CPT | Performed by: EMERGENCY MEDICINE

## 2025-01-01 PROCEDURE — 82728 ASSAY OF FERRITIN: CPT

## 2025-01-01 PROCEDURE — 84295 ASSAY OF SERUM SODIUM: CPT

## 2025-01-01 PROCEDURE — 99233 SBSQ HOSP IP/OBS HIGH 50: CPT | Mod: ,,,

## 2025-01-01 PROCEDURE — 25000242 PHARM REV CODE 250 ALT 637 W/ HCPCS: Performed by: STUDENT IN AN ORGANIZED HEALTH CARE EDUCATION/TRAINING PROGRAM

## 2025-01-01 PROCEDURE — 87045 FECES CULTURE AEROBIC BACT: CPT

## 2025-01-01 PROCEDURE — 80053 COMPREHEN METABOLIC PANEL: CPT | Performed by: INTERNAL MEDICINE

## 2025-01-01 PROCEDURE — 84075 ASSAY ALKALINE PHOSPHATASE: CPT

## 2025-01-01 DEVICE — STENT SYSTEM
Type: IMPLANTABLE DEVICE | Site: ESOPHAGUS | Status: FUNCTIONAL
Brand: WALLFLEX™ ESOPHAGEAL

## 2025-01-01 RX ORDER — POTASSIUM CHLORIDE 7.45 MG/ML
10 INJECTION INTRAVENOUS
Status: COMPLETED | OUTPATIENT
Start: 2025-01-01 | End: 2025-01-01

## 2025-01-01 RX ORDER — POTASSIUM CHLORIDE 14.9 MG/ML
20 INJECTION INTRAVENOUS ONCE
Status: COMPLETED | OUTPATIENT
Start: 2025-01-01 | End: 2025-01-01

## 2025-01-01 RX ORDER — IBUPROFEN 200 MG
24 TABLET ORAL
Status: DISCONTINUED | OUTPATIENT
Start: 2025-01-01 | End: 2025-01-01 | Stop reason: HOSPADM

## 2025-01-01 RX ORDER — INSULIN ASPART 100 [IU]/ML
0-10 INJECTION, SOLUTION INTRAVENOUS; SUBCUTANEOUS EVERY 4 HOURS PRN
Status: DISCONTINUED | OUTPATIENT
Start: 2025-01-01 | End: 2025-01-01

## 2025-01-01 RX ORDER — FUROSEMIDE 10 MG/ML
40 INJECTION INTRAMUSCULAR; INTRAVENOUS ONCE
Status: COMPLETED | OUTPATIENT
Start: 2025-01-01 | End: 2025-01-01

## 2025-01-01 RX ORDER — PANTOPRAZOLE SODIUM 40 MG/10ML
40 INJECTION, POWDER, LYOPHILIZED, FOR SOLUTION INTRAVENOUS 2 TIMES DAILY
Status: DISCONTINUED | OUTPATIENT
Start: 2025-01-01 | End: 2025-01-01

## 2025-01-01 RX ORDER — IBUPROFEN 200 MG
1 TABLET ORAL DAILY
Status: DISCONTINUED | OUTPATIENT
Start: 2025-01-01 | End: 2025-01-01 | Stop reason: HOSPADM

## 2025-01-01 RX ORDER — PANTOPRAZOLE SODIUM 40 MG/1
80 TABLET, DELAYED RELEASE ORAL DAILY
Status: DISCONTINUED | OUTPATIENT
Start: 2025-01-01 | End: 2025-01-01

## 2025-01-01 RX ORDER — HYDROMORPHONE HYDROCHLORIDE 1 MG/ML
0.2 INJECTION, SOLUTION INTRAMUSCULAR; INTRAVENOUS; SUBCUTANEOUS EVERY 5 MIN PRN
Status: DISCONTINUED | OUTPATIENT
Start: 2025-01-01 | End: 2025-01-01 | Stop reason: HOSPADM

## 2025-01-01 RX ORDER — HEPARIN SODIUM 5000 [USP'U]/ML
5000 INJECTION, SOLUTION INTRAVENOUS; SUBCUTANEOUS EVERY 8 HOURS
Status: DISCONTINUED | OUTPATIENT
Start: 2025-01-01 | End: 2025-01-01 | Stop reason: HOSPADM

## 2025-01-01 RX ORDER — MUPIROCIN 20 MG/G
OINTMENT TOPICAL 2 TIMES DAILY
Status: DISPENSED | OUTPATIENT
Start: 2025-01-01 | End: 2025-01-01

## 2025-01-01 RX ORDER — SODIUM CHLORIDE, SODIUM LACTATE, POTASSIUM CHLORIDE, CALCIUM CHLORIDE 600; 310; 30; 20 MG/100ML; MG/100ML; MG/100ML; MG/100ML
INJECTION, SOLUTION INTRAVENOUS CONTINUOUS
Status: DISCONTINUED | OUTPATIENT
Start: 2025-01-01 | End: 2025-01-01

## 2025-01-01 RX ORDER — HYDROMORPHONE HYDROCHLORIDE 1 MG/ML
0.5 INJECTION, SOLUTION INTRAMUSCULAR; INTRAVENOUS; SUBCUTANEOUS EVERY 6 HOURS PRN
Status: COMPLETED | OUTPATIENT
Start: 2025-01-01 | End: 2025-01-01

## 2025-01-01 RX ORDER — SODIUM BICARBONATE 650 MG/1
650 TABLET ORAL 3 TIMES DAILY
Status: DISCONTINUED | OUTPATIENT
Start: 2025-01-01 | End: 2025-01-01

## 2025-01-01 RX ORDER — POTASSIUM CHLORIDE 7.45 MG/ML
10 INJECTION INTRAVENOUS
Status: DISCONTINUED | OUTPATIENT
Start: 2025-01-01 | End: 2025-01-01

## 2025-01-01 RX ORDER — SPIRONOLACTONE 25 MG/1
25 TABLET ORAL DAILY
Status: DISCONTINUED | OUTPATIENT
Start: 2025-01-01 | End: 2025-01-01

## 2025-01-01 RX ORDER — POTASSIUM CHLORIDE 14.9 MG/ML
40 INJECTION INTRAVENOUS ONCE
Status: COMPLETED | OUTPATIENT
Start: 2025-01-01 | End: 2025-01-01

## 2025-01-01 RX ORDER — SODIUM CHLORIDE 0.9 % (FLUSH) 0.9 %
10 SYRINGE (ML) INJECTION EVERY 12 HOURS PRN
Status: DISCONTINUED | OUTPATIENT
Start: 2025-01-01 | End: 2025-01-01 | Stop reason: HOSPADM

## 2025-01-01 RX ORDER — SODIUM CHLORIDE, SODIUM LACTATE, POTASSIUM CHLORIDE, CALCIUM CHLORIDE 600; 310; 30; 20 MG/100ML; MG/100ML; MG/100ML; MG/100ML
1000 INJECTION, SOLUTION INTRAVENOUS CONTINUOUS
Status: DISCONTINUED | OUTPATIENT
Start: 2025-01-01 | End: 2025-01-01

## 2025-01-01 RX ORDER — PANTOPRAZOLE SODIUM 40 MG/1
40 TABLET, DELAYED RELEASE ORAL
Status: DISCONTINUED | OUTPATIENT
Start: 2025-01-01 | End: 2025-01-01 | Stop reason: HOSPADM

## 2025-01-01 RX ORDER — SODIUM CHLORIDE, SODIUM LACTATE, POTASSIUM CHLORIDE, CALCIUM CHLORIDE 600; 310; 30; 20 MG/100ML; MG/100ML; MG/100ML; MG/100ML
INJECTION, SOLUTION INTRAVENOUS CONTINUOUS
Status: ACTIVE | OUTPATIENT
Start: 2025-01-01 | End: 2025-01-01

## 2025-01-01 RX ORDER — MAGNESIUM SULFATE HEPTAHYDRATE 40 MG/ML
2 INJECTION, SOLUTION INTRAVENOUS ONCE
Status: COMPLETED | OUTPATIENT
Start: 2025-01-01 | End: 2025-01-01

## 2025-01-01 RX ORDER — IBUPROFEN 200 MG
16 TABLET ORAL
Status: DISCONTINUED | OUTPATIENT
Start: 2025-01-01 | End: 2025-01-01 | Stop reason: HOSPADM

## 2025-01-01 RX ORDER — PROCHLORPERAZINE EDISYLATE 5 MG/ML
5 INJECTION INTRAMUSCULAR; INTRAVENOUS EVERY 6 HOURS PRN
Status: DISCONTINUED | OUTPATIENT
Start: 2025-01-01 | End: 2025-01-01 | Stop reason: HOSPADM

## 2025-01-01 RX ORDER — ONDANSETRON HYDROCHLORIDE 2 MG/ML
4 INJECTION, SOLUTION INTRAVENOUS ONCE
Status: COMPLETED | OUTPATIENT
Start: 2025-01-01 | End: 2025-01-01

## 2025-01-01 RX ORDER — NALOXONE HCL 0.4 MG/ML
0.02 VIAL (ML) INJECTION
Status: DISCONTINUED | OUTPATIENT
Start: 2025-01-01 | End: 2025-01-01 | Stop reason: HOSPADM

## 2025-01-01 RX ORDER — CEFEPIME HYDROCHLORIDE 2 G/1
2 INJECTION, POWDER, FOR SOLUTION INTRAVENOUS
Status: COMPLETED | OUTPATIENT
Start: 2025-01-01 | End: 2025-01-01

## 2025-01-01 RX ORDER — HYDROCODONE BITARTRATE AND ACETAMINOPHEN 500; 5 MG/1; MG/1
TABLET ORAL
Status: DISCONTINUED | OUTPATIENT
Start: 2025-01-01 | End: 2025-01-01

## 2025-01-01 RX ORDER — FENTANYL CITRATE 50 UG/ML
25 INJECTION, SOLUTION INTRAMUSCULAR; INTRAVENOUS EVERY 5 MIN PRN
Status: DISCONTINUED | OUTPATIENT
Start: 2025-01-01 | End: 2025-01-01 | Stop reason: HOSPADM

## 2025-01-01 RX ORDER — MELATONIN 1 MG/ML
3 LIQUID (ML) ORAL NIGHTLY
Status: DISCONTINUED | OUTPATIENT
Start: 2025-01-01 | End: 2025-01-01 | Stop reason: HOSPADM

## 2025-01-01 RX ORDER — PROCHLORPERAZINE EDISYLATE 5 MG/ML
5 INJECTION INTRAMUSCULAR; INTRAVENOUS EVERY 30 MIN PRN
Status: DISCONTINUED | OUTPATIENT
Start: 2025-01-01 | End: 2025-01-01 | Stop reason: HOSPADM

## 2025-01-01 RX ORDER — SODIUM CHLORIDE 0.9 % (FLUSH) 0.9 %
3 SYRINGE (ML) INJECTION
Status: DISCONTINUED | OUTPATIENT
Start: 2025-01-01 | End: 2025-01-01 | Stop reason: HOSPADM

## 2025-01-01 RX ORDER — FUROSEMIDE 10 MG/ML
INJECTION INTRAMUSCULAR; INTRAVENOUS
Status: DISCONTINUED
Start: 2025-01-01 | End: 2025-01-01 | Stop reason: HOSPADM

## 2025-01-01 RX ORDER — ONDANSETRON HYDROCHLORIDE 2 MG/ML
4 INJECTION, SOLUTION INTRAVENOUS EVERY 8 HOURS PRN
Status: DISCONTINUED | OUTPATIENT
Start: 2025-01-01 | End: 2025-01-01

## 2025-01-01 RX ORDER — ALBUMIN HUMAN 250 G/1000ML
12.5 SOLUTION INTRAVENOUS ONCE
Status: COMPLETED | OUTPATIENT
Start: 2025-01-01 | End: 2025-01-01

## 2025-01-01 RX ORDER — GLUCAGON 1 MG
1 KIT INJECTION
Status: DISCONTINUED | OUTPATIENT
Start: 2025-01-01 | End: 2025-01-01

## 2025-01-01 RX ORDER — INSULIN ASPART 100 [IU]/ML
0-10 INJECTION, SOLUTION INTRAVENOUS; SUBCUTANEOUS EVERY 4 HOURS PRN
Status: CANCELLED | OUTPATIENT
Start: 2025-01-01

## 2025-01-01 RX ORDER — HEPARIN 100 UNIT/ML
5 SYRINGE INTRAVENOUS ONCE
Status: DISCONTINUED | OUTPATIENT
Start: 2025-01-01 | End: 2025-01-01

## 2025-01-01 RX ORDER — GLUCAGON 1 MG
1 KIT INJECTION
Status: DISCONTINUED | OUTPATIENT
Start: 2025-01-01 | End: 2025-01-01 | Stop reason: HOSPADM

## 2025-01-01 RX ORDER — ALBUMIN HUMAN 250 G/1000ML
25 SOLUTION INTRAVENOUS ONCE
Status: COMPLETED | OUTPATIENT
Start: 2025-01-01 | End: 2025-01-01

## 2025-01-01 RX ORDER — FUROSEMIDE 20 MG/1
20 TABLET ORAL DAILY
Status: DISCONTINUED | OUTPATIENT
Start: 2025-01-01 | End: 2025-01-01

## 2025-01-01 RX ORDER — PANTOPRAZOLE SODIUM 40 MG/10ML
80 INJECTION, POWDER, LYOPHILIZED, FOR SOLUTION INTRAVENOUS DAILY
Status: DISCONTINUED | OUTPATIENT
Start: 2025-01-01 | End: 2025-01-01

## 2025-01-01 RX ORDER — ACETAMINOPHEN 325 MG/1
650 TABLET ORAL EVERY 4 HOURS PRN
Status: DISCONTINUED | OUTPATIENT
Start: 2025-01-01 | End: 2025-01-01 | Stop reason: HOSPADM

## 2025-01-01 RX ORDER — OXYCODONE HCL 5 MG/5 ML
5 SOLUTION, ORAL ORAL EVERY 4 HOURS PRN
Refills: 0 | Status: DISCONTINUED | OUTPATIENT
Start: 2025-01-01 | End: 2025-01-01 | Stop reason: HOSPADM

## 2025-01-01 RX ORDER — HYDROMORPHONE HYDROCHLORIDE 1 MG/ML
0.5 INJECTION, SOLUTION INTRAMUSCULAR; INTRAVENOUS; SUBCUTANEOUS EVERY 4 HOURS PRN
Status: DISCONTINUED | OUTPATIENT
Start: 2025-01-01 | End: 2025-01-01 | Stop reason: HOSPADM

## 2025-01-01 RX ORDER — ALBUMIN HUMAN 250 G/1000ML
30 SOLUTION INTRAVENOUS ONCE
Status: COMPLETED | OUTPATIENT
Start: 2025-01-01 | End: 2025-01-01

## 2025-01-01 RX ORDER — SEVELAMER CARBONATE 800 MG/1
800 TABLET, FILM COATED ORAL
Status: DISCONTINUED | OUTPATIENT
Start: 2025-01-01 | End: 2025-01-01

## 2025-01-01 RX ORDER — MORPHINE SULFATE 2 MG/ML
1 INJECTION, SOLUTION INTRAMUSCULAR; INTRAVENOUS ONCE
Status: COMPLETED | OUTPATIENT
Start: 2025-01-01 | End: 2025-01-01

## 2025-01-01 RX ORDER — THIAMINE HYDROCHLORIDE 100 MG/ML
100 INJECTION, SOLUTION INTRAMUSCULAR; INTRAVENOUS DAILY
Status: DISCONTINUED | OUTPATIENT
Start: 2025-01-01 | End: 2025-01-01

## 2025-01-01 RX ORDER — HEPARIN SODIUM 5000 [USP'U]/ML
5000 INJECTION, SOLUTION INTRAVENOUS; SUBCUTANEOUS EVERY 8 HOURS
Status: DISCONTINUED | OUTPATIENT
Start: 2025-01-01 | End: 2025-01-01

## 2025-01-01 RX ORDER — HALOPERIDOL LACTATE 5 MG/ML
0.5 INJECTION, SOLUTION INTRAMUSCULAR EVERY 10 MIN PRN
Status: DISCONTINUED | OUTPATIENT
Start: 2025-01-01 | End: 2025-01-01 | Stop reason: HOSPADM

## 2025-01-01 RX ORDER — INSULIN ASPART 100 [IU]/ML
0-5 INJECTION, SOLUTION INTRAVENOUS; SUBCUTANEOUS EVERY 4 HOURS PRN
Status: DISCONTINUED | OUTPATIENT
Start: 2025-01-01 | End: 2025-01-01

## 2025-01-01 RX ORDER — DIPHENHYDRAMINE HYDROCHLORIDE 50 MG/ML
25 INJECTION, SOLUTION INTRAMUSCULAR; INTRAVENOUS EVERY 6 HOURS PRN
Status: DISCONTINUED | OUTPATIENT
Start: 2025-01-01 | End: 2025-01-01 | Stop reason: HOSPADM

## 2025-01-01 RX ORDER — HYDROMORPHONE HYDROCHLORIDE 1 MG/ML
0.5 INJECTION, SOLUTION INTRAMUSCULAR; INTRAVENOUS; SUBCUTANEOUS EVERY 6 HOURS PRN
Status: DISCONTINUED | OUTPATIENT
Start: 2025-01-01 | End: 2025-01-01

## 2025-01-01 RX ORDER — ENOXAPARIN SODIUM 100 MG/ML
40 INJECTION SUBCUTANEOUS EVERY 24 HOURS
Status: DISCONTINUED | OUTPATIENT
Start: 2025-01-01 | End: 2025-01-01

## 2025-01-01 RX ORDER — POTASSIUM CHLORIDE 7.45 MG/ML
20 INJECTION INTRAVENOUS ONCE
Status: COMPLETED | OUTPATIENT
Start: 2025-01-01 | End: 2025-01-01

## 2025-01-01 RX ADMIN — INSULIN ASPART 2 UNITS: 100 INJECTION, SOLUTION INTRAVENOUS; SUBCUTANEOUS at 08:07

## 2025-01-01 RX ADMIN — HEPARIN SODIUM 5000 UNITS: 5000 INJECTION INTRAVENOUS; SUBCUTANEOUS at 09:06

## 2025-01-01 RX ADMIN — POTASSIUM CHLORIDE 10 MEQ: 7.46 INJECTION, SOLUTION INTRAVENOUS at 02:06

## 2025-01-01 RX ADMIN — POTASSIUM CHLORIDE 10 MEQ: 7.46 INJECTION, SOLUTION INTRAVENOUS at 05:06

## 2025-01-01 RX ADMIN — PANTOPRAZOLE SODIUM 40 MG: 40 INJECTION, POWDER, LYOPHILIZED, FOR SOLUTION INTRAVENOUS at 08:06

## 2025-01-01 RX ADMIN — HEPARIN SODIUM 5000 UNITS: 5000 INJECTION INTRAVENOUS; SUBCUTANEOUS at 01:06

## 2025-01-01 RX ADMIN — POTASSIUM CHLORIDE 10 MEQ: 7.46 INJECTION, SOLUTION INTRAVENOUS at 04:07

## 2025-01-01 RX ADMIN — SODIUM CHLORIDE, POTASSIUM CHLORIDE, SODIUM LACTATE AND CALCIUM CHLORIDE 1000 ML: 600; 310; 30; 20 INJECTION, SOLUTION INTRAVENOUS at 06:06

## 2025-01-01 RX ADMIN — HEPARIN SODIUM 5000 UNITS: 5000 INJECTION INTRAVENOUS; SUBCUTANEOUS at 05:07

## 2025-01-01 RX ADMIN — ASCORBIC ACID, VITAMIN A PALMITATE, CHOLECALCIFEROL, THIAMINE HYDROCHLORIDE, RIBOFLAVIN-5 PHOSPHATE SODIUM, PYRIDOXINE HYDROCHLORIDE, NIACINAMIDE, DEXPANTHENOL, ALPHA-TOCOPHEROL ACETATE, VITAMIN K1, FOLIC ACID, BIOTIN, CYANOCOBALAMIN: 200; 3300; 200; 6; 3.6; 6; 40; 15; 10; 150; 600; 60; 5 INJECTION, SOLUTION INTRAVENOUS at 10:07

## 2025-01-01 RX ADMIN — Medication 1 PATCH: at 09:07

## 2025-01-01 RX ADMIN — SODIUM CHLORIDE, POTASSIUM CHLORIDE, SODIUM LACTATE AND CALCIUM CHLORIDE: 600; 310; 30; 20 INJECTION, SOLUTION INTRAVENOUS at 10:06

## 2025-01-01 RX ADMIN — SODIUM CHLORIDE, POTASSIUM CHLORIDE, SODIUM LACTATE AND CALCIUM CHLORIDE 1000 ML: 600; 310; 30; 20 INJECTION, SOLUTION INTRAVENOUS at 08:06

## 2025-01-01 RX ADMIN — INSULIN ASPART 2 UNITS: 100 INJECTION, SOLUTION INTRAVENOUS; SUBCUTANEOUS at 11:06

## 2025-01-01 RX ADMIN — HEPARIN SODIUM 5000 UNITS: 5000 INJECTION INTRAVENOUS; SUBCUTANEOUS at 05:06

## 2025-01-01 RX ADMIN — HEPARIN SODIUM 5000 UNITS: 5000 INJECTION INTRAVENOUS; SUBCUTANEOUS at 02:07

## 2025-01-01 RX ADMIN — POTASSIUM CHLORIDE 10 MEQ: 7.46 INJECTION, SOLUTION INTRAVENOUS at 07:07

## 2025-01-01 RX ADMIN — MUPIROCIN: 20 OINTMENT TOPICAL at 10:06

## 2025-01-01 RX ADMIN — ONDANSETRON 4 MG: 2 INJECTION INTRAMUSCULAR; INTRAVENOUS at 01:07

## 2025-01-01 RX ADMIN — MORPHINE SULFATE 1 MG: 2 INJECTION, SOLUTION INTRAMUSCULAR; INTRAVENOUS at 09:07

## 2025-01-01 RX ADMIN — INSULIN ASPART 4 UNITS: 100 INJECTION, SOLUTION INTRAVENOUS; SUBCUTANEOUS at 06:06

## 2025-01-01 RX ADMIN — ALUMINUM HYDROXIDE, MAGNESIUM HYDROXIDE, AND DIMETHICONE 10 ML: 400; 400; 40 SUSPENSION ORAL at 01:07

## 2025-01-01 RX ADMIN — POTASSIUM CHLORIDE 40 MEQ: 200 INJECTION, SOLUTION INTRAVENOUS at 08:07

## 2025-01-01 RX ADMIN — THIAMINE HYDROCHLORIDE 100 MG: 100 INJECTION, SOLUTION INTRAMUSCULAR; INTRAVENOUS at 09:06

## 2025-01-01 RX ADMIN — POTASSIUM CHLORIDE 10 MEQ: 7.46 INJECTION, SOLUTION INTRAVENOUS at 03:07

## 2025-01-01 RX ADMIN — MUPIROCIN: 20 OINTMENT TOPICAL at 08:06

## 2025-01-01 RX ADMIN — PANTOPRAZOLE SODIUM 40 MG: 40 TABLET, DELAYED RELEASE ORAL at 06:07

## 2025-01-01 RX ADMIN — POTASSIUM CHLORIDE 10 MEQ: 7.46 INJECTION, SOLUTION INTRAVENOUS at 08:06

## 2025-01-01 RX ADMIN — ASCORBIC ACID, VITAMIN A PALMITATE, CHOLECALCIFEROL, THIAMINE HYDROCHLORIDE, RIBOFLAVIN-5 PHOSPHATE SODIUM, PYRIDOXINE HYDROCHLORIDE, NIACINAMIDE, DEXPANTHENOL, ALPHA-TOCOPHEROL ACETATE, VITAMIN K1, FOLIC ACID, BIOTIN, CYANOCOBALAMIN: 200; 3300; 200; 6; 3.6; 6; 40; 15; 10; 150; 600; 60; 5 INJECTION, SOLUTION INTRAVENOUS at 11:06

## 2025-01-01 RX ADMIN — MAGNESIUM SULFATE HEPTAHYDRATE 2 G: 40 INJECTION, SOLUTION INTRAVENOUS at 12:07

## 2025-01-01 RX ADMIN — SPIRONOLACTONE 25 MG: 25 TABLET, FILM COATED ORAL at 10:07

## 2025-01-01 RX ADMIN — ALBUMIN (HUMAN) 12.5 G: 12.5 SOLUTION INTRAVENOUS at 05:07

## 2025-01-01 RX ADMIN — OXYCODONE HYDROCHLORIDE 5 MG: 5 SOLUTION ORAL at 06:07

## 2025-01-01 RX ADMIN — HEPARIN SODIUM 5000 UNITS: 5000 INJECTION INTRAVENOUS; SUBCUTANEOUS at 09:07

## 2025-01-01 RX ADMIN — POTASSIUM CHLORIDE 10 MEQ: 7.46 INJECTION, SOLUTION INTRAVENOUS at 01:06

## 2025-01-01 RX ADMIN — Medication 1 PATCH: at 10:07

## 2025-01-01 RX ADMIN — MUPIROCIN: 20 OINTMENT TOPICAL at 09:07

## 2025-01-01 RX ADMIN — INSULIN ASPART 1 UNITS: 100 INJECTION, SOLUTION INTRAVENOUS; SUBCUTANEOUS at 08:06

## 2025-01-01 RX ADMIN — ASCORBIC ACID, VITAMIN A PALMITATE, CHOLECALCIFEROL, THIAMINE HYDROCHLORIDE, RIBOFLAVIN-5 PHOSPHATE SODIUM, PYRIDOXINE HYDROCHLORIDE, NIACINAMIDE, DEXPANTHENOL, ALPHA-TOCOPHEROL ACETATE, VITAMIN K1, FOLIC ACID, BIOTIN, CYANOCOBALAMIN: 200; 3300; 200; 6; 3.6; 6; 40; 15; 10; 150; 600; 60; 5 INJECTION, SOLUTION INTRAVENOUS at 10:06

## 2025-01-01 RX ADMIN — PROCHLORPERAZINE EDISYLATE 5 MG: 5 INJECTION INTRAMUSCULAR; INTRAVENOUS at 08:07

## 2025-01-01 RX ADMIN — SODIUM CHLORIDE, POTASSIUM CHLORIDE, SODIUM LACTATE AND CALCIUM CHLORIDE 1000 ML: 600; 310; 30; 20 INJECTION, SOLUTION INTRAVENOUS at 12:06

## 2025-01-01 RX ADMIN — HEPARIN SODIUM 5000 UNITS: 5000 INJECTION INTRAVENOUS; SUBCUTANEOUS at 06:07

## 2025-01-01 RX ADMIN — PROCHLORPERAZINE EDISYLATE 5 MG: 5 INJECTION INTRAMUSCULAR; INTRAVENOUS at 04:07

## 2025-01-01 RX ADMIN — INSULIN ASPART 4 UNITS: 100 INJECTION, SOLUTION INTRAVENOUS; SUBCUTANEOUS at 01:06

## 2025-01-01 RX ADMIN — POTASSIUM BICARBONATE 40 MEQ: 391 TABLET, EFFERVESCENT ORAL at 11:07

## 2025-01-01 RX ADMIN — INSULIN ASPART 2 UNITS: 100 INJECTION, SOLUTION INTRAVENOUS; SUBCUTANEOUS at 02:06

## 2025-01-01 RX ADMIN — INSULIN ASPART 2 UNITS: 100 INJECTION, SOLUTION INTRAVENOUS; SUBCUTANEOUS at 04:07

## 2025-01-01 RX ADMIN — Medication 3 MG: at 09:07

## 2025-01-01 RX ADMIN — PANTOPRAZOLE SODIUM 40 MG: 40 TABLET, DELAYED RELEASE ORAL at 05:07

## 2025-01-01 RX ADMIN — FENTANYL CITRATE 25 MCG: 50 INJECTION INTRAMUSCULAR; INTRAVENOUS at 12:07

## 2025-01-01 RX ADMIN — INSULIN ASPART 1 UNITS: 100 INJECTION, SOLUTION INTRAVENOUS; SUBCUTANEOUS at 12:07

## 2025-01-01 RX ADMIN — HYDROMORPHONE HYDROCHLORIDE 0.5 MG: 1 INJECTION, SOLUTION INTRAMUSCULAR; INTRAVENOUS; SUBCUTANEOUS at 09:07

## 2025-01-01 RX ADMIN — Medication 3 MG: at 08:07

## 2025-01-01 RX ADMIN — PANTOPRAZOLE SODIUM 40 MG: 40 INJECTION, POWDER, LYOPHILIZED, FOR SOLUTION INTRAVENOUS at 09:06

## 2025-01-01 RX ADMIN — HEPARIN SODIUM 5000 UNITS: 5000 INJECTION INTRAVENOUS; SUBCUTANEOUS at 10:06

## 2025-01-01 RX ADMIN — INSULIN ASPART 2 UNITS: 100 INJECTION, SOLUTION INTRAVENOUS; SUBCUTANEOUS at 03:06

## 2025-01-01 RX ADMIN — MUPIROCIN: 20 OINTMENT TOPICAL at 01:06

## 2025-01-01 RX ADMIN — SODIUM BICARBONATE: 84 INJECTION, SOLUTION INTRAVENOUS at 02:06

## 2025-01-01 RX ADMIN — PROCHLORPERAZINE EDISYLATE 5 MG: 5 INJECTION INTRAMUSCULAR; INTRAVENOUS at 10:07

## 2025-01-01 RX ADMIN — ALUMINUM HYDROXIDE, MAGNESIUM HYDROXIDE, AND DIMETHICONE 10 ML: 400; 400; 40 SUSPENSION ORAL at 09:07

## 2025-01-01 RX ADMIN — SODIUM CHLORIDE, POTASSIUM CHLORIDE, SODIUM LACTATE AND CALCIUM CHLORIDE 1000 ML: 600; 310; 30; 20 INJECTION, SOLUTION INTRAVENOUS at 01:06

## 2025-01-01 RX ADMIN — HEPARIN SODIUM 5000 UNITS: 5000 INJECTION INTRAVENOUS; SUBCUTANEOUS at 10:07

## 2025-01-01 RX ADMIN — SODIUM CHLORIDE, POTASSIUM CHLORIDE, SODIUM LACTATE AND CALCIUM CHLORIDE 1000 ML: 600; 310; 30; 20 INJECTION, SOLUTION INTRAVENOUS at 05:06

## 2025-01-01 RX ADMIN — POTASSIUM CHLORIDE 10 MEQ: 7.46 INJECTION, SOLUTION INTRAVENOUS at 02:07

## 2025-01-01 RX ADMIN — INSULIN ASPART 2 UNITS: 100 INJECTION, SOLUTION INTRAVENOUS; SUBCUTANEOUS at 09:07

## 2025-01-01 RX ADMIN — POTASSIUM CHLORIDE 10 MEQ: 7.46 INJECTION, SOLUTION INTRAVENOUS at 06:07

## 2025-01-01 RX ADMIN — HYDROMORPHONE HYDROCHLORIDE 0.5 MG: 1 INJECTION, SOLUTION INTRAMUSCULAR; INTRAVENOUS; SUBCUTANEOUS at 12:07

## 2025-01-01 RX ADMIN — INSULIN ASPART 4 UNITS: 100 INJECTION, SOLUTION INTRAVENOUS; SUBCUTANEOUS at 12:06

## 2025-01-01 RX ADMIN — FUROSEMIDE 20 MG: 20 TABLET ORAL at 11:07

## 2025-01-01 RX ADMIN — MAGNESIUM SULFATE HEPTAHYDRATE 2 G: 40 INJECTION, SOLUTION INTRAVENOUS at 11:07

## 2025-01-01 RX ADMIN — ONDANSETRON 4 MG: 2 INJECTION INTRAMUSCULAR; INTRAVENOUS at 04:06

## 2025-01-01 RX ADMIN — CEFEPIME 2 G: 2 INJECTION, POWDER, FOR SOLUTION INTRAVENOUS at 01:06

## 2025-01-01 RX ADMIN — SODIUM CHLORIDE, POTASSIUM CHLORIDE, SODIUM LACTATE AND CALCIUM CHLORIDE: 600; 310; 30; 20 INJECTION, SOLUTION INTRAVENOUS at 04:07

## 2025-01-01 RX ADMIN — PANTOPRAZOLE SODIUM 40 MG: 40 INJECTION, POWDER, LYOPHILIZED, FOR SOLUTION INTRAVENOUS at 08:07

## 2025-01-01 RX ADMIN — POTASSIUM CHLORIDE 10 MEQ: 7.46 INJECTION, SOLUTION INTRAVENOUS at 11:07

## 2025-01-01 RX ADMIN — ONDANSETRON 4 MG: 2 INJECTION INTRAMUSCULAR; INTRAVENOUS at 08:07

## 2025-01-01 RX ADMIN — PANTOPRAZOLE SODIUM 40 MG: 40 INJECTION, POWDER, LYOPHILIZED, FOR SOLUTION INTRAVENOUS at 09:07

## 2025-01-01 RX ADMIN — POTASSIUM BICARBONATE 25 MEQ: 977.5 TABLET, EFFERVESCENT ORAL at 05:07

## 2025-01-01 RX ADMIN — POTASSIUM CHLORIDE 10 MEQ: 7.46 INJECTION, SOLUTION INTRAVENOUS at 11:06

## 2025-01-01 RX ADMIN — HEPARIN SODIUM 5000 UNITS: 5000 INJECTION INTRAVENOUS; SUBCUTANEOUS at 02:06

## 2025-01-01 RX ADMIN — HEPARIN SODIUM 5000 UNITS: 5000 INJECTION INTRAVENOUS; SUBCUTANEOUS at 01:07

## 2025-01-01 RX ADMIN — SODIUM CHLORIDE, POTASSIUM CHLORIDE, SODIUM LACTATE AND CALCIUM CHLORIDE: 600; 310; 30; 20 INJECTION, SOLUTION INTRAVENOUS at 01:06

## 2025-01-01 RX ADMIN — MAGNESIUM SULFATE 2 G: 2 INJECTION INTRAVENOUS at 01:06

## 2025-01-01 RX ADMIN — POTASSIUM CHLORIDE 10 MEQ: 7.46 INJECTION, SOLUTION INTRAVENOUS at 05:07

## 2025-01-01 RX ADMIN — SODIUM CHLORIDE, POTASSIUM CHLORIDE, SODIUM LACTATE AND CALCIUM CHLORIDE 1000 ML: 600; 310; 30; 20 INJECTION, SOLUTION INTRAVENOUS at 07:06

## 2025-01-01 RX ADMIN — FUROSEMIDE 20 MG: 20 TABLET ORAL at 10:07

## 2025-01-01 RX ADMIN — SODIUM CHLORIDE, POTASSIUM CHLORIDE, SODIUM LACTATE AND CALCIUM CHLORIDE: 600; 310; 30; 20 INJECTION, SOLUTION INTRAVENOUS at 03:07

## 2025-01-01 RX ADMIN — INSULIN ASPART 2 UNITS: 100 INJECTION, SOLUTION INTRAVENOUS; SUBCUTANEOUS at 01:06

## 2025-01-01 RX ADMIN — POTASSIUM CHLORIDE 10 MEQ: 7.46 INJECTION, SOLUTION INTRAVENOUS at 09:07

## 2025-01-01 RX ADMIN — POTASSIUM BICARBONATE 40 MEQ: 391 TABLET, EFFERVESCENT ORAL at 04:07

## 2025-01-01 RX ADMIN — POTASSIUM CHLORIDE 20 MEQ: 200 INJECTION, SOLUTION INTRAVENOUS at 03:07

## 2025-01-01 RX ADMIN — PANTOPRAZOLE SODIUM 80 MG: 40 INJECTION, POWDER, LYOPHILIZED, FOR SOLUTION INTRAVENOUS at 08:06

## 2025-01-01 RX ADMIN — HYDROMORPHONE HYDROCHLORIDE 0.5 MG: 1 INJECTION, SOLUTION INTRAMUSCULAR; INTRAVENOUS; SUBCUTANEOUS at 05:07

## 2025-01-01 RX ADMIN — INSULIN ASPART 1 UNITS: 100 INJECTION, SOLUTION INTRAVENOUS; SUBCUTANEOUS at 04:06

## 2025-01-01 RX ADMIN — SODIUM CHLORIDE, POTASSIUM CHLORIDE, SODIUM LACTATE AND CALCIUM CHLORIDE: 600; 310; 30; 20 INJECTION, SOLUTION INTRAVENOUS at 06:07

## 2025-01-01 RX ADMIN — SODIUM CHLORIDE, POTASSIUM CHLORIDE, SODIUM LACTATE AND CALCIUM CHLORIDE: 600; 310; 30; 20 INJECTION, SOLUTION INTRAVENOUS at 07:07

## 2025-01-01 RX ADMIN — POTASSIUM CHLORIDE 10 MEQ: 7.46 INJECTION, SOLUTION INTRAVENOUS at 03:06

## 2025-01-01 RX ADMIN — POTASSIUM CHLORIDE 10 MEQ: 7.46 INJECTION, SOLUTION INTRAVENOUS at 12:07

## 2025-01-01 RX ADMIN — ONDANSETRON 4 MG: 2 INJECTION INTRAMUSCULAR; INTRAVENOUS at 05:06

## 2025-01-01 RX ADMIN — ALBUMIN (HUMAN) 25 G: 12.5 SOLUTION INTRAVENOUS at 05:06

## 2025-01-01 RX ADMIN — SODIUM PHOSPHATE, MONOBASIC, MONOHYDRATE AND SODIUM PHOSPHATE, DIBASIC, ANHYDROUS 20.1 MMOL: 142; 276 INJECTION, SOLUTION INTRAVENOUS at 01:07

## 2025-01-01 RX ADMIN — ASCORBIC ACID, VITAMIN A PALMITATE, CHOLECALCIFEROL, THIAMINE HYDROCHLORIDE, RIBOFLAVIN-5 PHOSPHATE SODIUM, PYRIDOXINE HYDROCHLORIDE, NIACINAMIDE, DEXPANTHENOL, ALPHA-TOCOPHEROL ACETATE, VITAMIN K1, FOLIC ACID, BIOTIN, CYANOCOBALAMIN: 200; 3300; 200; 6; 3.6; 6; 40; 15; 10; 150; 600; 60; 5 INJECTION, SOLUTION INTRAVENOUS at 09:06

## 2025-01-01 RX ADMIN — VANCOMYCIN HYDROCHLORIDE 1500 MG: 1.5 INJECTION, POWDER, LYOPHILIZED, FOR SOLUTION INTRAVENOUS at 01:06

## 2025-01-01 RX ADMIN — POTASSIUM BICARBONATE 40 MEQ: 391 TABLET, EFFERVESCENT ORAL at 02:07

## 2025-01-01 RX ADMIN — HYDROMORPHONE HYDROCHLORIDE 0.5 MG: 1 INJECTION, SOLUTION INTRAMUSCULAR; INTRAVENOUS; SUBCUTANEOUS at 10:07

## 2025-01-01 RX ADMIN — POTASSIUM CHLORIDE 10 MEQ: 7.46 INJECTION, SOLUTION INTRAVENOUS at 10:07

## 2025-01-01 RX ADMIN — HUMAN ALBUMIN MICROSPHERES AND PERFLUTREN 0.66 MG: 10; .22 INJECTION, SOLUTION INTRAVENOUS at 12:06

## 2025-01-01 RX ADMIN — MAGNESIUM SULFATE HEPTAHYDRATE 2 G: 40 INJECTION, SOLUTION INTRAVENOUS at 07:07

## 2025-01-01 RX ADMIN — INSULIN ASPART 2 UNITS: 100 INJECTION, SOLUTION INTRAVENOUS; SUBCUTANEOUS at 05:06

## 2025-01-01 RX ADMIN — MUPIROCIN: 20 OINTMENT TOPICAL at 09:06

## 2025-01-01 RX ADMIN — SODIUM CHLORIDE 1000 ML: 9 INJECTION, SOLUTION INTRAVENOUS at 01:07

## 2025-01-01 RX ADMIN — THIAMINE HYDROCHLORIDE 100 MG: 100 INJECTION, SOLUTION INTRAMUSCULAR; INTRAVENOUS at 10:06

## 2025-01-01 RX ADMIN — INSULIN ASPART 2 UNITS: 100 INJECTION, SOLUTION INTRAVENOUS; SUBCUTANEOUS at 01:07

## 2025-01-01 RX ADMIN — INSULIN ASPART 2 UNITS: 100 INJECTION, SOLUTION INTRAVENOUS; SUBCUTANEOUS at 08:06

## 2025-01-01 RX ADMIN — POTASSIUM PHOSPHATE, MONOBASIC POTASSIUM PHOSPHATE, DIBASIC 30 MMOL: 224; 236 INJECTION, SOLUTION, CONCENTRATE INTRAVENOUS at 08:06

## 2025-01-01 RX ADMIN — ONDANSETRON 4 MG: 2 INJECTION INTRAMUSCULAR; INTRAVENOUS at 11:06

## 2025-01-01 RX ADMIN — MAGNESIUM SULFATE HEPTAHYDRATE 2 G: 40 INJECTION, SOLUTION INTRAVENOUS at 09:06

## 2025-01-01 RX ADMIN — SODIUM CHLORIDE, SODIUM LACTATE, POTASSIUM CHLORIDE, AND CALCIUM CHLORIDE: .6; .31; .03; .02 INJECTION, SOLUTION INTRAVENOUS at 01:06

## 2025-01-01 RX ADMIN — PANTOPRAZOLE SODIUM 40 MG: 40 INJECTION, POWDER, LYOPHILIZED, FOR SOLUTION INTRAVENOUS at 10:06

## 2025-01-01 RX ADMIN — SODIUM CHLORIDE, POTASSIUM CHLORIDE, SODIUM LACTATE AND CALCIUM CHLORIDE: 600; 310; 30; 20 INJECTION, SOLUTION INTRAVENOUS at 08:06

## 2025-01-01 RX ADMIN — SODIUM CHLORIDE, POTASSIUM CHLORIDE, SODIUM LACTATE AND CALCIUM CHLORIDE 1000 ML: 600; 310; 30; 20 INJECTION, SOLUTION INTRAVENOUS at 11:06

## 2025-01-01 RX ADMIN — SODIUM CHLORIDE, POTASSIUM CHLORIDE, SODIUM LACTATE AND CALCIUM CHLORIDE: 600; 310; 30; 20 INJECTION, SOLUTION INTRAVENOUS at 05:06

## 2025-01-01 RX ADMIN — MAGNESIUM SULFATE HEPTAHYDRATE 2 G: 40 INJECTION, SOLUTION INTRAVENOUS at 05:06

## 2025-01-01 RX ADMIN — SODIUM CHLORIDE, POTASSIUM CHLORIDE, SODIUM LACTATE AND CALCIUM CHLORIDE: 600; 310; 30; 20 INJECTION, SOLUTION INTRAVENOUS at 01:07

## 2025-01-01 RX ADMIN — SPIRONOLACTONE 25 MG: 25 TABLET, FILM COATED ORAL at 11:07

## 2025-01-01 RX ADMIN — SODIUM CHLORIDE, POTASSIUM CHLORIDE, SODIUM LACTATE AND CALCIUM CHLORIDE: 600; 310; 30; 20 INJECTION, SOLUTION INTRAVENOUS at 11:06

## 2025-01-01 RX ADMIN — POTASSIUM CHLORIDE 10 MEQ: 7.46 INJECTION, SOLUTION INTRAVENOUS at 12:06

## 2025-01-01 RX ADMIN — POTASSIUM CHLORIDE 10 MEQ: 7.46 INJECTION, SOLUTION INTRAVENOUS at 10:06

## 2025-01-01 RX ADMIN — HEPARIN SODIUM 5000 UNITS: 5000 INJECTION INTRAVENOUS; SUBCUTANEOUS at 08:07

## 2025-01-01 RX ADMIN — ALTEPLASE 2 MG: 2.2 INJECTION, POWDER, LYOPHILIZED, FOR SOLUTION INTRAVENOUS at 05:06

## 2025-01-01 RX ADMIN — SODIUM CHLORIDE, POTASSIUM CHLORIDE, SODIUM LACTATE AND CALCIUM CHLORIDE 1000 ML: 600; 310; 30; 20 INJECTION, SOLUTION INTRAVENOUS at 09:06

## 2025-01-01 RX ADMIN — ONDANSETRON 4 MG: 2 INJECTION INTRAMUSCULAR; INTRAVENOUS at 09:06

## 2025-01-01 RX ADMIN — ALBUMIN (HUMAN) 25 G: 12.5 SOLUTION INTRAVENOUS at 01:07

## 2025-01-01 RX ADMIN — FUROSEMIDE 40 MG: 10 INJECTION, SOLUTION INTRAMUSCULAR; INTRAVENOUS at 09:07

## 2025-01-06 ENCOUNTER — OFFICE VISIT (OUTPATIENT)
Dept: HEMATOLOGY/ONCOLOGY | Facility: CLINIC | Age: 70
End: 2025-01-06
Payer: MEDICARE

## 2025-01-06 VITALS
HEIGHT: 67 IN | OXYGEN SATURATION: 97 % | SYSTOLIC BLOOD PRESSURE: 119 MMHG | WEIGHT: 152 LBS | BODY MASS INDEX: 23.86 KG/M2 | TEMPERATURE: 98 F | DIASTOLIC BLOOD PRESSURE: 68 MMHG

## 2025-01-06 DIAGNOSIS — C15.9 ESOPHAGEAL ADENOCARCINOMA: Primary | ICD-10-CM

## 2025-01-06 DIAGNOSIS — N28.89 LEFT RENAL MASS: ICD-10-CM

## 2025-01-06 DIAGNOSIS — Z51.81 ENCOUNTER FOR MONITORING CARDIOTOXIC DRUG THERAPY: ICD-10-CM

## 2025-01-06 DIAGNOSIS — Z79.899 ENCOUNTER FOR MONITORING CARDIOTOXIC DRUG THERAPY: ICD-10-CM

## 2025-01-06 DIAGNOSIS — E78.5 HYPERLIPIDEMIA, UNSPECIFIED HYPERLIPIDEMIA TYPE: ICD-10-CM

## 2025-01-06 DIAGNOSIS — Z72.0 TOBACCO USE: ICD-10-CM

## 2025-01-06 DIAGNOSIS — I10 HYPERTENSION, UNSPECIFIED TYPE: ICD-10-CM

## 2025-01-06 PROCEDURE — 99215 OFFICE O/P EST HI 40 MIN: CPT | Mod: PBBFAC | Performed by: INTERNAL MEDICINE

## 2025-01-06 PROCEDURE — G2211 COMPLEX E/M VISIT ADD ON: HCPCS | Mod: S$PBB,,, | Performed by: INTERNAL MEDICINE

## 2025-01-06 PROCEDURE — 99214 OFFICE O/P EST MOD 30 MIN: CPT | Mod: S$PBB,,, | Performed by: INTERNAL MEDICINE

## 2025-01-06 PROCEDURE — 99999 PR PBB SHADOW E&M-EST. PATIENT-LVL V: CPT | Mod: PBBFAC,,, | Performed by: INTERNAL MEDICINE

## 2025-01-06 NOTE — PROGRESS NOTES
MEDICAL ONCOLOGY - ESTABLISHED PATIENT VISIT    Reason for visit: esophageal adenocarcinoma     Best Contact Phone Number(s): There are no phone numbers on file.     Cancer/Stage/TNM:    Cancer Staging   Esophageal adenocarcinoma  Staging form: Esophagus - Adenocarcinoma, AJCC 8th Edition  - Clinical: Stage III (cT2, cN1, cM0, G2) - Signed by Miguel Angel Hampton Jr., MD on 7/31/2023       Oncology History   Esophageal adenocarcinoma   7/31/2023 Initial Diagnosis    Esophageal adenocarcinoma     7/31/2023 Cancer Staged    Staging form: Esophagus - Adenocarcinoma, AJCC 8th Edition  - Clinical: Stage III (cT2, cN1, cM0, G2)     9/5/2023 - 10/5/2023 Chemotherapy    Treatment Summary   Plan Name: OP ESOPHAGEAL PACLITAXEL CARBOPLATIN WEEKLY  Treatment Goal: Curative  Status: Inactive  Start Date: 9/5/2023  End Date: 10/5/2023  Provider: Sahil Singer MD  Chemotherapy: CARBOplatin (PARAPLATIN) 195 mg in sodium chloride 0.9% 304.5 mL chemo infusion, 195 mg (100 % of original dose 193.4 mg), Intravenous, Clinic/HOD 1 time, 1 of 1 cycle  Dose modification:   (original dose 193.4 mg, Cycle 1), 193.4 mg (original dose 193.4 mg, Cycle 1),   (original dose 193.4 mg, Cycle 1, Reason: MD Discretion)  Administration: 195 mg (9/5/2023), 225 mg (9/12/2023), 210 mg (9/19/2023), 180 mg (9/26/2023), 195 mg (10/5/2023)  PACLitaxeL (TAXOL) 50 mg/m2 = 96 mg in sodium chloride 0.9% 250 mL chemo infusion, 50 mg/m2 = 96 mg, Intravenous, Clinic/HOD 1 time, 1 of 1 cycle  Administration: 96 mg (9/5/2023), 96 mg (9/12/2023), 96 mg (9/19/2023), 96 mg (9/26/2023), 96 mg (10/5/2023)     9/5/2023 - 10/9/2023 Radiation Therapy    Treating physician: Anshul Neri    Course: C1 Thorax 2023    Treatment Site Ref. ID Energy Dose/Fx (Gy) #Fx Dose Correction (Gy) Total Dose (Gy) Start Date End Date Elapsed Days   IM Esophagus PTV_High 6X 2 25 / 25 0 50 9/5/2023 10/9/2023 34            1/21/2025 -  Chemotherapy    Treatment Summary   Plan Name: OP  "GASTROESOPHAGEAL trastuzumab + MFOLFOX6 (oxaliplatin leucovorin fluorouracil) Q2W  Treatment Goal: Control  Status: Active  Start Date: 1/21/2025 (Planned)  End Date: 9/17/2027 (Planned)  Provider: Sahil Singer MD  Chemotherapy: fluorouraciL injection 725 mg, 400 mg/m2 = 725 mg, Intravenous, Clinic/HOD 1 time, 0 of 24 cycles  oxaliplatin (ELOXATIN) 85 mg/m2 = 154 mg in D5W 530.8 mL chemo infusion, 85 mg/m2 = 154 mg, Intravenous, Clinic/HOD 1 time, 0 of 24 cycles  fluorouracil (Adrucil) 2,400 mg/m2 = 4,345 mg in 0.9% NaCl 100 mL chemo infusion, 2,400 mg/m2 = 4,345 mg, Intravenous, Over 46 hours, 0 of 24 cycles  trastuzumab-anns (KANJINTI) 414 mg in 0.9% NaCl 250 mL chemo infusion, 6 mg/kg = 414 mg, Intravenous, Clinic/HOD 1 time, 0 of 24 cycles        Interim History:  69 y.o. male with esophageal adenocarcinoma who presents for follow-up.  He completed definitive chemoRT in early October 2023.  He was experiencing weight loss and "upset stomach" in late 2024, so we set up EGD for him.  He is having frequent eructations and increased saliva and intensive swallowing.  Denies dysphagia.      Presents alone today. ECOG status is 1.     ROS:   Review of Systems   Constitutional:  Positive for malaise/fatigue and weight loss. Negative for fever.   HENT:  Negative for nosebleeds.    Respiratory:  Negative for shortness of breath.    Cardiovascular:  Negative for chest pain and palpitations.   Gastrointestinal:  Positive for heartburn. Negative for blood in stool, constipation, diarrhea, nausea and vomiting.   Genitourinary:  Negative for hematuria.   Musculoskeletal:  Negative for falls.   Skin:  Negative for itching and rash.   Neurological:  Negative for dizziness, tingling, sensory change and weakness.   Psychiatric/Behavioral:  The patient is not nervous/anxious.      Past Medical History:   Past Medical History:   Diagnosis Date    HTN (hypertension)      Past Surgical History:   Past Surgical History: "   Procedure Laterality Date    ESOPHAGOGASTRODUODENOSCOPY N/A 12/19/2024    Procedure: EGD (ESOPHAGOGASTRODUODENOSCOPY);  Surgeon: Pete Buenrostro MD;  Location: [address]);  Service: Endoscopy;  Laterality: N/A;  Medically Urgent      12/16 ref by Sahil Singer MD, [place]-  12/18 - pre-call complete; MB    LAPAROSCOPIC REPAIR OF INGUINAL HERNIA Right      Family History:   No family history on file.     Social History:   Social History     Tobacco Use    Smoking status: Every Day     Current packs/day: 1.00     Types: Cigarettes    Smokeless tobacco: Never   Substance Use Topics    Alcohol use: Yes     Alcohol/week: 1.0 standard drink of alcohol     Types: 1 Glasses of wine per week     Comment: rarely      I have reviewed and updated the patient's past medical, surgical, family and social histories.    Allergies:   Review of patient's allergies indicates:  No Known Allergies     Medications:   Current Outpatient Medications   Medication Sig Dispense Refill    amLODIPine (NORVASC) 10 MG tablet Take 10 mg by mouth.      amlodipine-benazepril 10-20mg (LOTREL) 10-20 mg per capsule Take 1 capsule by mouth.      amlodipine-benazepril 10-20mg (LOTREL) 10-20 mg per capsule Take 1 capsule by mouth once daily.      azelastine (ASTELIN) 137 mcg (0.1 %) nasal spray SPRAY 1 SPRAY BY NASAL ROUTE 2 TIMES DAILY USE IN EACH NOSTRIL AS DIRECTED.      azelastine (ASTELIN) 137 mcg (0.1 %) nasal spray 2 sprays by Nasal route.      co-enzyme Q-10 30 mg capsule Take 30 mg by mouth 3 (three) times daily.      etodolac (LODINE) 400 MG tablet Take 1 tablet by mouth 2 (two) times daily.      fluticasone propionate (FLONASE) 50 mcg/actuation nasal spray SMARTSIG:3 Spray(s) Both Nares Every Morning      fluticasone propionate (FLONASE) 50 mcg/actuation nasal spray 2 sprays by Each Nostril route.      gabapentin (NEURONTIN) 300 MG capsule Take 300 mg by mouth.      gabapentin (NEURONTIN) 300 MG capsule Take 1 capsule by  "mouth every evening.      multivitamin with minerals tablet Take 1 tablet by mouth once daily. One A Day Multivitamin      nicotine (NICODERM CQ) 21 mg/24 hr Place 1 patch onto the skin once daily. 14 patch 0    nicotine polacrilex 2 MG Lozg Take 1 lozenge (2 mg total) by mouth as needed (in place of a cigarette). 72 lozenge 0    omeprazole (PRILOSEC) 40 MG capsule Take 1 capsule by mouth every morning.      ondansetron (ZOFRAN) 8 MG tablet TAKE 1 TABLET BY MOUTH EVERY 8 HOURS AS NEEDED FOR NAUSEA 60 tablet 2    ondansetron (ZOFRAN) 8 MG tablet Take 1 tablet (8 mg total) by mouth every 8 (eight) hours as needed for Nausea. 60 tablet 2    pravastatin (PRAVACHOL) 40 MG tablet Take 40 mg by mouth.      pravastatin (PRAVACHOL) 40 MG tablet Take 1 tablet by mouth once daily.      TURMERIC ORAL Take by mouth.      busPIRone (BUSPAR) 5 MG Tab Take 5 mg by mouth.       No current facility-administered medications for this visit.      Physical Exam:   /68 (BP Location: Left arm, Patient Position: Sitting)   Pulse (P) 76   Temp 98.2 °F (36.8 °C) (Temporal)   Ht 5' 7" (1.702 m)   Wt 68.9 kg (152 lb 0.1 oz)   SpO2 97%   BMI 23.81 kg/m²           Physical Exam  Vitals reviewed.   Constitutional:       General: He is not in acute distress.     Appearance: Normal appearance. He is not ill-appearing, toxic-appearing or diaphoretic.   HENT:      Head: Normocephalic and atraumatic.      Right Ear: External ear normal.      Left Ear: External ear normal.      Nose: Nose normal. No congestion.      Mouth/Throat:      Pharynx: Oropharynx is clear. No oropharyngeal exudate.   Eyes:      General: No scleral icterus.     Conjunctiva/sclera: Conjunctivae normal.   Neck:      Comments: Slight hoarseness  Cardiovascular:      Rate and Rhythm: Normal rate.   Pulmonary:      Effort: Pulmonary effort is normal. No respiratory distress.   Abdominal:      General: There is no distension.   Musculoskeletal:      Cervical back: Normal " range of motion.      Right lower leg: No edema.      Left lower leg: No edema.   Skin:     General: Skin is warm and dry.      Coloration: Skin is not jaundiced or pale.      Findings: No bruising, erythema or rash.   Neurological:      General: No focal deficit present.      Mental Status: He is alert and oriented to person, place, and time.      Cranial Nerves: No cranial nerve deficit.      Motor: No weakness.      Gait: Gait normal.   Psychiatric:         Mood and Affect: Mood normal.         Behavior: Behavior normal.         Labs:      I have reviewed the pertinent labs from 24. CBC, CMP unremarkable.  Cr 1.2.    Imagin/12/24 - CT A/P   Impression:     History of esophageal cancer with persistent suspected thickening of the distal esophagus.  Probable hiatal hernia noted.     No convincing metastatic disease.     Stable pulmonary micro nodules.     Stable hepatic hypodensity and hyperenhancing nodule.  Attention on follow-up.     2 x 1.6 cm enhancing left renal mass.     8 mm hypodense nodule caudal aspect body of the pancreas.  Attention on follow-up.     Significant plaque in the aortoiliac system as above.     Additional findings above.       Path:   24:    Gastroesophageal junction mass (biopsy):   Adenocarcinoma, poorly differentiated   HER2 immunostain:  Positive (score 3)     Immunohistochemistry (IHC) Testing for Mismatch Repair (MMR) Proteins:   MLH1, MSH2, MSH6, PMS2 - Intact nuclear expression   Background nonneoplastic tissue/internal control with intact nuclear expression     Interpretation: No loss of nuclear expression of MMR proteins: low probability of microsatellite instability. There are exceptions to the above IHC interpretations. These results should not be considered in isolation, and clinical correlation with   genetic counseling is recommended to assess the need for germline testing.     Assessment:       1. Esophageal adenocarcinoma    2. Hypertension,  "unspecified type    3. Hyperlipidemia, unspecified hyperlipidemia type    4. Tobacco use    5. Left renal mass    6. Encounter for monitoring cardiotoxic drug therapy              Plan:        # Esophageal adenocarcinoma  Mr. Lugo is a 69 y.o. male who presents to clinic for evaluation of newly diagnosed esophageal cancer. He initially presented with dysphagia, now improved s/p esophageal dilation during EUS. Biopsy confirmed adenocarcinoma.     We reviewed his diagnosis, prognosis, and treatment options with him during our initial visit. Discussed with him the high likelihood that cancer would recur if he was taken straight to surgery without neoadjuvant treatment. Our recommendation is to proceed with weekly carboplatin + taxol in conjunction with daily radiation (M-F, no holidays) for a total of 5-5.5 weeks. Handouts provided to patient on both chemotherapy medications.     He has completed Carboplatin AUC 2 + paclitaxel 50 mg/m2 for 5 treatments. Also completed concurrent radiation for definitive non-surgical treatment.    Repeat PET/CT on 11/15/23 showed persistent distal hypermetabolic esophageal tumor (decreasing SUV) without clear evidence of metastatic disease.     Repeat PET/CT on 2/21/24 showed further improvement in hypermetabolism at distal esophagus.    Repeat CT CAP on 4/24/24 showed stable thickening at distal esophagus. Stable left kidney mass c/f malignancy.     Repeat CT CAP 9/9/24 showed stable thickening at distal esophagus.  Repeat CT CAP 12/12/24 showed stable findings.  L renal mass is slightly enlarged.     Given weight decrease, "upset stomach" and increased eructations we got repeat EGD to assess for cancer recurrence.  This was done on 12/19/24 which showed a distal esophageal mass, biopsy confirming poorly differentiated adenocarcinoma, pMMR and HER-2 positive.    Will plan for FOLFOX + trastuzumab. No indication for pembro given PD-L1 being negative, per final results of [id number] " regimen.    He has no dysphagia at present but worsening hypersalivation.  I told him that if he has dysphagia that develops or he has some difficulty with swallowing his secretions, we may need to consider a palliative esophageal stent and he would need to come to the ER for expedition.  He expressed understanding.    Will refer for port placement, surgical consult for any role of salvage resection.  Also needs echocardiogram.    RTC ASAP for cycle 1 of FOLFOX + trastuzumab.    Tempus xT: KRAS G12D, NOTCH1, SMARCA4, TP53; PD-L1 0  PGX: DPYD nml, UGT1A1 intermediate    # HTN, HLD  BP normal in clinic today.    Continue medical management.   Following with PCP.    # Tobacco use  Encouraged cessation.  Previously enrolled in smoking cessation clinic.     # Left renal mass  Slightly enlarged in size on 12/2024 scan.   Saw Dr. Sainz 5/2024 and agreed on surveillance with repeat imaging 5/2025.    Follow up: RTC 2 weeks    Patient is in agreement with the proposed treatment plan. All questions were answered to the patient's satisfaction. Pt knows to call clinic if anything is needed before the next clinic visit.    Sahil Singer MD  Hematology/Oncology  [facility]        Med Onc Chart Routing      Follow up with physician    Follow up with [facility] 2 weeks. for FOLFOX + Herceptin   Infusion scheduling note New or changed treatment      Injection scheduling note    Labs CBC and CMP   Scheduling:  Preferred lab:  Lab interval:     Imaging ECHO      Pharmacy appointment    Other referrals

## 2025-01-10 ENCOUNTER — PATIENT MESSAGE (OUTPATIENT)
Dept: HEMATOLOGY/ONCOLOGY | Facility: CLINIC | Age: 70
End: 2025-01-10
Payer: MEDICARE

## 2025-01-14 ENCOUNTER — OFFICE VISIT (OUTPATIENT)
Dept: SURGERY | Facility: CLINIC | Age: 70
End: 2025-01-14
Payer: MEDICARE

## 2025-01-14 VITALS
HEART RATE: 86 BPM | OXYGEN SATURATION: 95 % | DIASTOLIC BLOOD PRESSURE: 60 MMHG | WEIGHT: 147.38 LBS | SYSTOLIC BLOOD PRESSURE: 122 MMHG | BODY MASS INDEX: 23.08 KG/M2

## 2025-01-14 DIAGNOSIS — C15.9 ESOPHAGEAL ADENOCARCINOMA: Primary | ICD-10-CM

## 2025-01-14 PROCEDURE — 99214 OFFICE O/P EST MOD 30 MIN: CPT | Mod: PBBFAC | Performed by: SURGERY

## 2025-01-14 PROCEDURE — 99999 PR PBB SHADOW E&M-EST. PATIENT-LVL IV: CPT | Mod: PBBFAC,,, | Performed by: SURGERY

## 2025-01-14 PROCEDURE — 99215 OFFICE O/P EST HI 40 MIN: CPT | Mod: S$PBB,,, | Performed by: SURGERY

## 2025-01-14 RX ORDER — PANTOPRAZOLE SODIUM 40 MG/1
40 TABLET, DELAYED RELEASE ORAL
Status: ON HOLD | COMMUNITY
Start: 2024-12-16 | End: 2025-01-27

## 2025-01-14 NOTE — H&P (VIEW-ONLY)
Encounter Date:  2025    Patient ID: Jone Lugo  Age:  69 y.o. :  1955    Chief Complaint:  No chief complaint on file.      History:    Mr. Lugo is a 69 y.o. male with a history of hypertension presents for evaluation of esophageal adenocarcinoma. The patient has a known history of esophageal adenocarcinoma, dating back to 2023. Following his diagnosis, he has completed curative chemoradiotherapy in 2023. His follow-up imaging demonstrated diminished hypermetabolic activity at the primary site on PET scans in 2023 and 2024. Unfortunately, the patient developed increased abdominal discomfort, progressive dysphagia, and a near 20lb unintentional weight loss for the past two months. He underwent repeat EGD in , which demonstrated a circumferential, non-obstructing mass at the distal esophagus, GE junction. Biopsy confirms adenocarcinoma. He is established with Dr. Singer.     The patient endorses dysphagia to solid food. He states that he can tolerate the beginning of most meals, but experiences dysphagia, globus and associated early satiety. He has a recent unintentional weight loss. Does not endorse dysphagia to liquids. Tolerating own secretions. He continues to smoke, endorsing 1/2 pack per day. He takes no blood thinning medications.     Past Medical History:   Diagnosis Date    HTN (hypertension)      Past Surgical History:   Procedure Laterality Date    ESOPHAGOGASTRODUODENOSCOPY N/A 2024    Procedure: EGD (ESOPHAGOGASTRODUODENOSCOPY);  Surgeon: Pete Buenrostro MD;  Location: [address]);  Service: Endoscopy;  Laterality: N/A;  Medically Urgent       ref by Saihl Singer MD, [place]-   - pre-call complete; MB    LAPAROSCOPIC REPAIR OF INGUINAL HERNIA Right      Current Outpatient Medications on File Prior to Visit   Medication Sig Dispense Refill    amLODIPine (NORVASC) 10 MG tablet Take 10 mg by mouth.       amlodipine-benazepril 10-20mg (LOTREL) 10-20 mg per capsule Take 1 capsule by mouth.      amlodipine-benazepril 10-20mg (LOTREL) 10-20 mg per capsule Take 1 capsule by mouth once daily.      azelastine (ASTELIN) 137 mcg (0.1 %) nasal spray SPRAY 1 SPRAY BY NASAL ROUTE 2 TIMES DAILY USE IN EACH NOSTRIL AS DIRECTED.      azelastine (ASTELIN) 137 mcg (0.1 %) nasal spray 2 sprays by Nasal route.      busPIRone (BUSPAR) 5 MG Tab Take 5 mg by mouth.      co-enzyme Q-10 30 mg capsule Take 30 mg by mouth 3 (three) times daily.      etodolac (LODINE) 400 MG tablet Take 1 tablet by mouth 2 (two) times daily.      fluticasone propionate (FLONASE) 50 mcg/actuation nasal spray SMARTSIG:3 Spray(s) Both Nares Every Morning      fluticasone propionate (FLONASE) 50 mcg/actuation nasal spray 2 sprays by Each Nostril route.      gabapentin (NEURONTIN) 300 MG capsule Take 300 mg by mouth.      gabapentin (NEURONTIN) 300 MG capsule Take 1 capsule by mouth every evening.      multivitamin with minerals tablet Take 1 tablet by mouth once daily. One A Day Multivitamin      nicotine (NICODERM CQ) 21 mg/24 hr Place 1 patch onto the skin once daily. 14 patch 0    nicotine polacrilex 2 MG Lozg Take 1 lozenge (2 mg total) by mouth as needed (in place of a cigarette). 72 lozenge 0    omeprazole (PRILOSEC) 40 MG capsule Take 1 capsule by mouth every morning.      ondansetron (ZOFRAN) 8 MG tablet TAKE 1 TABLET BY MOUTH EVERY 8 HOURS AS NEEDED FOR NAUSEA 60 tablet 2    ondansetron (ZOFRAN) 8 MG tablet Take 1 tablet (8 mg total) by mouth every 8 (eight) hours as needed for Nausea. 60 tablet 2    pantoprazole (PROTONIX) 40 MG tablet Take 40 mg by mouth.      pravastatin (PRAVACHOL) 40 MG tablet Take 40 mg by mouth.      pravastatin (PRAVACHOL) 40 MG tablet Take 1 tablet by mouth once daily.      TURMERIC ORAL Take by mouth.       No current facility-administered medications on file prior to visit.     Review of patient's allergies indicates:  No  Known Allergies    Family History:  His family history is not on file.     Social History:  He reports that he has been smoking cigarettes. He has never used smokeless tobacco. He reports current alcohol use of about 1.0 standard drink of alcohol per week. He reports that he does not use drugs.     ROS:     Review of Systems  Pertinent positive/negatives detailed in HPI, all other systems negative.     Physical Exam:  /60 (BP Location: Left arm, Patient Position: Sitting)   Pulse 86   Wt 66.8 kg (147 lb 6 oz)   SpO2 95%   BMI 23.08 kg/m²     Physical Exam    Constitutional:  Non-toxic, no acute distress.  Performance status: ECOG 0  Eyes:  Sclerae anicteric, gaze symmetrical  Neck:  Trachea midline, thyroid, non enlarged without palpable nodules,  FROM  Resp:  Easy work of breathing, no wheezes  CV:  Regular pulse, no JVD  Abd:  Soft, non-tender, no masses, no hepatosplenomegaly, no ascites, no superficial varices  Lymphatics:  No cervical, supraclavicular, axillary, or inguinal lymphadenopathy  Musculoskeletal:  Ambulatory, normal gait, no muscle wasting  Neuro:  No gross deficits  Psych:  Awake, alert, oriented.  Answers and asks questions appropriately    Data:     Radiology:  I personally reviewed these images:   12/12/24 CT C/A/P:   Impression:  History of esophageal cancer with persistent suspected thickening of the distal esophagus.  Probable hiatal hernia noted.  No convincing metastatic disease.  Stable pulmonary micro nodules.  Stable hepatic hypodensity and hyperenhancing nodule.  Attention on follow-up.  2 x 1.6 cm enhancing left renal mass.  8 mm hypodense nodule caudal aspect body of the pancreas.  Attention on follow-up.  Significant plaque in the aortoiliac system as above.     Endoscopy:  12/19/24        Pathology:    Gastroesophageal junction mass (biopsy):   Adenocarcinoma, poorly differentiated   HER2 immunostain:  Positive (score 3)     Immunohistochemistry (IHC) Testing for Mismatch  Repair (MMR) Proteins:   MLH1, MSH2, MSH6, PMS2 - Intact nuclear expression   Background nonneoplastic tissue/internal control with intact nuclear expression       ICD-10-CM ICD-9-CM    1. Esophageal adenocarcinoma  C15.9 150.9 Case Request Operating Room: INSERTION, SINGLE LUMEN CATHETER WITH PORT, WITH FLUOROSCOPIC GUIDANCE, left poss right      Plan     1. Esophageal adenocarcinoma    Plan:  Jone Lugo is a 69yoM s/p definitive chemoradiotherapy for esophageal adenocarcinoma now found with persistent/local recurrence. He is established with Dr. Singer and is planning for initiation of FOLFOX + trastuzumab. We will plan for port-a-cath placement. Surgical consent was obtained. He does not need enteral access at this time. Functional status, salvage status, support/coping ability, prior chemoRT preclude salvage esophagectomy.      Risks and benefits of port placement including bleeding, infection, hemothorax, pneumothorax, death, port malfunction, need to change port location, need for additional procedures and imponderables were all reviewed. He was given the opportunity to ask questions, which were all addressed.  He voiced understanding and wishes to proceed.    I spent >60 minutes of total time on the encounter, which includes face to face time and non-face to face time preparing to see the patient (e.g., review of tests), obtaining and/or reviewing separately obtained history, documenting clinical information in the electronic or other health record, independently interpreting results (not separately reported) and communicating results to the patient/family/caregiver, or care coordination (not separately reported).        Chadd Velez MD, FACS  Surgical Oncology  [facility] [place], [place]  Office: [phone]  Fax: [phone]     Jone Lugo 1955

## 2025-01-14 NOTE — PROGRESS NOTES
Encounter Date:  2025    Patient ID: Jone Lugo  Age:  69 y.o. :  1955    Chief Complaint:  No chief complaint on file.      History:    Mr. Lugo is a 69 y.o. male with a history of hypertension presents for evaluation of esophageal adenocarcinoma. The patient has a known history of esophageal adenocarcinoma, dating back to 2023. Following his diagnosis, he has completed curative chemoradiotherapy in 2023. His follow-up imaging demonstrated diminished hypermetabolic activity at the primary site on PET scans in 2023 and 2024. Unfortunately, the patient developed increased abdominal discomfort, progressive dysphagia, and a near 20lb unintentional weight loss for the past two months. He underwent repeat EGD in , which demonstrated a circumferential, non-obstructing mass at the distal esophagus, GE junction. Biopsy confirms adenocarcinoma. He is established with Dr. Singer.     The patient endorses dysphagia to solid food. He states that he can tolerate the beginning of most meals, but experiences dysphagia, globus and associated early satiety. He has a recent unintentional weight loss. Does not endorse dysphagia to liquids. Tolerating own secretions. He continues to smoke, endorsing 1/2 pack per day. He takes no blood thinning medications.     Past Medical History:   Diagnosis Date    HTN (hypertension)      Past Surgical History:   Procedure Laterality Date    ESOPHAGOGASTRODUODENOSCOPY N/A 2024    Procedure: EGD (ESOPHAGOGASTRODUODENOSCOPY);  Surgeon: Pete Buenrostro MD;  Location: [address]);  Service: Endoscopy;  Laterality: N/A;  Medically Urgent       ref by Sahil Singer MD, [place]-   - pre-call complete; MB    LAPAROSCOPIC REPAIR OF INGUINAL HERNIA Right      Current Outpatient Medications on File Prior to Visit   Medication Sig Dispense Refill    amLODIPine (NORVASC) 10 MG tablet Take 10 mg by mouth.       amlodipine-benazepril 10-20mg (LOTREL) 10-20 mg per capsule Take 1 capsule by mouth.      amlodipine-benazepril 10-20mg (LOTREL) 10-20 mg per capsule Take 1 capsule by mouth once daily.      azelastine (ASTELIN) 137 mcg (0.1 %) nasal spray SPRAY 1 SPRAY BY NASAL ROUTE 2 TIMES DAILY USE IN EACH NOSTRIL AS DIRECTED.      azelastine (ASTELIN) 137 mcg (0.1 %) nasal spray 2 sprays by Nasal route.      busPIRone (BUSPAR) 5 MG Tab Take 5 mg by mouth.      co-enzyme Q-10 30 mg capsule Take 30 mg by mouth 3 (three) times daily.      etodolac (LODINE) 400 MG tablet Take 1 tablet by mouth 2 (two) times daily.      fluticasone propionate (FLONASE) 50 mcg/actuation nasal spray SMARTSIG:3 Spray(s) Both Nares Every Morning      fluticasone propionate (FLONASE) 50 mcg/actuation nasal spray 2 sprays by Each Nostril route.      gabapentin (NEURONTIN) 300 MG capsule Take 300 mg by mouth.      gabapentin (NEURONTIN) 300 MG capsule Take 1 capsule by mouth every evening.      multivitamin with minerals tablet Take 1 tablet by mouth once daily. One A Day Multivitamin      nicotine (NICODERM CQ) 21 mg/24 hr Place 1 patch onto the skin once daily. 14 patch 0    nicotine polacrilex 2 MG Lozg Take 1 lozenge (2 mg total) by mouth as needed (in place of a cigarette). 72 lozenge 0    omeprazole (PRILOSEC) 40 MG capsule Take 1 capsule by mouth every morning.      ondansetron (ZOFRAN) 8 MG tablet TAKE 1 TABLET BY MOUTH EVERY 8 HOURS AS NEEDED FOR NAUSEA 60 tablet 2    ondansetron (ZOFRAN) 8 MG tablet Take 1 tablet (8 mg total) by mouth every 8 (eight) hours as needed for Nausea. 60 tablet 2    pantoprazole (PROTONIX) 40 MG tablet Take 40 mg by mouth.      pravastatin (PRAVACHOL) 40 MG tablet Take 40 mg by mouth.      pravastatin (PRAVACHOL) 40 MG tablet Take 1 tablet by mouth once daily.      TURMERIC ORAL Take by mouth.       No current facility-administered medications on file prior to visit.     Review of patient's allergies indicates:  No  Known Allergies    Family History:  His family history is not on file.     Social History:  He reports that he has been smoking cigarettes. He has never used smokeless tobacco. He reports current alcohol use of about 1.0 standard drink of alcohol per week. He reports that he does not use drugs.     ROS:     Review of Systems  Pertinent positive/negatives detailed in HPI, all other systems negative.     Physical Exam:  /60 (BP Location: Left arm, Patient Position: Sitting)   Pulse 86   Wt 66.8 kg (147 lb 6 oz)   SpO2 95%   BMI 23.08 kg/m²     Physical Exam    Constitutional:  Non-toxic, no acute distress.  Performance status: ECOG 0  Eyes:  Sclerae anicteric, gaze symmetrical  Neck:  Trachea midline, thyroid, non enlarged without palpable nodules,  FROM  Resp:  Easy work of breathing, no wheezes  CV:  Regular pulse, no JVD  Abd:  Soft, non-tender, no masses, no hepatosplenomegaly, no ascites, no superficial varices  Lymphatics:  No cervical, supraclavicular, axillary, or inguinal lymphadenopathy  Musculoskeletal:  Ambulatory, normal gait, no muscle wasting  Neuro:  No gross deficits  Psych:  Awake, alert, oriented.  Answers and asks questions appropriately    Data:     Radiology:  I personally reviewed these images:   12/12/24 CT C/A/P:   Impression:  History of esophageal cancer with persistent suspected thickening of the distal esophagus.  Probable hiatal hernia noted.  No convincing metastatic disease.  Stable pulmonary micro nodules.  Stable hepatic hypodensity and hyperenhancing nodule.  Attention on follow-up.  2 x 1.6 cm enhancing left renal mass.  8 mm hypodense nodule caudal aspect body of the pancreas.  Attention on follow-up.  Significant plaque in the aortoiliac system as above.     Endoscopy:  12/19/24        Pathology:    Gastroesophageal junction mass (biopsy):   Adenocarcinoma, poorly differentiated   HER2 immunostain:  Positive (score 3)     Immunohistochemistry (IHC) Testing for Mismatch  Repair (MMR) Proteins:   MLH1, MSH2, MSH6, PMS2 - Intact nuclear expression   Background nonneoplastic tissue/internal control with intact nuclear expression       ICD-10-CM ICD-9-CM    1. Esophageal adenocarcinoma  C15.9 150.9 Case Request Operating Room: INSERTION, SINGLE LUMEN CATHETER WITH PORT, WITH FLUOROSCOPIC GUIDANCE, left poss right      Plan     1. Esophageal adenocarcinoma    Plan:  Jone Lugo is a 69yoM s/p definitive chemoradiotherapy for esophageal adenocarcinoma now found with persistent/local recurrence. He is established with Dr. Singer and is planning for initiation of FOLFOX + trastuzumab. We will plan for port-a-cath placement. Surgical consent was obtained. He does not need enteral access at this time. Functional status, salvage status, support/coping ability, prior chemoRT preclude salvage esophagectomy.      Risks and benefits of port placement including bleeding, infection, hemothorax, pneumothorax, death, port malfunction, need to change port location, need for additional procedures and imponderables were all reviewed. He was given the opportunity to ask questions, which were all addressed.  He voiced understanding and wishes to proceed.    I spent >60 minutes of total time on the encounter, which includes face to face time and non-face to face time preparing to see the patient (e.g., review of tests), obtaining and/or reviewing separately obtained history, documenting clinical information in the electronic or other health record, independently interpreting results (not separately reported) and communicating results to the patient/family/caregiver, or care coordination (not separately reported).        Chadd Velez MD, FACS  Surgical Oncology  [facility] [place], [place]  Office: [phone]  Fax: [phone]     Jone Lugo 1955

## 2025-01-16 ENCOUNTER — PATIENT MESSAGE (OUTPATIENT)
Dept: HEMATOLOGY/ONCOLOGY | Facility: CLINIC | Age: 70
End: 2025-01-16
Payer: MEDICARE

## 2025-01-19 ENCOUNTER — PATIENT MESSAGE (OUTPATIENT)
Dept: HEMATOLOGY/ONCOLOGY | Facility: CLINIC | Age: 70
End: 2025-01-19
Payer: MEDICARE

## 2025-01-22 ENCOUNTER — TELEPHONE (OUTPATIENT)
Dept: SURGERY | Facility: CLINIC | Age: 70
End: 2025-01-22
Payer: MEDICARE

## 2025-01-22 NOTE — TELEPHONE ENCOUNTER
----- Message from Nurse Summer sent at 1/22/2025  4:02 PM CST -----  Regarding: surgery Friday  Hello,         Just spoke with pt.  He stated [place] having [place] and ice. They have several bridges to cross.   He is very sure they will not be able to get over her and get him to the hospital for Friday. He is asking if it is possible to r/s until next week but prior to his chemo on 1/30.  Please call to discuss    Thank you

## 2025-01-22 NOTE — PRE-PROCEDURE INSTRUCTIONS
PreOp Instructions given:    -- Medication information (what to hold and what to take)   -- NPO guidelines as follows: (or as per your Surgeon)  Stop ALL solid food, gum, candy 8 hours before arrival time.  Stop all CLOUDY liquids: coffee with creamer, cloudy juices, 8 hours prior to arrival time.  The patient should be ENCOURAGED to drink carbohydrate-rich clear liquids (sports drinks, clear juices) until 2 hours prior to arrival time.  Stop clear liquids 2 hours prior to arrival time.  CLEAR liquids include water, black coffee NO creamer, clear oral rehydration drinks, clear sports drinks and clear fruit juices (no orange juice, no pulpy juices, no apple cider).   IF IN DOUBT, drink water instead.   NOTHING TO DRINK 2 hours before to surgery/procedure  time. If you are told to take medication on the morning of surgery, it may be taken with a sip of water.   -- *Arrival place and directions given*.  Time to be given the day before procedure by the Surgeon's Office   -- Bathe with antibacterial soap (dial or Hibiclens as instructed)  -- Don't wear any jewelry or valuables and not metals on skin or hair AM of surgery   -- No makeup or moisturizer to face   -- No perfume/cologne, powder, lotions, aftershave or deodorant    Pt verbalized understanding.         *If going to , see below:     Directions and Instructions for [facility]   At [facility], we have an outstanding team of physicians, anesthesiologists, CRNAs, Registered Nurses, Surgical Technologists, and other ancillary team members all focused on your surgical and procedural care.   Before Your Procedure:   The physician's office will call you with a specific arrival time and directions a day or two before your scheduled procedure. You may also receive these instructions through your [facility] portal.   Day of Procedure:   Please be sure to arrive at the arrival time given or you may risk your surgery being delayed or  canceled. The arrival time is earlier than your scheduled surgery or procedure time. In the winter months please dress warm and bring blankets for you or your child as the waiting room may be cold. If you have difficulty locating the facility, please give us a call at [phone].   Directions:   The [facility] is located on the 1st floor of the hospital building near the [place] entrance.   Parking:   You will park in the [facility] Parking Garage (note location on map). [facility] opens at 5:00 a.m. and has a drop off area by the entrance.  parking is available starting at 7:00 a.m. Please see below for further  parking instructions.   Directions from the parking garage elevators   Blue [facility] Elevators: From the parking garage, take the blue [place] elevators (located in the center of the parking garage) to the 1st floor of the garage. You will then take a right once off the elevators then another right to the outside of the parking garage. You will be across from the [facility]. You will walk down the sidewalk, pass the  curve at the [place] entrance and continue to follow the sidewalk. You will pass the radiation oncology entrance on your right. Continue to follow the sidewalk to the [facility] glass door entrance.   Hospital Entrance (Inside Route): If a mostly inside route is preferred: Take the inside elevator bank (located at the far north end of the garage) from the parking garage to the 1st floor. On the 1st floor walk past [employer]. Keep walking down the center of the hallway towards the hospital elevators. Once you reach the red brick lorie, take a left and go past the hospital elevators. Take another left and follow the blue and white [place] signs around the hallway to the end. Go outside of the door. You will see the [facility] entrance to your right.   Drop Off:   There is a drop off area at  the doors of the [facility] for your convenience. If utilized for pediatric patients, an adult must accompany the patient into the surgery center while another adult reynaga the vehicle.   [place] (at 7:00 a.m.):   Upon check-in, please let the  know that you are utilizing [facility] parking which is free. The . will then call [employer] for your car to be picked up. Your keys and phone number will be collected and given to [employer] services. You will then be given a ticket. Upon discharge, [employer] will be notified to bring your vehicle back when you are ready.   2/6/2024      If going to 2nd floor surgery center, see below:    Directions to the 2nd floor ([facility]) Surgery Center  The hallway to get to the surgery center is on the 2nd fl between the gold elevators in the atrium.  Follow the hallway into the waiting room (has a fish tank) and check in at desk.

## 2025-01-22 NOTE — TELEPHONE ENCOUNTER
Returned pt call. Pt states he wants to cancel his sx on  Friday 1/24/25 due to transportation issue that was caused by bad weather.  Informed pt Dr Velez office is closed today and Thursday 1/23/25. Informed pt Dr Velez office staff will give pt a call to r/s his sx when his clinic  re open.   Pt verbalized understanding.

## 2025-01-23 ENCOUNTER — PATIENT MESSAGE (OUTPATIENT)
Dept: SURGERY | Facility: CLINIC | Age: 70
End: 2025-01-23
Payer: MEDICARE

## 2025-01-24 DIAGNOSIS — C15.9 ESOPHAGEAL ADENOCARCINOMA: Primary | ICD-10-CM

## 2025-01-27 ENCOUNTER — HOSPITAL ENCOUNTER (OUTPATIENT)
Facility: HOSPITAL | Age: 70
Discharge: HOME | End: 2025-01-27
Attending: SURGERY | Admitting: SURGERY
Payer: MEDICARE

## 2025-01-27 ENCOUNTER — TELEPHONE (OUTPATIENT)
Dept: HEMATOLOGY/ONCOLOGY | Facility: CLINIC | Age: 70
End: 2025-01-27
Payer: MEDICARE

## 2025-01-27 ENCOUNTER — ANESTHESIA (OUTPATIENT)
Dept: SURGERY | Facility: HOSPITAL | Age: 70
End: 2025-01-27
Payer: MEDICARE

## 2025-01-27 ENCOUNTER — ANESTHESIA EVENT (OUTPATIENT)
Dept: SURGERY | Facility: HOSPITAL | Age: 70
End: 2025-01-27
Payer: MEDICARE

## 2025-01-27 VITALS
HEART RATE: 73 BPM | HEIGHT: 67 IN | SYSTOLIC BLOOD PRESSURE: 123 MMHG | RESPIRATION RATE: 18 BRPM | OXYGEN SATURATION: 97 % | WEIGHT: 147.38 LBS | BODY MASS INDEX: 23.13 KG/M2 | TEMPERATURE: 98 F | DIASTOLIC BLOOD PRESSURE: 68 MMHG

## 2025-01-27 DIAGNOSIS — C15.9 ESOPHAGEAL ADENOCARCINOMA: Primary | ICD-10-CM

## 2025-01-27 PROCEDURE — C1788 PORT, INDWELLING, IMP: HCPCS | Performed by: SURGERY

## 2025-01-27 PROCEDURE — 36000707: Performed by: SURGERY

## 2025-01-27 PROCEDURE — 63600175 PHARM REV CODE 636 W HCPCS: Performed by: SURGERY

## 2025-01-27 PROCEDURE — 36000706: Performed by: SURGERY

## 2025-01-27 PROCEDURE — 36561 INSERT TUNNELED CV CATH: CPT | Mod: RT,,, | Performed by: SURGERY

## 2025-01-27 PROCEDURE — 71000044 HC DOSC ROUTINE RECOVERY FIRST HOUR: Performed by: SURGERY

## 2025-01-27 PROCEDURE — 77001 FLUOROGUIDE FOR VEIN DEVICE: CPT | Mod: 26,,, | Performed by: SURGERY

## 2025-01-27 PROCEDURE — 25000003 PHARM REV CODE 250: Performed by: STUDENT IN AN ORGANIZED HEALTH CARE EDUCATION/TRAINING PROGRAM

## 2025-01-27 PROCEDURE — 37000009 HC ANESTHESIA EA ADD 15 MINS: Performed by: SURGERY

## 2025-01-27 PROCEDURE — D9220A PRA ANESTHESIA: Mod: ANES,,, | Performed by: ANESTHESIOLOGY

## 2025-01-27 PROCEDURE — 63600175 PHARM REV CODE 636 W HCPCS: Performed by: STUDENT IN AN ORGANIZED HEALTH CARE EDUCATION/TRAINING PROGRAM

## 2025-01-27 PROCEDURE — 37000008 HC ANESTHESIA 1ST 15 MINUTES: Performed by: SURGERY

## 2025-01-27 PROCEDURE — 71000015 HC POSTOP RECOV 1ST HR: Performed by: SURGERY

## 2025-01-27 PROCEDURE — D9220A PRA ANESTHESIA: Mod: CRNA,,, | Performed by: STUDENT IN AN ORGANIZED HEALTH CARE EDUCATION/TRAINING PROGRAM

## 2025-01-27 DEVICE — POWERPORT M.R.I. IMPLANTABLE PORT WITH ATTACHABLE 8F CHRONOFLEX OPEN-ENDED SINGLE-LUMEN VENOUS CATHETER INTERMEDIATE KIT  (WITH SUTURE PLUGS)
Type: IMPLANTABLE DEVICE | Site: CHEST | Status: FUNCTIONAL
Brand: POWERPORT M.R.I., CHRONOFLEX

## 2025-01-27 RX ORDER — LIDOCAINE HYDROCHLORIDE 20 MG/ML
INJECTION INTRAVENOUS
Status: DISCONTINUED | OUTPATIENT
Start: 2025-01-27 | End: 2025-01-27

## 2025-01-27 RX ORDER — BUPIVACAINE HYDROCHLORIDE 2.5 MG/ML
INJECTION, SOLUTION EPIDURAL; INFILTRATION; INTRACAUDAL
Status: DISCONTINUED | OUTPATIENT
Start: 2025-01-27 | End: 2025-01-27 | Stop reason: HOSPADM

## 2025-01-27 RX ORDER — PHENYLEPHRINE HYDROCHLORIDE 10 MG/ML
INJECTION INTRAVENOUS
Status: DISCONTINUED | OUTPATIENT
Start: 2025-01-27 | End: 2025-01-27

## 2025-01-27 RX ORDER — HEPARIN 100 UNIT/ML
SYRINGE INTRAVENOUS
Status: DISCONTINUED | OUTPATIENT
Start: 2025-01-27 | End: 2025-01-27 | Stop reason: HOSPADM

## 2025-01-27 RX ORDER — FENTANYL CITRATE 50 UG/ML
INJECTION, SOLUTION INTRAMUSCULAR; INTRAVENOUS
Status: DISCONTINUED | OUTPATIENT
Start: 2025-01-27 | End: 2025-01-27

## 2025-01-27 RX ORDER — ONDANSETRON HYDROCHLORIDE 2 MG/ML
INJECTION, SOLUTION INTRAVENOUS
Status: DISCONTINUED | OUTPATIENT
Start: 2025-01-27 | End: 2025-01-27

## 2025-01-27 RX ORDER — SODIUM CHLORIDE 0.9 % (FLUSH) 0.9 %
10 SYRINGE (ML) INJECTION
Status: DISCONTINUED | OUTPATIENT
Start: 2025-01-27 | End: 2025-01-27 | Stop reason: HOSPADM

## 2025-01-27 RX ORDER — KETAMINE HCL IN 0.9 % NACL 50 MG/5 ML
SYRINGE (ML) INTRAVENOUS
Status: DISCONTINUED | OUTPATIENT
Start: 2025-01-27 | End: 2025-01-27

## 2025-01-27 RX ORDER — DEXMEDETOMIDINE HYDROCHLORIDE 100 UG/ML
INJECTION, SOLUTION INTRAVENOUS
Status: DISCONTINUED | OUTPATIENT
Start: 2025-01-27 | End: 2025-01-27

## 2025-01-27 RX ORDER — GLUCAGON 1 MG
1 KIT INJECTION
Status: DISCONTINUED | OUTPATIENT
Start: 2025-01-27 | End: 2025-01-27 | Stop reason: HOSPADM

## 2025-01-27 RX ORDER — CEFAZOLIN 2 G/1
2 INJECTION, POWDER, FOR SOLUTION INTRAMUSCULAR; INTRAVENOUS
Status: DISCONTINUED | OUTPATIENT
Start: 2025-01-27 | End: 2025-01-27 | Stop reason: HOSPADM

## 2025-01-27 RX ORDER — CEFAZOLIN 2 G/1
INJECTION, POWDER, FOR SOLUTION INTRAMUSCULAR; INTRAVENOUS
Status: DISCONTINUED | OUTPATIENT
Start: 2025-01-27 | End: 2025-01-27

## 2025-01-27 RX ORDER — MIDAZOLAM HYDROCHLORIDE 1 MG/ML
INJECTION INTRAMUSCULAR; INTRAVENOUS
Status: DISCONTINUED | OUTPATIENT
Start: 2025-01-27 | End: 2025-01-27

## 2025-01-27 RX ORDER — PROPOFOL 10 MG/ML
VIAL (ML) INTRAVENOUS
Status: DISCONTINUED | OUTPATIENT
Start: 2025-01-27 | End: 2025-01-27

## 2025-01-27 RX ORDER — DEXAMETHASONE SODIUM PHOSPHATE 4 MG/ML
INJECTION, SOLUTION INTRA-ARTICULAR; INTRALESIONAL; INTRAMUSCULAR; INTRAVENOUS; SOFT TISSUE
Status: DISCONTINUED | OUTPATIENT
Start: 2025-01-27 | End: 2025-01-27

## 2025-01-27 RX ADMIN — PROPOFOL 150 MCG/KG/MIN: 10 INJECTION, EMULSION INTRAVENOUS at 10:01

## 2025-01-27 RX ADMIN — PHENYLEPHRINE HYDROCHLORIDE 100 MCG: 10 INJECTION INTRAVENOUS at 11:01

## 2025-01-27 RX ADMIN — LIDOCAINE HYDROCHLORIDE 80 MG: 20 INJECTION INTRAVENOUS at 10:01

## 2025-01-27 RX ADMIN — PROPOFOL 50 MG: 10 INJECTION, EMULSION INTRAVENOUS at 10:01

## 2025-01-27 RX ADMIN — DEXAMETHASONE SODIUM PHOSPHATE 4 MG: 4 INJECTION INTRA-ARTICULAR; INTRALESIONAL; INTRAMUSCULAR; INTRAVENOUS; SOFT TISSUE at 10:01

## 2025-01-27 RX ADMIN — MIDAZOLAM HYDROCHLORIDE 2 MG: 2 INJECTION, SOLUTION INTRAMUSCULAR; INTRAVENOUS at 10:01

## 2025-01-27 RX ADMIN — FENTANYL CITRATE 25 MCG: 50 INJECTION, SOLUTION INTRAMUSCULAR; INTRAVENOUS at 10:01

## 2025-01-27 RX ADMIN — PHENYLEPHRINE HYDROCHLORIDE 100 MCG: 10 INJECTION INTRAVENOUS at 10:01

## 2025-01-27 RX ADMIN — ONDANSETRON 4 MG: 2 INJECTION INTRAMUSCULAR; INTRAVENOUS at 11:01

## 2025-01-27 RX ADMIN — Medication 15 MG: at 10:01

## 2025-01-27 RX ADMIN — CEFAZOLIN 2 G: 330 INJECTION, POWDER, FOR SOLUTION INTRAMUSCULAR; INTRAVENOUS at 10:01

## 2025-01-27 RX ADMIN — PHENYLEPHRINE HYDROCHLORIDE 200 MCG: 10 INJECTION INTRAVENOUS at 11:01

## 2025-01-27 RX ADMIN — SODIUM CHLORIDE: 0.9 INJECTION, SOLUTION INTRAVENOUS at 10:01

## 2025-01-27 RX ADMIN — DEXMEDETOMIDINE 8 MCG: 100 INJECTION, SOLUTION, CONCENTRATE INTRAVENOUS at 10:01

## 2025-01-27 NOTE — ANESTHESIA PREPROCEDURE EVALUATION
[facility]  Anesthesia Pre-Operative Evaluation         Patient Name: Jone Lugo  YOB: 1955  MRN: [id number]    SUBJECTIVE:     Pre-operative evaluation for Procedure(s) (LRB):  INSERTION, SINGLE LUMEN CATHETER WITH PORT, WITH FLUOROSCOPIC GUIDANCE, left poss right (Left)     01/27/2025    Jone Lugo is a 69 y.o. male w/ a significant PMHx of esophageal adenocarcinoma.    Patient now presents for the above procedure(s).      LDA: None documented.       Prev airway: None documented.    Drips: None documented.      Patient Active Problem List   Diagnosis    Esophageal adenocarcinoma    H/O traumatic brain injury    Tobacco abuse    Left renal mass       Review of patient's allergies indicates:  No Known Allergies    Current Inpatient Medications:      No current facility-administered medications on file prior to encounter.     Current Outpatient Medications on File Prior to Encounter   Medication Sig Dispense Refill    amlodipine-benazepril 10-20mg (LOTREL) 10-20 mg per capsule Take 1 capsule by mouth once daily.      etodolac (LODINE) 400 MG tablet Take 1 tablet by mouth 2 (two) times daily.      fluticasone propionate (FLONASE) 50 mcg/actuation nasal spray 2 sprays by Each Nostril route.      gabapentin (NEURONTIN) 300 MG capsule Take 300 mg by mouth.      multivitamin with minerals tablet Take 1 tablet by mouth once daily. One A Day Multivitamin      omeprazole (PRILOSEC) 40 MG capsule Take 1 capsule by mouth every morning.      pravastatin (PRAVACHOL) 40 MG tablet Take 40 mg by mouth.      azelastine (ASTELIN) 137 mcg (0.1 %) nasal spray 2 sprays by Nasal route. (Patient not taking: Reported on 1/22/2025)      busPIRone (BUSPAR) 5 MG Tab Take 5 mg by mouth.      ondansetron (ZOFRAN) 8 MG tablet TAKE 1 TABLET BY MOUTH EVERY 8 HOURS AS NEEDED FOR NAUSEA (Patient not taking: Reported on 1/22/2025) 60 tablet 2    ondansetron (ZOFRAN) 8 MG tablet Take 1 tablet (8 mg  total) by mouth every 8 (eight) hours as needed for Nausea. (Patient not taking: Reported on 1/22/2025) 60 tablet 2    pantoprazole (PROTONIX) 40 MG tablet Take 40 mg by mouth.      TURMERIC ORAL Take by mouth. (Patient not taking: Reported on 1/22/2025)         Past Surgical History:   Procedure Laterality Date    ESOPHAGOGASTRODUODENOSCOPY N/A 12/19/2024    Procedure: EGD (ESOPHAGOGASTRODUODENOSCOPY);  Surgeon: Pete Buenrostro MD;  Location: [address]);  Service: Endoscopy;  Laterality: N/A;  Medically Urgent      12/16 ref by Sahil Singer MD, [facility]  12/18 - pre-call complete; MB    LAPAROSCOPIC REPAIR OF INGUINAL HERNIA Right        Social History     Socioeconomic History    Marital status: Single   Tobacco Use    Smoking status: Every Day     Current packs/day: 1.00     Types: Cigarettes    Smokeless tobacco: Never   Substance and Sexual Activity    Alcohol use: Yes     Alcohol/week: 1.0 standard drink of alcohol     Types: 1 Glasses of wine per week     Comment: rarely    Drug use: Never     Social Drivers of Health     Financial Resource Strain: Low Risk  (10/7/2024)    Received from [facility]    Overall Financial Resource Strain (CARDIA)     Difficulty of Paying Living Expenses: Not hard at all   Food Insecurity: No Food Insecurity (10/7/2024)    Received from [facility]    Hunger Vital Sign     Worried About Running Out of Food in the Last Year: Never true     Ran Out of Food in the Last Year: Never true   Transportation Needs: No Transportation Needs (10/7/2024)    Received from [facility]    PRAPARE - Transportation     Lack of Transportation (Medical): No     Lack of Transportation (Non-Medical): No   Physical Activity: Unknown (10/7/2024)    Received from [facility]    Exercise Vital Sign     Days of Exercise per Week: Patient declined   Stress: Patient Declined (10/7/2024)    Received from [facility]    [employer] - Occupational Stress  Questionnaire     Feeling of Stress : Patient declined   Housing Stability: Unknown (10/7/2024)    Received from [facility]    Housing Stability Vital Sign     Unable to Pay for Housing in the Last Year: No       OBJECTIVE:     Vital Signs Range (Last 24H):         Significant Labs:  Lab Results   Component Value Date    WBC 10.06 12/12/2024    HGB 14.0 12/12/2024    HCT 41.8 12/12/2024     12/12/2024    CHOL 132 07/01/2024    TRIG 195 (H) 07/01/2024    HDL 34 (L) 07/01/2024    ALT 26 12/12/2024    AST 19 12/12/2024     12/12/2024    K 4.1 12/12/2024     12/12/2024    CREATININE 1.2 12/12/2024    BUN 25 (H) 12/12/2024    CO2 22 (L) 12/12/2024    TSH 1.31 07/01/2024    HGBA1C 5.8 (H) 07/01/2024       Diagnosti  ASSESSMENT/PLAN:                                                                                                             01/27/2025  Jone Lugo is a 69 y.o., male.      Pre-op Assessment    I have reviewed the Patient Summary Reports.     I have reviewed the Nursing Notes. I have reviewed the NPO Status.   I have reviewed the Medications.     Review of Systems  Anesthesia Hx:  No problems with previous Anesthesia   History of prior surgery of interest to airway management or planning:          Denies Family Hx of Anesthesia complications.    Denies Personal Hx of Anesthesia complications.                    Social:  Smoker       Hematology/Oncology:  Hematology Normal                                     Cardiovascular:     Hypertension                                    Hypertension         Hepatic/GI:    Esophageal / Stomach Disorders (esophageal carcinoma)           Musculoskeletal:  Musculoskeletal Normal                Neurological:  Neurology Normal                           Head Injury            Dermatological:  Skin Normal        Physical Exam  General: Cooperative, Alert and Oriented    Airway:  Mallampati: II   Mouth Opening: Normal  TM Distance: Normal  Tongue:  Normal  Neck ROM: Normal ROM    Dental:  Intact        Anesthesia Plan  Type of Anesthesia, risks & benefits discussed:    Anesthesia Type: Gen Natural Airway  Intra-op Monitoring Plan: Standard ASA Monitors  Post Op Pain Control Plan: multimodal analgesia  Induction:  IV  Informed Consent: Informed consent signed with the Patient and all parties understand the risks and agree with anesthesia plan.  All questions answered.   ASA Score: 3  Day of Surgery Review of History & Physical: H&P Update referred to the surgeon/provider.    Ready For Surgery From Anesthesia Perspective.     .

## 2025-01-27 NOTE — PLAN OF CARE
Pt stable, tolerating po liquid, no complaint of pain.  Discharge instructions given to pt.  Dr. King reviewed cxr; cleared pt for discharge. Ready for discharge.

## 2025-01-27 NOTE — TELEPHONE ENCOUNTER
----- Message from Catie sent at 1/25/2025  9:07 AM CST -----  Regarding: injection r/s  Contact: [phone]  GABRIEL CARTER calling regarding Appointment Access  (message) for # pt needs to r/s injection that is on 2/1 due to the parades and cannot get out his house because of it pls advise

## 2025-01-27 NOTE — DISCHARGE SUMMARY
[facility] - Surgery (2nd Fl)  Discharge Note  Short Stay    Procedure(s) (LRB):  INSERTION, SINGLE LUMEN CATHETER WITH PORT, WITH FLUOROSCOPIC GUIDANCE, left poss right (Right)      OUTCOME: Patient tolerated treatment/procedure well without complication and is now ready for discharge.    DISPOSITION: Home or Self Care    FINAL DIAGNOSIS:  <principal problem not specified>    FOLLOWUP: None    DISCHARGE INSTRUCTIONS:    Discharge Procedure Orders   Diet Adult Regular     Notify your health care provider if you experience any of the following:  temperature >100.4     Notify your health care provider if you experience any of the following:  persistent nausea and vomiting or diarrhea     Notify your health care provider if you experience any of the following:  severe uncontrolled pain     Notify your health care provider if you experience any of the following:  redness, tenderness, or signs of infection (pain, swelling, redness, odor or green/yellow discharge around incision site)     Notify your health care provider if you experience any of the following:  difficulty breathing or increased cough     Notify your health care provider if you experience any of the following:  severe persistent headache     Notify your health care provider if you experience any of the following:  worsening rash     Notify your health care provider if you experience any of the following:  persistent dizziness, light-headedness, or visual disturbances     Notify your health care provider if you experience any of the following:  increased confusion or weakness     No dressing needed     Activity as tolerated         Clinical Reference Documents Added to Patient Instructions         Document    CENTRAL LINE CATHETER DISCHARGE INSTRUCTIONS (ENGLISH)            TIME SPENT ON DISCHARGE: 10 minutes

## 2025-01-27 NOTE — DISCHARGE INSTRUCTIONS
You had a Port-a-cath placement performed.     Please alternate tylenol and ibuprofen for pain as directed by the bottle.     You have skin glue (dermabond) over your incision. Skin glue will fall off on its own  You may shower and let soapy water run over your incision, do not scrub. Please no baths or soaking for 2 weeks    You have no diet restrictions.    Please call with any questions.

## 2025-01-27 NOTE — TELEPHONE ENCOUNTER
"Returned call to patient.  Patient states [place] and [place] of his neighborhood for pump dc on 2/1.  Clarified that [place] is 3/4 and there are no [place]ades [place] on 2[place]1.  Patient verbalized understanding and is able to come to appointments on 2/1.  Appointments that will be scheduled for 2/27 and 3/1 will be an issue.  Patient needs to be scheduled early in the morning for these as [place]."  "

## 2025-01-27 NOTE — TRANSFER OF CARE
"Anesthesia Transfer of Care Note    Patient: Jone Lugo    Procedure(s) Performed: Procedure(s) (LRB):  INSERTION, SINGLE LUMEN CATHETER WITH PORT, WITH FLUOROSCOPIC GUIDANCE, left poss right (Right)    Patient location: [facility]    Anesthesia Type: general    Transport from OR: Transported from OR on room air with adequate spontaneous ventilation    Post pain: adequate analgesia    Post assessment: no apparent anesthetic complications and tolerated procedure well    Post vital signs: stable    Level of consciousness: awake, alert and oriented    Nausea/Vomiting: no nausea/vomiting    Complications: none    Transfer of care protocol was followedComments: Bedside report to PACU RN, opportunity for questions given.       Last vitals: Visit Vitals  BP (!) 100/57   Pulse 69   Temp 36.7 °C (98.1 °F) (Temporal)   Resp 18   Ht 5' 7" (1.702 m)   Wt 66.8 kg (147 lb 6 oz)   SpO2 98%   BMI 23.08 kg/m²     "

## 2025-01-27 NOTE — BRIEF OP NOTE
Anupam Alcantara - Surgery ([facility])  Brief Operative Note    SUMMARY     Surgery Date: 1/27/2025     Surgeons and Role:     * Chadd Velez MD - Primary     * Maria Esther King MD - Resident - Assisting     * Chago Baez MD - Resident - Chief        Pre-op Diagnosis:  Esophageal adenocarcinoma [C15.9]    Post-op Diagnosis:  Post-Op Diagnosis Codes:     * Esophageal adenocarcinoma [C15.9]    Procedure(s) (LRB):  INSERTION, SINGLE LUMEN CATHETER WITH PORT, WITH FLUOROSCOPIC GUIDANCE, left poss right (Right)    Anesthesia: Local MAC    Implants:  Implant Name Type Inv. Item Serial No.  Lot No. LRB No. Used Action   KIT POWERPORT SINGLE 8FR - [id number]  KIT POWERPORT SINGLE 8FR  [employer] [id number] Right 1 Implanted       Operative Findings: Placement of R. Sided central venous catheter single lumen catheter with mediport with no apparent  compol    Estimated Blood Loss: * No values recorded between 1/27/2025 10:43 AM and 1/27/2025 11:34 AM *    Estimated Blood Loss has not been documented. EBL = Minimal.         Specimens:   Specimen (24h ago, onward)      None            [id number]    Maria Esther King MD  General Surgery, PGY-1  Anupam Alcantara- Surgery

## 2025-01-27 NOTE — INTERVAL H&P NOTE
Day of surgery addendum     Current history and physical on file and completed as below.  There have been no significant clinical changes since the completion of the H&P below.  Patient identified by me and surgical site confirmed by patient and myself.    I have personally discussed the risks of surgery as detailed below, as well as the risks of anesthesia.  Jone Meyer Parish understands the risks, any and all questions were answered to his satisfaction.     Completed by:  Chadd Velez MD  on 1/27/2025 at 10:19 AM

## 2025-01-28 ENCOUNTER — HOSPITAL ENCOUNTER (OUTPATIENT)
Dept: CARDIOLOGY | Facility: HOSPITAL | Age: 70
Discharge: HOME | End: 2025-01-28
Attending: INTERNAL MEDICINE
Payer: MEDICARE

## 2025-01-28 VITALS
HEART RATE: 66 BPM | HEIGHT: 67 IN | DIASTOLIC BLOOD PRESSURE: 60 MMHG | SYSTOLIC BLOOD PRESSURE: 120 MMHG | BODY MASS INDEX: 23.12 KG/M2 | WEIGHT: 147.31 LBS

## 2025-01-28 DIAGNOSIS — Z79.899 ENCOUNTER FOR MONITORING CARDIOTOXIC DRUG THERAPY: ICD-10-CM

## 2025-01-28 DIAGNOSIS — Z51.81 ENCOUNTER FOR MONITORING CARDIOTOXIC DRUG THERAPY: ICD-10-CM

## 2025-01-28 LAB
ASCENDING AORTA: 2.92 CM
AV AREA BY CONTINUOUS VTI: 2.4 CM2
AV INDEX (PROSTH): 0.68
AV LVOT MEAN GRADIENT: 2 MMHG
AV LVOT PEAK GRADIENT: 5 MMHG
AV MEAN GRADIENT: 5 MMHG
AV PEAK GRADIENT: 12 MMHG
AV VALVE AREA BY VELOCITY RATIO: 2.2 CM²
AV VALVE AREA: 2.3 CM2
AV VELOCITY RATIO: 0.65
BSA FOR ECHO PROCEDURE: 1.78 M2
CV ECHO LV RWT: 0.34 CM
DOP CALC AO PEAK VEL: 1.7 M/S
DOP CALC AO VTI: 29.7 CM
DOP CALC LVOT AREA: 3.5 CM2
DOP CALC LVOT DIAMETER: 2.1 CM
DOP CALC LVOT PEAK VEL: 1.1 M/S
DOP CALC LVOT STROKE VOLUME: 69.6 CM3
DOP CALCLVOT PEAK VEL VTI: 20.1 CM
E WAVE DECELERATION TIME: 230 MS
E/A RATIO: 0.92
E/E' RATIO: 13 M/S
ECHO EF ESTIMATED: 67 %
ECHO LV POSTERIOR WALL: 0.8 CM (ref 0.6–1.1)
FRACTIONAL SHORTENING: 36.2 % (ref 28–44)
GLOBAL LONGITUIDAL STRAIN: 17.4 %
INTERVENTRICULAR SEPTUM: 0.9 CM (ref 0.6–1.1)
IVRT: 84 MS
LA MAJOR: 5.1 CM
LA MINOR: 4.7 CM
LA WIDTH: 4.1 CM
LEFT ATRIUM SIZE: 3 CM
LEFT ATRIUM VOLUME INDEX MOD: 30 ML/M2
LEFT ATRIUM VOLUME INDEX: 29 ML/M2
LEFT ATRIUM VOLUME MOD: 53 ML
LEFT ATRIUM VOLUME: 51 CM3
LEFT INTERNAL DIMENSION IN SYSTOLE: 3 CM (ref 2.1–4)
LEFT VENTRICLE DIASTOLIC VOLUME INDEX: 57.92 ML/M2
LEFT VENTRICLE DIASTOLIC VOLUME: 103.09 ML
LEFT VENTRICLE MASS INDEX: 74.3 G/M2
LEFT VENTRICLE SYSTOLIC VOLUME INDEX: 19 ML/M2
LEFT VENTRICLE SYSTOLIC VOLUME: 33.74 ML
LEFT VENTRICULAR INTERNAL DIMENSION IN DIASTOLE: 4.7 CM (ref 3.5–6)
LEFT VENTRICULAR MASS: 132.3 G
LV LATERAL E/E' RATIO: 14.2
LV SEPTAL E/E' RATIO: 11.8
MV A" WAVE DURATION": 87.54 MS
MV PEAK A VEL: 0.77 M/S
MV PEAK E VEL: 0.71 M/S
OHS CV RV/LV RATIO: 0.91 CM
OHS LV EJECTION FRACTION SIMPSONS BIPLANE MOD: 64 %
PISA TR MAX VEL: 2.1 M/S
PULM VEIN A" WAVE DURATION": 87.54 MS
PULM VEIN S/D RATIO: 1.34
PULMONIC VEIN PEAK A VELOCITY: 0.4 M/S
PV PEAK D VEL: 0.41 M/S
PV PEAK S VEL: 0.55 M/S
RA MAJOR: 4.49 CM
RA PRESSURE ESTIMATED: 3 MMHG
RA WIDTH: 3.71 CM
RIGHT VENTRICLE DIASTOLIC BASEL DIMENSION: 4.3 CM
RV TB RVSP: 5 MMHG
RV TISSUE DOPPLER FREE WALL SYSTOLIC VELOCITY 1 (APICAL 4 CHAMBER VIEW): 16.68 CM/S
SINUS: 3.26 CM
STJ: 2.37 CM
TDI LATERAL: 0.05 M/S
TDI SEPTAL: 0.06 M/S
TDI: 0.06 M/S
TR MAX PG: 18 MMHG
TV PEAK GRADIENT: 17 MMHG
TV REST PULMONARY ARTERY PRESSURE: 21 MMHG
Z-SCORE OF LEFT VENTRICULAR DIMENSION IN END DIASTOLE: -0.46
Z-SCORE OF LEFT VENTRICULAR DIMENSION IN END SYSTOLE: -0.11

## 2025-01-28 PROCEDURE — 93356 MYOCRD STRAIN IMG SPCKL TRCK: CPT

## 2025-01-28 PROCEDURE — 93306 TTE W/DOPPLER COMPLETE: CPT | Mod: 26,,, | Performed by: INTERNAL MEDICINE

## 2025-01-28 PROCEDURE — 93356 MYOCRD STRAIN IMG SPCKL TRCK: CPT | Mod: ,,, | Performed by: INTERNAL MEDICINE

## 2025-01-28 NOTE — OP NOTE
Anupam Alcantara - Surgery ([facility])  Surgery Department  Operative Note    SUMMARY     Date of Procedure: 1/27/2025     Procedure: Procedure(s) (LRB):  INSERTION, SINGLE LUMEN CATHETER WITH PORT, WITH FLUOROSCOPIC GUIDANCE, left poss right (Right)     Surgeons and Role:     * Chadd Velez MD - Primary     * Maria Esther King MD - Resident - Assisting     * Chago Baez MD - Resident - Chief        Pre-Operative Diagnosis: Esophageal adenocarcinoma [C15.9]      Post-Operative Diagnosis: Post-Op Diagnosis Codes:     * Esophageal adenocarcinoma [C15.9]  Same    Anesthesia: Local MAC     Estimated Blood Loss (EBL):  <10 mL    Procedures:     1.  Right Internal Jugular Tunneled Central Venous Catheter with Subcutaneous Port (PowerPort)  2.  Intraoperative Fluoroscopy for Port Placement    Indication:  Jone Lugo is a 69 y.o. male in need of port placement for central venous access.  Risks and benefits of port placement including bleeding, infection, hemothorax, pneumothorax, death, port malfunction, need for additional procedures and imponderables were all reviewed prior to the procedure;  he agrees to proceed.    Details: After informed consent, the patient was transported to the operating room.  Following satisfactory anesthesia, the patient was positioned supine, Trendelenburg position, and the arms tucked and padded.  The operative field was prepped and draped in sterile fashion.  Local anesthetic was infused about the port site.  The ultrasound was used to survey the internal jugular veins, which were patent and without abnormality.  The right internal jugular vein was confirmed by easy compression and respiratory variation.  Under ultrasound guidance, an access needle was passed into the internal jugular vein with easy return of dark red, non-pulsatile blood.  The wire was confirmed to be in the vein by ultrasound.  A wire was passed into the superior vena cava under fluoroscopic guidance.   A tunnel  was created to the chest wall, where an incision and pocket were created with a scalpel and electrocautery.  The port was sutured to the pectoral fascia with absorbable suture.  Sheath and dilator assembly were passed over the wire under fluoroscopic guidance, and the wire/dilator removed.  A catheter was passed through the tunnel, into the sheath, measured and cut to length under fluoroscopic guidance.  The sheath was peeled away in its entirety.  The fluoroscopic images were interpreted by me and saved on PACS.  The catheter and port were joined, de-aired, aspirated and flushed with easy return of dark red, non-pulsatile blood.  Heparinized saline was instilled in the catheter and port.  Wounds were closed using absorbable suture, derma-bond.  The patient recovered from anesthesia and was taken to the recovery room in stable condition.  All needle, lap, and sponge counts were reported as correct.      Implants:   Implant Name Type Inv. Item Serial No.  Lot No. LRB No. Used Action   SLIMPORT GRISELDA - [id number]  SLIMPORT GRISELDA  [employer] [id number] Right 1 Wasted   KIT POWERPORT SINGLE 8FR - [id number]  KIT POWERPORT SINGLE 8FR  [employer] [id number] Right 1 Implanted       Specimens:   Specimen (24h ago, onward)      None                  Condition: Good    Disposition: PACU - hemodynamically stable.    Attestation: I was present for the entire procedure.

## 2025-01-29 NOTE — ANESTHESIA POSTPROCEDURE EVALUATION
Anesthesia Post Evaluation    Patient: Jone Lugo    Procedure(s) Performed: Procedure(s) (LRB):  INSERTION, SINGLE LUMEN CATHETER WITH PORT, WITH FLUOROSCOPIC GUIDANCE, left poss right (Right)    Final Anesthesia Type: general      Patient location during evaluation: PACU  Patient participation: Yes- Able to Participate  Level of consciousness: awake and alert and oriented  Post-procedure vital signs: reviewed and stable  Pain management: adequate  Airway patency: patent    PONV status at discharge: No PONV  Anesthetic complications: no      Cardiovascular status: blood pressure returned to baseline and hemodynamically stable  Respiratory status: unassisted  Hydration status: euvolemic  Follow-up not needed.              Vitals Value Taken Time   /68 01/27/25 1230   Temp 36.8 °C (98.2 °F) 01/27/25 1230   Pulse 73 01/27/25 1230   Resp 18 01/27/25 1230   SpO2 97 % 01/27/25 1230         No case tracking events are documented in the log.      Pain/Joselo Score: No data recorded

## 2025-01-30 ENCOUNTER — INFUSION (OUTPATIENT)
Dept: INFUSION THERAPY | Facility: HOSPITAL | Age: 70
End: 2025-01-30
Attending: INTERNAL MEDICINE
Payer: MEDICARE

## 2025-01-30 ENCOUNTER — OFFICE VISIT (OUTPATIENT)
Dept: HEMATOLOGY/ONCOLOGY | Facility: CLINIC | Age: 70
End: 2025-01-30
Payer: MEDICARE

## 2025-01-30 VITALS
WEIGHT: 141 LBS | SYSTOLIC BLOOD PRESSURE: 120 MMHG | DIASTOLIC BLOOD PRESSURE: 60 MMHG | BODY MASS INDEX: 22.13 KG/M2 | OXYGEN SATURATION: 97 % | RESPIRATION RATE: 18 BRPM | HEIGHT: 67 IN | HEART RATE: 70 BPM

## 2025-01-30 VITALS
RESPIRATION RATE: 18 BRPM | SYSTOLIC BLOOD PRESSURE: 124 MMHG | HEART RATE: 73 BPM | TEMPERATURE: 98 F | DIASTOLIC BLOOD PRESSURE: 60 MMHG

## 2025-01-30 DIAGNOSIS — C15.9 ESOPHAGEAL ADENOCARCINOMA: Primary | ICD-10-CM

## 2025-01-30 DIAGNOSIS — I10 HYPERTENSION, UNSPECIFIED TYPE: ICD-10-CM

## 2025-01-30 DIAGNOSIS — E78.5 HYPERLIPIDEMIA, UNSPECIFIED HYPERLIPIDEMIA TYPE: ICD-10-CM

## 2025-01-30 DIAGNOSIS — Z72.0 TOBACCO USE: ICD-10-CM

## 2025-01-30 DIAGNOSIS — N28.89 LEFT RENAL MASS: ICD-10-CM

## 2025-01-30 PROCEDURE — 96367 TX/PROPH/DG ADDL SEQ IV INF: CPT

## 2025-01-30 PROCEDURE — 63600175 PHARM REV CODE 636 W HCPCS: Performed by: INTERNAL MEDICINE

## 2025-01-30 PROCEDURE — 99214 OFFICE O/P EST MOD 30 MIN: CPT | Mod: PBBFAC | Performed by: INTERNAL MEDICINE

## 2025-01-30 PROCEDURE — 99999 PR PBB SHADOW E&M-EST. PATIENT-LVL IV: CPT | Mod: PBBFAC,,, | Performed by: INTERNAL MEDICINE

## 2025-01-30 PROCEDURE — 96416 CHEMO PROLONG INFUSE W/PUMP: CPT

## 2025-01-30 PROCEDURE — 96413 CHEMO IV INFUSION 1 HR: CPT

## 2025-01-30 PROCEDURE — 96417 CHEMO IV INFUS EACH ADDL SEQ: CPT

## 2025-01-30 PROCEDURE — 96415 CHEMO IV INFUSION ADDL HR: CPT

## 2025-01-30 PROCEDURE — 25000003 PHARM REV CODE 250: Performed by: INTERNAL MEDICINE

## 2025-01-30 PROCEDURE — G2211 COMPLEX E/M VISIT ADD ON: HCPCS | Mod: S$PBB,,, | Performed by: INTERNAL MEDICINE

## 2025-01-30 PROCEDURE — 99215 OFFICE O/P EST HI 40 MIN: CPT | Mod: S$PBB,,, | Performed by: INTERNAL MEDICINE

## 2025-01-30 RX ORDER — HEPARIN 100 UNIT/ML
500 SYRINGE INTRAVENOUS
Status: DISCONTINUED | OUTPATIENT
Start: 2025-01-30 | End: 2025-01-30 | Stop reason: HOSPADM

## 2025-01-30 RX ORDER — EPINEPHRINE 0.3 MG/.3ML
0.3 INJECTION SUBCUTANEOUS ONCE AS NEEDED
Status: CANCELLED | OUTPATIENT
Start: 2025-01-30

## 2025-01-30 RX ORDER — HEPARIN 100 UNIT/ML
500 SYRINGE INTRAVENOUS
Status: CANCELLED | OUTPATIENT
Start: 2025-01-31

## 2025-01-30 RX ORDER — EPINEPHRINE 0.3 MG/.3ML
0.3 INJECTION SUBCUTANEOUS ONCE AS NEEDED
Status: DISCONTINUED | OUTPATIENT
Start: 2025-01-30 | End: 2025-01-30 | Stop reason: HOSPADM

## 2025-01-30 RX ORDER — SODIUM CHLORIDE 0.9 % (FLUSH) 0.9 %
10 SYRINGE (ML) INJECTION
Status: DISCONTINUED | OUTPATIENT
Start: 2025-01-30 | End: 2025-01-30 | Stop reason: HOSPADM

## 2025-01-30 RX ORDER — DIPHENHYDRAMINE HYDROCHLORIDE 50 MG/ML
50 INJECTION INTRAMUSCULAR; INTRAVENOUS ONCE AS NEEDED
Status: CANCELLED | OUTPATIENT
Start: 2025-01-30

## 2025-01-30 RX ORDER — SODIUM CHLORIDE 0.9 % (FLUSH) 0.9 %
10 SYRINGE (ML) INJECTION
Status: CANCELLED | OUTPATIENT
Start: 2025-01-31

## 2025-01-30 RX ORDER — OLANZAPINE 5 MG/1
TABLET ORAL
Qty: 30 TABLET | Refills: 2 | Status: SHIPPED | OUTPATIENT
Start: 2025-01-30

## 2025-01-30 RX ORDER — PROCHLORPERAZINE EDISYLATE 5 MG/ML
5 INJECTION INTRAMUSCULAR; INTRAVENOUS ONCE AS NEEDED
Status: DISCONTINUED | OUTPATIENT
Start: 2025-01-30 | End: 2025-01-30 | Stop reason: HOSPADM

## 2025-01-30 RX ORDER — HEPARIN 100 UNIT/ML
500 SYRINGE INTRAVENOUS
Status: CANCELLED | OUTPATIENT
Start: 2025-01-30

## 2025-01-30 RX ORDER — LIDOCAINE AND PRILOCAINE 25; 25 MG/G; MG/G
CREAM TOPICAL
Qty: 30 G | Refills: 1 | Status: SHIPPED | OUTPATIENT
Start: 2025-01-30

## 2025-01-30 RX ORDER — PROCHLORPERAZINE EDISYLATE 5 MG/ML
5 INJECTION INTRAMUSCULAR; INTRAVENOUS ONCE AS NEEDED
Status: CANCELLED | OUTPATIENT
Start: 2025-01-31

## 2025-01-30 RX ORDER — PROCHLORPERAZINE EDISYLATE 5 MG/ML
5 INJECTION INTRAMUSCULAR; INTRAVENOUS ONCE AS NEEDED
Status: CANCELLED | OUTPATIENT
Start: 2025-01-30

## 2025-01-30 RX ORDER — DIPHENHYDRAMINE HYDROCHLORIDE 50 MG/ML
50 INJECTION INTRAMUSCULAR; INTRAVENOUS ONCE AS NEEDED
Status: DISCONTINUED | OUTPATIENT
Start: 2025-01-30 | End: 2025-01-30 | Stop reason: HOSPADM

## 2025-01-30 RX ORDER — SODIUM CHLORIDE 0.9 % (FLUSH) 0.9 %
10 SYRINGE (ML) INJECTION
Status: CANCELLED | OUTPATIENT
Start: 2025-01-30

## 2025-01-30 RX ADMIN — OXALIPLATIN 148 MG: 5 INJECTION, SOLUTION INTRAVENOUS at 04:01

## 2025-01-30 RX ADMIN — DEXTROSE MONOHYDRATE: 50 INJECTION, SOLUTION INTRAVENOUS at 04:01

## 2025-01-30 RX ADMIN — FLUOROURACIL 4000 MG: 50 INJECTION, SOLUTION INTRAVENOUS at 06:01

## 2025-01-30 RX ADMIN — DEXAMETHASONE SODIUM PHOSPHATE 0.25 MG: 4 INJECTION, SOLUTION INTRA-ARTICULAR; INTRALESIONAL; INTRAMUSCULAR; INTRAVENOUS; SOFT TISSUE at 04:01

## 2025-01-30 RX ADMIN — TRASTUZUMAB-ANNS 384 MG: 420 INJECTION, POWDER, LYOPHILIZED, FOR SOLUTION INTRAVENOUS at 02:01

## 2025-01-30 NOTE — PROGRESS NOTES
MEDICAL ONCOLOGY - ESTABLISHED PATIENT VISIT    Reason for visit: esophageal adenocarcinoma     Best Contact Phone Number(s): There are no phone numbers on file.     Cancer/Stage/TNM:    Cancer Staging   Esophageal adenocarcinoma  Staging form: Esophagus - Adenocarcinoma, AJCC 8th Edition  - Clinical: Stage III (cT2, cN1, cM0, G2) - Signed by Miguel Angel Hampton Jr., MD on 7/31/2023       Oncology History   Esophageal adenocarcinoma   7/31/2023 Initial Diagnosis    Esophageal adenocarcinoma     7/31/2023 Cancer Staged    Staging form: Esophagus - Adenocarcinoma, AJCC 8th Edition  - Clinical: Stage III (cT2, cN1, cM0, G2)     9/5/2023 - 10/5/2023 Chemotherapy    Treatment Summary   Plan Name: OP ESOPHAGEAL PACLITAXEL CARBOPLATIN WEEKLY  Treatment Goal: Curative  Status: Inactive  Start Date: 9/5/2023  End Date: 10/5/2023  Provider: Sahil Singer MD  Chemotherapy: CARBOplatin (PARAPLATIN) 195 mg in sodium chloride 0.9% 304.5 mL chemo infusion, 195 mg (100 % of original dose 193.4 mg), Intravenous, Clinic/HOD 1 time, 1 of 1 cycle  Dose modification:   (original dose 193.4 mg, Cycle 1), 193.4 mg (original dose 193.4 mg, Cycle 1),   (original dose 193.4 mg, Cycle 1, Reason: MD Discretion)  Administration: 195 mg (9/5/2023), 225 mg (9/12/2023), 210 mg (9/19/2023), 180 mg (9/26/2023), 195 mg (10/5/2023)  PACLitaxeL (TAXOL) 50 mg/m2 = 96 mg in sodium chloride 0.9% 250 mL chemo infusion, 50 mg/m2 = 96 mg, Intravenous, Clinic/HOD 1 time, 1 of 1 cycle  Administration: 96 mg (9/5/2023), 96 mg (9/12/2023), 96 mg (9/19/2023), 96 mg (9/26/2023), 96 mg (10/5/2023)     9/5/2023 - 10/9/2023 Radiation Therapy    Treating physician: Anshul Neri    Course: C1 Thorax 2023    Treatment Site Ref. ID Energy Dose/Fx (Gy) #Fx Dose Correction (Gy) Total Dose (Gy) Start Date End Date Elapsed Days   IM Esophagus PTV_High 6X 2 25 / 25 0 50 9/5/2023 10/9/2023 34            1/21/2025 -  Chemotherapy    Treatment Summary   Plan Name: OP  GASTROESOPHAGEAL trastuzumab + MFOLFOX6 (oxaliplatin leucovorin fluorouracil) Q2W  Treatment Goal: Control  Status: Active  Start Date: 1/21/2025  End Date: 12/19/2025 (Planned)  Provider: Sahil Singer MD  Chemotherapy: oxaliplatin (ELOXATIN) 85 mg/m2 = 148 mg in D5W 529.6 mL chemo infusion, 85 mg/m2 = 148 mg, Intravenous, Clinic/HOD 1 time, 1 of 24 cycles  fluorouracil (Adrucil) 2,400 mg/m2 = 4,175 mg in 0.9% NaCl 100 mL chemo infusion, 2,400 mg/m2 = 4,175 mg, Intravenous, Over 46 hours, 1 of 24 cycles  trastuzumab-anns (KANJINTI) 384 mg in 0.9% NaCl 250 mL chemo infusion, 6 mg/kg = 384 mg, Intravenous, Clinic/HOD 1 time, 1 of 24 cycles        Interim History:  69 y.o. male with esophageal adenocarcinoma who presents for follow-up prior to cycle 1 of FOLFOX + trastuzumab for his recurrent esophageal adenocarcinoma.  He completed definitive chemoRT in early October 2023.  He continues to have some solid food dysphagia, though liquids go down ok.  Continues to experience frequent belching and hypersalivation.  Had port placed 1/27/25 by Dr. Velez.    Presents alone today. ECOG status is 1.     ROS:   Review of Systems   Constitutional:  Positive for malaise/fatigue and weight loss. Negative for fever.   HENT:  Negative for nosebleeds.    Respiratory:  Negative for shortness of breath.    Cardiovascular:  Negative for chest pain and palpitations.   Gastrointestinal:  Positive for heartburn. Negative for blood in stool, constipation, diarrhea, nausea and vomiting.   Genitourinary:  Negative for hematuria.   Musculoskeletal:  Negative for falls.   Skin:  Negative for itching and rash.   Neurological:  Negative for dizziness, tingling, sensory change and weakness.   Psychiatric/Behavioral:  The patient is not nervous/anxious.      Past Medical History:   Past Medical History:   Diagnosis Date    HTN (hypertension)      Past Surgical History:   Past Surgical History:   Procedure Laterality Date     ESOPHAGOGASTRODUODENOSCOPY N/A 12/19/2024    Procedure: EGD (ESOPHAGOGASTRODUODENOSCOPY);  Surgeon: Pete Buenrostro MD;  Location: [facility] (4TH FLR);  Service: Endoscopy;  Laterality: N/A;  Medically Urgent      12/16 ref by Sahil Singer MD, [facility]  12/18 - pre-call complete; MB    INSERTION OF TUNNELED CENTRAL VENOUS CATHETER (CVC) WITH SUBCUTANEOUS PORT Right 1/27/2025    Procedure: INSERTION, SINGLE LUMEN CATHETER WITH PORT, WITH FLUOROSCOPIC GUIDANCE, left poss right;  Surgeon: Chadd Velez MD;  Location: [facility] OR 2ND FLR;  Service: General;  Laterality: Right;    LAPAROSCOPIC REPAIR OF INGUINAL HERNIA Right      Family History:   No family history on file.     Social History:   Social History     Tobacco Use    Smoking status: Every Day     Current packs/day: 1.00     Types: Cigarettes    Smokeless tobacco: Never   Substance Use Topics    Alcohol use: Yes     Alcohol/week: 1.0 standard drink of alcohol     Types: 1 Glasses of wine per week     Comment: rarely      I have reviewed and updated the patient's past medical, surgical, family and social histories.    Allergies:   Review of patient's allergies indicates:  No Known Allergies     Medications:   Current Outpatient Medications   Medication Sig Dispense Refill    amlodipine-benazepril 10-20mg (LOTREL) 10-20 mg per capsule Take 1 capsule by mouth once daily.      azelastine (ASTELIN) 137 mcg (0.1 %) nasal spray 2 sprays by Nasal route. (Patient not taking: Reported on 1/22/2025)      etodolac (LODINE) 400 MG tablet Take 1 tablet by mouth 2 (two) times daily.      fluticasone propionate (FLONASE) 50 mcg/actuation nasal spray 2 sprays by Each Nostril route.      gabapentin (NEURONTIN) 300 MG capsule Take 300 mg by mouth.      multivitamin with minerals tablet Take 1 tablet by mouth once daily. One A Day Multivitamin      omeprazole (PRILOSEC) 40 MG capsule Take 1 capsule by mouth every morning.      ondansetron (ZOFRAN) 8 MG tablet  TAKE 1 TABLET BY MOUTH EVERY 8 HOURS AS NEEDED FOR NAUSEA 60 tablet 2    ondansetron (ZOFRAN) 8 MG tablet Take 1 tablet (8 mg total) by mouth every 8 (eight) hours as needed for Nausea. 60 tablet 2    pravastatin (PRAVACHOL) 40 MG tablet Take 40 mg by mouth.      TURMERIC ORAL Take by mouth. (Patient not taking: Reported on 1/22/2025)       No current facility-administered medications for this visit.     Facility-Administered Medications Ordered in Other Visits   Medication Dose Route Frequency Provider Last Rate Last Admin    0.9% NaCl 100 mL flush bag   Intravenous 1 time in Clinic/Sahil Martinez MD        alteplase injection 2 mg  2 mg Intra-Catheter PRN Sahil Singer MD        D5W 250 mL flush bag   Intravenous 1 time in Clinic/Sahil Martinez MD        diphenhydrAMINE injection 50 mg  50 mg Intravenous Once PRN Sahil Singer MD        EPINEPHrine (EPIPEN) 0.3 mg/0.3 mL pen injection 0.3 mg  0.3 mg Intramuscular Once PRN Sahil Singer MD        fluorouracil (Adrucil) 2,400 mg/m2 = 4,175 mg in 0.9% NaCl 100 mL chemo infusion  2,400 mg/m2 (Treatment Plan Recorded) Intravenous over 46 hr Sahil Singer MD        heparin, porcine (PF) 100 unit/mL injection flush 500 Units  500 Units Intravenous PRN Sahil Singer MD        hydrocortisone sodium succinate injection 100 mg  100 mg Intravenous Once PRN Sahil Singer MD        oxaliplatin (ELOXATIN) 85 mg/m2 = 148 mg in D5W 529.6 mL chemo infusion  85 mg/m2 (Treatment Plan Recorded) Intravenous 1 time in Clinic/Sahil Martinez MD        palonosetron 0.25mg/dexAMETHasone 12mg in NS IVPB 0.25 mg 50 mL  0.25 mg Intravenous 1 time in Clinic/Sahil Martinez MD        prochlorperazine injection Soln 5 mg  5 mg Intravenous Once PRN Sahil Singer MD        sodium chloride 0.9% flush 10 mL  10 mL Intravenous PRN Sahil Singer., MD        trastuzumab-milton (KANJINTI) 384 mg in 0.9%  "NaCl 250 mL chemo infusion  6 mg/kg (Treatment Plan Recorded) Intravenous 1 time in Clinic/HOD Sahil Singer MD          Physical Exam:   /60 (BP Location: Right arm, Patient Position: Sitting)   Pulse 70   Resp 18   Ht 5' 7" (1.702 m)   Wt 64 kg (140 lb 15.8 oz)   SpO2 97%   BMI 22.08 kg/m²           Physical Exam  Vitals reviewed.   Constitutional:       General: He is not in acute distress.     Appearance: Normal appearance. He is not ill-appearing, toxic-appearing or diaphoretic.   HENT:      Head: Normocephalic and atraumatic.      Right Ear: External ear normal.      Left Ear: External ear normal.      Nose: Nose normal. No congestion.      Mouth/Throat:      Pharynx: Oropharynx is clear. No oropharyngeal exudate.   Eyes:      General: No scleral icterus.     Conjunctiva/sclera: Conjunctivae normal.   Neck:      Comments: Slight hoarseness  Cardiovascular:      Rate and Rhythm: Normal rate.   Pulmonary:      Effort: Pulmonary effort is normal. No respiratory distress.   Abdominal:      General: There is no distension.   Musculoskeletal:      Cervical back: Normal range of motion.      Right lower leg: No edema.      Left lower leg: No edema.   Skin:     General: Skin is warm and dry.      Coloration: Skin is not jaundiced or pale.      Findings: No bruising, erythema or rash.   Neurological:      General: No focal deficit present.      Mental Status: He is alert and oriented to person, place, and time.      Cranial Nerves: No cranial nerve deficit.      Motor: No weakness.      Gait: Gait normal.   Psychiatric:         Mood and Affect: Mood normal.         Behavior: Behavior normal.         Labs:      I have reviewed the pertinent labs from 25. Mild anemia. Cr 1.3.    Imagin/12/24 - CT A/P   Impression:     History of esophageal cancer with persistent suspected thickening of the distal esophagus.  Probable hiatal hernia noted.     No convincing metastatic disease.     Stable " pulmonary micro nodules.     Stable hepatic hypodensity and hyperenhancing nodule.  Attention on follow-up.     2 x 1.6 cm enhancing left renal mass.     8 mm hypodense nodule caudal aspect body of the pancreas.  Attention on follow-up.     Significant plaque in the aortoiliac system as above.     Additional findings above.       Path:   12/19/24:    Gastroesophageal junction mass (biopsy):   Adenocarcinoma, poorly differentiated   HER2 immunostain:  Positive (score 3)     Immunohistochemistry (IHC) Testing for Mismatch Repair (MMR) Proteins:   MLH1, MSH2, MSH6, PMS2 - Intact nuclear expression   Background nonneoplastic tissue/internal control with intact nuclear expression     Interpretation: No loss of nuclear expression of MMR proteins: low probability of microsatellite instability. There are exceptions to the above IHC interpretations. These results should not be considered in isolation, and clinical correlation with   genetic counseling is recommended to assess the need for germline testing.     Assessment:       1. Esophageal adenocarcinoma    2. Hypertension, unspecified type    3. Hyperlipidemia, unspecified hyperlipidemia type    4. Tobacco use    5. Left renal mass                Plan:        # Esophageal adenocarcinoma  Mr. Lugo is a 69 y.o. male who presents to clinic for evaluation of newly diagnosed esophageal cancer. He initially presented with dysphagia, now improved s/p esophageal dilation during EUS. Biopsy confirmed adenocarcinoma.     We reviewed his diagnosis, prognosis, and treatment options with him during our initial visit. Discussed with him the high likelihood that cancer would recur if he was taken straight to surgery without neoadjuvant treatment. Our recommendation is to proceed with weekly carboplatin + taxol in conjunction with daily radiation (M-F, no holidays) for a total of 5-5.5 weeks. Handouts provided to patient on both chemotherapy medications.     He has completed  "Carboplatin AUC 2 + paclitaxel 50 mg/m2 for 5 treatments. Also completed concurrent radiation for definitive non-surgical treatment.    Repeat PET/CT on 11/15/23 showed persistent distal hypermetabolic esophageal tumor (decreasing SUV) without clear evidence of metastatic disease.     Repeat PET/CT on 2/21/24 showed further improvement in hypermetabolism at distal esophagus.    Repeat CT CAP on 4/24/24 showed stable thickening at distal esophagus. Stable left kidney mass c/f malignancy.     Repeat CT CAP 9/9/24 showed stable thickening at distal esophagus.  Repeat CT CAP 12/12/24 showed stable findings.  L renal mass is slightly enlarged.     Given weight decrease, "upset stomach" and increased eructations we got repeat EGD to assess for cancer recurrence.  This was done on 12/19/24 which showed a distal esophageal mass, biopsy confirming poorly differentiated adenocarcinoma, pMMR and HER-2 positive.    Recommended FOLFOX + trastuzumab. No indication for pembro given PD-L1 being negative, per final results of KEYNOTE-811 regimen.    Very mild dysphagia but does not need feeding tube or stent at this time.  Hope to see improvement with his systemic therapy.    TTE 1/28/25 with LVEF 55-60%.  Next due end of April.    Patient was consented for chemotherapy today 1/30/2025 .   An extensive discussion was had which included a thorough discussion of the risk and benefits of treatment and alternatives.  Risks, including but not limited to, possible hair loss, bone marrow damage (anemia, thrombocytopenia, immune suppression, neutropenia), damage to body organs (brain, heart, liver, kidney, lungs, nervous system, skin, and others), allergic reactions, sterility, nausea/vomiting, constipation/diarrhea, sores in the mouth, secondary cancers, local damage at possible injection sites, and rarely death were all discussed.  The patient agrees with the plan, and all questions have been answered to their satisfaction.  Consent was " signed the patient, provider, and a third party witness.       Labs reviewed.  Proceed with cycle 1 of FOLFOX + trastuzumab today.    RTC in 2 weeks for next cycle.    Tempus xT: KRAS G12D, NOTCH1, SMARCA4, TP53; PD-L1 0  PGX: DPYD nml, UGT1A1 intermediate    # HTN, HLD  BP normal in clinic today.    Continue medical management.   Following with PCP.    # Tobacco use  Encouraged cessation.  Previously enrolled in smoking cessation clinic.     # Left renal mass  Slightly enlarged in size on 12/2024 scan.   Saw Dr. Sainz 5/2024 and agreed on surveillance with repeat imaging 5/2025.    Follow up: RTC 2 weeks    Patient is in agreement with the proposed treatment plan. All questions were answered to the patient's satisfaction. Pt knows to call clinic if anything is needed before the next clinic visit.    Sahil Singer MD  Hematology/Oncology  [facility]        Med Onc Chart Routing      Follow up with physician 4 weeks. for FOLFOX + Herceptin   Follow up with LEO 2 weeks. for FOLFOX + Herceptin   Infusion scheduling note    Injection scheduling note    Labs CBC and CMP   Scheduling:  Preferred lab:  Lab interval:     Imaging    Pharmacy appointment    Other referrals

## 2025-01-30 NOTE — PLAN OF CARE
1400-Labs , hx, and medications reviewed, patient was seen by Md prior to arrival. He verbalized understanding zyprexa prescription to start tonight.  Assessment completed. Discussed plan of care with patient. Patient in agreement. Chair reclined and warm blanket and snack offered.

## 2025-01-31 NOTE — PLAN OF CARE
1835- Portable pump infusing on discharge all clamps open and connections secured. No kinks in tubing noted. Settings double checked by NICK valerio RN. Educated patient to be sure pump should say run and volume infused number should increase daily as reservoir volume decreases. Instructed to call with any issues with pump. Phone numbers given. AVS given.  Instructed to call provider for any questions or concerns. Patient will return to clinic at 12 on saturday for pump dc.

## 2025-02-01 ENCOUNTER — INFUSION (OUTPATIENT)
Dept: INFUSION THERAPY | Facility: HOSPITAL | Age: 70
End: 2025-02-01
Payer: MEDICARE

## 2025-02-01 VITALS
DIASTOLIC BLOOD PRESSURE: 58 MMHG | SYSTOLIC BLOOD PRESSURE: 113 MMHG | HEART RATE: 81 BPM | RESPIRATION RATE: 18 BRPM | OXYGEN SATURATION: 99 %

## 2025-02-01 DIAGNOSIS — C15.9 ESOPHAGEAL ADENOCARCINOMA: Primary | ICD-10-CM

## 2025-02-01 PROCEDURE — A4216 STERILE WATER/SALINE, 10 ML: HCPCS | Performed by: INTERNAL MEDICINE

## 2025-02-01 PROCEDURE — 25000003 PHARM REV CODE 250: Performed by: INTERNAL MEDICINE

## 2025-02-01 PROCEDURE — 63600175 PHARM REV CODE 636 W HCPCS: Performed by: INTERNAL MEDICINE

## 2025-02-01 RX ORDER — PROCHLORPERAZINE EDISYLATE 5 MG/ML
5 INJECTION INTRAMUSCULAR; INTRAVENOUS ONCE AS NEEDED
Status: DISCONTINUED | OUTPATIENT
Start: 2025-02-01 | End: 2025-02-01 | Stop reason: HOSPADM

## 2025-02-01 RX ORDER — HEPARIN 100 UNIT/ML
500 SYRINGE INTRAVENOUS
Status: DISCONTINUED | OUTPATIENT
Start: 2025-02-01 | End: 2025-02-01 | Stop reason: HOSPADM

## 2025-02-01 RX ORDER — SODIUM CHLORIDE 0.9 % (FLUSH) 0.9 %
10 SYRINGE (ML) INJECTION
Status: DISCONTINUED | OUTPATIENT
Start: 2025-02-01 | End: 2025-02-01 | Stop reason: HOSPADM

## 2025-02-01 RX ADMIN — HEPARIN SODIUM (PORCINE) LOCK FLUSH IV SOLN 100 UNIT/ML 500 UNITS: 100 SOLUTION at 12:02

## 2025-02-01 RX ADMIN — Medication 10 ML: at 12:02

## 2025-02-01 NOTE — PLAN OF CARE
1224 Pt tolerated home infusion well, no complaints or complications reported, VSS, vessel empty upon arrival. Pt disconnected from pump, positive blood return noted. Pt aware to call provider with any questions or concerns, aware of upcoming appts, ambulatory from clinic with steady gait, no distress noted.

## 2025-02-13 ENCOUNTER — OFFICE VISIT (OUTPATIENT)
Dept: HEMATOLOGY/ONCOLOGY | Facility: CLINIC | Age: 70
End: 2025-02-13
Payer: MEDICARE

## 2025-02-13 ENCOUNTER — INFUSION (OUTPATIENT)
Dept: INFUSION THERAPY | Facility: HOSPITAL | Age: 70
End: 2025-02-13
Payer: MEDICARE

## 2025-02-13 VITALS
OXYGEN SATURATION: 99 % | SYSTOLIC BLOOD PRESSURE: 119 MMHG | BODY MASS INDEX: 22.89 KG/M2 | HEART RATE: 77 BPM | DIASTOLIC BLOOD PRESSURE: 63 MMHG | RESPIRATION RATE: 16 BRPM | WEIGHT: 145.81 LBS | TEMPERATURE: 98 F | HEIGHT: 67 IN

## 2025-02-13 VITALS
BODY MASS INDEX: 22.89 KG/M2 | SYSTOLIC BLOOD PRESSURE: 119 MMHG | WEIGHT: 145.81 LBS | TEMPERATURE: 98 F | RESPIRATION RATE: 16 BRPM | DIASTOLIC BLOOD PRESSURE: 63 MMHG | HEART RATE: 77 BPM | HEIGHT: 67 IN

## 2025-02-13 DIAGNOSIS — D84.821 IMMUNODEFICIENCY SECONDARY TO CHEMOTHERAPY: ICD-10-CM

## 2025-02-13 DIAGNOSIS — D49.9 IMMUNODEFICIENCY SECONDARY TO NEOPLASM: ICD-10-CM

## 2025-02-13 DIAGNOSIS — N28.89 LEFT RENAL MASS: ICD-10-CM

## 2025-02-13 DIAGNOSIS — D84.81 IMMUNODEFICIENCY SECONDARY TO NEOPLASM: ICD-10-CM

## 2025-02-13 DIAGNOSIS — C15.9 ESOPHAGEAL ADENOCARCINOMA: Primary | ICD-10-CM

## 2025-02-13 DIAGNOSIS — Z72.0 TOBACCO USE: ICD-10-CM

## 2025-02-13 DIAGNOSIS — T45.1X5A IMMUNODEFICIENCY SECONDARY TO CHEMOTHERAPY: ICD-10-CM

## 2025-02-13 DIAGNOSIS — I10 HYPERTENSION, UNSPECIFIED TYPE: ICD-10-CM

## 2025-02-13 DIAGNOSIS — E78.5 HYPERLIPIDEMIA, UNSPECIFIED HYPERLIPIDEMIA TYPE: ICD-10-CM

## 2025-02-13 DIAGNOSIS — Z79.899 IMMUNODEFICIENCY SECONDARY TO CHEMOTHERAPY: ICD-10-CM

## 2025-02-13 PROCEDURE — 99999 PR PBB SHADOW E&M-EST. PATIENT-LVL V: CPT | Mod: PBBFAC,,, | Performed by: REGISTERED NURSE

## 2025-02-13 PROCEDURE — 96417 CHEMO IV INFUS EACH ADDL SEQ: CPT

## 2025-02-13 PROCEDURE — 96413 CHEMO IV INFUSION 1 HR: CPT

## 2025-02-13 PROCEDURE — 99215 OFFICE O/P EST HI 40 MIN: CPT | Mod: PBBFAC | Performed by: REGISTERED NURSE

## 2025-02-13 PROCEDURE — 96416 CHEMO PROLONG INFUSE W/PUMP: CPT

## 2025-02-13 PROCEDURE — 63600175 PHARM REV CODE 636 W HCPCS: Performed by: REGISTERED NURSE

## 2025-02-13 PROCEDURE — 99215 OFFICE O/P EST HI 40 MIN: CPT | Mod: S$PBB,,, | Performed by: REGISTERED NURSE

## 2025-02-13 PROCEDURE — 96415 CHEMO IV INFUSION ADDL HR: CPT

## 2025-02-13 PROCEDURE — G2211 COMPLEX E/M VISIT ADD ON: HCPCS | Mod: S$PBB,,, | Performed by: REGISTERED NURSE

## 2025-02-13 PROCEDURE — 25000003 PHARM REV CODE 250: Performed by: REGISTERED NURSE

## 2025-02-13 PROCEDURE — 96367 TX/PROPH/DG ADDL SEQ IV INF: CPT

## 2025-02-13 RX ORDER — PROCHLORPERAZINE EDISYLATE 5 MG/ML
5 INJECTION INTRAMUSCULAR; INTRAVENOUS ONCE AS NEEDED
Status: CANCELLED | OUTPATIENT
Start: 2025-02-13

## 2025-02-13 RX ORDER — HEPARIN 100 UNIT/ML
500 SYRINGE INTRAVENOUS
Status: CANCELLED | OUTPATIENT
Start: 2025-02-14

## 2025-02-13 RX ORDER — SODIUM CHLORIDE 0.9 % (FLUSH) 0.9 %
10 SYRINGE (ML) INJECTION
Status: CANCELLED | OUTPATIENT
Start: 2025-02-13

## 2025-02-13 RX ORDER — EPINEPHRINE 0.3 MG/.3ML
0.3 INJECTION SUBCUTANEOUS ONCE AS NEEDED
Status: DISCONTINUED | OUTPATIENT
Start: 2025-02-13 | End: 2025-02-13 | Stop reason: HOSPADM

## 2025-02-13 RX ORDER — DIPHENHYDRAMINE HYDROCHLORIDE 50 MG/ML
50 INJECTION INTRAMUSCULAR; INTRAVENOUS ONCE AS NEEDED
Status: DISCONTINUED | OUTPATIENT
Start: 2025-02-13 | End: 2025-02-13 | Stop reason: HOSPADM

## 2025-02-13 RX ORDER — DIPHENHYDRAMINE HYDROCHLORIDE 50 MG/ML
50 INJECTION INTRAMUSCULAR; INTRAVENOUS ONCE AS NEEDED
Status: CANCELLED | OUTPATIENT
Start: 2025-02-13

## 2025-02-13 RX ORDER — SODIUM CHLORIDE 0.9 % (FLUSH) 0.9 %
10 SYRINGE (ML) INJECTION
Status: DISCONTINUED | OUTPATIENT
Start: 2025-02-13 | End: 2025-02-13 | Stop reason: HOSPADM

## 2025-02-13 RX ORDER — HEPARIN 100 UNIT/ML
500 SYRINGE INTRAVENOUS
Status: DISCONTINUED | OUTPATIENT
Start: 2025-02-13 | End: 2025-02-13 | Stop reason: HOSPADM

## 2025-02-13 RX ORDER — PROCHLORPERAZINE EDISYLATE 5 MG/ML
5 INJECTION INTRAMUSCULAR; INTRAVENOUS ONCE AS NEEDED
Status: DISCONTINUED | OUTPATIENT
Start: 2025-02-13 | End: 2025-02-13 | Stop reason: HOSPADM

## 2025-02-13 RX ORDER — PROCHLORPERAZINE EDISYLATE 5 MG/ML
5 INJECTION INTRAMUSCULAR; INTRAVENOUS ONCE AS NEEDED
Status: CANCELLED | OUTPATIENT
Start: 2025-02-14

## 2025-02-13 RX ORDER — HEPARIN 100 UNIT/ML
500 SYRINGE INTRAVENOUS
Status: CANCELLED | OUTPATIENT
Start: 2025-02-13

## 2025-02-13 RX ORDER — EPINEPHRINE 0.3 MG/.3ML
0.3 INJECTION SUBCUTANEOUS ONCE AS NEEDED
Status: CANCELLED | OUTPATIENT
Start: 2025-02-13

## 2025-02-13 RX ORDER — SODIUM CHLORIDE 0.9 % (FLUSH) 0.9 %
10 SYRINGE (ML) INJECTION
Status: CANCELLED | OUTPATIENT
Start: 2025-02-14

## 2025-02-13 RX ADMIN — DEXAMETHASONE SODIUM PHOSPHATE 0.25 MG: 4 INJECTION, SOLUTION INTRA-ARTICULAR; INTRALESIONAL; INTRAMUSCULAR; INTRAVENOUS; SOFT TISSUE at 10:02

## 2025-02-13 RX ADMIN — SODIUM CHLORIDE: 9 INJECTION, SOLUTION INTRAVENOUS at 09:02

## 2025-02-13 RX ADMIN — OXALIPLATIN 148 MG: 5 INJECTION, SOLUTION INTRAVENOUS at 11:02

## 2025-02-13 RX ADMIN — TRASTUZUMAB-ANNS 256 MG: 420 INJECTION, POWDER, LYOPHILIZED, FOR SOLUTION INTRAVENOUS at 10:02

## 2025-02-13 RX ADMIN — DEXTROSE MONOHYDRATE: 50 INJECTION, SOLUTION INTRAVENOUS at 10:02

## 2025-02-13 RX ADMIN — FLUOROURACIL 4000 MG: 50 INJECTION, SOLUTION INTRAVENOUS at 01:02

## 2025-02-13 NOTE — PROGRESS NOTES
MEDICAL ONCOLOGY - ESTABLISHED PATIENT VISIT    Reason for visit: esophageal adenocarcinoma     Best Contact Phone Number(s): There are no phone numbers on file.     Cancer/Stage/TNM:    Cancer Staging   Esophageal adenocarcinoma  Staging form: Esophagus - Adenocarcinoma, AJCC 8th Edition  - Clinical: Stage III (cT2, cN1, cM0, G2) - Signed by Miguel Angel Hampton Jr., MD on 7/31/2023       Oncology History   Esophageal adenocarcinoma   7/31/2023 Initial Diagnosis    Esophageal adenocarcinoma     7/31/2023 Cancer Staged    Staging form: Esophagus - Adenocarcinoma, AJCC 8th Edition  - Clinical: Stage III (cT2, cN1, cM0, G2)     9/5/2023 - 10/5/2023 Chemotherapy    Treatment Summary   Plan Name: OP ESOPHAGEAL PACLITAXEL CARBOPLATIN WEEKLY  Treatment Goal: Curative  Status: Inactive  Start Date: 9/5/2023  End Date: 10/5/2023  Provider: Sahil Singer MD  Chemotherapy: CARBOplatin (PARAPLATIN) 195 mg in sodium chloride 0.9% 304.5 mL chemo infusion, 195 mg (100 % of original dose 193.4 mg), Intravenous, Clinic/HOD 1 time, 1 of 1 cycle  Dose modification:   (original dose 193.4 mg, Cycle 1), 193.4 mg (original dose 193.4 mg, Cycle 1),   (original dose 193.4 mg, Cycle 1, Reason: MD Discretion)  Administration: 195 mg (9/5/2023), 225 mg (9/12/2023), 210 mg (9/19/2023), 180 mg (9/26/2023), 195 mg (10/5/2023)  PACLitaxeL (TAXOL) 50 mg/m2 = 96 mg in sodium chloride 0.9% 250 mL chemo infusion, 50 mg/m2 = 96 mg, Intravenous, Clinic/HOD 1 time, 1 of 1 cycle  Administration: 96 mg (9/5/2023), 96 mg (9/12/2023), 96 mg (9/19/2023), 96 mg (9/26/2023), 96 mg (10/5/2023)     9/5/2023 - 10/9/2023 Radiation Therapy    Treating physician: Anshul Neri    Course: C1 [place] 2023    Treatment Site Ref. ID Energy Dose/Fx (Gy) #Fx Dose Correction (Gy) Total Dose (Gy) Start Date End Date Elapsed Days   IM Esophagus PTV_High 6X 2 25 / 25 0 50 9/5/2023 10/9/2023 34            1/21/2025 -  Chemotherapy    Treatment Summary   Plan Name: OP  GASTROESOPHAGEAL trastuzumab + MFOLFOX6 (oxaliplatin leucovorin fluorouracil) Q2W  Treatment Goal: Control  Status: Active  Start Date: 1/21/2025  End Date: 12/19/2025 (Planned)  Provider: Sahil Singer MD  Chemotherapy: oxaliplatin (ELOXATIN) 85 mg/m2 = 148 mg in D5W 594.6 mL chemo infusion, 85 mg/m2 = 148 mg, Intravenous, Clinic/HOD 1 time, 2 of 24 cycles  Administration: 148 mg (1/30/2025)  fluorouracil (Adrucil) 4,000 mg in 0.9% NaCl 100 mL chemo infusion, 4,175 mg, Intravenous, Over 46 hours, 2 of 24 cycles  Administration: 4,000 mg (1/30/2025)  trastuzumab-anns (KANJINTI) 384 mg in 0.9% NaCl 303.3 mL chemo infusion, 6 mg/kg = 384 mg, Intravenous, Clinic/HOD 1 time, 2 of 24 cycles  Administration: 384 mg (1/30/2025)        Interim History:  Mr. Lugo presents today prior to cycle 2 FOLFOX + trastuzumab. Tolerated fairly well overall. Had trouble sleeping on the first night d/t trying to get used to pump. Feels his swallowing is a little better. Was able to tolerate some of a Filet-O-Fish and Quarter Pounder if he took his time eating. He was also able to tolerate a piece of toast with his soup when he thoroughly chewed, which he could not do before. Nausea improved. Cold sensitivity lasted ~1.5 weeks. No persistent paresthesias. Has BM most mornings. No diarrhea. States legs feel cold in general. No color change, tenderness, or swelling.     Presents alone today. ECOG status is 1.     ROS:   Review of Systems   Constitutional:  Positive for malaise/fatigue and weight loss. Negative for fever.   HENT:  Negative for nosebleeds.    Respiratory:  Negative for shortness of breath.    Cardiovascular:  Negative for chest pain and palpitations.   Gastrointestinal:  Positive for heartburn. Negative for blood in stool, constipation, diarrhea, nausea and vomiting.   Genitourinary:  Negative for hematuria.   Musculoskeletal:  Negative for falls.   Skin:  Negative for itching and rash.   Neurological:  Negative for  dizziness, tingling, sensory change and weakness.   Psychiatric/Behavioral:  The patient is not nervous/anxious.      Past Medical History:   Past Medical History:   Diagnosis Date    HTN (hypertension)      Past Surgical History:   Past Surgical History:   Procedure Laterality Date    ESOPHAGOGASTRODUODENOSCOPY N/A 12/19/2024    Procedure: EGD (ESOPHAGOGASTRODUODENOSCOPY);  Surgeon: Pete Buenrostro MD;  Location: [facility] (4TH FLR);  Service: Endoscopy;  Laterality: N/A;  Medically Urgent      12/16 ref by Sahil Singer MD, [place]  12/18 - pre-call complete; MB    INSERTION OF TUNNELED CENTRAL VENOUS CATHETER (CVC) WITH SUBCUTANEOUS PORT Right 1/27/2025    Procedure: INSERTION, SINGLE LUMEN CATHETER WITH PORT, WITH FLUOROSCOPIC GUIDANCE, left poss right;  Surgeon: Chadd Velez MD;  Location: [facility] OR 2ND FLR;  Service: General;  Laterality: Right;    LAPAROSCOPIC REPAIR OF INGUINAL HERNIA Right      Family History:   No family history on file.     Social History:   Social History     Tobacco Use    Smoking status: Every Day     Current packs/day: 1.00     Types: Cigarettes    Smokeless tobacco: Never   Substance Use Topics    Alcohol use: Yes     Alcohol/week: 1.0 standard drink of alcohol     Types: 1 Glasses of wine per week     Comment: rarely      I have reviewed and updated the patient's past medical, surgical, family and social histories.    Allergies:   Review of patient's allergies indicates:  No Known Allergies     Medications:   Current Outpatient Medications   Medication Sig Dispense Refill    amlodipine-benazepril 10-20mg (LOTREL) 10-20 mg per capsule Take 1 capsule by mouth once daily.      azelastine (ASTELIN) 137 mcg (0.1 %) nasal spray 2 sprays by Nasal route.      etodolac (LODINE) 400 MG tablet Take 1 tablet by mouth 2 (two) times daily.      fluticasone propionate (FLONASE) 50 mcg/actuation nasal spray 2 sprays by Each Nostril route.      gabapentin (NEURONTIN) 300 MG  capsule Take 300 mg by mouth.      LIDOcaine-prilocaine (EMLA) cream Apply topically as needed. 30 g 1    multivitamin with minerals tablet Take 1 tablet by mouth once daily. One A Day Multivitamin      OLANZapine (ZYPREXA) 5 MG tablet Take 1 tab at nighttime on nights 1 thru 3 of each chemotherapy cycle. 30 tablet 2    omeprazole (PRILOSEC) 40 MG capsule Take 1 capsule by mouth every morning.      ondansetron (ZOFRAN) 8 MG tablet TAKE 1 TABLET BY MOUTH EVERY 8 HOURS AS NEEDED FOR NAUSEA 60 tablet 2    ondansetron (ZOFRAN) 8 MG tablet Take 1 tablet (8 mg total) by mouth every 8 (eight) hours as needed for Nausea. 60 tablet 2    pravastatin (PRAVACHOL) 40 MG tablet Take 40 mg by mouth.      TURMERIC ORAL Take by mouth. (Patient not taking: Reported on 2/13/2025)       No current facility-administered medications for this visit.     Facility-Administered Medications Ordered in Other Visits   Medication Dose Route Frequency Provider Last Rate Last Admin    alteplase injection 2 mg  2 mg Intra-Catheter PRN Aleshia Giraldo, CNS        D5W 250 mL flush bag   Intravenous 1 time in Clinic/HOD Aleshia Giraldo, CNS        diphenhydrAMINE injection 50 mg  50 mg Intravenous Once PRN Aleshia Giraldo, CNS        EPINEPHrine (EPIPEN) 0.3 mg/0.3 mL pen injection 0.3 mg  0.3 mg Intramuscular Once PRN Aleshia Giraldo, CNS        fluorouracil (Adrucil) 4,000 mg in 0.9% NaCl 100 mL chemo infusion  4,000 mg Intravenous over 46 hr Aleshia Giraldo, CNS        heparin, porcine (PF) 100 unit/mL injection flush 500 Units  500 Units Intravenous PRN Aleshia Giraldo, CNS        hydrocortisone sodium succinate injection 100 mg  100 mg Intravenous Once PRN Aleshia Giraldo, CNS        oxaliplatin (ELOXATIN) 85 mg/m2 = 148 mg in D5W 594.6 mL chemo infusion  85 mg/m2 (Treatment Plan Recorded) Intravenous 1 time in Clinic/[facility] Aleshia Giraldo, CNS        palonosetron 0.25mg/dexAMETHasone 12mg in NS IVPB 0.25 mg 50 mL  0.25 mg Intravenous 1  "time in Clinic/HOD Aleshia Giraldo CNS        prochlorperazine injection Soln 5 mg  5 mg Intravenous Once PRN Aleshia Giraldo CNS        sodium chloride 0.9% flush 10 mL  10 mL Intravenous PRN Aleshia Giraldo CNS        trastuzumab-anns (KANJINTI) 256 mg in 0.9% NaCl 297.2 mL chemo infusion  4 mg/kg (Treatment Plan Recorded) Intravenous 1 time in Clinic/HOD Aleshia Giraldo CNS          Physical Exam:   /63 (BP Location: Left arm, Patient Position: Sitting)   Pulse 77   Temp 98.1 °F (36.7 °C) (Oral)   Resp 16   Ht 5' 7" (1.702 m)   Wt 66.2 kg (145 lb 13.4 oz)   SpO2 99%   BMI 22.84 kg/m²           Physical Exam  Vitals reviewed.   Constitutional:       General: He is not in acute distress.     Appearance: Normal appearance. He is not ill-appearing, toxic-appearing or diaphoretic.   HENT:      Head: Normocephalic and atraumatic.      Right Ear: External ear normal.      Left Ear: External ear normal.      Nose: Nose normal. No congestion.      Mouth/Throat:      Pharynx: Oropharynx is clear. No oropharyngeal exudate.   Eyes:      General: No scleral icterus.     Conjunctiva/sclera: Conjunctivae normal.   Neck:      Comments: Slight hoarseness  Cardiovascular:      Rate and Rhythm: Normal rate.   Pulmonary:      Effort: Pulmonary effort is normal. No respiratory distress.   Abdominal:      General: There is no distension.   Musculoskeletal:      Cervical back: Normal range of motion.      Right lower leg: No edema.      Left lower leg: No edema.   Skin:     General: Skin is warm and dry.      Coloration: Skin is not jaundiced or pale.      Findings: No bruising, erythema or rash.   Neurological:      General: No focal deficit present.      Mental Status: He is alert and oriented to person, place, and time.      Cranial Nerves: No cranial nerve deficit.      Motor: No weakness.      Gait: Gait normal.   Psychiatric:         Mood and Affect: Mood normal.         Behavior: Behavior normal.       "   Labs:      Labs reviewed.     Imagin/12/24 - CT A/P   Impression:     History of esophageal cancer with persistent suspected thickening of the distal esophagus.  Probable hiatal hernia noted.     No convincing metastatic disease.     Stable pulmonary micro nodules.     Stable hepatic hypodensity and hyperenhancing nodule.  Attention on follow-up.     2 x 1.6 cm enhancing left renal mass.     8 mm hypodense nodule caudal aspect body of the pancreas.  Attention on follow-up.     Significant plaque in the aortoiliac system as above.     Additional findings above.       Path:   24:    Gastroesophageal junction mass (biopsy):   Adenocarcinoma, poorly differentiated   HER2 immunostain:  Positive (score 3)     Immunohistochemistry (IHC) Testing for Mismatch Repair (MMR) Proteins:   MLH1, MSH2, MSH6, PMS2 - Intact nuclear expression   Background nonneoplastic tissue/internal control with intact nuclear expression     Interpretation: No loss of nuclear expression of MMR proteins: low probability of microsatellite instability. There are exceptions to the above IHC interpretations. These results should not be considered in isolation, and clinical correlation with   genetic counseling is recommended to assess the need for germline testing.     Assessment:       1. Esophageal adenocarcinoma    2. 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 Hypertension, unspecified type    5. Hyperlipidemia, unspecified hyperlipidemia type    6. Tobacco use    7. Left renal mass        Plan:        # Esophageal adenocarcinoma  Mr. Lugo is a 69 y.o. male who presents to clinic for evaluation of newly diagnosed esophageal cancer. He initially presented with dysphagia, now improved s/p esophageal dilation during EUS. Biopsy confirmed adenocarcinoma.     We reviewed his diagnosis, prognosis, and treatment options with him during our initial visit. Discussed with him the high  "likelihood that cancer would recur if he was taken straight to surgery without neoadjuvant treatment. Our recommendation is to proceed with weekly carboplatin + taxol in conjunction with daily radiation (M-F, no holidays) for a total of 5-5.5 weeks. Handouts provided to patient on both chemotherapy medications.     He has completed Carboplatin AUC 2 + paclitaxel 50 mg/m2 for 5 treatments. Also completed concurrent radiation for definitive non-surgical treatment.    Repeat PET/CT on 11/15/23 showed persistent distal hypermetabolic esophageal tumor (decreasing SUV) without clear evidence of metastatic disease.     Repeat PET/CT on 2/21/24 showed further improvement in hypermetabolism at distal esophagus.    Repeat CT CAP on 4/24/24 showed stable thickening at distal esophagus. Stable left kidney mass c/f malignancy.     Repeat CT CAP 9/9/24 showed stable thickening at distal esophagus.  Repeat CT CAP 12/12/24 showed stable findings.  L renal mass is slightly enlarged.     Given weight decrease, "upset stomach" and increased eructations we got repeat EGD to assess for cancer recurrence.  This was done on 12/19/24 which showed a distal esophageal mass, biopsy confirming poorly differentiated adenocarcinoma, pMMR and HER-2 positive.    Recommended FOLFOX + trastuzumab. No indication for pembro given PD-L1 being negative, per final results of KEYNOTE-811 regimen.    Very mild dysphagia but does not need feeding tube or stent at this time.  Hope to see improvement with his systemic therapy.    TTE 1/28/25 with LVEF 55-60%.  Next due end of April.    Received C1 FOLFOX + trastuzumab on 1/30/25. Tolerated well overall.     Labs reviewed and adequate for treatment.   Proceed with cycle 2 of FOLFOX + trastuzumab today.    RTC in 2 weeks for next cycle.    Tempus xT: KRAS G12D, NOTCH1, SMARCA4, TP53; PD-L1 0  PGX: DPYD nml, UGT1A1 intermediate    # HTN, HLD  BP normal in clinic today.    Continue medical management.   Following " with PCP.    # Tobacco use  Encouraged cessation.  Previously enrolled in smoking cessation clinic.     # Left renal mass  Slightly enlarged in size on 12/2024 scan.   Saw Dr. Sainz 5/2024 and agreed on surveillance with repeat imaging 5/2025.    Follow up: RTC 2 weeks    Patient is in agreement with the proposed treatment plan. All questions were answered to the patient's satisfaction. Pt knows to call clinic if anything is needed before the next clinic visit.    Patient discussed with collaborating physician, Dr. Singer.    At least 40 minutes were spent today on this encounter including face to face time with the patient, data gathering/interpretation and documentation.       Aleshia Giraldo, MSN, APRN, [facility]-  Hematology and Medical Oncology  Clinical Nurse Specialist to Dr. Singer, Dr. Wheeler & Dr. Petersen Onc Chart Routing      Follow up with physician 6 weeks. with labs and scans 1-2 days prior to see Dr. Singer for scan review and chemo infusion to follow   Follow up with [facility] 2 weeks and 4 weeks. with labs for chemo   Infusion scheduling note   chemo every 2 weeks, pump d/c on day 3   Injection scheduling note    Labs CBC and CMP   Scheduling:  Preferred lab:  Lab interval: every 2 weeks     Imaging CT chest abdomen pelvis   1-2 days prior to MD visit in 6 weeks.   Pharmacy appointment    Other referrals

## 2025-02-13 NOTE — PLAN OF CARE
1315 pt tolerated tx. Port connected to pump, RUN noted and counting down prior to d/c from clinic. Pt instructed to return to clinic for pump d/c on 2/15/25 at 11:00 am. Pt instructed Saturday clinic, pt verbalized he is aware.

## 2025-02-15 ENCOUNTER — INFUSION (OUTPATIENT)
Dept: INFUSION THERAPY | Facility: HOSPITAL | Age: 70
End: 2025-02-15
Payer: MEDICARE

## 2025-02-15 VITALS
SYSTOLIC BLOOD PRESSURE: 133 MMHG | TEMPERATURE: 98 F | OXYGEN SATURATION: 99 % | HEART RATE: 74 BPM | DIASTOLIC BLOOD PRESSURE: 63 MMHG | RESPIRATION RATE: 18 BRPM

## 2025-02-15 DIAGNOSIS — C15.9 ESOPHAGEAL ADENOCARCINOMA: Primary | ICD-10-CM

## 2025-02-15 PROCEDURE — 63600175 PHARM REV CODE 636 W HCPCS: Performed by: REGISTERED NURSE

## 2025-02-15 PROCEDURE — 25000003 PHARM REV CODE 250: Performed by: REGISTERED NURSE

## 2025-02-15 PROCEDURE — A4216 STERILE WATER/SALINE, 10 ML: HCPCS | Performed by: REGISTERED NURSE

## 2025-02-15 RX ORDER — PROCHLORPERAZINE EDISYLATE 5 MG/ML
5 INJECTION INTRAMUSCULAR; INTRAVENOUS ONCE AS NEEDED
Status: DISCONTINUED | OUTPATIENT
Start: 2025-02-15 | End: 2025-02-15 | Stop reason: HOSPADM

## 2025-02-15 RX ORDER — HEPARIN 100 UNIT/ML
500 SYRINGE INTRAVENOUS
Status: DISCONTINUED | OUTPATIENT
Start: 2025-02-15 | End: 2025-02-15 | Stop reason: HOSPADM

## 2025-02-15 RX ORDER — SODIUM CHLORIDE 0.9 % (FLUSH) 0.9 %
10 SYRINGE (ML) INJECTION
Status: DISCONTINUED | OUTPATIENT
Start: 2025-02-15 | End: 2025-02-15 | Stop reason: HOSPADM

## 2025-02-15 RX ADMIN — Medication 10 ML: at 10:02

## 2025-02-15 RX ADMIN — HEPARIN SODIUM (PORCINE) LOCK FLUSH IV SOLN 100 UNIT/ML 500 UNITS: 100 SOLUTION at 10:02

## 2025-02-26 ENCOUNTER — LAB VISIT (OUTPATIENT)
Dept: LAB | Facility: HOSPITAL | Age: 70
End: 2025-02-26
Attending: INTERNAL MEDICINE
Payer: MEDICARE

## 2025-02-26 ENCOUNTER — OFFICE VISIT (OUTPATIENT)
Dept: HEMATOLOGY/ONCOLOGY | Facility: CLINIC | Age: 70
End: 2025-02-26
Payer: MEDICARE

## 2025-02-26 VITALS
HEART RATE: 69 BPM | DIASTOLIC BLOOD PRESSURE: 80 MMHG | WEIGHT: 145.31 LBS | RESPIRATION RATE: 16 BRPM | SYSTOLIC BLOOD PRESSURE: 121 MMHG | TEMPERATURE: 98 F | HEIGHT: 67 IN | BODY MASS INDEX: 22.81 KG/M2 | OXYGEN SATURATION: 97 %

## 2025-02-26 DIAGNOSIS — D84.81 IMMUNODEFICIENCY SECONDARY TO NEOPLASM: ICD-10-CM

## 2025-02-26 DIAGNOSIS — C15.9 ESOPHAGEAL ADENOCARCINOMA: Primary | ICD-10-CM

## 2025-02-26 DIAGNOSIS — I10 HYPERTENSION, UNSPECIFIED TYPE: ICD-10-CM

## 2025-02-26 DIAGNOSIS — D84.821 IMMUNODEFICIENCY SECONDARY TO CHEMOTHERAPY: ICD-10-CM

## 2025-02-26 DIAGNOSIS — Z79.899 IMMUNODEFICIENCY SECONDARY TO CHEMOTHERAPY: ICD-10-CM

## 2025-02-26 DIAGNOSIS — Z72.0 TOBACCO USE: ICD-10-CM

## 2025-02-26 DIAGNOSIS — E78.5 HYPERLIPIDEMIA, UNSPECIFIED HYPERLIPIDEMIA TYPE: ICD-10-CM

## 2025-02-26 DIAGNOSIS — D49.9 IMMUNODEFICIENCY SECONDARY TO NEOPLASM: ICD-10-CM

## 2025-02-26 DIAGNOSIS — N28.89 LEFT RENAL MASS: ICD-10-CM

## 2025-02-26 DIAGNOSIS — T45.1X5A IMMUNODEFICIENCY SECONDARY TO CHEMOTHERAPY: ICD-10-CM

## 2025-02-26 DIAGNOSIS — C15.9 ESOPHAGEAL ADENOCARCINOMA: ICD-10-CM

## 2025-02-26 LAB
ALBUMIN SERPL BCP-MCNC: 3.3 G/DL (ref 3.5–5.2)
ALP SERPL-CCNC: 109 U/L (ref 40–150)
ALT SERPL W/O P-5'-P-CCNC: 20 U/L (ref 10–44)
ANION GAP SERPL CALC-SCNC: 7 MMOL/L (ref 8–16)
AST SERPL-CCNC: 24 U/L (ref 10–40)
BILIRUB SERPL-MCNC: 0.6 MG/DL (ref 0.1–1)
BUN SERPL-MCNC: 16 MG/DL (ref 8–23)
CALCIUM SERPL-MCNC: 9.2 MG/DL (ref 8.7–10.5)
CHLORIDE SERPL-SCNC: 108 MMOL/L (ref 95–110)
CO2 SERPL-SCNC: 23 MMOL/L (ref 23–29)
CREAT SERPL-MCNC: 1.1 MG/DL (ref 0.5–1.4)
ERYTHROCYTE [DISTWIDTH] IN BLOOD BY AUTOMATED COUNT: 16.6 % (ref 11.5–14.5)
EST. GFR  (NO RACE VARIABLE): >60 ML/MIN/1.73 M^2
GLUCOSE SERPL-MCNC: 142 MG/DL (ref 70–110)
HCT VFR BLD AUTO: 33.8 % (ref 40–54)
HGB BLD-MCNC: 11.1 G/DL (ref 14–18)
IMM GRANULOCYTES # BLD AUTO: 0.08 K/UL (ref 0–0.04)
MCH RBC QN AUTO: 27.5 PG (ref 27–31)
MCHC RBC AUTO-ENTMCNC: 32.8 G/DL (ref 32–36)
MCV RBC AUTO: 84 FL (ref 82–98)
NEUTROPHILS # BLD AUTO: 5.6 K/UL (ref 1.8–7.7)
PLATELET # BLD AUTO: 157 K/UL (ref 150–450)
PMV BLD AUTO: 8.2 FL (ref 9.2–12.9)
POTASSIUM SERPL-SCNC: 3.6 MMOL/L (ref 3.5–5.1)
PROT SERPL-MCNC: 6.1 G/DL (ref 6–8.4)
RBC # BLD AUTO: 4.04 M/UL (ref 4.6–6.2)
SODIUM SERPL-SCNC: 138 MMOL/L (ref 136–145)
WBC # BLD AUTO: 7.11 K/UL (ref 3.9–12.7)

## 2025-02-26 PROCEDURE — 99214 OFFICE O/P EST MOD 30 MIN: CPT | Mod: PBBFAC | Performed by: PHYSICIAN ASSISTANT

## 2025-02-26 PROCEDURE — 36415 COLL VENOUS BLD VENIPUNCTURE: CPT | Performed by: INTERNAL MEDICINE

## 2025-02-26 PROCEDURE — G2211 COMPLEX E/M VISIT ADD ON: HCPCS | Mod: S$PBB,,, | Performed by: PHYSICIAN ASSISTANT

## 2025-02-26 PROCEDURE — 85027 COMPLETE CBC AUTOMATED: CPT | Performed by: INTERNAL MEDICINE

## 2025-02-26 PROCEDURE — 80053 COMPREHEN METABOLIC PANEL: CPT | Performed by: INTERNAL MEDICINE

## 2025-02-26 PROCEDURE — 99215 OFFICE O/P EST HI 40 MIN: CPT | Mod: S$PBB,,, | Performed by: PHYSICIAN ASSISTANT

## 2025-02-26 PROCEDURE — 99999 PR PBB SHADOW E&M-EST. PATIENT-LVL IV: CPT | Mod: PBBFAC,,, | Performed by: PHYSICIAN ASSISTANT

## 2025-02-26 RX ORDER — SODIUM CHLORIDE 0.9 % (FLUSH) 0.9 %
10 SYRINGE (ML) INJECTION
Status: CANCELLED | OUTPATIENT
Start: 2025-02-27

## 2025-02-26 RX ORDER — PROCHLORPERAZINE EDISYLATE 5 MG/ML
5 INJECTION INTRAMUSCULAR; INTRAVENOUS ONCE AS NEEDED
Status: CANCELLED | OUTPATIENT
Start: 2025-03-01

## 2025-02-26 RX ORDER — SODIUM CHLORIDE 0.9 % (FLUSH) 0.9 %
10 SYRINGE (ML) INJECTION
Status: CANCELLED | OUTPATIENT
Start: 2025-03-01

## 2025-02-26 RX ORDER — EPINEPHRINE 0.3 MG/.3ML
0.3 INJECTION SUBCUTANEOUS ONCE AS NEEDED
Status: CANCELLED | OUTPATIENT
Start: 2025-02-27

## 2025-02-26 RX ORDER — HEPARIN 100 UNIT/ML
500 SYRINGE INTRAVENOUS
Status: CANCELLED | OUTPATIENT
Start: 2025-02-27

## 2025-02-26 RX ORDER — DIPHENHYDRAMINE HYDROCHLORIDE 50 MG/ML
50 INJECTION INTRAMUSCULAR; INTRAVENOUS ONCE AS NEEDED
Status: CANCELLED | OUTPATIENT
Start: 2025-02-27

## 2025-02-26 RX ORDER — PROCHLORPERAZINE EDISYLATE 5 MG/ML
5 INJECTION INTRAMUSCULAR; INTRAVENOUS ONCE AS NEEDED
Status: CANCELLED | OUTPATIENT
Start: 2025-02-27

## 2025-02-26 RX ORDER — HEPARIN 100 UNIT/ML
500 SYRINGE INTRAVENOUS
Status: CANCELLED | OUTPATIENT
Start: 2025-03-01

## 2025-02-26 NOTE — PROGRESS NOTES
MEDICAL ONCOLOGY - ESTABLISHED PATIENT VISIT    Reason for visit: esophageal adenocarcinoma     Best Contact Phone Number(s): There are no phone numbers on file.     Cancer/Stage/TNM:    Cancer Staging   Esophageal adenocarcinoma  Staging form: Esophagus - Adenocarcinoma, AJCC 8th Edition  - Clinical: Stage III (cT2, cN1, cM0, G2) - Signed by Miguel Angel Hampton Jr., MD on 7/31/2023       Oncology History   Esophageal adenocarcinoma   7/31/2023 Initial Diagnosis    Esophageal adenocarcinoma     7/31/2023 Cancer Staged    Staging form: Esophagus - Adenocarcinoma, AJCC 8th Edition  - Clinical: Stage III (cT2, cN1, cM0, G2)     9/5/2023 - 10/5/2023 Chemotherapy    Treatment Summary   Plan Name: OP ESOPHAGEAL PACLITAXEL CARBOPLATIN WEEKLY  Treatment Goal: Curative  Status: Inactive  Start Date: 9/5/2023  End Date: 10/5/2023  Provider: Sahil Singer MD  Chemotherapy: CARBOplatin (PARAPLATIN) 195 mg in sodium chloride 0.9% 304.5 mL chemo infusion, 195 mg (100 % of original dose 193.4 mg), Intravenous, Clinic/HOD 1 time, 1 of 1 cycle  Dose modification:   (original dose 193.4 mg, Cycle 1), 193.4 mg (original dose 193.4 mg, Cycle 1),   (original dose 193.4 mg, Cycle 1, Reason: MD Discretion)  Administration: 195 mg (9/5/2023), 225 mg (9/12/2023), 210 mg (9/19/2023), 180 mg (9/26/2023), 195 mg (10/5/2023)  PACLitaxeL (TAXOL) 50 mg/m2 = 96 mg in sodium chloride 0.9% 250 mL chemo infusion, 50 mg/m2 = 96 mg, Intravenous, Clinic/HOD 1 time, 1 of 1 cycle  Administration: 96 mg (9/5/2023), 96 mg (9/12/2023), 96 mg (9/19/2023), 96 mg (9/26/2023), 96 mg (10/5/2023)     9/5/2023 - 10/9/2023 Radiation Therapy    Treating physician: Anshul Neri    Course: C1 Thorax 2023    Treatment Site Ref. ID Energy Dose/Fx (Gy) #Fx Dose Correction (Gy) Total Dose (Gy) Start Date End Date Elapsed Days   IM Esophagus PTV_High 6X 2 25 / 25 0 50 9/5/2023 10/9/2023 34            1/21/2025 -  Chemotherapy    Treatment Summary   Plan Name: OP  GASTROESOPHAGEAL trastuzumab + [id number] (oxaliplatin leucovorin fluorouracil) Q2W  Treatment Goal: Control  Status: Active  Start Date: 1/21/2025  End Date: 12/19/2025 (Planned)  Provider: Sahil Singer MD  Chemotherapy: oxaliplatin (ELOXATIN) 85 mg/m2 = 148 mg in D5W 594.6 mL chemo infusion, 85 mg/m2 = 148 mg, Intravenous, Clinic/HOD 1 time, 2 of 24 cycles  Administration: 148 mg (1/30/2025), 148 mg (2/13/2025)  fluorouracil (Adrucil) 4,000 mg in 0.9% NaCl 100 mL chemo infusion, 4,175 mg, Intravenous, Over 46 hours, 2 of 24 cycles  Administration: 4,000 mg (1/30/2025), 4,000 mg (2/13/2025)  trastuzumab-anns (KANJINTI) 384 mg in 0.9% NaCl 303.3 mL chemo infusion, 6 mg/kg = 384 mg, Intravenous, Clinic/HOD 1 time, 2 of 24 cycles  Administration: 384 mg (1/30/2025), 256 mg (2/13/2025)        Interim History:  69 y.o. male with esophageal adenocarcinoma who presents for follow-up prior to cycle 3 of FOLFOX + trastuzumab for his recurrent esophageal adenocarcinoma.  He completed definitive chemoRT in early October 2023.     Interval History: He did pretty well with this past cycle of chemotherapy. He denies any pain, fever, nausea or vomiting. He denies any paresthesias. Very mild acneiform rash to the face but no signs of infection. He continues to have some solid food dysphagia, though liquids go down ok.  Continues to experience frequent belching and hypersalivation.  Had port placed 1/27/25 by Dr. Velez. He is aggravated with the chemotherapy pump. Otherwise, no concerns or complaints.    Presents alone today. ECOG status is 1.     ROS:   Review of Systems   Constitutional:  Positive for malaise/fatigue and weight loss. Negative for fever.   HENT:  Negative for nosebleeds.    Respiratory:  Negative for shortness of breath.    Cardiovascular:  Negative for chest pain and palpitations.   Gastrointestinal:  Positive for heartburn. Negative for blood in stool, constipation, diarrhea, nausea and vomiting.    Genitourinary:  Negative for hematuria.   Musculoskeletal:  Negative for falls.   Skin:  Negative for itching and rash.   Neurological:  Negative for dizziness, tingling, sensory change and weakness.   Psychiatric/Behavioral:  The patient is not nervous/anxious.      Past Medical History:   Past Medical History:   Diagnosis Date    HTN (hypertension)      Past Surgical History:   Past Surgical History:   Procedure Laterality Date    ESOPHAGOGASTRODUODENOSCOPY N/A 12/19/2024    Procedure: EGD (ESOPHAGOGASTRODUODENOSCOPY);  Surgeon: Pete Buenrostro MD;  Location: [facility] (4TH FLR);  Service: Endoscopy;  Laterality: N/A;  Medically Urgent      12/16 ref by Sahil Singer MD, [place]-  12/18 - pre-call complete; MB    INSERTION OF TUNNELED CENTRAL VENOUS CATHETER (CVC) WITH SUBCUTANEOUS PORT Right 1/27/2025    Procedure: INSERTION, SINGLE LUMEN CATHETER WITH PORT, WITH FLUOROSCOPIC GUIDANCE, left poss right;  Surgeon: Chadd Velez MD;  Location: [facility] OR [address] [place] [address];  Service: General;  Laterality: Right;    LAPAROSCOPIC REPAIR OF INGUINAL HERNIA Right      Family History:   No family history on file.     Social History:   Social History     Tobacco Use    Smoking status: Every Day     Current packs/day: 1.00     Types: Cigarettes    Smokeless tobacco: Never   Substance Use Topics    Alcohol use: Yes     Alcohol/week: 1.0 standard drink of alcohol     Types: 1 Glasses of wine per week     Comment: rarely      I have reviewed and updated the patient's past medical, surgical, family and social histories.    Allergies:   Review of patient's allergies indicates:  No Known Allergies     Medications:   Current Outpatient Medications   Medication Sig Dispense Refill    amlodipine-benazepril 10-20mg (LOTREL) 10-20 mg per capsule Take 1 capsule by mouth once daily.      azelastine (ASTELIN) 137 mcg (0.1 %) nasal spray 2 sprays by Nasal route.      etodolac (LODINE) 400 MG tablet Take 1 tablet by mouth 2  "(two) times daily.      fluticasone propionate (FLONASE) 50 mcg/actuation nasal spray 2 sprays by Each Nostril route.      gabapentin (NEURONTIN) 300 MG capsule Take 300 mg by mouth.      LIDOcaine-prilocaine (EMLA) cream Apply topically as needed. 30 g 1    multivitamin with minerals tablet Take 1 tablet by mouth once daily. One A Day Multivitamin      OLANZapine (ZYPREXA) 5 MG tablet Take 1 tab at nighttime on nights 1 thru 3 of each chemotherapy cycle. 30 tablet 2    omeprazole (PRILOSEC) 40 MG capsule Take 1 capsule by mouth every morning.      ondansetron (ZOFRAN) 8 MG tablet TAKE 1 TABLET BY MOUTH EVERY 8 HOURS AS NEEDED FOR NAUSEA 60 tablet 2    ondansetron (ZOFRAN) 8 MG tablet Take 1 tablet (8 mg total) by mouth every 8 (eight) hours as needed for Nausea. 60 tablet 2    pravastatin (PRAVACHOL) 40 MG tablet Take 40 mg by mouth.      TURMERIC ORAL Take by mouth. (Patient not taking: Reported on 1/22/2025)       No current facility-administered medications for this visit.      Physical Exam:   /80 (BP Location: Left arm, Patient Position: Sitting)   Pulse 69   Temp 98 °F (36.7 °C) (Oral)   Resp 16   Ht 5' 7" (1.702 m)   Wt 65.9 kg (145 lb 4.5 oz)   SpO2 97%   BMI 22.75 kg/m²           Physical Exam  Vitals reviewed.   Constitutional:       General: He is not in acute distress.     Appearance: Normal appearance. He is not ill-appearing, toxic-appearing or diaphoretic.   HENT:      Head: Normocephalic and atraumatic.      Right Ear: External ear normal.      Left Ear: External ear normal.      Nose: Nose normal. No congestion.      Mouth/Throat:      Pharynx: Oropharynx is clear. No oropharyngeal exudate.   Eyes:      General: No scleral icterus.     Conjunctiva/sclera: Conjunctivae normal.   Neck:      Comments: Slight hoarseness  Cardiovascular:      Rate and Rhythm: Normal rate.   Pulmonary:      Effort: Pulmonary effort is normal. No respiratory distress.   Abdominal:      General: There is no " distension.   Musculoskeletal:      Cervical back: Normal range of motion.      Right lower leg: No edema.      Left lower leg: No edema.   Skin:     General: Skin is warm and dry.      Coloration: Skin is not jaundiced or pale.      Findings: No bruising, erythema or rash.   Neurological:      General: No focal deficit present.      Mental Status: He is alert and oriented to person, place, and time.      Cranial Nerves: No cranial nerve deficit.      Motor: No weakness.      Gait: Gait normal.   Psychiatric:         Mood and Affect: Mood normal.         Behavior: Behavior normal.         Labs:      I have reviewed the pertinent labs    Imagin/12/24 - CT A/P   Impression:     History of esophageal cancer with persistent suspected thickening of the distal esophagus.  Probable hiatal hernia noted.     No convincing metastatic disease.     Stable pulmonary micro nodules.     Stable hepatic hypodensity and hyperenhancing nodule.  Attention on follow-up.     2 x 1.6 cm enhancing left renal mass.     8 mm hypodense nodule caudal aspect body of the pancreas.  Attention on follow-up.     Significant plaque in the aortoiliac system as above.     Additional findings above.       Path:   24:    Gastroesophageal junction mass (biopsy):   Adenocarcinoma, poorly differentiated   HER2 immunostain:  Positive (score 3)     Immunohistochemistry (IHC) Testing for Mismatch Repair (MMR) Proteins:   MLH1, MSH2, MSH6, PMS2 - Intact nuclear expression   Background nonneoplastic tissue/internal control with intact nuclear expression     Interpretation: No loss of nuclear expression of MMR proteins: low probability of microsatellite instability. There are exceptions to the above IHC interpretations. These results should not be considered in isolation, and clinical correlation with   genetic counseling is recommended to assess the need for germline testing.     Assessment:       1. 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 Hypertension, unspecified type    5. Hyperlipidemia, unspecified hyperlipidemia type    6. Tobacco use    7. Left renal mass        Plan:        # Esophageal adenocarcinoma  Mr. Lugo is a 69 y.o. male who presents to clinic for evaluation of newly diagnosed esophageal cancer. He initially presented with dysphagia, now improved s/p esophageal dilation during EUS. Biopsy confirmed adenocarcinoma.     We reviewed his diagnosis, prognosis, and treatment options with him during our initial visit. Discussed with him the high likelihood that cancer would recur if he was taken straight to surgery without neoadjuvant treatment. Our recommendation is to proceed with weekly carboplatin + taxol in conjunction with daily radiation (M-F, no holidays) for a total of 5-5.5 weeks. Handouts provided to patient on both chemotherapy medications.     He has completed Carboplatin AUC 2 + paclitaxel 50 mg/m2 for 5 treatments. Also completed concurrent radiation for definitive non-surgical treatment.    Repeat PET/CT on 11/15/23 showed persistent distal hypermetabolic esophageal tumor (decreasing SUV) without clear evidence of metastatic disease.     Repeat PET/CT on 2/21/24 showed further improvement in hypermetabolism at distal esophagus.    Repeat CT CAP on 4/24/24 showed stable thickening at distal esophagus. Stable left kidney mass c/f malignancy.     Repeat CT CAP 9/9/24 showed stable thickening at distal esophagus.  Repeat CT CAP 12/12/24 showed stable findings.  L renal mass is slightly enlarged.     Given weight decrease, "upset stomach" and increased eructations we got repeat EGD to assess for cancer recurrence.  This was done on 12/19/24 which showed a distal esophageal mass, biopsy confirming poorly differentiated adenocarcinoma, pMMR and HER-2 positive.    Recommended FOLFOX + trastuzumab. No indication for pembro given PD-L1 being " negative, per final results of [id number] regimen.    Had very mild dysphagia at the time but did not need feeding tube or stent. The is hope is to see improvement with his systemic therapy. He received cycle 1 on 1/30/2025 and tolerated this well. He returns today for cycle 3  TTE 1/28/25 with LVEF 55-60%.  Next due end of April.    - He did pretty well with this past cycle of chemotherapy. He feels that his swallowing has improved some and that he has gained weight. He denies any pain, fever, nausea or vomiting. He denies any paresthesias. Very mild acneiform rash to the face but no signs of infection. Continues to experience frequent belching and hypersalivation. Had port placed 1/27/25 by Dr. Velez. He is aggravated with the chemotherapy pump. Otherwise, no concerns or complaints.  - I have personally reviewed the patient's lab work today, including CBC and CMP. ANC is adequate for treatment   - Proceed with cycle 3 of FOLFOX + trastuzumab today.    RTC in 2 weeks for next cycle. Will repeat imaging studies after cycle 4    Tempus xT: KRAS G12D, NOTCH1, SMARCA4, TP53; PD-L1 0  PGX: DPYD nml, UGT1A1 intermediate    # HTN, HLD  BP normal in clinic today.    Continue medical management.   Following with PCP.    # Tobacco use  Encouraged cessation.  Previously enrolled in smoking cessation clinic.     # Left renal mass  Slightly enlarged in size on 12/2024 scan.   Saw Dr. Sainz 5/2024 and agreed on surveillance with repeat imaging 5/2025.    Follow up: RTC 2 weeks    Patient and family members displayed understanding of the above encounter and treatment plan. All thoughtful questions were answered to their satisfaction. Patient was advised to notify the care team or proceed to the ER if signs and symptoms worsen.     30 minutes were spent today on this encounter including face to face time with the patient, data gathering/interpretation and documentation. Greater than 50% of this time involved counseling or  coordination of care. I have provided the patient with an opportunity to ask questions and have all questions answered to patient's satisfaction.     Visit today included increased complexity associated with the care of the episodic problem chemotherapy  addressed and managing the longitudinal care of the patient due to the serious and/or complex managed problem(s) GI malignancies/cancer. In addition, the above encounter addresses an illness that poses a significant threat to life, bodily function and overall performance status.       LUIS F Michelle, PA-C  [facility]  Dept of Hematology/Oncology  PA-C to GI Oncology team           Med Onc Chart Routing      Follow up with physician 4 weeks and 6 weeks. See Dr Singer in 4 weeks with lab work, CT CAP and treatment discussion with FOLFOX plus trastuzumab. FOLFOX plus trastuzumab in 6 weeks.   Follow up with [facility] 2 weeks and 2 months. FOLFOX plus trastuzumab   Infusion scheduling note    Injection scheduling note    Labs CBC and CMP   Scheduling:  Preferred lab:  Lab interval: every 2 weeks     Imaging CT chest abdomen pelvis   Scheduled. Thank you   Pharmacy appointment    Other referrals

## 2025-02-27 ENCOUNTER — PATIENT MESSAGE (OUTPATIENT)
Dept: HEMATOLOGY/ONCOLOGY | Facility: CLINIC | Age: 70
End: 2025-02-27
Payer: MEDICARE

## 2025-02-27 ENCOUNTER — INFUSION (OUTPATIENT)
Dept: INFUSION THERAPY | Facility: HOSPITAL | Age: 70
End: 2025-02-27
Payer: MEDICARE

## 2025-02-27 VITALS
BODY MASS INDEX: 22.81 KG/M2 | HEIGHT: 67 IN | DIASTOLIC BLOOD PRESSURE: 64 MMHG | TEMPERATURE: 98 F | RESPIRATION RATE: 16 BRPM | WEIGHT: 145.31 LBS | OXYGEN SATURATION: 97 % | SYSTOLIC BLOOD PRESSURE: 138 MMHG | HEART RATE: 87 BPM

## 2025-02-27 DIAGNOSIS — C15.9 ESOPHAGEAL ADENOCARCINOMA: Primary | ICD-10-CM

## 2025-02-27 PROCEDURE — 96417 CHEMO IV INFUS EACH ADDL SEQ: CPT

## 2025-02-27 PROCEDURE — 96415 CHEMO IV INFUSION ADDL HR: CPT

## 2025-02-27 PROCEDURE — 25000003 PHARM REV CODE 250: Performed by: PHYSICIAN ASSISTANT

## 2025-02-27 PROCEDURE — 96416 CHEMO PROLONG INFUSE W/PUMP: CPT

## 2025-02-27 PROCEDURE — 96413 CHEMO IV INFUSION 1 HR: CPT

## 2025-02-27 PROCEDURE — A4216 STERILE WATER/SALINE, 10 ML: HCPCS | Performed by: PHYSICIAN ASSISTANT

## 2025-02-27 PROCEDURE — 63600175 PHARM REV CODE 636 W HCPCS: Performed by: PHYSICIAN ASSISTANT

## 2025-02-27 PROCEDURE — 96367 TX/PROPH/DG ADDL SEQ IV INF: CPT

## 2025-02-27 RX ORDER — DIPHENHYDRAMINE HYDROCHLORIDE 50 MG/ML
50 INJECTION INTRAMUSCULAR; INTRAVENOUS ONCE AS NEEDED
Status: DISCONTINUED | OUTPATIENT
Start: 2025-02-27 | End: 2025-02-27 | Stop reason: HOSPADM

## 2025-02-27 RX ORDER — SODIUM CHLORIDE 0.9 % (FLUSH) 0.9 %
10 SYRINGE (ML) INJECTION
Status: DISCONTINUED | OUTPATIENT
Start: 2025-02-27 | End: 2025-02-27 | Stop reason: HOSPADM

## 2025-02-27 RX ORDER — EPINEPHRINE 0.3 MG/.3ML
0.3 INJECTION SUBCUTANEOUS ONCE AS NEEDED
Status: DISCONTINUED | OUTPATIENT
Start: 2025-02-27 | End: 2025-02-27 | Stop reason: HOSPADM

## 2025-02-27 RX ORDER — HEPARIN 100 UNIT/ML
500 SYRINGE INTRAVENOUS
Status: DISCONTINUED | OUTPATIENT
Start: 2025-02-27 | End: 2025-02-27 | Stop reason: HOSPADM

## 2025-02-27 RX ORDER — PROCHLORPERAZINE EDISYLATE 5 MG/ML
5 INJECTION INTRAMUSCULAR; INTRAVENOUS ONCE AS NEEDED
Status: DISCONTINUED | OUTPATIENT
Start: 2025-02-27 | End: 2025-02-27 | Stop reason: HOSPADM

## 2025-02-27 RX ADMIN — Medication 10 ML: at 12:02

## 2025-02-27 RX ADMIN — FLUOROURACIL 4000 MG: 50 INJECTION, SOLUTION INTRAVENOUS at 12:02

## 2025-02-27 RX ADMIN — DEXAMETHASONE SODIUM PHOSPHATE 0.25 MG: 4 INJECTION, SOLUTION INTRA-ARTICULAR; INTRALESIONAL; INTRAMUSCULAR; INTRAVENOUS; SOFT TISSUE at 09:02

## 2025-02-27 RX ADMIN — TRASTUZUMAB-ANNS 256 MG: 420 INJECTION, POWDER, LYOPHILIZED, FOR SOLUTION INTRAVENOUS at 09:02

## 2025-02-27 RX ADMIN — OXALIPLATIN 148 MG: 5 INJECTION, SOLUTION INTRAVENOUS at 10:02

## 2025-02-27 RX ADMIN — SODIUM CHLORIDE: 9 INJECTION, SOLUTION INTRAVENOUS at 09:02

## 2025-02-27 RX ADMIN — DEXTROSE MONOHYDRATE: 50 INJECTION, SOLUTION INTRAVENOUS at 10:02

## 2025-02-27 NOTE — PLAN OF CARE
0920 - Labs, hx, and medications reviewed, Pt meets parameters for treatment today. Assessment completed and plan of care reviewed. Pt verbalized understanding. PAC accessed without complications. Pt voices no new complaints or concerns, will continue to monitor for safety.

## 2025-02-27 NOTE — PLAN OF CARE
1250 - Pt tolerated Kanjinti and Oxaliplatin infusions well, no complaints or complications reported. VSS throughout treatment. Positive blood return noted from port; port flushed, and connected to CADD pump for 5-FU home infusion. Connection sites secured, pump infusing properly at time of discharge. Pt aware to call provider with any questions or concerns, knows to return to clinic at approx. 10:30 AM on 3/1 for pump d/c, Pt verbalized understanding. Pt ambulatory from clinic independently with steady gait, no distress noted.

## 2025-03-01 ENCOUNTER — INFUSION (OUTPATIENT)
Dept: INFUSION THERAPY | Facility: HOSPITAL | Age: 70
End: 2025-03-01
Payer: MEDICARE

## 2025-03-01 VITALS
TEMPERATURE: 98 F | SYSTOLIC BLOOD PRESSURE: 131 MMHG | HEART RATE: 73 BPM | RESPIRATION RATE: 18 BRPM | OXYGEN SATURATION: 99 % | DIASTOLIC BLOOD PRESSURE: 61 MMHG

## 2025-03-01 DIAGNOSIS — C15.9 ESOPHAGEAL ADENOCARCINOMA: Primary | ICD-10-CM

## 2025-03-01 PROCEDURE — 25000003 PHARM REV CODE 250: Performed by: PHYSICIAN ASSISTANT

## 2025-03-01 PROCEDURE — A4216 STERILE WATER/SALINE, 10 ML: HCPCS | Performed by: PHYSICIAN ASSISTANT

## 2025-03-01 PROCEDURE — 63600175 PHARM REV CODE 636 W HCPCS: Performed by: PHYSICIAN ASSISTANT

## 2025-03-01 RX ORDER — PROCHLORPERAZINE EDISYLATE 5 MG/ML
5 INJECTION INTRAMUSCULAR; INTRAVENOUS ONCE AS NEEDED
Status: DISCONTINUED | OUTPATIENT
Start: 2025-03-01 | End: 2025-03-01 | Stop reason: HOSPADM

## 2025-03-01 RX ORDER — SODIUM CHLORIDE 0.9 % (FLUSH) 0.9 %
10 SYRINGE (ML) INJECTION
Status: DISCONTINUED | OUTPATIENT
Start: 2025-03-01 | End: 2025-03-01 | Stop reason: HOSPADM

## 2025-03-01 RX ORDER — HEPARIN 100 UNIT/ML
500 SYRINGE INTRAVENOUS
Status: DISCONTINUED | OUTPATIENT
Start: 2025-03-01 | End: 2025-03-01 | Stop reason: HOSPADM

## 2025-03-01 RX ADMIN — Medication 10 ML: at 10:03

## 2025-03-01 RX ADMIN — HEPARIN SODIUM (PORCINE) LOCK FLUSH IV SOLN 100 UNIT/ML 500 UNITS: 100 SOLUTION at 10:03

## 2025-03-01 NOTE — PLAN OF CARE
1030 Patient disconnected from his home infusion pump without incident. Vitals stable. Reservoir volume at 0. Port heparin locked and deaccessed, blood return noted. Patient aware of his next appointment date/time on 3/13, uses [facility] portal. To contact provider with questions or concerns. D/C ambulatory and stable.

## 2025-03-11 ENCOUNTER — OFFICE VISIT (OUTPATIENT)
Dept: HEMATOLOGY/ONCOLOGY | Facility: CLINIC | Age: 70
End: 2025-03-11
Payer: MEDICARE

## 2025-03-11 ENCOUNTER — LAB VISIT (OUTPATIENT)
Dept: LAB | Facility: HOSPITAL | Age: 70
End: 2025-03-11
Attending: INTERNAL MEDICINE
Payer: MEDICARE

## 2025-03-11 VITALS
BODY MASS INDEX: 22.84 KG/M2 | HEIGHT: 67 IN | DIASTOLIC BLOOD PRESSURE: 60 MMHG | HEART RATE: 66 BPM | WEIGHT: 145.5 LBS | RESPIRATION RATE: 18 BRPM | TEMPERATURE: 98 F | SYSTOLIC BLOOD PRESSURE: 126 MMHG | OXYGEN SATURATION: 98 %

## 2025-03-11 DIAGNOSIS — N28.89 LEFT RENAL MASS: ICD-10-CM

## 2025-03-11 DIAGNOSIS — C15.9 ESOPHAGEAL ADENOCARCINOMA: ICD-10-CM

## 2025-03-11 DIAGNOSIS — D84.81 IMMUNODEFICIENCY SECONDARY TO NEOPLASM: ICD-10-CM

## 2025-03-11 DIAGNOSIS — C15.9 ESOPHAGEAL ADENOCARCINOMA: Primary | ICD-10-CM

## 2025-03-11 DIAGNOSIS — D84.821 IMMUNODEFICIENCY SECONDARY TO CHEMOTHERAPY: ICD-10-CM

## 2025-03-11 DIAGNOSIS — E78.5 HYPERLIPIDEMIA, UNSPECIFIED HYPERLIPIDEMIA TYPE: ICD-10-CM

## 2025-03-11 DIAGNOSIS — I10 HYPERTENSION, UNSPECIFIED TYPE: ICD-10-CM

## 2025-03-11 DIAGNOSIS — D49.9 IMMUNODEFICIENCY SECONDARY TO NEOPLASM: ICD-10-CM

## 2025-03-11 DIAGNOSIS — Z79.899 IMMUNODEFICIENCY SECONDARY TO CHEMOTHERAPY: ICD-10-CM

## 2025-03-11 DIAGNOSIS — T45.1X5A IMMUNODEFICIENCY SECONDARY TO CHEMOTHERAPY: ICD-10-CM

## 2025-03-11 DIAGNOSIS — Z72.0 TOBACCO USE: ICD-10-CM

## 2025-03-11 LAB
ALBUMIN SERPL BCP-MCNC: 3.4 G/DL (ref 3.5–5.2)
ALP SERPL-CCNC: 107 U/L (ref 40–150)
ALT SERPL W/O P-5'-P-CCNC: 25 U/L (ref 10–44)
ANION GAP SERPL CALC-SCNC: 9 MMOL/L (ref 8–16)
AST SERPL-CCNC: 28 U/L (ref 10–40)
BILIRUB SERPL-MCNC: 0.6 MG/DL (ref 0.1–1)
BUN SERPL-MCNC: 17 MG/DL (ref 8–23)
CALCIUM SERPL-MCNC: 9 MG/DL (ref 8.7–10.5)
CHLORIDE SERPL-SCNC: 109 MMOL/L (ref 95–110)
CO2 SERPL-SCNC: 22 MMOL/L (ref 23–29)
CREAT SERPL-MCNC: 1.2 MG/DL (ref 0.5–1.4)
ERYTHROCYTE [DISTWIDTH] IN BLOOD BY AUTOMATED COUNT: 19.4 % (ref 11.5–14.5)
EST. GFR  (NO RACE VARIABLE): >60 ML/MIN/1.73 M^2
GLUCOSE SERPL-MCNC: 122 MG/DL (ref 70–110)
HCT VFR BLD AUTO: 34.4 % (ref 40–54)
HGB BLD-MCNC: 11.8 G/DL (ref 14–18)
IMM GRANULOCYTES # BLD AUTO: 0.05 K/UL (ref 0–0.04)
MCH RBC QN AUTO: 28.9 PG (ref 27–31)
MCHC RBC AUTO-ENTMCNC: 34.3 G/DL (ref 32–36)
MCV RBC AUTO: 84 FL (ref 82–98)
NEUTROPHILS # BLD AUTO: 5.1 K/UL (ref 1.8–7.7)
PLATELET # BLD AUTO: 139 K/UL (ref 150–450)
PMV BLD AUTO: 8.6 FL (ref 9.2–12.9)
POTASSIUM SERPL-SCNC: 3.8 MMOL/L (ref 3.5–5.1)
PROT SERPL-MCNC: 6.3 G/DL (ref 6–8.4)
RBC # BLD AUTO: 4.08 M/UL (ref 4.6–6.2)
SODIUM SERPL-SCNC: 140 MMOL/L (ref 136–145)
WBC # BLD AUTO: 6.64 K/UL (ref 3.9–12.7)

## 2025-03-11 PROCEDURE — 99215 OFFICE O/P EST HI 40 MIN: CPT | Mod: S$PBB,,, | Performed by: PHYSICIAN ASSISTANT

## 2025-03-11 PROCEDURE — 80053 COMPREHEN METABOLIC PANEL: CPT | Performed by: INTERNAL MEDICINE

## 2025-03-11 PROCEDURE — 85027 COMPLETE CBC AUTOMATED: CPT | Performed by: INTERNAL MEDICINE

## 2025-03-11 PROCEDURE — 99999 PR PBB SHADOW E&M-EST. PATIENT-LVL IV: CPT | Mod: PBBFAC,,, | Performed by: PHYSICIAN ASSISTANT

## 2025-03-11 PROCEDURE — 99214 OFFICE O/P EST MOD 30 MIN: CPT | Mod: PBBFAC | Performed by: PHYSICIAN ASSISTANT

## 2025-03-11 PROCEDURE — 36415 COLL VENOUS BLD VENIPUNCTURE: CPT | Performed by: INTERNAL MEDICINE

## 2025-03-11 RX ORDER — SODIUM CHLORIDE 0.9 % (FLUSH) 0.9 %
10 SYRINGE (ML) INJECTION
Status: CANCELLED | OUTPATIENT
Start: 2025-03-15

## 2025-03-11 RX ORDER — SODIUM CHLORIDE 0.9 % (FLUSH) 0.9 %
10 SYRINGE (ML) INJECTION
Status: CANCELLED | OUTPATIENT
Start: 2025-03-13

## 2025-03-11 RX ORDER — HEPARIN 100 UNIT/ML
500 SYRINGE INTRAVENOUS
Status: CANCELLED | OUTPATIENT
Start: 2025-03-15

## 2025-03-11 RX ORDER — PROCHLORPERAZINE EDISYLATE 5 MG/ML
5 INJECTION INTRAMUSCULAR; INTRAVENOUS ONCE AS NEEDED
Status: CANCELLED | OUTPATIENT
Start: 2025-03-13

## 2025-03-11 RX ORDER — DIPHENHYDRAMINE HYDROCHLORIDE 50 MG/ML
50 INJECTION, SOLUTION INTRAMUSCULAR; INTRAVENOUS ONCE AS NEEDED
Status: CANCELLED | OUTPATIENT
Start: 2025-03-13

## 2025-03-11 RX ORDER — HEPARIN 100 UNIT/ML
500 SYRINGE INTRAVENOUS
Status: CANCELLED | OUTPATIENT
Start: 2025-03-13

## 2025-03-11 RX ORDER — PROCHLORPERAZINE EDISYLATE 5 MG/ML
5 INJECTION INTRAMUSCULAR; INTRAVENOUS ONCE AS NEEDED
Status: CANCELLED | OUTPATIENT
Start: 2025-03-15

## 2025-03-11 RX ORDER — EPINEPHRINE 0.3 MG/.3ML
0.3 INJECTION SUBCUTANEOUS ONCE AS NEEDED
Status: CANCELLED | OUTPATIENT
Start: 2025-03-13

## 2025-03-11 NOTE — PROGRESS NOTES
MEDICAL ONCOLOGY - ESTABLISHED PATIENT VISIT    Reason for visit: esophageal adenocarcinoma     Best Contact Phone Number(s): There are no phone numbers on file.     Cancer/Stage/TNM:    Cancer Staging   Esophageal adenocarcinoma  Staging form: Esophagus - Adenocarcinoma, AJCC 8th Edition  - Clinical: Stage III (cT2, cN1, cM0, G2) - Signed by Miguel Angel Hampton Jr., MD on 7/31/2023       Oncology History   Esophageal adenocarcinoma   7/31/2023 Initial Diagnosis    Esophageal adenocarcinoma     7/31/2023 Cancer Staged    Staging form: Esophagus - Adenocarcinoma, AJCC 8th Edition  - Clinical: Stage III (cT2, cN1, cM0, G2)     9/5/2023 - 10/5/2023 Chemotherapy    Treatment Summary   Plan Name: OP ESOPHAGEAL PACLITAXEL CARBOPLATIN WEEKLY  Treatment Goal: Curative  Status: Inactive  Start Date: 9/5/2023  End Date: 10/5/2023  Provider: Sahil Singer MD  Chemotherapy: CARBOplatin (PARAPLATIN) 195 mg in sodium chloride 0.9% 304.5 mL chemo infusion, 195 mg (100 % of original dose 193.4 mg), Intravenous, Clinic/HOD 1 time, 1 of 1 cycle  Dose modification:   (original dose 193.4 mg, Cycle 1), 193.4 mg (original dose 193.4 mg, Cycle 1),   (original dose 193.4 mg, Cycle 1, Reason: MD Discretion)  Administration: 195 mg (9/5/2023), 225 mg (9/12/2023), 210 mg (9/19/2023), 180 mg (9/26/2023), 195 mg (10/5/2023)  PACLitaxeL (TAXOL) 50 mg/m2 = 96 mg in sodium chloride 0.9% 250 mL chemo infusion, 50 mg/m2 = 96 mg, Intravenous, Clinic/HOD 1 time, 1 of 1 cycle  Administration: 96 mg (9/5/2023), 96 mg (9/12/2023), 96 mg (9/19/2023), 96 mg (9/26/2023), 96 mg (10/5/2023)     9/5/2023 - 10/9/2023 Radiation Therapy    Treating physician: Anshul Neri    Course: C1 Thorax 2023    Treatment Site Ref. ID Energy Dose/Fx (Gy) #Fx Dose Correction (Gy) Total Dose (Gy) Start Date End Date Elapsed Days   IM Esophagus PTV_High 6X 2 25 / 25 0 50 9/5/2023 10/9/2023 34            1/21/2025 -  Chemotherapy    Treatment Summary   Plan Name: OP  GASTROESOPHAGEAL trastuzumab + MFOLFOX6 (oxaliplatin leucovorin fluorouracil) Q2W  Treatment Goal: Control  Status: Active  Start Date: 1/21/2025  End Date: 12/20/2025 (Planned)  Provider: Sahil Singer MD  Chemotherapy: oxaliplatin (ELOXATIN) 85 mg/m2 = 148 mg in D5W 594.6 mL chemo infusion, 85 mg/m2 = 148 mg, Intravenous, Clinic/HOD 1 time, 3 of 24 cycles  Administration: 148 mg (1/30/2025), 148 mg (2/13/2025), 148 mg (2/27/2025)  fluorouracil (Adrucil) 4,000 mg in 0.9% NaCl 100 mL chemo infusion, 4,175 mg, Intravenous, Over 46 hours, 3 of 24 cycles  Administration: 4,000 mg (1/30/2025), 4,000 mg (2/13/2025), 4,000 mg (2/27/2025)  trastuzumab-anns (KANJINTI) 384 mg in 0.9% NaCl 303.3 mL chemo infusion, 6 mg/kg = 384 mg, Intravenous, Clinic/HOD 1 time, 3 of 24 cycles  Administration: 384 mg (1/30/2025), 256 mg (2/13/2025), 256 mg (2/27/2025)        Interim History:  69 y.o. male with esophageal adenocarcinoma who presents for follow-up prior to cycle 4 of FOLFOX + trastuzumab for his recurrent esophageal adenocarcinoma.  He completed definitive chemoRT in early October 2023.     Interval History: He did pretty well with this past cycle of chemotherapy. Swallowing is still ok. He is able to tolerate small amounts. He was able to eat some grits, eggs, viera and a biscuit. It did take him sometime to eat but he tolerated it without complication. He denies any pain, fever, nausea or vomiting. He denies any paresthesias. Very mild acneiform rash to the face but no signs of infection. He continues to have some solid food dysphagia, though liquids go down ok.  Continues to experience frequent belching and hypersalivation.  Had port placed 1/27/25 by Dr. Velez. He is aggravated with the chemotherapy pump. Otherwise, no concerns or complaints.    Presents alone today. ECOG status is 1.     ROS:   Review of Systems   Constitutional:  Positive for malaise/fatigue and weight loss. Negative for fever.   HENT:  Negative  for nosebleeds.    Respiratory:  Negative for shortness of breath.    Cardiovascular:  Negative for chest pain and palpitations.   Gastrointestinal:  Positive for heartburn. Negative for blood in stool, constipation, diarrhea, nausea and vomiting.   Genitourinary:  Negative for hematuria.   Musculoskeletal:  Negative for falls.   Skin:  Negative for itching and rash.   Neurological:  Negative for dizziness, tingling, sensory change and weakness.   Psychiatric/Behavioral:  The patient is not nervous/anxious.      Past Medical History:   Past Medical History:   Diagnosis Date    HTN (hypertension)      Past Surgical History:   Past Surgical History:   Procedure Laterality Date    ESOPHAGOGASTRODUODENOSCOPY N/A 12/19/2024    Procedure: EGD (ESOPHAGOGASTRODUODENOSCOPY);  Surgeon: Pete Buenrostro MD;  Location: [facility] ([facility]);  Service: Endoscopy;  Laterality: N/A;  Medically Urgent      12/16 ref by Sahil Singer MD, [place]-  12/18 - pre-call complete; MB    INSERTION OF TUNNELED CENTRAL VENOUS CATHETER (CVC) WITH SUBCUTANEOUS PORT Right 1/27/2025    Procedure: INSERTION, SINGLE LUMEN CATHETER WITH PORT, WITH FLUOROSCOPIC GUIDANCE, left poss right;  Surgeon: Chadd Velez MD;  Location: [facility] OR [address] [place] [address];  Service: General;  Laterality: Right;    LAPAROSCOPIC REPAIR OF INGUINAL HERNIA Right      Family History:   No family history on file.     Social History:   Social History     Tobacco Use    Smoking status: Every Day     Current packs/day: 1.00     Types: Cigarettes    Smokeless tobacco: Never   Substance Use Topics    Alcohol use: Yes     Alcohol/week: 1.0 standard drink of alcohol     Types: 1 Glasses of wine per week     Comment: rarely      I have reviewed and updated the patient's past medical, surgical, family and social histories.    Allergies:   Review of patient's allergies indicates:  No Known Allergies     Medications:   Current Outpatient Medications   Medication Sig Dispense  "Refill    amlodipine-benazepril 10-20mg (LOTREL) 10-20 mg per capsule Take 1 capsule by mouth once daily.      azelastine (ASTELIN) 137 mcg (0.1 %) nasal spray 2 sprays by Nasal route.      etodolac (LODINE) 400 MG tablet Take 1 tablet by mouth 2 (two) times daily.      fluticasone propionate (FLONASE) 50 mcg/actuation nasal spray 2 sprays by Each Nostril route.      gabapentin (NEURONTIN) 300 MG capsule Take 300 mg by mouth.      LIDOcaine-prilocaine (EMLA) cream Apply topically as needed. 30 g 1    multivitamin with minerals tablet Take 1 tablet by mouth once daily. One A Day Multivitamin      OLANZapine (ZYPREXA) 5 MG tablet Take 1 tab at nighttime on nights 1 thru 3 of each chemotherapy cycle. 30 tablet 2    omeprazole (PRILOSEC) 40 MG capsule Take 1 capsule by mouth every morning.      ondansetron (ZOFRAN) 8 MG tablet TAKE 1 TABLET BY MOUTH EVERY 8 HOURS AS NEEDED FOR NAUSEA 60 tablet 2    ondansetron (ZOFRAN) 8 MG tablet Take 1 tablet (8 mg total) by mouth every 8 (eight) hours as needed for Nausea. 60 tablet 2    pravastatin (PRAVACHOL) 40 MG tablet Take 40 mg by mouth.      TURMERIC ORAL Take by mouth. (Patient not taking: Reported on 1/22/2025)       No current facility-administered medications for this visit.      Physical Exam:   /60 (BP Location: Left arm, Patient Position: Sitting)   Pulse 66   Temp 98 °F (36.7 °C) (Temporal)   Resp 18   Ht 5' 7" (1.702 m)   Wt 66 kg (145 lb 8.1 oz)   SpO2 98%   BMI 22.79 kg/m²           Physical Exam  Vitals reviewed.   Constitutional:       General: He is not in acute distress.     Appearance: Normal appearance. He is not ill-appearing, toxic-appearing or diaphoretic.   HENT:      Head: Normocephalic and atraumatic.      Right Ear: External ear normal.      Left Ear: External ear normal.      Nose: Nose normal. No congestion.      Mouth/Throat:      Pharynx: Oropharynx is clear. No oropharyngeal exudate.   Eyes:      General: No scleral icterus.     " Conjunctiva/sclera: Conjunctivae normal.   Neck:      Comments: Slight hoarseness  Cardiovascular:      Rate and Rhythm: Normal rate.   Pulmonary:      Effort: Pulmonary effort is normal. No respiratory distress.   Abdominal:      General: There is no distension.   Musculoskeletal:      Cervical back: Normal range of motion.      Right lower leg: No edema.      Left lower leg: No edema.   Skin:     General: Skin is warm and dry.      Coloration: Skin is not jaundiced or pale.      Findings: No bruising, erythema or rash.   Neurological:      General: No focal deficit present.      Mental Status: He is alert and oriented to person, place, and time.      Cranial Nerves: No cranial nerve deficit.      Motor: No weakness.      Gait: Gait normal.   Psychiatric:         Mood and Affect: Mood normal.         Behavior: Behavior normal.         Labs:      I have reviewed the pertinent labs    Imagin/12/24 - CT A/P   Impression:     History of esophageal cancer with persistent suspected thickening of the distal esophagus.  Probable hiatal hernia noted.     No convincing metastatic disease.     Stable pulmonary micro nodules.     Stable hepatic hypodensity and hyperenhancing nodule.  Attention on follow-up.     2 x 1.6 cm enhancing left renal mass.     8 mm hypodense nodule caudal aspect body of the pancreas.  Attention on follow-up.     Significant plaque in the aortoiliac system as above.     Additional findings above.       Path:   24:    Gastroesophageal junction mass (biopsy):   Adenocarcinoma, poorly differentiated   HER2 immunostain:  Positive (score 3)     Immunohistochemistry (IHC) Testing for Mismatch Repair (MMR) Proteins:   MLH1, MSH2, MSH6, PMS2 - Intact nuclear expression   Background nonneoplastic tissue/internal control with intact nuclear expression     Interpretation: No loss of nuclear expression of MMR proteins: low probability of microsatellite instability. There are exceptions to the above  "IHC interpretations. These results should not be considered in isolation, and clinical correlation with   genetic counseling is recommended to assess the need for germline testing.     Assessment:       1. Esophageal adenocarcinoma    2. 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 Hypertension, unspecified type    5. Hyperlipidemia, unspecified hyperlipidemia type    6. Tobacco use    7. Left renal mass          Plan:        # Esophageal adenocarcinoma  Mr. Lugo is a 69 y.o. male who presents to clinic for evaluation of newly diagnosed esophageal cancer. He initially presented with dysphagia, now improved s/p esophageal dilation during EUS. Biopsy confirmed adenocarcinoma.     We reviewed his diagnosis, prognosis, and treatment options with him during our initial visit. Discussed with him the high likelihood that cancer would recur if he was taken straight to surgery without neoadjuvant treatment. Our recommendation is to proceed with weekly carboplatin + taxol in conjunction with daily radiation (M-F, no holidays) for a total of 5-5.5 weeks. Handouts provided to patient on both chemotherapy medications.     He has completed Carboplatin AUC 2 + paclitaxel 50 mg/m2 for 5 treatments. Also completed concurrent radiation for definitive non-surgical treatment.    Repeat PET/CT on 11/15/23 showed persistent distal hypermetabolic esophageal tumor (decreasing SUV) without clear evidence of metastatic disease.   Repeat PET/CT on 2/21/24 showed further improvement in hypermetabolism at distal esophagus.  Repeat CT CAP on 4/24/24 showed stable thickening at distal esophagus. Stable left kidney mass c/f malignancy.   Repeat CT CAP 9/9/24 showed stable thickening at distal esophagus.  Repeat CT CAP 12/12/24 showed stable findings.  L renal mass is slightly enlarged.     Given weight decrease, "upset stomach" and increased eructations we got repeat EGD to assess for cancer " recurrence.  This was done on 12/19/24 which showed a distal esophageal mass, biopsy confirming poorly differentiated adenocarcinoma, pMMR and HER-2 positive.    Recommended FOLFOX + trastuzumab. No indication for pembro given PD-L1 being negative, per final results of [id number] regimen.    Had very mild dysphagia at the time but did not need feeding tube or stent. The is hope is to see improvement with his systemic therapy. He received cycle 1 on 1/30/2025 and tolerated this well. He returns today for cycle 3  TTE 1/28/25 with LVEF 55-60%.  Next due end of April.    - He did pretty well with this past cycle of chemotherapy. Swallowing is still ok, has episodes of spitting up without vomiting. He has gained weight. He denies any pain, fever, nausea or vomiting. He denies any paresthesias. Very mild acneiform rash to the face but no signs of infection. Continues to experience frequent belching and hypersalivation. Had port placed 1/27/25 by Dr. Velez. He is aggravated with the chemotherapy pump. Otherwise, no concerns or complaints.  - I have personally reviewed the patient's lab work today, including CBC and CMP. ANC is adequate for treatment   - Proceed with cycle 4 of FOLFOX + trastuzumab on Thursday.    RTC in 2 weeks for next cycle. Will repeat imaging studies after cycle 4    Tempus xT: KRAS G12D, NOTCH1, SMARCA4, TP53; PD-L1 0  PGX: DPYD nml, UGT1A1 intermediate    # HTN, HLD  BP normal in clinic today.    Continue medical management.   Following with PCP.    # Tobacco use  Encouraged cessation.  Previously enrolled in smoking cessation clinic.     # Left renal mass  Slightly enlarged in size on 12/2024 scan.   Saw Dr. Sainz 5/2024 and agreed on surveillance with repeat imaging 5/2025.    Follow up: RTC 2 weeks    Patient and family members displayed understanding of the above encounter and treatment plan. All thoughtful questions were answered to their satisfaction. Patient was advised to notify the care  team or proceed to the ER if signs and symptoms worsen.     30 minutes were spent today on this encounter including face to face time with the patient, data gathering/interpretation and documentation. Greater than 50% of this time involved counseling or coordination of care. I have provided the patient with an opportunity to ask questions and have all questions answered to patient's satisfaction.     Visit today included increased complexity associated with the care of the episodic problem chemotherapy  addressed and managing the longitudinal care of the patient due to the serious and/or complex managed problem(s) GI malignancies/cancer. In addition, the above encounter addresses an illness that poses a significant threat to life, bodily function and overall performance status.       LUIS F Michelle, PA-C  [facility]  Dept of Hematology/Oncology  PA-C to GI Oncology team           Med Onc Chart Routing      Follow up with physician 2 weeks. See Dr Singer in 2 weeks with lab work, scans and chemotherapy   Follow up with [facility] 4 weeks. mFOLFOX plus trastuzumab   Infusion scheduling note    Injection scheduling note    Labs CBC and CMP   Scheduling:  Preferred lab:  Lab interval: every 2 weeks     Imaging   Scheduled in 2 weeks.   Pharmacy appointment    Other referrals

## 2025-03-13 ENCOUNTER — INFUSION (OUTPATIENT)
Dept: INFUSION THERAPY | Facility: HOSPITAL | Age: 70
End: 2025-03-13
Payer: MEDICARE

## 2025-03-13 VITALS
RESPIRATION RATE: 18 BRPM | WEIGHT: 145.5 LBS | TEMPERATURE: 98 F | HEART RATE: 72 BPM | BODY MASS INDEX: 22.84 KG/M2 | DIASTOLIC BLOOD PRESSURE: 73 MMHG | SYSTOLIC BLOOD PRESSURE: 128 MMHG | HEIGHT: 67 IN

## 2025-03-13 DIAGNOSIS — C15.9 ESOPHAGEAL ADENOCARCINOMA: Primary | ICD-10-CM

## 2025-03-13 PROCEDURE — 25000003 PHARM REV CODE 250: Performed by: PHYSICIAN ASSISTANT

## 2025-03-13 PROCEDURE — 96413 CHEMO IV INFUSION 1 HR: CPT

## 2025-03-13 PROCEDURE — 96416 CHEMO PROLONG INFUSE W/PUMP: CPT

## 2025-03-13 PROCEDURE — 96415 CHEMO IV INFUSION ADDL HR: CPT

## 2025-03-13 PROCEDURE — 63600175 PHARM REV CODE 636 W HCPCS: Mod: TB | Performed by: PHYSICIAN ASSISTANT

## 2025-03-13 PROCEDURE — 96367 TX/PROPH/DG ADDL SEQ IV INF: CPT

## 2025-03-13 PROCEDURE — 96417 CHEMO IV INFUS EACH ADDL SEQ: CPT

## 2025-03-13 RX ORDER — DIPHENHYDRAMINE HYDROCHLORIDE 50 MG/ML
50 INJECTION, SOLUTION INTRAMUSCULAR; INTRAVENOUS ONCE AS NEEDED
Status: DISCONTINUED | OUTPATIENT
Start: 2025-03-13 | End: 2025-03-13 | Stop reason: HOSPADM

## 2025-03-13 RX ORDER — PROCHLORPERAZINE EDISYLATE 5 MG/ML
5 INJECTION INTRAMUSCULAR; INTRAVENOUS ONCE AS NEEDED
Status: DISCONTINUED | OUTPATIENT
Start: 2025-03-13 | End: 2025-03-13 | Stop reason: HOSPADM

## 2025-03-13 RX ORDER — EPINEPHRINE 0.3 MG/.3ML
0.3 INJECTION SUBCUTANEOUS ONCE AS NEEDED
Status: DISCONTINUED | OUTPATIENT
Start: 2025-03-13 | End: 2025-03-13 | Stop reason: HOSPADM

## 2025-03-13 RX ORDER — HEPARIN 100 UNIT/ML
500 SYRINGE INTRAVENOUS
Status: DISCONTINUED | OUTPATIENT
Start: 2025-03-13 | End: 2025-03-13 | Stop reason: HOSPADM

## 2025-03-13 RX ORDER — SODIUM CHLORIDE 0.9 % (FLUSH) 0.9 %
10 SYRINGE (ML) INJECTION
Status: DISCONTINUED | OUTPATIENT
Start: 2025-03-13 | End: 2025-03-13 | Stop reason: HOSPADM

## 2025-03-13 RX ADMIN — TRASTUZUMAB-ANNS 256 MG: 420 INJECTION, POWDER, LYOPHILIZED, FOR SOLUTION INTRAVENOUS at 10:03

## 2025-03-13 RX ADMIN — DEXTROSE MONOHYDRATE: 50 INJECTION, SOLUTION INTRAVENOUS at 11:03

## 2025-03-13 RX ADMIN — DEXAMETHASONE SODIUM PHOSPHATE 0.25 MG: 4 INJECTION, SOLUTION INTRA-ARTICULAR; INTRALESIONAL; INTRAMUSCULAR; INTRAVENOUS; SOFT TISSUE at 11:03

## 2025-03-13 RX ADMIN — SODIUM CHLORIDE: 9 INJECTION, SOLUTION INTRAVENOUS at 10:03

## 2025-03-13 RX ADMIN — FLUOROURACIL 4000 MG: 50 INJECTION, SOLUTION INTRAVENOUS at 02:03

## 2025-03-13 RX ADMIN — OXALIPLATIN 148 MG: 5 INJECTION, SOLUTION INTRAVENOUS at 12:03

## 2025-03-13 NOTE — PLAN OF CARE
1424 Pt tolerated tx. Port connected to pump infusing without difficulty,RUN noted on pump and counting down prior to d/c from clinic. Pt instructed to return to clinic on Saturday 3/15/25 at 11:00 am for pump d/c. Pt verbalized understanding. Pt d/c from clinic.

## 2025-03-15 ENCOUNTER — INFUSION (OUTPATIENT)
Dept: INFUSION THERAPY | Facility: HOSPITAL | Age: 70
End: 2025-03-15
Payer: MEDICARE

## 2025-03-15 VITALS
RESPIRATION RATE: 18 BRPM | TEMPERATURE: 98 F | SYSTOLIC BLOOD PRESSURE: 128 MMHG | DIASTOLIC BLOOD PRESSURE: 60 MMHG | OXYGEN SATURATION: 98 % | HEART RATE: 71 BPM

## 2025-03-15 DIAGNOSIS — C15.9 ESOPHAGEAL ADENOCARCINOMA: Primary | ICD-10-CM

## 2025-03-15 PROCEDURE — 63600175 PHARM REV CODE 636 W HCPCS: Performed by: PHYSICIAN ASSISTANT

## 2025-03-15 PROCEDURE — 25000003 PHARM REV CODE 250: Performed by: PHYSICIAN ASSISTANT

## 2025-03-15 PROCEDURE — A4216 STERILE WATER/SALINE, 10 ML: HCPCS | Performed by: PHYSICIAN ASSISTANT

## 2025-03-15 RX ORDER — SODIUM CHLORIDE 0.9 % (FLUSH) 0.9 %
10 SYRINGE (ML) INJECTION
Status: DISCONTINUED | OUTPATIENT
Start: 2025-03-15 | End: 2025-03-15 | Stop reason: HOSPADM

## 2025-03-15 RX ORDER — PROCHLORPERAZINE EDISYLATE 5 MG/ML
5 INJECTION INTRAMUSCULAR; INTRAVENOUS ONCE AS NEEDED
Status: DISCONTINUED | OUTPATIENT
Start: 2025-03-15 | End: 2025-03-15 | Stop reason: HOSPADM

## 2025-03-15 RX ORDER — HEPARIN 100 UNIT/ML
500 SYRINGE INTRAVENOUS
Status: DISCONTINUED | OUTPATIENT
Start: 2025-03-15 | End: 2025-03-15 | Stop reason: HOSPADM

## 2025-03-15 RX ADMIN — Medication 10 ML: at 11:03

## 2025-03-15 RX ADMIN — HEPARIN SODIUM (PORCINE) LOCK FLUSH IV SOLN 100 UNIT/ML 500 UNITS: 100 SOLUTION at 11:03

## 2025-03-15 NOTE — PLAN OF CARE
Pt ambulatory to clinic. Pump finished infusing on arrival. Pump d/c'd. Port flushed and de-accessed. VSS. Pt has no complaints. Pt ambulatory to exit.

## 2025-03-21 ENCOUNTER — HOSPITAL ENCOUNTER (OUTPATIENT)
Dept: RADIOLOGY | Facility: HOSPITAL | Age: 70
Discharge: HOME | End: 2025-03-21
Attending: INTERNAL MEDICINE
Payer: MEDICARE

## 2025-03-21 DIAGNOSIS — C15.9 ESOPHAGEAL ADENOCARCINOMA: ICD-10-CM

## 2025-03-21 PROCEDURE — 71260 CT THORAX DX C+: CPT | Mod: 26,,, | Performed by: RADIOLOGY

## 2025-03-21 PROCEDURE — 25500020 PHARM REV CODE 255: Performed by: INTERNAL MEDICINE

## 2025-03-21 PROCEDURE — A9698 NON-RAD CONTRAST MATERIALNOC: HCPCS | Performed by: INTERNAL MEDICINE

## 2025-03-21 PROCEDURE — 74177 CT ABD & PELVIS W/CONTRAST: CPT | Mod: 26,,, | Performed by: RADIOLOGY

## 2025-03-21 PROCEDURE — 71260 CT THORAX DX C+: CPT | Mod: TC

## 2025-03-21 RX ADMIN — BARIUM SULFATE 450 ML: 20 SUSPENSION ORAL at 03:03

## 2025-03-21 RX ADMIN — IOHEXOL 75 ML: 350 INJECTION, SOLUTION INTRAVENOUS at 03:03

## 2025-03-23 DIAGNOSIS — C15.9 ESOPHAGEAL ADENOCARCINOMA: ICD-10-CM

## 2025-03-24 ENCOUNTER — PATIENT MESSAGE (OUTPATIENT)
Dept: HEMATOLOGY/ONCOLOGY | Facility: CLINIC | Age: 70
End: 2025-03-24

## 2025-03-24 ENCOUNTER — OFFICE VISIT (OUTPATIENT)
Dept: HEMATOLOGY/ONCOLOGY | Facility: CLINIC | Age: 70
End: 2025-03-24
Payer: MEDICARE

## 2025-03-24 VITALS
RESPIRATION RATE: 18 BRPM | BODY MASS INDEX: 22.68 KG/M2 | OXYGEN SATURATION: 96 % | HEIGHT: 67 IN | DIASTOLIC BLOOD PRESSURE: 73 MMHG | HEART RATE: 78 BPM | TEMPERATURE: 98 F | SYSTOLIC BLOOD PRESSURE: 123 MMHG | WEIGHT: 144.5 LBS

## 2025-03-24 DIAGNOSIS — Z79.899 IMMUNODEFICIENCY SECONDARY TO CHEMOTHERAPY: ICD-10-CM

## 2025-03-24 DIAGNOSIS — D84.81 IMMUNODEFICIENCY SECONDARY TO NEOPLASM: ICD-10-CM

## 2025-03-24 DIAGNOSIS — D84.821 IMMUNODEFICIENCY SECONDARY TO CHEMOTHERAPY: ICD-10-CM

## 2025-03-24 DIAGNOSIS — N28.89 LEFT RENAL MASS: ICD-10-CM

## 2025-03-24 DIAGNOSIS — E78.5 HYPERLIPIDEMIA, UNSPECIFIED HYPERLIPIDEMIA TYPE: ICD-10-CM

## 2025-03-24 DIAGNOSIS — Z72.0 TOBACCO USE: ICD-10-CM

## 2025-03-24 DIAGNOSIS — I10 HYPERTENSION, UNSPECIFIED TYPE: ICD-10-CM

## 2025-03-24 DIAGNOSIS — T45.1X5A IMMUNODEFICIENCY SECONDARY TO CHEMOTHERAPY: ICD-10-CM

## 2025-03-24 DIAGNOSIS — C15.9 ESOPHAGEAL ADENOCARCINOMA: Primary | ICD-10-CM

## 2025-03-24 DIAGNOSIS — D49.9 IMMUNODEFICIENCY SECONDARY TO NEOPLASM: ICD-10-CM

## 2025-03-24 PROCEDURE — 99999 PR PBB SHADOW E&M-EST. PATIENT-LVL IV: CPT | Mod: PBBFAC,,, | Performed by: INTERNAL MEDICINE

## 2025-03-24 PROCEDURE — 99215 OFFICE O/P EST HI 40 MIN: CPT | Mod: S$PBB,,, | Performed by: INTERNAL MEDICINE

## 2025-03-24 PROCEDURE — 99214 OFFICE O/P EST MOD 30 MIN: CPT | Mod: PBBFAC | Performed by: INTERNAL MEDICINE

## 2025-03-24 PROCEDURE — G2211 COMPLEX E/M VISIT ADD ON: HCPCS | Mod: S$PBB,,, | Performed by: INTERNAL MEDICINE

## 2025-03-24 RX ORDER — SODIUM CHLORIDE 0.9 % (FLUSH) 0.9 %
10 SYRINGE (ML) INJECTION
Status: CANCELLED | OUTPATIENT
Start: 2025-03-29

## 2025-03-24 RX ORDER — PROCHLORPERAZINE EDISYLATE 5 MG/ML
5 INJECTION INTRAMUSCULAR; INTRAVENOUS ONCE AS NEEDED
Status: CANCELLED | OUTPATIENT
Start: 2025-03-29

## 2025-03-24 RX ORDER — PROCHLORPERAZINE EDISYLATE 5 MG/ML
5 INJECTION INTRAMUSCULAR; INTRAVENOUS ONCE AS NEEDED
Status: CANCELLED | OUTPATIENT
Start: 2025-03-27

## 2025-03-24 RX ORDER — ONDANSETRON HYDROCHLORIDE 8 MG/1
8 TABLET, FILM COATED ORAL EVERY 8 HOURS PRN
Qty: 60 TABLET | Refills: 2 | Status: SHIPPED | OUTPATIENT
Start: 2025-03-24 | End: 2025-03-24 | Stop reason: SDUPTHER

## 2025-03-24 RX ORDER — ONDANSETRON HYDROCHLORIDE 8 MG/1
8 TABLET, FILM COATED ORAL EVERY 8 HOURS PRN
Qty: 60 TABLET | Refills: 2 | Status: SHIPPED | OUTPATIENT
Start: 2025-03-24

## 2025-03-24 RX ORDER — HEPARIN 100 UNIT/ML
500 SYRINGE INTRAVENOUS
Status: CANCELLED | OUTPATIENT
Start: 2025-03-27

## 2025-03-24 RX ORDER — HEPARIN 100 UNIT/ML
500 SYRINGE INTRAVENOUS
Status: CANCELLED | OUTPATIENT
Start: 2025-03-29

## 2025-03-24 RX ORDER — DIPHENHYDRAMINE HYDROCHLORIDE 50 MG/ML
50 INJECTION, SOLUTION INTRAMUSCULAR; INTRAVENOUS ONCE AS NEEDED
Status: CANCELLED | OUTPATIENT
Start: 2025-03-27

## 2025-03-24 RX ORDER — EPINEPHRINE 0.3 MG/.3ML
0.3 INJECTION SUBCUTANEOUS ONCE AS NEEDED
Status: CANCELLED | OUTPATIENT
Start: 2025-03-27

## 2025-03-24 RX ORDER — SODIUM CHLORIDE 0.9 % (FLUSH) 0.9 %
10 SYRINGE (ML) INJECTION
Status: CANCELLED | OUTPATIENT
Start: 2025-03-27

## 2025-03-24 NOTE — PROGRESS NOTES
MEDICAL ONCOLOGY - ESTABLISHED PATIENT VISIT    Reason for visit: esophageal adenocarcinoma     Best Contact Phone Number(s): There are no phone numbers on file.     Cancer/Stage/TNM:    Cancer Staging   Esophageal adenocarcinoma  Staging form: Esophagus - Adenocarcinoma, AJCC 8th Edition  - Clinical: Stage III (cT2, cN1, cM0, G2) - Signed by Miguel Angel Hampton Jr., MD on 7/31/2023       Oncology History   Esophageal adenocarcinoma   7/31/2023 Initial Diagnosis    Esophageal adenocarcinoma     7/31/2023 Cancer Staged    Staging form: Esophagus - Adenocarcinoma, AJCC 8th Edition  - Clinical: Stage III (cT2, cN1, cM0, G2)     9/5/2023 - 10/5/2023 Chemotherapy    Treatment Summary   Plan Name: OP ESOPHAGEAL PACLITAXEL CARBOPLATIN WEEKLY  Treatment Goal: Curative  Status: Inactive  Start Date: 9/5/2023  End Date: 10/5/2023  Provider: Sahil Singer MD  Chemotherapy: CARBOplatin (PARAPLATIN) 195 mg in sodium chloride 0.9% 304.5 mL chemo infusion, 195 mg (100 % of original dose 193.4 mg), Intravenous, Clinic/HOD 1 time, 1 of 1 cycle  Dose modification:   (original dose 193.4 mg, Cycle 1), 193.4 mg (original dose 193.4 mg, Cycle 1),   (original dose 193.4 mg, Cycle 1, Reason: MD Discretion)  Administration: 195 mg (9/5/2023), 225 mg (9/12/2023), 210 mg (9/19/2023), 180 mg (9/26/2023), 195 mg (10/5/2023)  PACLitaxeL (TAXOL) 50 mg/m2 = 96 mg in sodium chloride 0.9% 250 mL chemo infusion, 50 mg/m2 = 96 mg, Intravenous, Clinic/HOD 1 time, 1 of 1 cycle  Administration: 96 mg (9/5/2023), 96 mg (9/12/2023), 96 mg (9/19/2023), 96 mg (9/26/2023), 96 mg (10/5/2023)     9/5/2023 - 10/9/2023 Radiation Therapy    Treating physician: Anshul Neri    Course: C1 Thorax 2023    Treatment Site Ref. ID Energy Dose/Fx (Gy) #Fx Dose Correction (Gy) Total Dose (Gy) Start Date End Date Elapsed Days   IM Esophagus PTV_High 6X 2 25 / 25 0 50 9/5/2023 10/9/2023 34            1/21/2025 -  Chemotherapy    Treatment Summary   Plan Name: OP  GASTROESOPHAGEAL trastuzumab + MFOLFOX6 (oxaliplatin leucovorin fluorouracil) Q2W  Treatment Goal: Control  Status: Active  Start Date: 1/21/2025  End Date: 12/20/2025 (Planned)  Provider: Sahil Singer MD  Chemotherapy: oxaliplatin (ELOXATIN) 85 mg/m2 = 148 mg in D5W 594.6 mL chemo infusion, 85 mg/m2 = 148 mg, Intravenous, Clinic/HOD 1 time, 4 of 24 cycles  Administration: 148 mg (1/30/2025), 148 mg (2/13/2025), 148 mg (2/27/2025), 148 mg (3/13/2025)  fluorouracil (Adrucil) 4,000 mg in 0.9% NaCl 100 mL chemo infusion, 4,175 mg, Intravenous, Over 46 hours, 4 of 24 cycles  Administration: 4,000 mg (1/30/2025), 4,000 mg (2/13/2025), 4,000 mg (2/27/2025), 4,000 mg (3/13/2025)  trastuzumab-anns (KANJINTI) 384 mg in 0.9% NaCl 303.3 mL chemo infusion, 6 mg/kg = 384 mg, Intravenous, Clinic/HOD 1 time, 4 of 24 cycles  Administration: 384 mg (1/30/2025), 256 mg (2/13/2025), 256 mg (2/27/2025), 256 mg (3/13/2025)        Interim History:  69 y.o. male with esophageal adenocarcinoma who presents for follow-up prior to cycle 5 of FOLFOX + trastuzumab for his recurrent esophageal adenocarcinoma.    He is feeling well.  Dysphagia has improved some since starting chemotherapy but is still present; still has to chew quite a bit and eat slowly.  Denies any significant toxicities from his chemotherapy.  He dislikes wearing the 5-FU infusional pump.  He had some diarrhea after his CT scan.    Presents alone today. ECOG status is 1.     ROS:   Review of Systems   Constitutional:  Positive for malaise/fatigue and weight loss. Negative for fever.   HENT:  Negative for nosebleeds.    Respiratory:  Negative for shortness of breath.    Cardiovascular:  Negative for chest pain and palpitations.   Gastrointestinal:  Positive for heartburn. Negative for blood in stool, constipation, diarrhea, nausea and vomiting.   Genitourinary:  Negative for hematuria.   Musculoskeletal:  Negative for falls.   Skin:  Negative for itching and rash.    Neurological:  Negative for dizziness, tingling, sensory change and weakness.   Psychiatric/Behavioral:  The patient is not nervous/anxious.      Past Medical History:   Past Medical History:   Diagnosis Date    HTN (hypertension)      Past Surgical History:   Past Surgical History:   Procedure Laterality Date    ESOPHAGOGASTRODUODENOSCOPY N/A 12/19/2024    Procedure: EGD (ESOPHAGOGASTRODUODENOSCOPY);  Surgeon: Pete Buenrostro MD;  Location: [facility] (4TH FLR);  Service: Endoscopy;  Laterality: N/A;  Medically Urgent      12/16 ref by Sahil Singer MD, [place]-  12/18 - pre-call complete; MB    INSERTION OF TUNNELED CENTRAL VENOUS CATHETER (CVC) WITH SUBCUTANEOUS PORT Right 1/27/2025    Procedure: INSERTION, SINGLE LUMEN CATHETER WITH PORT, WITH FLUOROSCOPIC GUIDANCE, left poss right;  Surgeon: Chadd Velez MD;  Location: [facility] OR 2ND FLR;  Service: General;  Laterality: Right;    LAPAROSCOPIC REPAIR OF INGUINAL HERNIA Right      Family History:   No family history on file.     Social History:   Social History     Tobacco Use    Smoking status: Every Day     Current packs/day: 1.00     Types: Cigarettes    Smokeless tobacco: Never   Substance Use Topics    Alcohol use: Yes     Alcohol/week: 1.0 standard drink of alcohol     Types: 1 Glasses of wine per week     Comment: rarely      I have reviewed and updated the patient's past medical, surgical, family and social histories.    Allergies:   Review of patient's allergies indicates:  No Known Allergies     Medications:   Current Outpatient Medications   Medication Sig Dispense Refill    amlodipine-benazepril 10-20mg (LOTREL) 10-20 mg per capsule Take 1 capsule by mouth once daily.      azelastine (ASTELIN) 137 mcg (0.1 %) nasal spray 2 sprays by Nasal route.      etodolac (LODINE) 400 MG tablet Take 1 tablet by mouth 2 (two) times daily.      fluticasone propionate (FLONASE) 50 mcg/actuation nasal spray 2 sprays by Each Nostril route.       "gabapentin (NEURONTIN) 300 MG capsule Take 300 mg by mouth.      LIDOcaine-prilocaine (EMLA) cream Apply topically as needed. 30 g 1    multivitamin with minerals tablet Take 1 tablet by mouth once daily. One A Day Multivitamin      OLANZapine (ZYPREXA) 5 MG tablet Take 1 tab at nighttime on nights 1 thru 3 of each chemotherapy cycle. 30 tablet 2    omeprazole (PRILOSEC) 40 MG capsule Take 1 capsule by mouth every morning.      pravastatin (PRAVACHOL) 40 MG tablet Take 40 mg by mouth.      ondansetron (ZOFRAN) 8 MG tablet Take 1 tablet (8 mg total) by mouth every 8 (eight) hours as needed. 60 tablet 2    TURMERIC ORAL Take by mouth. (Patient not taking: Reported on 3/24/2025)       No current facility-administered medications for this visit.      Physical Exam:   /73 (BP Location: Left arm, Patient Position: Sitting)   Pulse 78   Temp 97.8 °F (36.6 °C) (Temporal)   Resp 18   Ht 5' 7" (1.702 m)   Wt 65.5 kg (144 lb 8.2 oz)   SpO2 96%   BMI 22.63 kg/m²           Physical Exam  Vitals reviewed.   Constitutional:       General: He is not in acute distress.     Appearance: Normal appearance. He is normal weight.   Eyes:      General: No scleral icterus.     Extraocular Movements: Extraocular movements intact.      Conjunctiva/sclera: Conjunctivae normal.   Cardiovascular:      Rate and Rhythm: Normal rate.   Pulmonary:      Effort: Pulmonary effort is normal. No respiratory distress.   Abdominal:      Tenderness: There is no abdominal tenderness.   Musculoskeletal:         General: No swelling. Normal range of motion.   Skin:     General: Skin is warm.      Coloration: Skin is not jaundiced.      Findings: No erythema or rash.   Neurological:      General: No focal deficit present.      Mental Status: He is alert and oriented to person, place, and time. Mental status is at baseline.      Gait: Gait normal.   Psychiatric:         Mood and Affect: Mood normal.         Behavior: Behavior normal.         Labs: "      I have reviewed the pertinent labs showing mild anemia.  Unremarkable CMP.    Imagin/12/24 - CT A/P   Impression:     History of esophageal cancer with persistent suspected thickening of the distal esophagus.  Probable hiatal hernia noted.     No convincing metastatic disease.     Stable pulmonary micro nodules.     Stable hepatic hypodensity and hyperenhancing nodule.  Attention on follow-up.     2 x 1.6 cm enhancing left renal mass.     8 mm hypodense nodule caudal aspect body of the pancreas.  Attention on follow-up.     Significant plaque in the aortoiliac system as above.     Additional findings above.       Path:   24:    Gastroesophageal junction mass (biopsy):   Adenocarcinoma, poorly differentiated   HER2 immunostain:  Positive (score 3)     Immunohistochemistry (IHC) Testing for Mismatch Repair (MMR) Proteins:   MLH1, MSH2, MSH6, PMS2 - Intact nuclear expression   Background nonneoplastic tissue/internal control with intact nuclear expression     Interpretation: No loss of nuclear expression of MMR proteins: low probability of microsatellite instability. There are exceptions to the above IHC interpretations. These results should not be considered in isolation, and clinical correlation with   genetic counseling is recommended to assess the need for germline testing.     Assessment:       1. Esophageal adenocarcinoma    2. 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 Hypertension, unspecified type    5. Hyperlipidemia, unspecified hyperlipidemia type    6. Tobacco use    7. Left renal mass            Plan:        # Esophageal adenocarcinoma  Mr. Lugo is a 69 y.o. male who presents to clinic for evaluation of newly diagnosed esophageal cancer. He initially presented with dysphagia, now improved s/p esophageal dilation during EUS. Biopsy confirmed adenocarcinoma.     We reviewed his diagnosis, prognosis, and treatment options with him  "during our initial visit. Discussed with him the high likelihood that cancer would recur if he was taken straight to surgery without neoadjuvant treatment. Our recommendation is to proceed with weekly carboplatin + taxol in conjunction with daily radiation (M-F, no holidays) for a total of 5-5.5 weeks. Handouts provided to patient on both chemotherapy medications.     He has completed Carboplatin AUC 2 + paclitaxel 50 mg/m2 for 5 treatments. Also completed concurrent radiation for definitive non-surgical treatment.    Repeat PET/CT on 11/15/23 showed persistent distal hypermetabolic esophageal tumor (decreasing SUV) without clear evidence of metastatic disease.   Repeat PET/CT on 2/21/24 showed further improvement in hypermetabolism at distal esophagus.  Repeat CT CAP on 4/24/24 showed stable thickening at distal esophagus. Stable left kidney mass c/f malignancy.   Repeat CT CAP 9/9/24 showed stable thickening at distal esophagus.  Repeat CT CAP 12/12/24 showed stable findings.  L renal mass is slightly enlarged.     Given weight decrease, "upset stomach" and increased eructations we got repeat EGD to assess for cancer recurrence.  This was done on 12/19/24 which showed a distal esophageal mass, biopsy confirming poorly differentiated adenocarcinoma, pMMR and HER-2 positive.    Recommended FOLFOX + trastuzumab. No indication for pembro given PD-L1 being negative, per final results of KEYNOTE-811 regimen.    Had very mild dysphagia at the time but did not need feeding tube or stent.   TTE 1/28/25 with LVEF 55-60%.  Next due end of April.   Had port placed 1/27/25 by Dr. Velez.    He received cycle 1 of FOLFOX + trastuzumab on 1/30/2025.    CT CAP after cycle 4 has not been read but by my review shows no evidence of progression or new disease.  Will proceed with treatment.  Labs acceptable.    - Proceed with cycle 5 of FOLFOX + trastuzumab on Thursday.    RTC in 2 weeks for next cycle. Will repeat imaging studies " after cycle 8    Tempus xT: KRAS G12D, NOTCH1, SMARCA4, TP53; PD-L1 0  PGX: DPYD nml, UGT1A1 intermediate    # HTN, HLD  BP normal in clinic today.    Continue medical management.   Following with PCP.    # Tobacco use  Encouraged cessation.  Previously enrolled in smoking cessation clinic.     # Left renal mass  Slightly enlarged in size on 12/2024 scan.   Saw Dr. Sainz 5/2024 and agreed on surveillance with repeat imaging 5/2025.    Follow up: RTC 2 weeks    Patient and family members displayed understanding of the above encounter and treatment plan. All thoughtful questions were answered to their satisfaction. Patient was advised to notify the care team or proceed to the ER if signs and symptoms worsen.      code applied: patient requires or will require a continuous, longitudinal, and active collaborative plan of care related to this patient's health condition, esophageal cancer --the management of which requires the direction of a practitioner with specialized clinical knowledge, skill, and expertise.       Sahil Singer MD  Hematology/Oncology  [facility]             Med Onc Chart Routing      Follow up with physician 4 weeks. for FOLFOX + Herceptin   Follow up with LEO 2 weeks. for FOLFOX + Herceptin   Infusion scheduling note    Injection scheduling note    Labs CBC and CMP   Scheduling:  Preferred lab:  Lab interval: every 2 weeks     Imaging    Pharmacy appointment    Other referrals

## 2025-03-27 ENCOUNTER — PATIENT MESSAGE (OUTPATIENT)
Dept: HEMATOLOGY/ONCOLOGY | Facility: CLINIC | Age: 70
End: 2025-03-27
Payer: MEDICARE

## 2025-03-27 ENCOUNTER — INFUSION (OUTPATIENT)
Dept: INFUSION THERAPY | Facility: HOSPITAL | Age: 70
End: 2025-03-27
Payer: MEDICARE

## 2025-03-27 VITALS
OXYGEN SATURATION: 96 % | BODY MASS INDEX: 21.84 KG/M2 | TEMPERATURE: 99 F | RESPIRATION RATE: 18 BRPM | HEART RATE: 77 BPM | HEIGHT: 67 IN | WEIGHT: 139.13 LBS | DIASTOLIC BLOOD PRESSURE: 73 MMHG | SYSTOLIC BLOOD PRESSURE: 154 MMHG

## 2025-03-27 DIAGNOSIS — C15.9 ESOPHAGEAL ADENOCARCINOMA: Primary | ICD-10-CM

## 2025-03-27 PROCEDURE — 96413 CHEMO IV INFUSION 1 HR: CPT

## 2025-03-27 PROCEDURE — 63600175 PHARM REV CODE 636 W HCPCS: Performed by: INTERNAL MEDICINE

## 2025-03-27 PROCEDURE — 25000003 PHARM REV CODE 250: Performed by: INTERNAL MEDICINE

## 2025-03-27 PROCEDURE — 96367 TX/PROPH/DG ADDL SEQ IV INF: CPT

## 2025-03-27 PROCEDURE — 96416 CHEMO PROLONG INFUSE W/PUMP: CPT

## 2025-03-27 PROCEDURE — 96415 CHEMO IV INFUSION ADDL HR: CPT

## 2025-03-27 PROCEDURE — 96417 CHEMO IV INFUS EACH ADDL SEQ: CPT

## 2025-03-27 RX ORDER — EPINEPHRINE 0.3 MG/.3ML
0.3 INJECTION SUBCUTANEOUS ONCE AS NEEDED
Status: DISCONTINUED | OUTPATIENT
Start: 2025-03-27 | End: 2025-03-27 | Stop reason: HOSPADM

## 2025-03-27 RX ORDER — SODIUM CHLORIDE 0.9 % (FLUSH) 0.9 %
10 SYRINGE (ML) INJECTION
Status: DISCONTINUED | OUTPATIENT
Start: 2025-03-27 | End: 2025-03-27 | Stop reason: HOSPADM

## 2025-03-27 RX ORDER — PROCHLORPERAZINE EDISYLATE 5 MG/ML
5 INJECTION INTRAMUSCULAR; INTRAVENOUS ONCE AS NEEDED
Status: DISCONTINUED | OUTPATIENT
Start: 2025-03-27 | End: 2025-03-27 | Stop reason: HOSPADM

## 2025-03-27 RX ORDER — DIPHENHYDRAMINE HYDROCHLORIDE 50 MG/ML
50 INJECTION, SOLUTION INTRAMUSCULAR; INTRAVENOUS ONCE AS NEEDED
Status: DISCONTINUED | OUTPATIENT
Start: 2025-03-27 | End: 2025-03-27 | Stop reason: HOSPADM

## 2025-03-27 RX ORDER — HEPARIN 100 UNIT/ML
500 SYRINGE INTRAVENOUS
Status: DISCONTINUED | OUTPATIENT
Start: 2025-03-27 | End: 2025-03-27 | Stop reason: HOSPADM

## 2025-03-27 RX ADMIN — DEXTROSE MONOHYDRATE: 50 INJECTION, SOLUTION INTRAVENOUS at 11:03

## 2025-03-27 RX ADMIN — TRASTUZUMAB-ANNS 256 MG: 420 INJECTION, POWDER, LYOPHILIZED, FOR SOLUTION INTRAVENOUS at 10:03

## 2025-03-27 RX ADMIN — DEXAMETHASONE SODIUM PHOSPHATE 0.25 MG: 4 INJECTION, SOLUTION INTRA-ARTICULAR; INTRALESIONAL; INTRAMUSCULAR; INTRAVENOUS; SOFT TISSUE at 11:03

## 2025-03-27 RX ADMIN — SODIUM CHLORIDE: 9 INJECTION, SOLUTION INTRAVENOUS at 10:03

## 2025-03-27 RX ADMIN — FLUOROURACIL 4000 MG: 50 INJECTION, SOLUTION INTRAVENOUS at 02:03

## 2025-03-27 RX ADMIN — OXALIPLATIN 148 MG: 5 INJECTION, SOLUTION INTRAVENOUS at 11:03

## 2025-03-27 NOTE — PLAN OF CARE
Treatment completed. Pt tolerated well. VS charted. Port left in place. CADD pump infusing @2.2ml/hr for 46hrs prior to leaving unit. Ambulated off unit independently. Pt is aware to RTC 3/29 @12pm.

## 2025-03-27 NOTE — PLAN OF CARE
Pt here for Kanjinti/ Folfox. Pt seated. Weight obtained. VS obtained and charted. Assessment done. Port assessed, flushed, blood return noted. Will continue to monitor throughout treatment

## 2025-03-29 ENCOUNTER — INFUSION (OUTPATIENT)
Dept: INFUSION THERAPY | Facility: HOSPITAL | Age: 70
End: 2025-03-29
Payer: MEDICARE

## 2025-03-29 VITALS
RESPIRATION RATE: 18 BRPM | SYSTOLIC BLOOD PRESSURE: 128 MMHG | HEART RATE: 79 BPM | OXYGEN SATURATION: 97 % | DIASTOLIC BLOOD PRESSURE: 61 MMHG | TEMPERATURE: 98 F

## 2025-03-29 DIAGNOSIS — C15.9 ESOPHAGEAL ADENOCARCINOMA: Primary | ICD-10-CM

## 2025-03-29 PROCEDURE — A4216 STERILE WATER/SALINE, 10 ML: HCPCS | Performed by: INTERNAL MEDICINE

## 2025-03-29 PROCEDURE — 63600175 PHARM REV CODE 636 W HCPCS: Performed by: INTERNAL MEDICINE

## 2025-03-29 PROCEDURE — 25000003 PHARM REV CODE 250: Performed by: INTERNAL MEDICINE

## 2025-03-29 RX ORDER — PROCHLORPERAZINE EDISYLATE 5 MG/ML
5 INJECTION INTRAMUSCULAR; INTRAVENOUS ONCE AS NEEDED
Status: DISCONTINUED | OUTPATIENT
Start: 2025-03-29 | End: 2025-03-29 | Stop reason: HOSPADM

## 2025-03-29 RX ORDER — HEPARIN 100 UNIT/ML
500 SYRINGE INTRAVENOUS
Status: DISCONTINUED | OUTPATIENT
Start: 2025-03-29 | End: 2025-03-29 | Stop reason: HOSPADM

## 2025-03-29 RX ORDER — SODIUM CHLORIDE 0.9 % (FLUSH) 0.9 %
10 SYRINGE (ML) INJECTION
Status: DISCONTINUED | OUTPATIENT
Start: 2025-03-29 | End: 2025-03-29 | Stop reason: HOSPADM

## 2025-03-29 RX ADMIN — HEPARIN 500 UNITS: 100 SYRINGE at 12:03

## 2025-03-29 RX ADMIN — SODIUM CHLORIDE, PRESERVATIVE FREE 10 ML: 5 INJECTION INTRAVENOUS at 12:03

## 2025-03-29 NOTE — PLAN OF CARE
Problem: Chemotherapy Effects  Goal: Anemia Symptom Improvement  Outcome: Progressing  Goal: Safety Maintained  Outcome: Progressing  Goal: Absence of Hematuria  Outcome: Progressing  Goal: Nausea and Vomiting Relief  Outcome: Progressing  Goal: Neurotoxicity Symptom Control  Outcome: Progressing  Goal: Absence of Infection  Outcome: Progressing  Goal: Absence of Bleeding  Outcome: Progressing     PAC flushed with out difficulty, blood return noted, heparinized and needle removed.  NAD.

## 2025-04-07 DIAGNOSIS — C15.9 ESOPHAGEAL ADENOCARCINOMA: Primary | ICD-10-CM

## 2025-04-08 ENCOUNTER — TELEPHONE (OUTPATIENT)
Dept: ENDOSCOPY | Facility: HOSPITAL | Age: 70
End: 2025-04-08
Payer: MEDICARE

## 2025-04-08 VITALS — BODY MASS INDEX: 21.82 KG/M2 | HEIGHT: 67 IN | WEIGHT: 139 LBS

## 2025-04-08 DIAGNOSIS — C15.9 ESOPHAGEAL ADENOCARCINOMA: Primary | ICD-10-CM

## 2025-04-08 NOTE — TELEPHONE ENCOUNTER
Referral for procedure from  Workqueue referral (see Appts tab)      Spoke to patient to schedule procedure(s) Upper Endoscopy (EGD)       Physician to perform procedure(s) Dr. SAMIRA Jurado  Date of Procedure (s) 4/11/25  Arrival Time 8:15 AM  Time of Procedure(s) 9:15 AM   Location of Procedure(s) [address]  Type of Rx Prep sent to patient: N/A  Instructions provided to patient via [facility]    Patient was informed on the following information and verbalized understanding. Screening questionnaire reviewed with patient and complete. If procedure requires anesthesia, a responsible adult needs to be present to accompany the patient home, patient cannot drive after receiving anesthesia. Appointment details are tentative, especially check-in time. Patient will receive a prep-op call 7 days prior to confirm check-in time for procedure. If applicable the patient should contact their pharmacy to verify Rx for procedure prep is ready for pick-up. Patient was advised to call the scheduling department at [phone] if pharmacy states no Rx is available. Patient was advised to call the endoscopy scheduling department if any questions or concerns arise.      [facility]oscopy Scheduling Department

## 2025-04-08 NOTE — TELEPHONE ENCOUNTER
Jalen Celeste, RN to [facility]      4/7/25  4:16 PM  Referral has been placed.  Thanks!  Me to Sahil Singer MD  [facility] Staff   [place]      4/7/25  2:23 PM  Hello,      Please place an ambulatory referral to endo procedure  for an EGD, if one is needed. Thank you,      Janey       4/7/25  2:11 PM  Sarah Gagnon routed this conversation to Me        4/7/25  1:00 PM  Jalen Celeste, RN routed this conversation to [facility] Endoscopy Schedulers  Jalen Celeste, RN to Jone Lugo        4/7/25  1:00 PM  I will send a message to that department to get you scheduled.    Last read by Jone Lugo at 2:54PM on 4/7/2025.  Jone Lugo to  [facility] Staff (supporting Sahil Singer MD)        4/7/25  9:56 AM  I meant my eating has gotten worse. I guess Ill need the EGD anyway.  Jone Lugo to  [facility] Staff (supporting Sahil Singer MD)        4/6/25  7:50 PM  Its happening again. Im having a hard time keeping anything down. It seems that something has changed bc my eating had gotten better.  Jalen Celeste, RN to Jone Lugo        3/31/25  1:34 PM  We are glad to hear it!  Please let us know if it happens again.    Last read by Jone Lugo at 2:54PM on 4/7/2025.  Jone Lugo to  [facility] Staff (supporting Sahil Singer MD)        3/30/25 11:07 AM  Whatever it was has run its course and Im able to eat again. I dont need to do the EGD anymore.       3/28/25  9:33 AM  Sarah Gagnon routed this conversation to Joaquina Infante RN Schaefer, Kari, RN to [facility] Patients   [place]      3/27/25  4:56 PM  Pt is not followed by Dr. Buenrostro.  Jalen Celeste, RN to Porter Estrada        3/27/25  4:37 PM  Please see patient message.  Dr. Singer would like to repeat his EGD ASAP.   Jalen Celeste, RN to Jone Lugo        3/27/25  4:36 PM  I shared your message with  Dr. Singer and he would like to get a repeat EGD.  They will reach out to you to get scheduled.    Last read by Jone Lugo at 1:01PM on 4/7/2025.  Jone Lugo to GEOFF Baxter Staff (supporting Sahil Singer MD)        3/27/25 11:59 AM  I had a bowel movement this morning. Not passing gas or nauseous.  Jalen Celeste, RN to Jone DANIELS      3/27/25 11:50 AM  Hi Mr. Lugo,     When was your last bowel movement?  Passing gas?  Are you nauseated?     Please let us know,  Jalen    Last read by Jone Lugo at 7:48PM on 4/6/2025.  Jone Lugo to GEOFF Baxter Staff (supporting Sahil Singer MD)        3/27/25  8:12 AM  I think something has gotten stuck because the last two days I havent been able to keep anything I try to eat down. If I try to eat anything, even soup, I have to run and spit everything up. I cant even have coffee in the morning. I dont know what to do.

## 2025-04-10 ENCOUNTER — TELEPHONE (OUTPATIENT)
Dept: ENDOSCOPY | Facility: HOSPITAL | Age: 70
End: 2025-04-10
Payer: MEDICARE

## 2025-04-10 ENCOUNTER — LAB VISIT (OUTPATIENT)
Dept: LAB | Facility: HOSPITAL | Age: 70
End: 2025-04-10
Payer: MEDICARE

## 2025-04-10 ENCOUNTER — INFUSION (OUTPATIENT)
Dept: INFUSION THERAPY | Facility: HOSPITAL | Age: 70
End: 2025-04-10
Payer: MEDICARE

## 2025-04-10 ENCOUNTER — OFFICE VISIT (OUTPATIENT)
Dept: HEMATOLOGY/ONCOLOGY | Facility: CLINIC | Age: 70
End: 2025-04-10
Payer: MEDICARE

## 2025-04-10 VITALS
DIASTOLIC BLOOD PRESSURE: 58 MMHG | RESPIRATION RATE: 18 BRPM | HEIGHT: 67 IN | TEMPERATURE: 98 F | BODY MASS INDEX: 21.63 KG/M2 | HEART RATE: 65 BPM | WEIGHT: 137.81 LBS | SYSTOLIC BLOOD PRESSURE: 127 MMHG | OXYGEN SATURATION: 99 %

## 2025-04-10 VITALS — HEART RATE: 78 BPM | SYSTOLIC BLOOD PRESSURE: 158 MMHG | DIASTOLIC BLOOD PRESSURE: 77 MMHG | RESPIRATION RATE: 18 BRPM

## 2025-04-10 DIAGNOSIS — Z72.0 TOBACCO USE: ICD-10-CM

## 2025-04-10 DIAGNOSIS — D84.81 IMMUNODEFICIENCY SECONDARY TO NEOPLASM: ICD-10-CM

## 2025-04-10 DIAGNOSIS — D69.59 CHEMOTHERAPY-INDUCED THROMBOCYTOPENIA: ICD-10-CM

## 2025-04-10 DIAGNOSIS — I10 HYPERTENSION, UNSPECIFIED TYPE: ICD-10-CM

## 2025-04-10 DIAGNOSIS — C15.9 ESOPHAGEAL ADENOCARCINOMA: Primary | ICD-10-CM

## 2025-04-10 DIAGNOSIS — T45.1X5A CHEMOTHERAPY-INDUCED THROMBOCYTOPENIA: ICD-10-CM

## 2025-04-10 DIAGNOSIS — E78.5 HYPERLIPIDEMIA, UNSPECIFIED HYPERLIPIDEMIA TYPE: ICD-10-CM

## 2025-04-10 DIAGNOSIS — D84.821 IMMUNODEFICIENCY SECONDARY TO CHEMOTHERAPY: ICD-10-CM

## 2025-04-10 DIAGNOSIS — C15.9 ESOPHAGEAL ADENOCARCINOMA: ICD-10-CM

## 2025-04-10 DIAGNOSIS — D49.9 IMMUNODEFICIENCY SECONDARY TO NEOPLASM: ICD-10-CM

## 2025-04-10 DIAGNOSIS — Z79.69 IMMUNODEFICIENCY SECONDARY TO CHEMOTHERAPY: ICD-10-CM

## 2025-04-10 DIAGNOSIS — N28.89 LEFT RENAL MASS: ICD-10-CM

## 2025-04-10 DIAGNOSIS — T45.1X5A IMMUNODEFICIENCY SECONDARY TO CHEMOTHERAPY: ICD-10-CM

## 2025-04-10 LAB
ABSOLUTE EOSINOPHIL (OHS): 0.06 K/UL
ABSOLUTE MONOCYTE (OHS): 0.66 K/UL (ref 0.3–1)
ABSOLUTE NEUTROPHIL COUNT (OHS): 4.22 K/UL (ref 1.8–7.7)
ALBUMIN SERPL BCP-MCNC: 3.5 G/DL (ref 3.5–5.2)
ALP SERPL-CCNC: 100 UNIT/L (ref 40–150)
ALT SERPL W/O P-5'-P-CCNC: 15 UNIT/L (ref 10–44)
ANION GAP (OHS): 8 MMOL/L (ref 8–16)
AST SERPL-CCNC: 23 UNIT/L (ref 11–45)
BASOPHILS # BLD AUTO: 0.02 K/UL
BASOPHILS NFR BLD AUTO: 0.4 %
BILIRUB SERPL-MCNC: 0.8 MG/DL (ref 0.1–1)
BUN SERPL-MCNC: 16 MG/DL (ref 8–23)
CALCIUM SERPL-MCNC: 9.5 MG/DL (ref 8.7–10.5)
CHLORIDE SERPL-SCNC: 110 MMOL/L (ref 95–110)
CO2 SERPL-SCNC: 23 MMOL/L (ref 23–29)
CREAT SERPL-MCNC: 1.1 MG/DL (ref 0.5–1.4)
ERYTHROCYTE [DISTWIDTH] IN BLOOD BY AUTOMATED COUNT: 19 % (ref 11.5–14.5)
GFR SERPLBLD CREATININE-BSD FMLA CKD-EPI: >60 ML/MIN/1.73/M2
GLUCOSE SERPL-MCNC: 111 MG/DL (ref 70–110)
HCT VFR BLD AUTO: 36.7 % (ref 40–54)
HGB BLD-MCNC: 12.5 GM/DL (ref 14–18)
IMM GRANULOCYTES # BLD AUTO: 0.02 K/UL (ref 0–0.04)
IMM GRANULOCYTES NFR BLD AUTO: 0.4 % (ref 0–0.5)
LYMPHOCYTES # BLD AUTO: 0.57 K/UL (ref 1–4.8)
MCH RBC QN AUTO: 29.9 PG (ref 27–31)
MCHC RBC AUTO-ENTMCNC: 34.1 G/DL (ref 32–36)
MCV RBC AUTO: 88 FL (ref 82–98)
NUCLEATED RBC (/100WBC) (OHS): 0 /100 WBC
PLATELET # BLD AUTO: 147 K/UL (ref 150–450)
PMV BLD AUTO: 9.2 FL (ref 9.2–12.9)
POTASSIUM SERPL-SCNC: 3.6 MMOL/L (ref 3.5–5.1)
PROT SERPL-MCNC: 6.4 GM/DL (ref 6–8.4)
RBC # BLD AUTO: 4.18 M/UL (ref 4.6–6.2)
RELATIVE EOSINOPHIL (OHS): 1.1 %
RELATIVE LYMPHOCYTE (OHS): 10.3 % (ref 18–48)
RELATIVE MONOCYTE (OHS): 11.9 % (ref 4–15)
RELATIVE NEUTROPHIL (OHS): 75.9 % (ref 38–73)
SODIUM SERPL-SCNC: 141 MMOL/L (ref 136–145)
WBC # BLD AUTO: 5.55 K/UL (ref 3.9–12.7)

## 2025-04-10 PROCEDURE — 96416 CHEMO PROLONG INFUSE W/PUMP: CPT

## 2025-04-10 PROCEDURE — 36415 COLL VENOUS BLD VENIPUNCTURE: CPT

## 2025-04-10 PROCEDURE — 63600175 PHARM REV CODE 636 W HCPCS: Mod: TB | Performed by: REGISTERED NURSE

## 2025-04-10 PROCEDURE — 25000003 PHARM REV CODE 250: Performed by: REGISTERED NURSE

## 2025-04-10 PROCEDURE — 99215 OFFICE O/P EST HI 40 MIN: CPT | Mod: S$PBB,,, | Performed by: REGISTERED NURSE

## 2025-04-10 PROCEDURE — 96417 CHEMO IV INFUS EACH ADDL SEQ: CPT

## 2025-04-10 PROCEDURE — 99214 OFFICE O/P EST MOD 30 MIN: CPT | Mod: PBBFAC | Performed by: REGISTERED NURSE

## 2025-04-10 PROCEDURE — 99999 PR PBB SHADOW E&M-EST. PATIENT-LVL IV: CPT | Mod: PBBFAC,,, | Performed by: REGISTERED NURSE

## 2025-04-10 PROCEDURE — 82040 ASSAY OF SERUM ALBUMIN: CPT

## 2025-04-10 PROCEDURE — G2211 COMPLEX E/M VISIT ADD ON: HCPCS | Mod: S$PBB,,, | Performed by: REGISTERED NURSE

## 2025-04-10 PROCEDURE — 96413 CHEMO IV INFUSION 1 HR: CPT

## 2025-04-10 PROCEDURE — 96367 TX/PROPH/DG ADDL SEQ IV INF: CPT

## 2025-04-10 PROCEDURE — 96415 CHEMO IV INFUSION ADDL HR: CPT

## 2025-04-10 PROCEDURE — 85025 COMPLETE CBC W/AUTO DIFF WBC: CPT

## 2025-04-10 RX ORDER — DIPHENHYDRAMINE HYDROCHLORIDE 50 MG/ML
50 INJECTION, SOLUTION INTRAMUSCULAR; INTRAVENOUS ONCE AS NEEDED
Status: DISCONTINUED | OUTPATIENT
Start: 2025-04-10 | End: 2025-04-10 | Stop reason: HOSPADM

## 2025-04-10 RX ORDER — SODIUM CHLORIDE 0.9 % (FLUSH) 0.9 %
10 SYRINGE (ML) INJECTION
Status: DISCONTINUED | OUTPATIENT
Start: 2025-04-10 | End: 2025-04-10 | Stop reason: HOSPADM

## 2025-04-10 RX ORDER — EPINEPHRINE 0.3 MG/.3ML
0.3 INJECTION SUBCUTANEOUS ONCE AS NEEDED
Status: DISCONTINUED | OUTPATIENT
Start: 2025-04-10 | End: 2025-04-10 | Stop reason: HOSPADM

## 2025-04-10 RX ORDER — HEPARIN 100 UNIT/ML
500 SYRINGE INTRAVENOUS
Status: DISCONTINUED | OUTPATIENT
Start: 2025-04-10 | End: 2025-04-10 | Stop reason: HOSPADM

## 2025-04-10 RX ORDER — SODIUM CHLORIDE 0.9 % (FLUSH) 0.9 %
10 SYRINGE (ML) INJECTION
Status: CANCELLED | OUTPATIENT
Start: 2025-04-10

## 2025-04-10 RX ORDER — PROCHLORPERAZINE EDISYLATE 5 MG/ML
5 INJECTION INTRAMUSCULAR; INTRAVENOUS ONCE AS NEEDED
Status: DISCONTINUED | OUTPATIENT
Start: 2025-04-10 | End: 2025-04-10 | Stop reason: HOSPADM

## 2025-04-10 RX ORDER — HEPARIN 100 UNIT/ML
500 SYRINGE INTRAVENOUS
Status: CANCELLED | OUTPATIENT
Start: 2025-04-10

## 2025-04-10 RX ORDER — SODIUM CHLORIDE 0.9 % (FLUSH) 0.9 %
10 SYRINGE (ML) INJECTION
OUTPATIENT
Start: 2025-04-12

## 2025-04-10 RX ORDER — PROCHLORPERAZINE EDISYLATE 5 MG/ML
5 INJECTION INTRAMUSCULAR; INTRAVENOUS ONCE AS NEEDED
Status: CANCELLED | OUTPATIENT
Start: 2025-04-10

## 2025-04-10 RX ORDER — DIPHENHYDRAMINE HYDROCHLORIDE 50 MG/ML
50 INJECTION, SOLUTION INTRAMUSCULAR; INTRAVENOUS ONCE AS NEEDED
Status: CANCELLED | OUTPATIENT
Start: 2025-04-10

## 2025-04-10 RX ORDER — HEPARIN 100 UNIT/ML
500 SYRINGE INTRAVENOUS
OUTPATIENT
Start: 2025-04-12

## 2025-04-10 RX ORDER — PROCHLORPERAZINE EDISYLATE 5 MG/ML
5 INJECTION INTRAMUSCULAR; INTRAVENOUS ONCE AS NEEDED
OUTPATIENT
Start: 2025-04-12

## 2025-04-10 RX ORDER — EPINEPHRINE 0.3 MG/.3ML
0.3 INJECTION SUBCUTANEOUS ONCE AS NEEDED
Status: CANCELLED | OUTPATIENT
Start: 2025-04-10

## 2025-04-10 RX ADMIN — DEXTROSE MONOHYDRATE 148 MG: 25000 INJECTION, SOLUTION INTRAVENOUS at 10:04

## 2025-04-10 RX ADMIN — TRASTUZUMAB-ANNS 256 MG: 420 INJECTION, POWDER, LYOPHILIZED, FOR SOLUTION INTRAVENOUS at 09:04

## 2025-04-10 RX ADMIN — FLUOROURACIL 4000 MG: 50 INJECTION, SOLUTION INTRAVENOUS at 12:04

## 2025-04-10 RX ADMIN — DEXAMETHASONE SODIUM PHOSPHATE 0.25 MG: 4 INJECTION, SOLUTION INTRA-ARTICULAR; INTRALESIONAL; INTRAMUSCULAR; INTRAVENOUS; SOFT TISSUE at 10:04

## 2025-04-10 RX ADMIN — DEXTROSE MONOHYDRATE: 5 INJECTION, SOLUTION INTRAVENOUS at 10:04

## 2025-04-10 RX ADMIN — SODIUM CHLORIDE: 9 INJECTION, SOLUTION INTRAVENOUS at 09:04

## 2025-04-10 NOTE — PROGRESS NOTES
MEDICAL ONCOLOGY - ESTABLISHED PATIENT VISIT    Reason for visit: esophageal adenocarcinoma     Best Contact Phone Number(s): There are no phone numbers on file.     Cancer/Stage/TNM:    Cancer Staging   Esophageal adenocarcinoma  Staging form: Esophagus - Adenocarcinoma, AJCC 8th Edition  - Clinical: Stage III (cT2, cN1, cM0, G2) - Signed by Miguel Angel Hampton Jr., MD on 7/31/2023       Oncology History   Esophageal adenocarcinoma   7/31/2023 Initial Diagnosis    Esophageal adenocarcinoma     7/31/2023 Cancer Staged    Staging form: Esophagus - Adenocarcinoma, AJCC 8th Edition  - Clinical: Stage III (cT2, cN1, cM0, G2)     9/5/2023 - 10/5/2023 Chemotherapy    Treatment Summary   Plan Name: OP ESOPHAGEAL PACLITAXEL CARBOPLATIN WEEKLY  Treatment Goal: Curative  Status: Inactive  Start Date: 9/5/2023  End Date: 10/5/2023  Provider: Sahil Singer MD  Chemotherapy: CARBOplatin (PARAPLATIN) 195 mg in sodium chloride 0.9% 304.5 mL chemo infusion, 195 mg (100 % of original dose 193.4 mg), Intravenous, Clinic/HOD 1 time, 1 of 1 cycle  Dose modification:   (original dose 193.4 mg, Cycle 1), 193.4 mg (original dose 193.4 mg, Cycle 1),   (original dose 193.4 mg, Cycle 1, Reason: MD Discretion)  Administration: 195 mg (9/5/2023), 225 mg (9/12/2023), 210 mg (9/19/2023), 180 mg (9/26/2023), 195 mg (10/5/2023)  PACLitaxeL (TAXOL) 50 mg/m2 = 96 mg in sodium chloride 0.9% 250 mL chemo infusion, 50 mg/m2 = 96 mg, Intravenous, Clinic/HOD 1 time, 1 of 1 cycle  Administration: 96 mg (9/5/2023), 96 mg (9/12/2023), 96 mg (9/19/2023), 96 mg (9/26/2023), 96 mg (10/5/2023)     9/5/2023 - 10/9/2023 Radiation Therapy    Treating physician: Anshul Neri    Course: C1 Thorax 2023    Treatment Site Ref. ID Energy Dose/Fx (Gy) #Fx Dose Correction (Gy) Total Dose (Gy) Start Date End Date Elapsed Days   IM Esophagus PTV_High 6X 2 25 / 25 0 50 9/5/2023 10/9/2023 34            1/21/2025 -  Chemotherapy    Treatment Summary   Plan Name: OP  GASTROESOPHAGEAL trastuzumab + MFOLFOX6 (oxaliplatin leucovorin fluorouracil) Q2W  Treatment Goal: Control  Status: Active  Start Date: 1/21/2025  End Date: 12/20/2025 (Planned)  Provider: Sahil Singer MD  Chemotherapy: oxaliplatin (ELOXATIN) 85 mg/m2 = 148 mg in D5W 594.6 mL chemo infusion, 85 mg/m2 = 148 mg, Intravenous, Clinic/HOD 1 time, 5 of 24 cycles  Administration: 148 mg (1/30/2025), 148 mg (2/13/2025), 148 mg (2/27/2025), 148 mg (3/13/2025), 148 mg (3/27/2025)  fluorouracil (Adrucil) 4,000 mg in 0.9% NaCl 100 mL chemo infusion, 4,175 mg, Intravenous, Over 46 hours, 5 of 24 cycles  Administration: 4,000 mg (1/30/2025), 4,000 mg (2/13/2025), 4,000 mg (2/27/2025), 4,000 mg (3/13/2025), 4,000 mg (3/27/2025)  trastuzumab-anns (KANJINTI) 384 mg in 0.9% NaCl 303.3 mL chemo infusion, 6 mg/kg = 384 mg, Intravenous, Clinic/HOD 1 time, 5 of 24 cycles  Administration: 384 mg (1/30/2025), 256 mg (2/13/2025), 256 mg (2/27/2025), 256 mg (3/13/2025), 256 mg (3/27/2025)        Interim History:  69 y.o. male with esophageal adenocarcinoma who presents for follow-up prior to cycle 6 of FOLFOX + trastuzumab for his recurrent esophageal adenocarcinoma.      He has noted worsening of his dysphagia despite chewing foods thoroughly and taking his time eating. Only tolerating liquids such as consumme/broths and Boost. Tried potato soup and did not tolerate the solid pieces well. Does get some pain with eating and feels that things are getting stuck, only relieved with leaning over sink and trying to spit up, though mostly saliva. No other nausea/vomiting. Diarrhea has resolved. Has cold sensitivity nearly the entire cycle, no persistent paresthesias. Dislikes the 5FU pump and hopes to get better soon. Nervous about EGD tomorrow.     Presents alone today. ECOG status is 1.     ROS:   Review of Systems   Constitutional:  Positive for malaise/fatigue and weight loss. Negative for fever.   HENT:  Negative for nosebleeds.     Respiratory:  Negative for shortness of breath.    Cardiovascular:  Negative for chest pain and palpitations.   Gastrointestinal:  Positive for heartburn. Negative for blood in stool, constipation, diarrhea, nausea and vomiting.   Genitourinary:  Negative for hematuria.   Musculoskeletal:  Negative for falls.   Skin:  Negative for itching and rash.   Neurological:  Negative for dizziness, tingling, sensory change and weakness.   Psychiatric/Behavioral:  The patient is not nervous/anxious.      Past Medical History:   Past Medical History:   Diagnosis Date    HTN (hypertension)      Past Surgical History:   Past Surgical History:   Procedure Laterality Date    ESOPHAGOGASTRODUODENOSCOPY N/A 12/19/2024    Procedure: EGD (ESOPHAGOGASTRODUODENOSCOPY);  Surgeon: Pete Buenrostro MD;  Location: [facility] ([facility]);  Service: Endoscopy;  Laterality: N/A;  Medically Urgent      12/16 ref by Sahil Singer MD, [facility]  12/18 - pre-call complete; MB    INSERTION OF TUNNELED CENTRAL VENOUS CATHETER (CVC) WITH SUBCUTANEOUS PORT Right 1/27/2025    Procedure: INSERTION, SINGLE LUMEN CATHETER WITH PORT, WITH FLUOROSCOPIC GUIDANCE, left poss right;  Surgeon: Chadd Velez MD;  Location: [facility] OR [address] [place] [address];  Service: General;  Laterality: Right;    LAPAROSCOPIC REPAIR OF INGUINAL HERNIA Right      Family History:   No family history on file.     Social History:   Social History     Tobacco Use    Smoking status: Every Day     Current packs/day: 1.00     Types: Cigarettes    Smokeless tobacco: Never   Substance Use Topics    Alcohol use: Yes     Alcohol/week: 1.0 standard drink of alcohol     Types: 1 Glasses of wine per week     Comment: rarely      I have reviewed and updated the patient's past medical, surgical, family and social histories.    Allergies:   Review of patient's allergies indicates:  No Known Allergies     Medications:   Current Outpatient Medications   Medication Sig Dispense Refill     "amlodipine-benazepril 10-20mg (LOTREL) 10-20 mg per capsule Take 1 capsule by mouth once daily.      azelastine (ASTELIN) 137 mcg (0.1 %) nasal spray 2 sprays by Nasal route.      etodolac (LODINE) 400 MG tablet Take 1 tablet by mouth 2 (two) times daily.      fluticasone propionate (FLONASE) 50 mcg/actuation nasal spray 2 sprays by Each Nostril route.      gabapentin (NEURONTIN) 300 MG capsule Take 300 mg by mouth.      LIDOcaine-prilocaine (EMLA) cream Apply topically as needed. 30 g 1    multivitamin with minerals tablet Take 1 tablet by mouth once daily. One A Day Multivitamin      OLANZapine (ZYPREXA) 5 MG tablet Take 1 tab at nighttime on nights 1 thru 3 of each chemotherapy cycle. 30 tablet 2    omeprazole (PRILOSEC) 40 MG capsule Take 1 capsule by mouth every morning.      ondansetron (ZOFRAN) 8 MG tablet Take 1 tablet (8 mg total) by mouth every 8 (eight) hours as needed. 60 tablet 2    pravastatin (PRAVACHOL) 40 MG tablet Take 40 mg by mouth.      TURMERIC ORAL Take by mouth. (Patient not taking: Reported on 3/24/2025)       No current facility-administered medications for this visit.      Physical Exam:   BP (!) 127/58 (BP Location: Left arm, Patient Position: Sitting)   Pulse 65   Temp 98 °F (36.7 °C) (Temporal)   Resp 18   Ht 5' 7" (1.702 m)   Wt 62.5 kg (137 lb 12.6 oz)   SpO2 99%   BMI 21.58 kg/m²           Physical Exam  Vitals reviewed.   Constitutional:       General: He is not in acute distress.     Appearance: Normal appearance. He is normal weight.   Eyes:      General: No scleral icterus.     Extraocular Movements: Extraocular movements intact.      Conjunctiva/sclera: Conjunctivae normal.   Cardiovascular:      Rate and Rhythm: Normal rate.   Pulmonary:      Effort: Pulmonary effort is normal. No respiratory distress.   Abdominal:      Tenderness: There is no abdominal tenderness.   Musculoskeletal:         General: No swelling. Normal range of motion.   Skin:     General: Skin is warm. "      Coloration: Skin is not jaundiced.      Findings: No erythema or rash.   Neurological:      General: No focal deficit present.      Mental Status: He is alert and oriented to person, place, and time. Mental status is at baseline.      Gait: Gait normal.   Psychiatric:         Mood and Affect: Mood normal.         Behavior: Behavior normal.         Labs:      I have reviewed the pertinent labs showing mild anemia.      Imagin/12/24 - CT A/P   Impression:     History of esophageal cancer with persistent suspected thickening of the distal esophagus.  Probable hiatal hernia noted.     No convincing metastatic disease.     Stable pulmonary micro nodules.     Stable hepatic hypodensity and hyperenhancing nodule.  Attention on follow-up.     2 x 1.6 cm enhancing left renal mass.     8 mm hypodense nodule caudal aspect body of the pancreas.  Attention on follow-up.     Significant plaque in the aortoiliac system as above.     Additional findings above.       Path:   24:    Gastroesophageal junction mass (biopsy):   Adenocarcinoma, poorly differentiated   HER2 immunostain:  Positive (score 3)     Immunohistochemistry (IHC) Testing for Mismatch Repair (MMR) Proteins:   MLH1, MSH2, MSH6, PMS2 - Intact nuclear expression   Background nonneoplastic tissue/internal control with intact nuclear expression     Interpretation: No loss of nuclear expression of MMR proteins: low probability of microsatellite instability. There are exceptions to the above IHC interpretations. These results should not be considered in isolation, and clinical correlation with   genetic counseling is recommended to assess the need for germline testing.     Assessment:       1. Esophageal adenocarcinoma    2. 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 Hypertension, unspecified type    5. Hyperlipidemia, unspecified hyperlipidemia type    6. Tobacco use    7. Left renal mass    8.  "Chemotherapy-induced thrombocytopenia        Plan:        # Esophageal adenocarcinoma  Mr. Lugo is a 69 y.o. male who presents to clinic for evaluation of newly diagnosed esophageal cancer. He initially presented with dysphagia, now improved s/p esophageal dilation during EUS. Biopsy confirmed adenocarcinoma.     We reviewed his diagnosis, prognosis, and treatment options with him during our initial visit. Discussed with him the high likelihood that cancer would recur if he was taken straight to surgery without neoadjuvant treatment. Our recommendation is to proceed with weekly carboplatin + taxol in conjunction with daily radiation (M-F, no holidays) for a total of 5-5.5 weeks. Handouts provided to patient on both chemotherapy medications.     He has completed Carboplatin AUC 2 + paclitaxel 50 mg/m2 for 5 treatments. Also completed concurrent radiation for definitive non-surgical treatment.    Repeat PET/CT on 11/15/23 showed persistent distal hypermetabolic esophageal tumor (decreasing SUV) without clear evidence of metastatic disease.   Repeat PET/CT on 2/21/24 showed further improvement in hypermetabolism at distal esophagus.  Repeat CT CAP on 4/24/24 showed stable thickening at distal esophagus. Stable left kidney mass c/f malignancy.   Repeat CT CAP 9/9/24 showed stable thickening at distal esophagus.  Repeat CT CAP 12/12/24 showed stable findings.  L renal mass is slightly enlarged.     Given weight decrease, "upset stomach" and increased eructations we got repeat EGD to assess for cancer recurrence.  This was done on 12/19/24 which showed a distal esophageal mass, biopsy confirming poorly differentiated adenocarcinoma, pMMR and HER-2 positive.    Recommended FOLFOX + trastuzumab. No indication for pembro given PD-L1 being negative, per final results of [id number] regimen.    Had very mild dysphagia at the time but did not need feeding tube or stent.   TTE 1/28/25 with LVEF 55-60%.  Next due end of " April.   Had port placed 1/27/25 by Dr. Velez.    He received cycle 1 of FOLFOX + trastuzumab on 1/30/2025.    CT CAP after cycle 4 shows no evidence of progression or new disease.    - Labs reviewed, adequate for treatment.   - Proceed with cycle 6 of FOLFOX + trastuzumab today.  - Scheduled for repeat EGD with Dr. Jurado 4/11/25.     RTC in 2 weeks for next cycle.   Will repeat imaging studies after cycle 8    Tempus xT: KRAS G12D, NOTCH1, SMARCA4, TP53; PD-L1 0  PGX: DPYD nml, UGT1A1 intermediate    # HTN, HLD  BP normal in clinic today.    Continue medical management.   Following with PCP.    # Tobacco use  Encouraged cessation.  Previously enrolled in smoking cessation clinic.     # Left renal mass  Slightly enlarged in size on 12/2024 scan.   Saw Dr. Sainz 5/2024 and agreed on surveillance with repeat imaging 5/2025.    Follow up: RTC 2 weeks    Patient is in agreement with the proposed treatment plan. All questions were answered to the patient's satisfaction. Pt knows to call clinic if anything is needed before the next clinic visit.    Patient discussed with collaborating physician, Dr. Singer.    At least 40 minutes were spent today on this encounter including face to face time with the patient, data gathering/interpretation and documentation.       Aleshia Giraldo, MSN, APRN, [facility]-  Hematology and Medical Oncology  Clinical Nurse Specialist to Dr. Singer, Dr. Wheeler & Dr. Cooper         code applied: patient requires or will require a continuous, longitudinal, and active collaborative plan of care related to this patient's health condition, esophageal cancer --the management of which requires the direction of a practitioner with specialized clinical knowledge, skill, and expertise.     Med Onc Chart Routing      Follow up with physician 2 weeks and 6 weeks. with labs for chemo. scans 1-2 days prior to MD visit in 6 weeks.   Follow up with LEO 4 weeks. with labs for chemo.   Infusion scheduling  note   chemo every 2 weeks, pump d/c on day 3   Injection scheduling note    Labs CBC and CMP   Scheduling:  Preferred lab:  Lab interval: every 2 weeks     Imaging CT chest abdomen pelvis   1-2 days prior to MD visit in 6 weeks.   Pharmacy appointment    Other referrals

## 2025-04-10 NOTE — PLAN OF CARE
1259 Patient tolerated C6D1 Kanjinti + FOLFOX without incident. Seen by Aleshia Giraldo NP prior to treatment. Labs reviewed and within parameters for treatment. Vitals stable before and after treatment. Port with brisk blood return and flushed without resistance before, during and after infusion. Pre-medicated per orders after Kanjinti. Patient connected to 5FU home infusion pump at completion of infusion, port with blood return noted and flushed without resistance. Line connections secured. Confirmed pump was infusing, visualized decrease in reservoir volume. Patient verbalized understanding of home care of pump and aware of number to call on side of pump if issues arise. Patient aware of his pump d/c appointment on Saturday at 1100am. To contact provider with questions or concerns. D/C ambulatory and stable.

## 2025-04-11 ENCOUNTER — PATIENT MESSAGE (OUTPATIENT)
Dept: ENDOSCOPY | Facility: HOSPITAL | Age: 70
End: 2025-04-11
Payer: MEDICARE

## 2025-04-11 ENCOUNTER — ANESTHESIA (OUTPATIENT)
Dept: ENDOSCOPY | Facility: HOSPITAL | Age: 70
End: 2025-04-11
Payer: MEDICARE

## 2025-04-11 ENCOUNTER — HOSPITAL ENCOUNTER (INPATIENT)
Facility: HOSPITAL | Age: 70
LOS: 3 days | Discharge: LEFT BEFORE DISCHARGE | DRG: 376 | End: 2025-04-14
Attending: INTERNAL MEDICINE | Admitting: INTERNAL MEDICINE
Payer: MEDICARE

## 2025-04-11 ENCOUNTER — ANESTHESIA EVENT (OUTPATIENT)
Dept: ENDOSCOPY | Facility: HOSPITAL | Age: 70
End: 2025-04-11
Payer: MEDICARE

## 2025-04-11 DIAGNOSIS — C15.9 ESOPHAGEAL ADENOCARCINOMA: ICD-10-CM

## 2025-04-11 DIAGNOSIS — R13.19 ESOPHAGEAL DYSPHAGIA: Primary | ICD-10-CM

## 2025-04-11 DIAGNOSIS — R13.10 DYSPHAGIA: ICD-10-CM

## 2025-04-11 DIAGNOSIS — K22.2 OBSTRUCTION OF ESOPHAGUS: ICD-10-CM

## 2025-04-11 DIAGNOSIS — R07.9 CHEST PAIN: ICD-10-CM

## 2025-04-11 PROBLEM — I10 HTN (HYPERTENSION): Status: ACTIVE | Noted: 2025-04-11

## 2025-04-11 PROCEDURE — 63600175 PHARM REV CODE 636 W HCPCS

## 2025-04-11 PROCEDURE — 0DJ08ZZ INSPECTION OF UPPER INTESTINAL TRACT, VIA NATURAL OR ARTIFICIAL OPENING ENDOSCOPIC: ICD-10-PCS | Performed by: INTERNAL MEDICINE

## 2025-04-11 PROCEDURE — 25000003 PHARM REV CODE 250: Performed by: STUDENT IN AN ORGANIZED HEALTH CARE EDUCATION/TRAINING PROGRAM

## 2025-04-11 PROCEDURE — 20600001 HC STEP DOWN PRIVATE ROOM

## 2025-04-11 PROCEDURE — 63600175 PHARM REV CODE 636 W HCPCS: Performed by: STUDENT IN AN ORGANIZED HEALTH CARE EDUCATION/TRAINING PROGRAM

## 2025-04-11 PROCEDURE — 25000003 PHARM REV CODE 250: Performed by: INTERNAL MEDICINE

## 2025-04-11 RX ORDER — DEXTROSE MONOHYDRATE AND SODIUM CHLORIDE 5; .9 G/100ML; G/100ML
INJECTION, SOLUTION INTRAVENOUS CONTINUOUS
Status: DISCONTINUED | OUTPATIENT
Start: 2025-04-11 | End: 2025-04-12

## 2025-04-11 RX ORDER — OLANZAPINE 2.5 MG/1
5 TABLET, FILM COATED ORAL NIGHTLY
Status: DISCONTINUED | OUTPATIENT
Start: 2025-04-11 | End: 2025-04-14 | Stop reason: HOSPADM

## 2025-04-11 RX ORDER — ONDANSETRON HYDROCHLORIDE 2 MG/ML
INJECTION, SOLUTION INTRAVENOUS
Status: DISCONTINUED | OUTPATIENT
Start: 2025-04-11 | End: 2025-04-11

## 2025-04-11 RX ORDER — LIDOCAINE HYDROCHLORIDE 20 MG/ML
INJECTION, SOLUTION EPIDURAL; INFILTRATION; INTRACAUDAL; PERINEURAL
Status: DISCONTINUED | OUTPATIENT
Start: 2025-04-11 | End: 2025-04-11

## 2025-04-11 RX ORDER — PROPOFOL 10 MG/ML
VIAL (ML) INTRAVENOUS
Status: DISCONTINUED | OUTPATIENT
Start: 2025-04-11 | End: 2025-04-11

## 2025-04-11 RX ORDER — SUCCINYLCHOLINE CHLORIDE 20 MG/ML
INJECTION INTRAMUSCULAR; INTRAVENOUS
Status: DISCONTINUED | OUTPATIENT
Start: 2025-04-11 | End: 2025-04-11

## 2025-04-11 RX ORDER — SODIUM CHLORIDE 0.9 % (FLUSH) 0.9 %
10 SYRINGE (ML) INJECTION EVERY 12 HOURS PRN
Status: DISCONTINUED | OUTPATIENT
Start: 2025-04-11 | End: 2025-04-14 | Stop reason: HOSPADM

## 2025-04-11 RX ORDER — AMLODIPINE AND BENAZEPRIL HYDROCHLORIDE 5; 10 MG/1; MG/1
2 CAPSULE ORAL DAILY
Status: DISCONTINUED | OUTPATIENT
Start: 2025-04-12 | End: 2025-04-14 | Stop reason: HOSPADM

## 2025-04-11 RX ORDER — PANTOPRAZOLE SODIUM 40 MG/1
40 TABLET, DELAYED RELEASE ORAL DAILY
Status: DISCONTINUED | OUTPATIENT
Start: 2025-04-11 | End: 2025-04-11

## 2025-04-11 RX ORDER — GLUCAGON 1 MG
1 KIT INJECTION
Status: DISCONTINUED | OUTPATIENT
Start: 2025-04-11 | End: 2025-04-14 | Stop reason: HOSPADM

## 2025-04-11 RX ORDER — FENTANYL CITRATE 50 UG/ML
INJECTION, SOLUTION INTRAMUSCULAR; INTRAVENOUS
Status: DISCONTINUED | OUTPATIENT
Start: 2025-04-11 | End: 2025-04-11

## 2025-04-11 RX ORDER — FLUTICASONE PROPIONATE 50 MCG
2 SPRAY, SUSPENSION (ML) NASAL DAILY
Status: DISCONTINUED | OUTPATIENT
Start: 2025-04-12 | End: 2025-04-14 | Stop reason: HOSPADM

## 2025-04-11 RX ORDER — GABAPENTIN 300 MG/1
300 CAPSULE ORAL NIGHTLY
Status: DISCONTINUED | OUTPATIENT
Start: 2025-04-11 | End: 2025-04-14 | Stop reason: HOSPADM

## 2025-04-11 RX ORDER — PANTOPRAZOLE SODIUM 40 MG/1
40 TABLET, DELAYED RELEASE ORAL 2 TIMES DAILY
Status: DISCONTINUED | OUTPATIENT
Start: 2025-04-12 | End: 2025-04-14 | Stop reason: HOSPADM

## 2025-04-11 RX ORDER — IBUPROFEN 200 MG
16 TABLET ORAL
Status: DISCONTINUED | OUTPATIENT
Start: 2025-04-11 | End: 2025-04-14 | Stop reason: HOSPADM

## 2025-04-11 RX ORDER — AZELASTINE 1 MG/ML
2 SPRAY, METERED NASAL 2 TIMES DAILY
Status: DISCONTINUED | OUTPATIENT
Start: 2025-04-11 | End: 2025-04-14 | Stop reason: HOSPADM

## 2025-04-11 RX ORDER — NALOXONE HCL 0.4 MG/ML
0.02 VIAL (ML) INJECTION
Status: DISCONTINUED | OUTPATIENT
Start: 2025-04-11 | End: 2025-04-14 | Stop reason: HOSPADM

## 2025-04-11 RX ORDER — IBUPROFEN 200 MG
24 TABLET ORAL
Status: DISCONTINUED | OUTPATIENT
Start: 2025-04-11 | End: 2025-04-14 | Stop reason: HOSPADM

## 2025-04-11 RX ADMIN — ONDANSETRON 4 MG: 2 INJECTION INTRAMUSCULAR; INTRAVENOUS at 09:04

## 2025-04-11 RX ADMIN — FENTANYL CITRATE 100 MCG: 50 INJECTION, SOLUTION INTRAMUSCULAR; INTRAVENOUS at 09:04

## 2025-04-11 RX ADMIN — SODIUM CHLORIDE: 0.9 INJECTION, SOLUTION INTRAVENOUS at 09:04

## 2025-04-11 RX ADMIN — PANTOPRAZOLE SODIUM 40 MG: 40 TABLET, DELAYED RELEASE ORAL at 04:04

## 2025-04-11 RX ADMIN — PROPOFOL 100 MG: 10 INJECTION, EMULSION INTRAVENOUS at 09:04

## 2025-04-11 RX ADMIN — GABAPENTIN 300 MG: 300 CAPSULE ORAL at 08:04

## 2025-04-11 RX ADMIN — SUCCINYLCHOLINE CHLORIDE 110 MG: 20 INJECTION, SOLUTION INTRAMUSCULAR; INTRAVENOUS at 09:04

## 2025-04-11 RX ADMIN — DEXTROSE AND SODIUM CHLORIDE: 5; 900 INJECTION, SOLUTION INTRAVENOUS at 04:04

## 2025-04-11 RX ADMIN — PROPOFOL 150 MCG/KG/MIN: 10 INJECTION, EMULSION INTRAVENOUS at 09:04

## 2025-04-11 RX ADMIN — OLANZAPINE 5 MG: 2.5 TABLET, FILM COATED ORAL at 08:04

## 2025-04-11 RX ADMIN — LIDOCAINE HYDROCHLORIDE 100 MG: 20 INJECTION, SOLUTION EPIDURAL; INFILTRATION; INTRACAUDAL; PERINEURAL at 09:04

## 2025-04-11 RX ADMIN — AZELASTINE 274 MCG: 1 SPRAY, METERED NASAL at 08:04

## 2025-04-11 NOTE — ANESTHESIA PROCEDURE NOTES
Intubation    Date/Time: 4/11/2025 9:36 AM    Performed by: Nhi Haley CRNA  Authorized by: Marc Stahl MD    Intubation:     Induction:  Rapid sequence induction    Intubated:  Postinduction    Mask Ventilation:  Not attempted    Attempts:  1    Attempted By:  CRNA    Method of Intubation:  Video laryngoscopy    Blade:  Mcknight 3    Laryngeal View Grade: Grade I - full view of cords      Difficult Airway Encountered?: No      Complications:  None    Airway Device:  Oral endotracheal tube    Airway Device Size:  7.0    Style/Cuff Inflation:  Cuffed (inflated to minimal occlusive pressure)    Tube secured:  23    Secured at:  The lips    Placement Verified By:  Capnometry    Complicating Factors:  None    Findings Post-Intubation:  BS equal bilateral and atraumatic/condition of teeth unchanged

## 2025-04-11 NOTE — SUBJECTIVE & OBJECTIVE
Oncology Treatment Plan:   OP GASTROESOPHAGEAL trastuzumab + MFOLFOX6 (oxaliplatin leucovorin fluorouracil) Q2W    Medications:  Continuous Infusions:   D5 and 0.9% NaCl   Intravenous Continuous         Scheduled Meds:   [START ON 4/12/2025] amlodipine-benazepril 5-10 mg  2 capsule Oral Daily    azelastine  2 spray Nasal BID    [START ON 4/12/2025] fluticasone propionate  2 spray Each Nostril Daily    gabapentin  300 mg Oral QHS    OLANZapine  5 mg Oral QHS    [START ON 4/12/2025] pantoprazole  40 mg Oral BID     PRN Meds:  Current Facility-Administered Medications:     dextrose 50%, 12.5 g, Intravenous, PRN    dextrose 50%, 25 g, Intravenous, PRN    glucagon (human recombinant), 1 mg, Intramuscular, PRN    glucose, 16 g, Oral, PRN    glucose, 24 g, Oral, PRN    naloxone, 0.02 mg, Intravenous, PRN    sodium chloride 0.9%, 10 mL, Intravenous, Q12H PRN     Review of patient's allergies indicates:  No Known Allergies     Past Medical History:   Diagnosis Date    HTN (hypertension)      Past Surgical History:   Procedure Laterality Date    ESOPHAGOGASTRODUODENOSCOPY N/A 12/19/2024    Procedure: EGD (ESOPHAGOGASTRODUODENOSCOPY);  Surgeon: Pete Buenrostro MD;  Location: [address]);  Service: Endoscopy;  Laterality: N/A;  Medically Urgent      12/16 ref by Sahil Singer MD, [facility]  12/18 - pre-call complete; MB    INSERTION OF TUNNELED CENTRAL VENOUS CATHETER (CVC) WITH SUBCUTANEOUS PORT Right 1/27/2025    Procedure: INSERTION, SINGLE LUMEN CATHETER WITH PORT, WITH FLUOROSCOPIC GUIDANCE, left poss right;  Surgeon: Chadd Velez MD;  Location: [address];  Service: General;  Laterality: Right;    LAPAROSCOPIC REPAIR OF INGUINAL HERNIA Right      Family History    None       Tobacco Use    Smoking status: Every Day     Current packs/day: 1.00     Types: Cigarettes    Smokeless tobacco: Never   Substance and Sexual Activity    Alcohol use: Yes     Alcohol/week: 1.0 standard drink of  alcohol     Types: 1 Glasses of wine per week     Comment: rarely    Drug use: Never    Sexual activity: Not on file       Review of Systems   Constitutional:  Positive for appetite change, fatigue and unexpected weight change. Negative for activity change and fever.   HENT: Negative.     Eyes: Negative.    Respiratory: Negative.     Cardiovascular: Negative.    Gastrointestinal:  Positive for abdominal pain.   Endocrine: Negative.    Genitourinary: Negative.    Musculoskeletal: Negative.    Skin: Negative.    Allergic/Immunologic: Negative.    Neurological: Negative.    Hematological: Negative.    Psychiatric/Behavioral: Negative.       Objective:     Vital Signs (Most Recent):  Temp: 98 °F (36.7 °C) (04/11/25 1454)  Pulse: 77 (04/11/25 1454)  Resp: 20 (04/11/25 1454)  BP: 137/77 (04/11/25 1454)  SpO2: 98 % (04/11/25 1454) Vital Signs (24h Range):  Temp:  [97.7 °F (36.5 °C)-98.1 °F (36.7 °C)] 98 °F (36.7 °C)  Pulse:  [65-78] 77  Resp:  [15-33] 20  SpO2:  [96 %-99 %] 98 %  BP: (108-137)/(55-77) 137/77     Weight: 64 kg (141 lb 1.5 oz)  Body mass index is 21.45 kg/m².  Body surface area is 1.75 meters squared.    No intake or output data in the 24 hours ending 04/11/25 1644     Physical Exam  Constitutional:       Appearance: Normal appearance.   HENT:      Head: Normocephalic and atraumatic.      Mouth/Throat:      Mouth: Mucous membranes are dry.   Eyes:      General: No scleral icterus.     Conjunctiva/sclera: Conjunctivae normal.      Pupils: Pupils are equal, round, and reactive to light.   Cardiovascular:      Rate and Rhythm: Normal rate and regular rhythm.      Pulses: Normal pulses.      Heart sounds: Normal heart sounds.   Pulmonary:      Effort: Pulmonary effort is normal.      Breath sounds: Normal breath sounds.   Abdominal:      General: Abdomen is flat. There is no distension.      Palpations: Abdomen is soft.   Musculoskeletal:         General: Normal range of motion.      Right lower leg: No edema.       Left lower leg: No edema.   Skin:     General: Skin is dry.   Neurological:      General: No focal deficit present.      Mental Status: He is alert and oriented to person, place, and time.      Cranial Nerves: No cranial nerve deficit.          Significant Labs:   CBC:   Recent Labs   Lab 04/10/25  0807   WBC 5.55   HGB 12.5*   HCT 36.7*   *    and CMP:   Recent Labs   Lab 04/10/25  0807      K 3.6      CO2 23   BUN 16   CREATININE 1.1   CALCIUM 9.5   ALBUMIN 3.5   BILITOT 0.8   ALKPHOS 100   AST 23   ALT 15   ANIONGAP 8       Diagnostic Results:  I have reviewed all pertinent imaging results/findings within the past 24 hours.

## 2025-04-11 NOTE — NURSING
Arrived per w/c anb without assit co of unable to swallow regular food for one week can susie pills and liquids had egd done today had esoph dilated some but needs further work up onc team notified pt in rm oriented to rm and call light

## 2025-04-11 NOTE — H&P
[facility] - Transplant [facility]  Hematology/Oncology  H&P    Patient Name: Jone Lugo  MRN: [id number]  Admission Date: 4/11/2025  Code Status: Full Code   Attending Provider: Angelo Grant MD  Primary Care Physician: [place], Primary Doctor  Principal Problem:Obstruction of esophagus    Subjective:     HPI: Jone Lugo is a 69 y.o. male with esophageal adenocarcinoma presents as a direct admission for further evaluation of dysphagia. Underwent EGD today that showed Partially obstructing, malignant esophageal tumor in the lower third of the esophagus. Lately, he is only able to tolerate clear liquid. He has difficulty swallowing with Soup. He is established with Dr. Singer and is currently on 2nd line treatment with FOLFOX + Trastuzumab, last treatment with C6 on 4/10/2025.      Oncology Treatment Plan:   OP GASTROESOPHAGEAL trastuzumab + MFOLFOX6 (oxaliplatin leucovorin fluorouracil) Q2W    Medications:  Continuous Infusions:   D5 and 0.9% NaCl   Intravenous Continuous         Scheduled Meds:   [START ON 4/12/2025] amlodipine-benazepril 5-10 mg  2 capsule Oral Daily    azelastine  2 spray Nasal BID    [START ON 4/12/2025] fluticasone propionate  2 spray Each Nostril Daily    gabapentin  300 mg Oral QHS    OLANZapine  5 mg Oral QHS    [START ON 4/12/2025] pantoprazole  40 mg Oral BID     PRN Meds:  Current Facility-Administered Medications:     dextrose 50%, 12.5 g, Intravenous, PRN    dextrose 50%, 25 g, Intravenous, PRN    glucagon (human recombinant), 1 mg, Intramuscular, PRN    glucose, 16 g, Oral, PRN    glucose, 24 g, Oral, PRN    naloxone, 0.02 mg, Intravenous, PRN    sodium chloride 0.9%, 10 mL, Intravenous, Q12H PRN     Review of patient's allergies indicates:  No Known Allergies     Past Medical History:   Diagnosis Date    HTN (hypertension)      Past Surgical History:   Procedure Laterality Date    ESOPHAGOGASTRODUODENOSCOPY N/A 12/19/2024    Procedure: EGD (ESOPHAGOGASTRODUODENOSCOPY);   Surgeon: Pete Buenrostro MD;  Location: [facility] ENDO (4TH FLR);  Service: Endoscopy;  Laterality: N/A;  Medically Urgent      12/16 ref by Sahil Singer MD, portal-st  12/18 - pre-call complete; MB    INSERTION OF TUNNELED CENTRAL VENOUS CATHETER (CVC) WITH SUBCUTANEOUS PORT Right 1/27/2025    Procedure: INSERTION, SINGLE LUMEN CATHETER WITH PORT, WITH FLUOROSCOPIC GUIDANCE, left poss right;  Surgeon: Chadd Velez MD;  Location: [facility] OR 2ND FLR;  Service: General;  Laterality: Right;    LAPAROSCOPIC REPAIR OF INGUINAL HERNIA Right      Family History    None       Tobacco Use    Smoking status: Every Day     Current packs/day: 1.00     Types: Cigarettes    Smokeless tobacco: Never   Substance and Sexual Activity    Alcohol use: Yes     Alcohol/week: 1.0 standard drink of alcohol     Types: 1 Glasses of wine per week     Comment: rarely    Drug use: Never    Sexual activity: Not on file       Review of Systems   Constitutional:  Positive for appetite change, fatigue and unexpected weight change. Negative for activity change and fever.   HENT: Negative.     Eyes: Negative.    Respiratory: Negative.     Cardiovascular: Negative.    Gastrointestinal:  Positive for abdominal pain.   Endocrine: Negative.    Genitourinary: Negative.    Musculoskeletal: Negative.    Skin: Negative.    Allergic/Immunologic: Negative.    Neurological: Negative.    Hematological: Negative.    Psychiatric/Behavioral: Negative.       Objective:     Vital Signs (Most Recent):  Temp: 98 °F (36.7 °C) (04/11/25 1454)  Pulse: 77 (04/11/25 1454)  Resp: 20 (04/11/25 1454)  BP: 137/77 (04/11/25 1454)  SpO2: 98 % (04/11/25 1454) Vital Signs (24h Range):  Temp:  [97.7 °F (36.5 °C)-98.1 °F (36.7 °C)] 98 °F (36.7 °C)  Pulse:  [65-78] 77  Resp:  [15-33] 20  SpO2:  [96 %-99 %] 98 %  BP: (108-137)/(55-77) 137/77     Weight: 64 kg (141 lb 1.5 oz)  Body mass index is 21.45 kg/m².  Body surface area is 1.75 meters squared.    No intake or  output data in the 24 hours ending 04/11/25 5744     Physical Exam  Constitutional:       Appearance: Normal appearance.   HENT:      Head: Normocephalic and atraumatic.      Mouth/Throat:      Mouth: Mucous membranes are dry.   Eyes:      General: No scleral icterus.     Conjunctiva/sclera: Conjunctivae normal.      Pupils: Pupils are equal, round, and reactive to light.   Cardiovascular:      Rate and Rhythm: Normal rate and regular rhythm.      Pulses: Normal pulses.      Heart sounds: Normal heart sounds.   Pulmonary:      Effort: Pulmonary effort is normal.      Breath sounds: Normal breath sounds.   Abdominal:      General: Abdomen is flat. There is no distension.      Palpations: Abdomen is soft.   Musculoskeletal:         General: Normal range of motion.      Right lower leg: No edema.      Left lower leg: No edema.   Skin:     General: Skin is dry.   Neurological:      General: No focal deficit present.      Mental Status: He is alert and oriented to person, place, and time.      Cranial Nerves: No cranial nerve deficit.          Significant Labs:   CBC:   Recent Labs   Lab 04/10/25  0807   WBC 5.55   HGB 12.5*   HCT 36.7*   *    and CMP:   Recent Labs   Lab 04/10/25  0807      K 3.6      CO2 23   BUN 16   CREATININE 1.1   CALCIUM 9.5   ALBUMIN 3.5   BILITOT 0.8   ALKPHOS 100   AST 23   ALT 15   ANIONGAP 8       Diagnostic Results:  I have reviewed all pertinent imaging results/findings within the past 24 hours.  Assessment/Plan:     * Obstruction of esophagus  Underwent EGD with Dr. Jurado on 4/11/25 which showed Partially obstructing, malignant esophageal tumor was found in the lower third of the esophagus. He was direct admission for nutrition support with IVF and nutrition consult. Dr. Jurado further recommended surgery oncology and AES consult.     - Clear liquid diet   - AES consulted to see if there is any feasibility in palliative esophageal stent to promote oral nutrition   -  PPI BID   - mIVF     HTN (hypertension)  Continue home amlodipine     Esophageal adenocarcinoma  Patient established with Dr. Singer. Diagnosed 07/2023 with Stage III esophageal adenocarcinoma s/p concurrent carboplatin + taxol + RT. He completed Carboplatin AUC 2 + paclitaxel 50 mg/m2 for 5 treatments and radiation for definitive non-surgical treatment. EGD on 12/19/24 showed a distal esophageal mass, biopsy confirming poorly differentiated adenocarcinoma, pMMR and HER-2 positive. Started 2nd line FOLFOX + trastuzumab on 1/30/2025, last treatment - Cycle 6 - on 4/10/25.     - Continue Olanzapine as part of post-treatment anti-emetic   - Compazine PRN   - D/C pump on 4/12/25         Nathanael Bagley MD  Hematology/Oncology  [facility] - Transplant Stepdown

## 2025-04-11 NOTE — TRANSFER OF CARE
Anesthesia Transfer of Care Note    Patient: Jone Lugo    Procedure(s) Performed: Procedure(s) (LRB):  EGD (ESOPHAGOGASTRODUODENOSCOPY) (N/A)    Patient location: [facility]    Anesthesia Type: general    Transport from OR: Transported from OR on 6-10 L/min O2 by face mask with adequate spontaneous ventilation    Post pain: adequate analgesia    Post assessment: no apparent anesthetic complications and tolerated procedure well    Post vital signs: stable    Level of consciousness: responds to stimulation    Nausea/Vomiting: no nausea/vomiting    Complications: none    Transfer of care protocol was followed      Last vitals: Visit Vitals  /66 (BP Location: Left arm, Patient Position: Lying)   Pulse 78   Temp 36.7 °C (98.1 °F)   Resp 18   SpO2 99%

## 2025-04-11 NOTE — ASSESSMENT & PLAN NOTE
Underwent EGD with Dr. Jurado on 4/11/25 which showed Partially obstructing, malignant esophageal tumor was found in the lower third of the esophagus. He was direct admission for nutrition support with IVF and nutrition consult. Dr. Jurado further recommended surgery oncology and AES consult.     - Clear liquid diet   - AES consulted to see if there is any feasibility in palliative esophageal stent to promote oral nutrition   - PPI BID   - mIVF

## 2025-04-11 NOTE — ASSESSMENT & PLAN NOTE
Patient established with Dr. Singer. Diagnosed 07/2023 with Stage III esophageal adenocarcinoma s/p concurrent carboplatin + taxol + RT. He completed Carboplatin AUC 2 + paclitaxel 50 mg/m2 for 5 treatments and radiation for definitive non-surgical treatment. EGD on 12/19/24 showed a distal esophageal mass, biopsy confirming poorly differentiated adenocarcinoma, pMMR and HER-2 positive. Started 2nd line FOLFOX + trastuzumab on 1/30/2025, last treatment - Cycle 6 - on 4/10/25.     - Continue Olanzapine as part of post-treatment anti-emetic   - Compazine PRN   - D/C pump on 4/12/25

## 2025-04-11 NOTE — ANESTHESIA PREPROCEDURE EVALUATION
04/11/2025  Jone Lugo is a 69 y.o., male.      Pre-op Assessment          Review of Systems  Anesthesia Hx:  No problems with previous Anesthesia                Hematology/Oncology:                      Current/Recent Cancer.                Cardiovascular:     Hypertension    Denies CAD.                                          Pulmonary:     Denies Asthma.     Denies Sleep Apnea.                Renal/:   Denies Chronic Renal Disease.                Hepatic/GI:   Denies PUD.   Denies GERD. Denies Liver Disease.               Neurological:    Denies CVA.    Denies Seizures.                                Endocrine:  Denies Diabetes. Denies Hypothyroidism.              Physical Exam  General: Alert    Airway:  Mallampati: I   Mouth Opening: Normal  TM Distance: Normal  Tongue: Normal  Neck ROM: Normal ROM    Dental:  Intact        Anesthesia Plan  Type of Anesthesia, risks & benefits discussed:    Anesthesia Type: Gen Natural Airway, MAC  Intra-op Monitoring Plan: Standard ASA Monitors  Post Op Pain Control Plan: multimodal analgesia and IV/PO Opioids PRN  Induction:  IV  Airway Plan: Direct  Informed Consent: Informed consent signed with the Patient and all parties understand the risks and agree with anesthesia plan.  All questions answered.   ASA Score: 3    Ready For Surgery From Anesthesia Perspective.     .

## 2025-04-11 NOTE — HPI
Jone Lugo is a 69 y.o. male with esophageal adenocarcinoma presents as a direct admission for further evaluation of dysphagia. Underwent EGD today that showed Partially obstructing, malignant esophageal tumor in the lower third of the esophagus. Lately, he is only able to tolerate clear liquid. He has difficulty swallowing with Soup. He is established with Dr. Singer and is currently on 2nd line treatment with FOLFOX + Trastuzumab, last treatment with C6 on 4/10/2025.

## 2025-04-12 PROBLEM — F17.200 TOBACCO DEPENDENCY: Status: ACTIVE | Noted: 2025-04-12

## 2025-04-12 LAB
ABSOLUTE EOSINOPHIL (OHS): 0.01 K/UL
ABSOLUTE MONOCYTE (OHS): 0.4 K/UL (ref 0.3–1)
ABSOLUTE NEUTROPHIL COUNT (OHS): 3.65 K/UL (ref 1.8–7.7)
ALBUMIN SERPL BCP-MCNC: 3 G/DL (ref 3.5–5.2)
ALP SERPL-CCNC: 81 UNIT/L (ref 40–150)
ALT SERPL W/O P-5'-P-CCNC: 22 UNIT/L (ref 10–44)
ANION GAP (OHS): 7 MMOL/L (ref 8–16)
AST SERPL-CCNC: 36 UNIT/L (ref 11–45)
BASOPHILS # BLD AUTO: 0.01 K/UL
BASOPHILS NFR BLD AUTO: 0.2 %
BILIRUB SERPL-MCNC: 0.6 MG/DL (ref 0.1–1)
BUN SERPL-MCNC: 18 MG/DL (ref 8–23)
CALCIUM SERPL-MCNC: 8.9 MG/DL (ref 8.7–10.5)
CHLORIDE SERPL-SCNC: 111 MMOL/L (ref 95–110)
CO2 SERPL-SCNC: 24 MMOL/L (ref 23–29)
CREAT SERPL-MCNC: 0.9 MG/DL (ref 0.5–1.4)
ERYTHROCYTE [DISTWIDTH] IN BLOOD BY AUTOMATED COUNT: 19.1 % (ref 11.5–14.5)
GFR SERPLBLD CREATININE-BSD FMLA CKD-EPI: >60 ML/MIN/1.73/M2
GLUCOSE SERPL-MCNC: 124 MG/DL (ref 70–110)
HCT VFR BLD AUTO: 35.3 % (ref 40–54)
HGB BLD-MCNC: 12.1 GM/DL (ref 14–18)
IMM GRANULOCYTES # BLD AUTO: 0.02 K/UL (ref 0–0.04)
IMM GRANULOCYTES NFR BLD AUTO: 0.4 % (ref 0–0.5)
INR PPP: 1.1 (ref 0.8–1.2)
LYMPHOCYTES # BLD AUTO: 0.64 K/UL (ref 1–4.8)
MAGNESIUM SERPL-MCNC: 1.8 MG/DL (ref 1.6–2.6)
MCH RBC QN AUTO: 30 PG (ref 27–31)
MCHC RBC AUTO-ENTMCNC: 34.3 G/DL (ref 32–36)
MCV RBC AUTO: 88 FL (ref 82–98)
NUCLEATED RBC (/100WBC) (OHS): 0 /100 WBC
PHOSPHATE SERPL-MCNC: 2.9 MG/DL (ref 2.7–4.5)
PLATELET # BLD AUTO: 130 K/UL (ref 150–450)
PMV BLD AUTO: 9.5 FL (ref 9.2–12.9)
POTASSIUM SERPL-SCNC: 3.6 MMOL/L (ref 3.5–5.1)
PROT SERPL-MCNC: 5.7 GM/DL (ref 6–8.4)
PROTHROMBIN TIME: 11.5 SECONDS (ref 9–12.5)
RBC # BLD AUTO: 4.03 M/UL (ref 4.6–6.2)
RELATIVE EOSINOPHIL (OHS): 0.2 %
RELATIVE LYMPHOCYTE (OHS): 13.5 % (ref 18–48)
RELATIVE MONOCYTE (OHS): 8.5 % (ref 4–15)
RELATIVE NEUTROPHIL (OHS): 77.2 % (ref 38–73)
SODIUM SERPL-SCNC: 142 MMOL/L (ref 136–145)
WBC # BLD AUTO: 4.73 K/UL (ref 3.9–12.7)

## 2025-04-12 PROCEDURE — 63600175 PHARM REV CODE 636 W HCPCS

## 2025-04-12 PROCEDURE — 85025 COMPLETE CBC W/AUTO DIFF WBC: CPT | Performed by: STUDENT IN AN ORGANIZED HEALTH CARE EDUCATION/TRAINING PROGRAM

## 2025-04-12 PROCEDURE — 63600175 PHARM REV CODE 636 W HCPCS: Performed by: STUDENT IN AN ORGANIZED HEALTH CARE EDUCATION/TRAINING PROGRAM

## 2025-04-12 PROCEDURE — 20600001 HC STEP DOWN PRIVATE ROOM

## 2025-04-12 PROCEDURE — 25000003 PHARM REV CODE 250: Performed by: STUDENT IN AN ORGANIZED HEALTH CARE EDUCATION/TRAINING PROGRAM

## 2025-04-12 PROCEDURE — 25000003 PHARM REV CODE 250

## 2025-04-12 PROCEDURE — 25000242 PHARM REV CODE 250 ALT 637 W/ HCPCS: Performed by: STUDENT IN AN ORGANIZED HEALTH CARE EDUCATION/TRAINING PROGRAM

## 2025-04-12 PROCEDURE — 99222 1ST HOSP IP/OBS MODERATE 55: CPT | Mod: ,,, | Performed by: INTERNAL MEDICINE

## 2025-04-12 PROCEDURE — 85610 PROTHROMBIN TIME: CPT | Performed by: STUDENT IN AN ORGANIZED HEALTH CARE EDUCATION/TRAINING PROGRAM

## 2025-04-12 PROCEDURE — 36415 COLL VENOUS BLD VENIPUNCTURE: CPT | Performed by: STUDENT IN AN ORGANIZED HEALTH CARE EDUCATION/TRAINING PROGRAM

## 2025-04-12 PROCEDURE — 80053 COMPREHEN METABOLIC PANEL: CPT | Performed by: STUDENT IN AN ORGANIZED HEALTH CARE EDUCATION/TRAINING PROGRAM

## 2025-04-12 PROCEDURE — 84100 ASSAY OF PHOSPHORUS: CPT | Performed by: STUDENT IN AN ORGANIZED HEALTH CARE EDUCATION/TRAINING PROGRAM

## 2025-04-12 PROCEDURE — 99223 1ST HOSP IP/OBS HIGH 75: CPT | Mod: GC,,, | Performed by: INTERNAL MEDICINE

## 2025-04-12 PROCEDURE — 83735 ASSAY OF MAGNESIUM: CPT | Performed by: STUDENT IN AN ORGANIZED HEALTH CARE EDUCATION/TRAINING PROGRAM

## 2025-04-12 RX ORDER — ONDANSETRON 8 MG/1
8 TABLET, ORALLY DISINTEGRATING ORAL EVERY 8 HOURS PRN
Status: DISCONTINUED | OUTPATIENT
Start: 2025-04-12 | End: 2025-04-14 | Stop reason: HOSPADM

## 2025-04-12 RX ORDER — HEPARIN 100 UNIT/ML
500 SYRINGE INTRAVENOUS
Status: DISCONTINUED | OUTPATIENT
Start: 2025-04-12 | End: 2025-04-14 | Stop reason: HOSPADM

## 2025-04-12 RX ADMIN — AZELASTINE 274 MCG: 1 SPRAY, METERED NASAL at 09:04

## 2025-04-12 RX ADMIN — OLANZAPINE 5 MG: 2.5 TABLET, FILM COATED ORAL at 09:04

## 2025-04-12 RX ADMIN — PANTOPRAZOLE SODIUM 40 MG: 40 TABLET, DELAYED RELEASE ORAL at 09:04

## 2025-04-12 RX ADMIN — AMLODIPINE BESYLATE AND BENAZEPRIL HYDROCHLORIDE 2 CAPSULE: 5; 10 CAPSULE ORAL at 09:04

## 2025-04-12 RX ADMIN — DEXTROSE AND SODIUM CHLORIDE: 5; 900 INJECTION, SOLUTION INTRAVENOUS at 06:04

## 2025-04-12 RX ADMIN — HEPARIN 500 UNITS: 100 SYRINGE at 12:04

## 2025-04-12 RX ADMIN — GABAPENTIN 300 MG: 300 CAPSULE ORAL at 09:04

## 2025-04-12 RX ADMIN — ONDANSETRON 8 MG: 8 TABLET, ORALLY DISINTEGRATING ORAL at 10:04

## 2025-04-12 RX ADMIN — FLUTICASONE PROPIONATE 100 MCG: 50 SPRAY, METERED NASAL at 09:04

## 2025-04-12 NOTE — HOSPITAL COURSE
Patient with a known hx of esophageal adenocarcinoma presents as a direct admission for further evaluation of dysphagia. Underwent an EGD on admission, which showed a partially obstructing, malignant esophageal tumor in the lower third of the esophagus. The patient was admitted for a palliative stent, with GI planning to place the stent either today or tomorrow. The patient expressed irritation that the procedure was not performed over the weekend.    GI discussed the procedure with the patient today, but the patient appears to not fully understand the benefits and limitations of the palliative stent. GI recommends that the Oncology team speak with the patient again regarding the procedure and consider arranging an outpatient procedure, provided the patient is tolerating some oral intake.    The patient left AMA

## 2025-04-12 NOTE — PROGRESS NOTES
[facility] - Transplant Stepdown  Hematology/Oncology  Progress Note    Patient Name: Jone Lugo  Admission Date: 4/11/2025  Hospital Length of Stay: 1 days  Code Status: Full Code     Subjective:     HPI:  Jone Lugo is a 69 y.o. male with esophageal adenocarcinoma presents as a direct admission for further evaluation of dysphagia. Underwent EGD today that showed Partially obstructing, malignant esophageal tumor in the lower third of the esophagus. Lately, he is only able to tolerate clear liquid. He has difficulty swallowing with Soup. He is established with Dr. Singer and is currently on 2nd line treatment with FOLFOX + Trastuzumab, last treatment with C6 on 4/10/2025.     Interval History: No active events overnight. Stable vitals. AES consult appreciated-egd with possible stent placement tentatively mon or Tuesday. Clear diet.     Oncology Treatment Plan:   OP GASTROESOPHAGEAL trastuzumab + MFOLFOX6 (oxaliplatin leucovorin fluorouracil) Q2W    Medications:  Continuous Infusions:   D5 and 0.9% NaCl   Intravenous Continuous 75 mL/hr at 04/12/25 1048 Rate Verify at 04/12/25 1048     Scheduled Meds:   amlodipine-benazepril 5-10 mg  2 capsule Oral Daily    azelastine  2 spray Nasal BID    fluticasone propionate  2 spray Each Nostril Daily    gabapentin  300 mg Oral QHS    OLANZapine  5 mg Oral QHS    pantoprazole  40 mg Oral BID     PRN Meds:  Current Facility-Administered Medications:     dextrose 50%, 12.5 g, Intravenous, PRN    dextrose 50%, 25 g, Intravenous, PRN    glucagon (human recombinant), 1 mg, Intramuscular, PRN    glucose, 16 g, Oral, PRN    glucose, 24 g, Oral, PRN    heparin, porcine (PF), 500 Units, Intravenous, PRN    naloxone, 0.02 mg, Intravenous, PRN    sodium chloride 0.9%, 10 mL, Intravenous, Q12H PRN     Review of Systems  Objective:     Vital Signs (Most Recent):  Temp: 98.7 °F (37.1 °C) (04/12/25 1139)  Pulse: 62 (04/12/25 1139)  Resp: 18 (04/12/25 1139)  BP: (!) 144/66  (04/12/25 1139)  SpO2: 96 % (04/12/25 1139) Vital Signs (24h Range):  Temp:  [97.2 °F (36.2 °C)-98.7 °F (37.1 °C)] 98.7 °F (37.1 °C)  Pulse:  [61-77] 62  Resp:  [18-20] 18  SpO2:  [94 %-98 %] 96 %  BP: (114-144)/(55-77) 144/66     Weight: 61.7 kg (135 lb 14.6 oz)  Body mass index is 20.67 kg/m².  Body surface area is 1.72 meters squared.      Intake/Output Summary (Last 24 hours) at 4/12/2025 1407  Last data filed at 4/12/2025 1048  Gross per 24 hour   Intake 1644.4 ml   Output 1475 ml   Net 169.4 ml        Physical Exam  Constitutional:       Appearance: Normal appearance.   HENT:      Head: Normocephalic and atraumatic.      Mouth/Throat:      Mouth: Mucous membranes are dry.   Eyes:      General: No scleral icterus.     Conjunctiva/sclera: Conjunctivae normal.      Pupils: Pupils are equal, round, and reactive to light.   Cardiovascular:      Rate and Rhythm: Normal rate and regular rhythm.      Pulses: Normal pulses.      Heart sounds: Normal heart sounds.   Pulmonary:      Effort: Pulmonary effort is normal.      Breath sounds: Normal breath sounds.   Abdominal:      General: Abdomen is flat. There is no distension.      Palpations: Abdomen is soft.   Musculoskeletal:         General: Normal range of motion.      Right lower leg: No edema.      Left lower leg: No edema.   Skin:     General: Skin is dry.   Neurological:      General: No focal deficit present.      Mental Status: He is alert and oriented to person, place, and time.      Cranial Nerves: No cranial nerve deficit.          Significant Labs:   All pertinent labs from the last 24 hours have been reviewed.    Diagnostic Results:  I have reviewed all pertinent imaging results/findings within the past 24 hours.  I have reviewed and interpreted all pertinent imaging results/findings within the past 24 hours.  Assessment/Plan:     * Obstruction of esophagus  Underwent EGD with Dr. Jurado on 4/11/25 which showed Partially obstructing, malignant esophageal  tumor was found in the lower third of the esophagus. He was direct admission for nutrition support with IVF and nutrition consult. Dr. Jurado further recommended surgery oncology and AES consult. AES consul appreciated- egd with possible stent placement tentatively mon or Tuesday    - Clear liquid diet   - AES consulted to see if there is any feasibility in palliative esophageal stent to promote oral nutrition   - PPI BID   - mIVF   - NPO on Sunday night    HTN (hypertension)  Continue home amlodipine     Esophageal adenocarcinoma  Patient established with Dr. Singer. Diagnosed 07/2023 with Stage III esophageal adenocarcinoma s/p concurrent carboplatin + taxol + RT. He completed Carboplatin AUC 2 + paclitaxel 50 mg/m2 for 5 treatments and radiation for definitive non-surgical treatment. EGD on 12/19/24 showed a distal esophageal mass, biopsy confirming poorly differentiated adenocarcinoma, pMMR and HER-2 positive. Started 2nd line FOLFOX + trastuzumab on 1/30/2025, last treatment - Cycle 6 - on 4/10/25.     - Continue Olanzapine as part of post-treatment anti-emetic   - Compazine PRN   - D/C pump on 4/12/25              Kirby Acuna MD  Hematology/Oncology  [facility] - Transplant Stepdown

## 2025-04-12 NOTE — PLAN OF CARE
AAOx4, Endo unable to perform stint placement  in his throat due to it currently being non emergent. Patient aware he is to be NPO  Sunday night @ midnight. Patient has been ambulating the halls taking smoke breaks and  tolerating his liquid diet. He is very disgruntled to having to wait till Monday and potentially Tuesday.  D5NS d/c'd due to him tolerating his liquid diet. Hem Onc placed added boost breeze to his diet to help him get more protein and calories in his diet with his concern for his weight lost. Patient able to tolerate his PO meds with no complications. See I&O for accurate numbers. No c/o pain or N/V this shift. No additional concerns or complaints. VSS, afebrile, on RA. Patient currently sitting up in bed, SR up x2, call light and belongings within reach.

## 2025-04-12 NOTE — ASSESSMENT & PLAN NOTE
Patient had 6 beats of Vtach. Cardiology NP notified, okay to keep monitoring patient.    Underwent EGD with Dr. Jurado on 4/11/25 which showed Partially obstructing, malignant esophageal tumor was found in the lower third of the esophagus. He was direct admission for nutrition support with IVF and nutrition consult. Dr. Jurado further recommended surgery oncology and AES consult. AES consul appreciated- egd with possible stent placement tentatively mon or Tuesday    - Clear liquid diet   - AES consulted to see if there is any feasibility in palliative esophageal stent to promote oral nutrition   - PPI BID   - mIVF   - NPO on Sunday night

## 2025-04-12 NOTE — CONSULTS
[facility] - Transplant Stepdown  Adult Nutrition  Consult Note    SUMMARY     Recommendations     1.) Recommend continuing with CLD- advancing per MD/SLP- goal regular.      - may add Boost Breeze TID to optimize nutrition.    2.) If diet advances, recommend Boost Plus TID to help meet needs.     3.) RD to monitor wt, PO intake, skin, labs.      Goals: to advance diet w/in 48hrs  Nutrition Goal Status: new  Communication of RD Recs:  (POC)    Nutrition Discharge Planning     Nutrition Discharge Planning: Too early to determine, pending clinical course    Assessment and Plan    Nutrition Problem  Swallowing difficulty    Related to (etiology):   Dysphagia 2/2 esophageal CA    Signs and Symptoms (as evidenced by):   Involuntary wt loss ( -7% x 1 month) and need for CLD    Interventions/Recommendations (treatment strategy):  Collaboration of nutritional care with other providers.      Nutrition Diagnosis Status:   New     Malnutrition Assessment    Pending NFPE    Reason for Assessment    Reason For Assessment: consult  Diagnosis: cancer diagnosis/related complications (FTT)    General Information Comments: RD consulted for FTT. RD providing remote coverage. PMHx:  esophageal cancer and progressive dysphagia w/ chemo, HTN, h/o TBI. No noted n/v/d/c. Pt s/p EGD on 4/11- awaiting AES consulted for palliative esophageal stent to promote oral nutrition (either Monday or Tuesday). Per MD note, pt is unable to consume solid foods. Per chart review, significant wt loss noted at the one month marker: -7%. RD suspects malnutrition at this time. RD to perform NFPE at f/u. RD team to monitor and f/u.     Nutrition Related Social Determinants of Health: SDOH: Adequate food in home environment     Food Insecurity: No Food Insecurity (4/11/2025)    Hunger Vital Sign     Worried About Running Out of Food in the Last Year: Never true     Ran Out of Food in the Last Year: Never true      Nutrition/Diet History    Spiritual, Cultural  "Beliefs, Latter-day Practices, Values that Affect Care:  (RD unable to obtain)  Food Allergies: NKFA  Factors Affecting Nutritional Intake: clear liquid diet, difficulty/impaired swallowing    Anthropometrics    Height: 5' 8" (172.7 cm)  Height (inches): 68 in  Weight: 61.7 kg (135 lb 14.6 oz)  Weight (lb): 135.91 lb  Weight Method: Standard Scale  Ideal Body Weight (IBW), Male: 154 lb  % Ideal Body Weight, Male (lb): 88.25 %  BMI (Calculated): 20.7  BMI Grade: less than 23 (older than 65 years) - underweight    Lab/Procedures/Meds    Pertinent Labs Reviewed: reviewed  Pertinent Labs Comments: Gluc: 124, PRO: 5.7  Pertinent Medications Reviewed: reviewed  Pertinent Medications Comments: Pantoprazole    Estimated/Assessed Needs    Weight Used For Calorie Calculations: 61.7 kg (136 lb 0.4 oz)  Energy Calorie Requirements (kcal): 1851- 2160 kcal  Energy Need Method: Kcal/kg (30-35 kcal/kg (for wt gain))  Protein Requirements: 74- 93 g (1.2-1.5g/kg)  Weight Used For Protein Calculations: 61.7 kg (136 lb 0.4 oz)  Fluid Requirements (mL): as per MD or RDA  Estimated Fluid Requirement Method: RDA Method  RDA Method (mL): 1851    Nutrition Prescription Ordered    Current Diet Order: CLD    Evaluation of Received Nutrient/Fluid Intake    I/O: +180ml since admit  Energy Calories Required: not meeting needs  Protein Required: not meeting needs  Fluid Required:  (as per MD)  Comments: LBM 4/10  Tolerance: tolerating  % Intake of Estimated Energy Needs: 0 - 25 %  % Meal Intake: Other: CLD    Nutrition Risk    Level of Risk/Frequency of Follow-up: high     Monitor and Evaluation    Monitor and Evaluation: Energy intake, Food and beverage intake, Diet order, Weight, Electrolyte and renal panel, Gastrointestinal profile, Glucose/endocrine profile, Inflammatory profile, Lipid profile, Nutrition focused physical findings, Skin     Nutrition Follow-Up    RD Follow-up?: Yes    "

## 2025-04-12 NOTE — CONSULTS
[facility]  Advanced Endoscopy Service  Consult Note    Patient Name: Jone Lugo  MRN: [id number]  Admission Date: 4/11/2025  Hospital Length of Stay: 1 days  Code Status: Full Code   Attending Provider: Angelo Grant MD   Consulting Provider: Joaquina Lowry MD  Primary Care Physician: [place], Primary Doctor  Principal Problem:Obstruction of esophagus    Inpatient consult to Advanced Endoscopy Service (AES)  Consult performed by: Joaquina Lowry MD  Consult ordered by: Nathanael Bagley MD        Subjective:     HPI: Jone Lugo is a 69 y.o. male with history of esophageal adenocarcinoma on chemotherapy, HTN, admitted directly after EGD 4/11 showed obstructing malignant tumor in the lower third of the esophagus which correlated with his worsening dysphagia. Patient is able to drink liquid but not able to eat solids. Is able to take his meds per patient. AES consulted for palliative esophageal stent to promote oral nutrition. Not on anticoagulation       EGD 4/11  Impression:            - Normal examined duodenum.                          - Erythematous mucosa in the cardia.                          - Partially obstructing, malignant esophageal                          tumor was found in the lower third of the                          esophagus.                          - No specimens collected.     Past Medical History:   Diagnosis Date    HTN (hypertension)        Past Surgical History:   Procedure Laterality Date    ESOPHAGOGASTRODUODENOSCOPY N/A 12/19/2024    Procedure: EGD (ESOPHAGOGASTRODUODENOSCOPY);  Surgeon: Pete Buenrostro MD;  Location: [facility] (4TH FLR);  Service: Endoscopy;  Laterality: N/A;  Medically Urgent      12/16 [place] Sahil Singer [place] [facility]  12/18 - pre-call complete; MB    ESOPHAGOGASTRODUODENOSCOPY N/A 4/11/2025    Procedure: EGD (ESOPHAGOGASTRODUODENOSCOPY);  Surgeon: Paxton Jurado MD;  Location: [facility] (2ND FLR);  Service:  Endoscopy;  Laterality: N/A;  ref by  Sahil Singer MD, 2nd floor due to availability- portal -ml    INSERTION OF TUNNELED CENTRAL VENOUS CATHETER (CVC) WITH SUBCUTANEOUS PORT Right 1/27/2025    Procedure: INSERTION, SINGLE LUMEN CATHETER WITH PORT, WITH FLUOROSCOPIC GUIDANCE, left poss right;  Surgeon: Chadd Velez MD;  Location: [facility] OR [address] [place] [address];  Service: General;  Laterality: Right;    LAPAROSCOPIC REPAIR OF INGUINAL HERNIA Right        No family history on file.    Social History[1]    Medications Ordered Prior to Encounter[2]    Review of patient's allergies indicates:  No Known Allergies    ROS     Objective:     Vitals:    04/12/25 1139   BP: (!) 144/66   Pulse: 62   Resp: 18   Temp: 98.7 °F (37.1 °C)         Constitutional:  not in acute distress and well developed  HENT: Head: Normal, normocephalic, atraumatic.  Eyes: conjunctiva clear and sclera nonicteric  Respiratory: normal chest expansion & respiratory effort   and no accessory muscle use  GI: soft, nondistended, and nontender  Skin: normal color  Neurological: alert, oriented x3    Significant Labs:  Recent Labs   Lab 04/10/25  0807 04/12/25  0640   HGB 12.5* 12.1*       Lab Results   Component Value Date    WBC 4.73 04/12/2025    HGB 12.1 (L) 04/12/2025    HCT 35.3 (L) 04/12/2025    MCV 88 04/12/2025     (L) 04/12/2025       Lab Results   Component Value Date     04/12/2025    K 3.6 04/12/2025     (H) 04/12/2025    CO2 24 04/12/2025    BUN 18 04/12/2025    CREATININE 0.9 04/12/2025    CALCIUM 8.9 04/12/2025    ANIONGAP 7 (L) 04/12/2025       Lab Results   Component Value Date    ALT 22 04/12/2025    AST 36 04/12/2025    ALKPHOS 81 04/12/2025    BILITOT 0.6 04/12/2025       Lab Results   Component Value Date    INR 1.1 04/12/2025       Significant Imaging:  Reviewed pertinent radiology findings.       Assessment/Plan:     Jone Lugo is a 69 y.o. male with history of esophageal adenocarcinoma on  chemotherapy, HTN, admitted directly after EGD 4/11 showed obstructing malignant tumor in the lower third of the esophagus which correlated with his worsening dysphagia. Patient is able to drink liquid but not able to eat solids. Is able to take his meds per patient. AES consulted for palliative esophageal stent to promote oral nutrition. Not on anticoagulation       Problem List:  Esophageal adenocarcinoma   Dysphagia to solid food     Recommendations:  - clear liquid diet  - egd with possible stent placement tentatively mon or Tuesday   - make npo Sunday night  - continue PPI daily or BID     Thank you for involving us in the care of Jone Lugo. Please call with any additional questions, concerns or changes in the patient's clinical status. We will continue to follow.     Joaquina Lowry MD  Gastroenterology and Hepatology Fellow, PGY-4         [1]   Social History  Socioeconomic History    Marital status: Single   Tobacco Use    Smoking status: Every Day     Current packs/day: 1.00     Types: Cigarettes    Smokeless tobacco: Never   Substance and Sexual Activity    Alcohol use: Yes     Alcohol/week: 1.0 standard drink of alcohol     Types: 1 Glasses of wine per week     Comment: rarely    Drug use: Never     Social Drivers of Health     Financial Resource Strain: Low Risk  (4/11/2025)    Overall Financial Resource Strain (CARDIA)     Difficulty of Paying Living Expenses: Not very hard   Food Insecurity: No Food Insecurity (4/11/2025)    Hunger Vital Sign     Worried About Running Out of Food in the Last Year: Never true     Ran Out of Food in the Last Year: Never true   Transportation Needs: No Transportation Needs (4/11/2025)    PRAPARE - Transportation     Lack of Transportation (Medical): No     Lack of Transportation (Non-Medical): No   Physical Activity: Unknown (4/7/2025)    Received from [facility]    Exercise Vital Sign     Days of Exercise per Week: Patient declined   Recent Concern:  Physical Activity - Insufficiently Active (3/17/2025)    Exercise Vital Sign     Days of Exercise per Week: 2 days     Minutes of Exercise per Session: 20 min   Stress: No Stress Concern Present (4/11/2025)    [employer] - Occupational Stress Questionnaire     Feeling of Stress : Not at all   Recent Concern: Stress - Stress Concern Present (3/17/2025)    [employer] - Occupational Stress Questionnaire     Feeling of Stress : To some extent   Housing Stability: Low Risk  (4/11/2025)    Housing Stability Vital Sign     Unable to Pay for Housing in the Last Year: No     Number of Times Moved in the Last Year: 0     Homeless in the Last Year: No   [2]   Current Facility-Administered Medications on File Prior to Encounter   Medication Dose Route Frequency Provider Last Rate Last Admin    [DISCONTINUED] 0.9% NaCl infusion   Intravenous Continuous Paxton Jurado MD   Stopped at 04/11/25 1002    [DISCONTINUED] ondansetron injection 4 mg  4 mg Intravenous Once PRMarc Chamorro MD        [DISCONTINUED] ondansetron injection 4 mg  4 mg Intravenous Daily PRMarc Chamorro MD         Current Outpatient Medications on File Prior to Encounter   Medication Sig Dispense Refill    amlodipine-benazepril 10-20mg (LOTREL) 10-20 mg per capsule Take 1 capsule by mouth once daily.      azelastine (ASTELIN) 137 mcg (0.1 %) nasal spray 2 sprays by Nasal route.      etodolac (LODINE) 400 MG tablet Take 1 tablet by mouth 2 (two) times daily.      fluticasone propionate (FLONASE) 50 mcg/actuation nasal spray 2 sprays by Each Nostril route.      gabapentin (NEURONTIN) 300 MG capsule Take 300 mg by mouth.      LIDOcaine-prilocaine (EMLA) cream Apply topically as needed. 30 g 1    multivitamin with minerals tablet Take 1 tablet by mouth once daily. One A Day Multivitamin      OLANZapine (ZYPREXA) 5 MG tablet Take 1 tab at nighttime on nights 1 thru 3 of each  chemotherapy cycle. 30 tablet 2    omeprazole (PRILOSEC) 40 MG capsule Take 1 capsule by mouth every morning.      ondansetron (ZOFRAN) 8 MG tablet Take 1 tablet (8 mg total) by mouth every 8 (eight) hours as needed. 60 tablet 2    pravastatin (PRAVACHOL) 40 MG tablet Take 40 mg by mouth.      TURMERIC ORAL Take by mouth. (Patient not taking: Reported on 3/24/2025)

## 2025-04-12 NOTE — NURSING
Patient back in room, very agitated  that he is not getting his procedure completed today in endo to place a stint in his throat to potentially Monday or Tuesday. He will not nurse reconnect him to [facility], even though when hem onc rounded this morning he was told he could have a 30 min break from his  continuous IV.

## 2025-04-12 NOTE — PLAN OF CARE
Recommendations      1.) Recommend continuing with CLD- advancing per MD/SLP- goal regular.                  - may add Boost Breeze TID to optimize nutrition.     2.) If diet advances, recommend Boost Plus TID to help meet needs.      3.) RD to monitor wt, PO intake, skin, labs.       Goals: to advance diet w/in 48hrs  Nutrition Goal Status: new  Communication of RD Recs:  (POC)

## 2025-04-12 NOTE — SUBJECTIVE & OBJECTIVE
Interval History: No active events overnight. Stable vitals. AES consult appreciated-egd with possible stent placement tentatively mon or Tuesday. Clear diet.     Oncology Treatment Plan:   OP GASTROESOPHAGEAL trastuzumab + MFOLFOX6 (oxaliplatin leucovorin fluorouracil) Q2W    Medications:  Continuous Infusions:   D5 and 0.9% NaCl   Intravenous Continuous 75 mL/hr at 04/12/25 1048 Rate Verify at 04/12/25 1048     Scheduled Meds:   amlodipine-benazepril 5-10 mg  2 capsule Oral Daily    azelastine  2 spray Nasal BID    fluticasone propionate  2 spray Each Nostril Daily    gabapentin  300 mg Oral QHS    OLANZapine  5 mg Oral QHS    pantoprazole  40 mg Oral BID     PRN Meds:  Current Facility-Administered Medications:     dextrose 50%, 12.5 g, Intravenous, PRN    dextrose 50%, 25 g, Intravenous, PRN    glucagon (human recombinant), 1 mg, Intramuscular, PRN    glucose, 16 g, Oral, PRN    glucose, 24 g, Oral, PRN    heparin, porcine (PF), 500 Units, Intravenous, PRN    naloxone, 0.02 mg, Intravenous, PRN    sodium chloride 0.9%, 10 mL, Intravenous, Q12H PRN     Review of Systems  Objective:     Vital Signs (Most Recent):  Temp: 98.7 °F (37.1 °C) (04/12/25 1139)  Pulse: 62 (04/12/25 1139)  Resp: 18 (04/12/25 1139)  BP: (!) 144/66 (04/12/25 1139)  SpO2: 96 % (04/12/25 1139) Vital Signs (24h Range):  Temp:  [97.2 °F (36.2 °C)-98.7 °F (37.1 °C)] 98.7 °F (37.1 °C)  Pulse:  [61-77] 62  Resp:  [18-20] 18  SpO2:  [94 %-98 %] 96 %  BP: (114-144)/(55-77) 144/66     Weight: 61.7 kg (135 lb 14.6 oz)  Body mass index is 20.67 kg/m².  Body surface area is 1.72 meters squared.      Intake/Output Summary (Last 24 hours) at 4/12/2025 1407  Last data filed at 4/12/2025 1048  Gross per 24 hour   Intake 1644.4 ml   Output 1475 ml   Net 169.4 ml        Physical Exam  Constitutional:       Appearance: Normal appearance.   HENT:      Head: Normocephalic and atraumatic.      Mouth/Throat:      Mouth: Mucous membranes are dry.   Eyes:       General: No scleral icterus.     Conjunctiva/sclera: Conjunctivae normal.      Pupils: Pupils are equal, round, and reactive to light.   Cardiovascular:      Rate and Rhythm: Normal rate and regular rhythm.      Pulses: Normal pulses.      Heart sounds: Normal heart sounds.   Pulmonary:      Effort: Pulmonary effort is normal.      Breath sounds: Normal breath sounds.   Abdominal:      General: Abdomen is flat. There is no distension.      Palpations: Abdomen is soft.   Musculoskeletal:         General: Normal range of motion.      Right lower leg: No edema.      Left lower leg: No edema.   Skin:     General: Skin is dry.   Neurological:      General: No focal deficit present.      Mental Status: He is alert and oriented to person, place, and time.      Cranial Nerves: No cranial nerve deficit.          Significant Labs:   All pertinent labs from the last 24 hours have been reviewed.    Diagnostic Results:  I have reviewed all pertinent imaging results/findings within the past 24 hours.  I have reviewed and interpreted all pertinent imaging results/findings within the past 24 hours.

## 2025-04-12 NOTE — PLAN OF CARE
Plan of care reviewed with the patient at the beginning of the shift. Pt admitted for further evaluation of dysphagia related to esophageal obstruction secondary to adenocarcinoma. Pt only able to tolerate clears. Pt is NPO since midnight for AES consult. IVF infusing. Pt has home chemo infusing to right chest port, per pt, it should finish around 1030am. Fall precautions maintained. He is up independently without difficulty. Pt remained free from falls and injury this shift. Bed locked in lowest position, side rails up x2, call light within reach. Instructed pt to call for assistance as needed. Pt verbalized understanding. Vitals stable. Pt afebrile overnight. No acute issues overnight. Will continue to monitor.

## 2025-04-13 LAB
ABSOLUTE EOSINOPHIL (OHS): 0.02 K/UL
ABSOLUTE MONOCYTE (OHS): 0.16 K/UL (ref 0.3–1)
ABSOLUTE NEUTROPHIL COUNT (OHS): 1.69 K/UL (ref 1.8–7.7)
ALBUMIN SERPL BCP-MCNC: 3.1 G/DL (ref 3.5–5.2)
ALP SERPL-CCNC: 83 UNIT/L (ref 40–150)
ALT SERPL W/O P-5'-P-CCNC: 24 UNIT/L (ref 10–44)
ANION GAP (OHS): 9 MMOL/L (ref 8–16)
AST SERPL-CCNC: 32 UNIT/L (ref 11–45)
BASOPHILS # BLD AUTO: 0.01 K/UL
BASOPHILS NFR BLD AUTO: 0.4 %
BILIRUB SERPL-MCNC: 0.7 MG/DL (ref 0.1–1)
BUN SERPL-MCNC: 13 MG/DL (ref 8–23)
CALCIUM SERPL-MCNC: 9 MG/DL (ref 8.7–10.5)
CHLORIDE SERPL-SCNC: 109 MMOL/L (ref 95–110)
CO2 SERPL-SCNC: 24 MMOL/L (ref 23–29)
CREAT SERPL-MCNC: 0.8 MG/DL (ref 0.5–1.4)
ERYTHROCYTE [DISTWIDTH] IN BLOOD BY AUTOMATED COUNT: 18.5 % (ref 11.5–14.5)
GFR SERPLBLD CREATININE-BSD FMLA CKD-EPI: >60 ML/MIN/1.73/M2
GLUCOSE SERPL-MCNC: 83 MG/DL (ref 70–110)
HCT VFR BLD AUTO: 35.1 % (ref 40–54)
HGB BLD-MCNC: 12.2 GM/DL (ref 14–18)
IMM GRANULOCYTES # BLD AUTO: 0.02 K/UL (ref 0–0.04)
IMM GRANULOCYTES NFR BLD AUTO: 0.7 % (ref 0–0.5)
LYMPHOCYTES # BLD AUTO: 0.82 K/UL (ref 1–4.8)
MAGNESIUM SERPL-MCNC: 1.8 MG/DL (ref 1.6–2.6)
MCH RBC QN AUTO: 30.2 PG (ref 27–31)
MCHC RBC AUTO-ENTMCNC: 34.8 G/DL (ref 32–36)
MCV RBC AUTO: 87 FL (ref 82–98)
NUCLEATED RBC (/100WBC) (OHS): 0 /100 WBC
PHOSPHATE SERPL-MCNC: 2.5 MG/DL (ref 2.7–4.5)
PLATELET # BLD AUTO: 111 K/UL (ref 150–450)
PMV BLD AUTO: 9.8 FL (ref 9.2–12.9)
POTASSIUM SERPL-SCNC: 3.1 MMOL/L (ref 3.5–5.1)
PROT SERPL-MCNC: 5.9 GM/DL (ref 6–8.4)
RBC # BLD AUTO: 4.04 M/UL (ref 4.6–6.2)
RELATIVE EOSINOPHIL (OHS): 0.7 %
RELATIVE LYMPHOCYTE (OHS): 30.1 % (ref 18–48)
RELATIVE MONOCYTE (OHS): 5.9 % (ref 4–15)
RELATIVE NEUTROPHIL (OHS): 62.2 % (ref 38–73)
SODIUM SERPL-SCNC: 142 MMOL/L (ref 136–145)
WBC # BLD AUTO: 2.72 K/UL (ref 3.9–12.7)

## 2025-04-13 PROCEDURE — 20600001 HC STEP DOWN PRIVATE ROOM

## 2025-04-13 PROCEDURE — 25000003 PHARM REV CODE 250

## 2025-04-13 PROCEDURE — 84100 ASSAY OF PHOSPHORUS: CPT | Performed by: STUDENT IN AN ORGANIZED HEALTH CARE EDUCATION/TRAINING PROGRAM

## 2025-04-13 PROCEDURE — 36415 COLL VENOUS BLD VENIPUNCTURE: CPT | Performed by: STUDENT IN AN ORGANIZED HEALTH CARE EDUCATION/TRAINING PROGRAM

## 2025-04-13 PROCEDURE — 85025 COMPLETE CBC W/AUTO DIFF WBC: CPT | Performed by: STUDENT IN AN ORGANIZED HEALTH CARE EDUCATION/TRAINING PROGRAM

## 2025-04-13 PROCEDURE — 25000003 PHARM REV CODE 250: Performed by: STUDENT IN AN ORGANIZED HEALTH CARE EDUCATION/TRAINING PROGRAM

## 2025-04-13 PROCEDURE — 83735 ASSAY OF MAGNESIUM: CPT | Performed by: STUDENT IN AN ORGANIZED HEALTH CARE EDUCATION/TRAINING PROGRAM

## 2025-04-13 PROCEDURE — 80053 COMPREHEN METABOLIC PANEL: CPT | Performed by: STUDENT IN AN ORGANIZED HEALTH CARE EDUCATION/TRAINING PROGRAM

## 2025-04-13 RX ADMIN — AMLODIPINE BESYLATE AND BENAZEPRIL HYDROCHLORIDE 2 CAPSULE: 5; 10 CAPSULE ORAL at 07:04

## 2025-04-13 RX ADMIN — GABAPENTIN 300 MG: 300 CAPSULE ORAL at 09:04

## 2025-04-13 RX ADMIN — AZELASTINE 274 MCG: 1 SPRAY, METERED NASAL at 09:04

## 2025-04-13 RX ADMIN — ONDANSETRON 8 MG: 8 TABLET, ORALLY DISINTEGRATING ORAL at 07:04

## 2025-04-13 RX ADMIN — PANTOPRAZOLE SODIUM 40 MG: 40 TABLET, DELAYED RELEASE ORAL at 08:04

## 2025-04-13 RX ADMIN — FLUTICASONE PROPIONATE 100 MCG: 50 SPRAY, METERED NASAL at 07:04

## 2025-04-13 RX ADMIN — ONDANSETRON 8 MG: 8 TABLET, ORALLY DISINTEGRATING ORAL at 06:04

## 2025-04-13 RX ADMIN — PANTOPRAZOLE SODIUM 40 MG: 40 TABLET, DELAYED RELEASE ORAL at 09:04

## 2025-04-13 NOTE — PLAN OF CARE
AAOx4,  Patient has  went for walks x2 this shift. Patient tolerating liquid diet, but not too enthusiastic about it. He is looking forward to having his procedure complete. Afebrile, VSS on RA. Patient met with dietician to hopefully optimize his PO intake. Patient aware of POC and he is to be NPO @ midnight tonight in preparation for his procedure. See I&O's for accurate numbers. SL Zofran given x2. Patient currently sitting up in bed, SR up x2,  bed low and locked, call light and belongings with reach.

## 2025-04-13 NOTE — PLAN OF CARE
Plan of care reviewed with the patient at the beginning of the shift. Pt admitted for further evaluation of dysphagia related to esophageal obstruction secondary to adenocarcinoma. Pt only able to tolerate clears. Fall precautions maintained. He is up independently without difficulty. Pt remained free from falls and injury this shift. Bed locked in lowest position, side rails up x2, call light within reach. Instructed pt to call for assistance as needed. Pt verbalized understanding. Vitals stable. Pt afebrile overnight. No acute issues overnight. Will continue to monitor.

## 2025-04-14 ENCOUNTER — PATIENT MESSAGE (OUTPATIENT)
Dept: HEMATOLOGY/ONCOLOGY | Facility: CLINIC | Age: 70
End: 2025-04-14
Payer: MEDICARE

## 2025-04-14 VITALS
HEART RATE: 66 BPM | SYSTOLIC BLOOD PRESSURE: 148 MMHG | RESPIRATION RATE: 18 BRPM | OXYGEN SATURATION: 94 % | TEMPERATURE: 98 F | HEIGHT: 68 IN | BODY MASS INDEX: 20.6 KG/M2 | DIASTOLIC BLOOD PRESSURE: 72 MMHG | WEIGHT: 135.94 LBS

## 2025-04-14 LAB
ABSOLUTE EOSINOPHIL (OHS): 0.07 K/UL
ABSOLUTE MONOCYTE (OHS): 0.19 K/UL (ref 0.3–1)
ABSOLUTE NEUTROPHIL COUNT (OHS): 1.66 K/UL (ref 1.8–7.7)
ALBUMIN SERPL BCP-MCNC: 3.1 G/DL (ref 3.5–5.2)
ALP SERPL-CCNC: 87 UNIT/L (ref 40–150)
ALT SERPL W/O P-5'-P-CCNC: 27 UNIT/L (ref 10–44)
ANION GAP (OHS): 8 MMOL/L (ref 8–16)
AST SERPL-CCNC: 31 UNIT/L (ref 11–45)
BASOPHILS # BLD AUTO: 0.01 K/UL
BASOPHILS NFR BLD AUTO: 0.4 %
BILIRUB SERPL-MCNC: 0.9 MG/DL (ref 0.1–1)
BUN SERPL-MCNC: 15 MG/DL (ref 8–23)
CALCIUM SERPL-MCNC: 9.2 MG/DL (ref 8.7–10.5)
CHLORIDE SERPL-SCNC: 109 MMOL/L (ref 95–110)
CO2 SERPL-SCNC: 23 MMOL/L (ref 23–29)
CREAT SERPL-MCNC: 0.9 MG/DL (ref 0.5–1.4)
ERYTHROCYTE [DISTWIDTH] IN BLOOD BY AUTOMATED COUNT: 18.2 % (ref 11.5–14.5)
GFR SERPLBLD CREATININE-BSD FMLA CKD-EPI: >60 ML/MIN/1.73/M2
GLUCOSE SERPL-MCNC: 83 MG/DL (ref 70–110)
HCT VFR BLD AUTO: 36.4 % (ref 40–54)
HGB BLD-MCNC: 12.5 GM/DL (ref 14–18)
IMM GRANULOCYTES # BLD AUTO: 0.02 K/UL (ref 0–0.04)
IMM GRANULOCYTES NFR BLD AUTO: 0.7 % (ref 0–0.5)
LYMPHOCYTES # BLD AUTO: 0.78 K/UL (ref 1–4.8)
MAGNESIUM SERPL-MCNC: 1.7 MG/DL (ref 1.6–2.6)
MCH RBC QN AUTO: 29.8 PG (ref 27–31)
MCHC RBC AUTO-ENTMCNC: 34.3 G/DL (ref 32–36)
MCV RBC AUTO: 87 FL (ref 82–98)
NUCLEATED RBC (/100WBC) (OHS): 0 /100 WBC
PHOSPHATE SERPL-MCNC: 2.8 MG/DL (ref 2.7–4.5)
PLATELET # BLD AUTO: 117 K/UL (ref 150–450)
PMV BLD AUTO: 8.9 FL (ref 9.2–12.9)
POTASSIUM SERPL-SCNC: 3.3 MMOL/L (ref 3.5–5.1)
PROT SERPL-MCNC: 6.1 GM/DL (ref 6–8.4)
RBC # BLD AUTO: 4.19 M/UL (ref 4.6–6.2)
RELATIVE EOSINOPHIL (OHS): 2.6 %
RELATIVE LYMPHOCYTE (OHS): 28.6 % (ref 18–48)
RELATIVE MONOCYTE (OHS): 7 % (ref 4–15)
RELATIVE NEUTROPHIL (OHS): 60.7 % (ref 38–73)
SODIUM SERPL-SCNC: 140 MMOL/L (ref 136–145)
WBC # BLD AUTO: 2.73 K/UL (ref 3.9–12.7)

## 2025-04-14 PROCEDURE — 36415 COLL VENOUS BLD VENIPUNCTURE: CPT | Performed by: STUDENT IN AN ORGANIZED HEALTH CARE EDUCATION/TRAINING PROGRAM

## 2025-04-14 PROCEDURE — 25000003 PHARM REV CODE 250: Performed by: STUDENT IN AN ORGANIZED HEALTH CARE EDUCATION/TRAINING PROGRAM

## 2025-04-14 PROCEDURE — 84100 ASSAY OF PHOSPHORUS: CPT | Performed by: STUDENT IN AN ORGANIZED HEALTH CARE EDUCATION/TRAINING PROGRAM

## 2025-04-14 PROCEDURE — 85025 COMPLETE CBC W/AUTO DIFF WBC: CPT | Performed by: STUDENT IN AN ORGANIZED HEALTH CARE EDUCATION/TRAINING PROGRAM

## 2025-04-14 PROCEDURE — 99232 SBSQ HOSP IP/OBS MODERATE 35: CPT | Mod: ,,,

## 2025-04-14 PROCEDURE — 83735 ASSAY OF MAGNESIUM: CPT | Performed by: STUDENT IN AN ORGANIZED HEALTH CARE EDUCATION/TRAINING PROGRAM

## 2025-04-14 PROCEDURE — 82040 ASSAY OF SERUM ALBUMIN: CPT | Performed by: STUDENT IN AN ORGANIZED HEALTH CARE EDUCATION/TRAINING PROGRAM

## 2025-04-14 PROCEDURE — 99238 HOSP IP/OBS DSCHRG MGMT 30/<: CPT | Mod: ,,, | Performed by: INTERNAL MEDICINE

## 2025-04-14 RX ADMIN — FLUTICASONE PROPIONATE 100 MCG: 50 SPRAY, METERED NASAL at 08:04

## 2025-04-14 RX ADMIN — AZELASTINE 274 MCG: 1 SPRAY, METERED NASAL at 08:04

## 2025-04-14 RX ADMIN — AMLODIPINE BESYLATE AND BENAZEPRIL HYDROCHLORIDE 2 CAPSULE: 5; 10 CAPSULE ORAL at 08:04

## 2025-04-14 RX ADMIN — PANTOPRAZOLE SODIUM 40 MG: 40 TABLET, DELAYED RELEASE ORAL at 08:04

## 2025-04-14 NOTE — SUBJECTIVE & OBJECTIVE
Subjective:     HPI: Jone Lugo is a 69 y.o. male with history of esophageal adenocarcinoma on chemotherapy, HTN, admitted directly after EGD 4/11 showed obstructing malignant tumor in the lower third of the esophagus which correlated with his worsening dysphagia. Patient is able to drink liquid but not able to eat solids. Is able to take his meds per patient. AES consulted for palliative esophageal stent to promote oral nutrition. Not on anticoagulation         EGD 4/11  Impression:            - Normal examined duodenum.                          - Erythematous mucosa in the cardia.                          - Partially obstructing, malignant esophageal                          tumor was found in the lower third of the                          esophagus.                          - No specimens collected.       Interval history: Patient reports that he is able to tolerate liquids but has difficulty swallowing solids. Otherwise asymptomatic at this time. WBC 2.73. H/H stable. Patient was admitted for potential placement of esophageal stent, however after discussing that the stent would be placed indefinitely and would only allow for intake of liquids/very soft solids, he is not sure if stent placement is desired at this time. He has left AMA at this time.     Review of Systems   Constitutional:  Negative for chills and fever.   Gastrointestinal:  Negative for abdominal distention, abdominal pain, anal bleeding, blood in stool, constipation, diarrhea, nausea, rectal pain and vomiting.   Psychiatric/Behavioral:  Negative for confusion.      Objective:     Vital Signs (Most Recent):  Temp: 98.3 °F (36.8 °C) (04/14/25 0730)  Pulse: 66 (04/14/25 0730)  Resp: 18 (04/14/25 0730)  BP: (!) 148/72 (04/14/25 0730)  SpO2: (!) 94 % (04/14/25 0730) Vital Signs (24h Range):  Temp:  [98.3 °F (36.8 °C)-98.7 °F (37.1 °C)] 98.3 °F (36.8 °C)  Pulse:  [56-71] 66  Resp:  [18] 18  SpO2:  [92 %-96 %] 94 %  BP: (120-148)/(58-80)  "148/72     Weight: 61.7 kg (135 lb 14.6 oz) (04/12/25 0936)  Body mass index is 20.67 kg/m².      Intake/Output Summary (Last 24 hours) at 4/14/2025 1532  Last data filed at 4/13/2025 1847  Gross per 24 hour   Intake 320 ml   Output 200 ml   Net 120 ml       Lines/Drains/Airways       Central Venous Catheter Line  Duration             Port A Cath Single Lumen 01/27/25 1000 77 days                     Physical Exam  Constitutional:       General: He is not in acute distress.     Appearance: Normal appearance. He is not ill-appearing.   Eyes:      General: No scleral icterus.  Abdominal:      General: Abdomen is flat. Bowel sounds are normal. There is no distension.      Palpations: Abdomen is soft. There is no mass.      Tenderness: There is no abdominal tenderness. There is no guarding or rebound.      Hernia: No hernia is present.   Skin:     General: Skin is warm and dry.      Coloration: Skin is not jaundiced or pale.   Neurological:      Mental Status: He is alert and oriented to person, place, and time. Mental status is at baseline.          Significant Labs:  CBC:   Recent Labs   Lab 04/13/25  0713 04/14/25  0723   WBC 2.72* 2.73*   HGB 12.2* 12.5*   HCT 35.1* 36.4*   * 117*     CMP:   Recent Labs   Lab 04/14/25  0723   CALCIUM 9.2   ALBUMIN 3.1*      K 3.3*   CO2 23      BUN 15   CREATININE 0.9   ALKPHOS 87   ALT 27   AST 31   BILITOT 0.9     Lipase: No results for input(s): "LIPASE" in the last 48 hours.  Liver Function Test:   Recent Labs   Lab 04/13/25  0713 04/14/25  0723   ALT 24 27   AST 32 31   ALKPHOS 83 87   BILITOT 0.7 0.9   ALBUMIN 3.1* 3.1*         Significant Imaging:  Imaging results within the past 24 hours have been reviewed.  "

## 2025-04-14 NOTE — DISCHARGE SUMMARY
[facility] Trans[facility]  Hematology/Oncology  Discharge Summary      Patient Name: Jone Lugo  MRN: [id number]  Admission Date: 4/11/2025  Hospital Length of Stay: 3 days  Discharge Date and Time: 04/14/2025 6:37 PM  Attending Physician: [place] att. providers found   Discharging Provider: Kirby Acuna MD  Primary Care Provider: [place], Primary Doctor    HPI: Jone Lugo is a 69 y.o. male with esophageal adenocarcinoma presents as a direct admission for further evaluation of dysphagia. Underwent EGD today that showed Partially obstructing, malignant esophageal tumor in the lower third of the esophagus. Lately, he is only able to tolerate clear liquid. He has difficulty swallowing with Soup. He is established with Dr. Singer and is currently on 2nd line treatment with FOLFOX + Trastuzumab, last treatment with C6 on 4/10/2025.     * No surgery found *     Hospital Course: Patient with a known hx of esophageal adenocarcinoma presents as a direct admission for further evaluation of dysphagia. Underwent an EGD on admission, which showed a partially obstructing, malignant esophageal tumor in the lower third of the esophagus. The patient was admitted for a palliative stent, with GI planning to place the stent either today or tomorrow. The patient expressed irritation that the procedure was not performed over the weekend.    GI discussed the procedure with the patient today, but the patient appears to not fully understand the benefits and limitations of the palliative stent. GI recommends that the Oncology team speak with the patient again regarding the procedure and consider arranging an outpatient procedure, provided the patient is tolerating some oral intake.    The patient left AMA    Goals of Care Treatment Preferences:  Code Status: Full Code      Consults:   Consults (From admission, onward)          Status Ordering Provider     Inpatient consult to Registered Dietitian/Nutritionist  Once         Provider:  (Not yet assigned)    Completed MARY MONROE     Inpatient consult to Advanced Endoscopy Service (AES)  Once        Provider:  (Not yet assigned)    Completed MARY MONROE            Significant Diagnostic Studies: N/A    Pending Diagnostic Studies:       None          Final Active Diagnoses:    Diagnosis Date Noted POA    PRINCIPAL PROBLEM:  Obstruction of esophagus [K22.2] 04/11/2025 Yes    Tobacco dependency [F17.200] 04/12/2025 Unknown    HTN (hypertension) [I10] 04/11/2025 Yes    Esophageal adenocarcinoma [C15.9] 07/31/2023 Yes      Problems Resolved During this Admission:      Discharged Condition:  AMA    Disposition:  No dispo    Follow Up:    Patient Instructions:   No discharge procedures on file.  Medications:  None    Kirby Acuna MD  Hematology/Oncology  [facility] - [facility]

## 2025-04-14 NOTE — ASSESSMENT & PLAN NOTE
-EGD 4/11 showing partially obstructing, malignant esophageal tumor was found in the lower third of the esophagus.   -He was then admitted for possible esophageal stent placement. I had a lengthy discussion with the patient explaining that an esophageal stent would typically remain indefinitely in the setting of malignancy and would allow for increased intake of liquids/soft foods. He is currently tolerating liquids at this time and was under the impression that esophageal stent would allow him to return to a normal diet, which is unfortunately not the case. Upon learning this information, the patient was very confused and is not sure if a stent would realistically be beneficial for him at this time  -Recommend ongoing discussion with oncology team to decide if a palliative esophageal stent is warranted/desired  -If esophageal stent is desired by the patient and his oncology team, this can be arranged as an outpatient procedure as long as the patient is tolerating some PO intake   -If indicated, an AES clinic visit can also be arranged to further discuss the risks/benefits of esophageal stent placement

## 2025-04-14 NOTE — PROGRESS NOTES
[facility] - Transplant Stepdown  Gastroenterology  Progress Note    Patient Name: Jone Lugo  MRN: [id number]  Admission Date: 4/11/2025  Hospital Length of Stay: 3 days  Code Status: Full Code   Attending Provider: [place] att. providers found  Consulting Provider: Chetan Alan PA-C  Primary Care Physician: [place], Primary Doctor  Principal Problem: Obstruction of esophagus        Subjective:     HPI: Jone Lugo is a 69 y.o. male with history of esophageal adenocarcinoma on chemotherapy, HTN, admitted directly after EGD 4/11 showed obstructing malignant tumor in the lower third of the esophagus which correlated with his worsening dysphagia. Patient is able to drink liquid but not able to eat solids. Is able to take his meds per patient. AES consulted for palliative esophageal stent to promote oral nutrition. Not on anticoagulation         EGD 4/11  Impression:            - Normal examined duodenum.                          - Erythematous mucosa in the cardia.                          - Partially obstructing, malignant esophageal                          tumor was found in the lower third of the                          esophagus.                          - No specimens collected.       Interval history: Patient reports that he is able to tolerate liquids but has difficulty swallowing solids. Otherwise asymptomatic at this time. WBC 2.73. H/H stable. Patient was admitted for potential placement of esophageal stent, however after discussing that the stent would be placed indefinitely and would only allow for intake of liquids/very soft solids, he is not sure if stent placement is desired at this time. He has left AMA at this time.     Review of Systems   Constitutional:  Negative for chills and fever.   Gastrointestinal:  Negative for abdominal distention, abdominal pain, anal bleeding, blood in stool, constipation, diarrhea, nausea, rectal pain and vomiting.   Psychiatric/Behavioral:  Negative for confusion.   "    Objective:     Vital Signs (Most Recent):  Temp: 98.3 °F (36.8 °C) (04/14/25 0730)  Pulse: 66 (04/14/25 0730)  Resp: 18 (04/14/25 0730)  BP: (!) 148/72 (04/14/25 0730)  SpO2: (!) 94 % (04/14/25 0730) Vital Signs (24h Range):  Temp:  [98.3 °F (36.8 °C)-98.7 °F (37.1 °C)] 98.3 °F (36.8 °C)  Pulse:  [56-71] 66  Resp:  [18] 18  SpO2:  [92 %-96 %] 94 %  BP: (120-148)/(58-80) 148/72     Weight: 61.7 kg (135 lb 14.6 oz) (04/12/25 0936)  Body mass index is 20.67 kg/m².      Intake/Output Summary (Last 24 hours) at 4/14/2025 1532  Last data filed at 4/13/2025 1847  Gross per 24 hour   Intake 320 ml   Output 200 ml   Net 120 ml       Lines/Drains/Airways       Central Venous Catheter Line  Duration             Port A Cath Single Lumen 01/27/25 1000 77 days                     Physical Exam  Constitutional:       General: He is not in acute distress.     Appearance: Normal appearance. He is not ill-appearing.   Eyes:      General: No scleral icterus.  Abdominal:      General: Abdomen is flat. Bowel sounds are normal. There is no distension.      Palpations: Abdomen is soft. There is no mass.      Tenderness: There is no abdominal tenderness. There is no guarding or rebound.      Hernia: No hernia is present.   Skin:     General: Skin is warm and dry.      Coloration: Skin is not jaundiced or pale.   Neurological:      Mental Status: He is alert and oriented to person, place, and time. Mental status is at baseline.          Significant Labs:  CBC:   Recent Labs   Lab 04/13/25 0713 04/14/25 0723   WBC 2.72* 2.73*   HGB 12.2* 12.5*   HCT 35.1* 36.4*   * 117*     CMP:   Recent Labs   Lab 04/14/25 0723   CALCIUM 9.2   ALBUMIN 3.1*      K 3.3*   CO2 23      BUN 15   CREATININE 0.9   ALKPHOS 87   ALT 27   AST 31   BILITOT 0.9     Lipase: No results for input(s): "LIPASE" in the last 48 hours.  Liver Function Test:   Recent Labs   Lab 04/13/25  0713 04/14/25  0723   ALT 24 27   AST 32 31   ALKPHOS 83 87 "   BILITOT 0.7 0.9   ALBUMIN 3.1* 3.1*         Significant Imaging:  Imaging results within the past 24 hours have been reviewed.  Assessment/Plan:     GI  * Obstruction of esophagus  -EGD 4/11 showing partially obstructing, malignant esophageal tumor was found in the lower third of the esophagus.   -He was then admitted for possible esophageal stent placement. I had a lengthy discussion with the patient explaining that an esophageal stent would typically remain indefinitely in the setting of malignancy and would allow for increased intake of liquids/soft foods. He is currently tolerating liquids at this time and was under the impression that esophageal stent would allow him to return to a normal diet, which is unfortunately not the case. Upon learning this information, the patient was very confused and is not sure if a stent would realistically be beneficial for him at this time  -Recommend ongoing discussion with oncology team to decide if a palliative esophageal stent is warranted/desired  -If esophageal stent is desired by the patient and his oncology team, this can be arranged as an outpatient procedure as long as the patient is tolerating some PO intake   -If indicated, an [facility] clinic visit can also be arranged to further discuss the risks/benefits of esophageal stent placement         Thank you for your consult. I will sign off. Please contact us if you have any additional questions.    Chetan Alan PA-C  Gastroenterology  [facility] - Trans[facility] Step[facility]

## 2025-04-14 NOTE — NURSING
Pt continues pacing hallway asking to leave. Stopping other nurses in hallway. Team notified. Orders will be placed soon. Pt asked to be patient. IV d/william per pt demands.

## 2025-04-14 NOTE — PLAN OF CARE
Plan of care reviewed with the patient at the beginning of the shift. Pt admitted for further evaluation of dysphagia related to esophageal obstruction secondary to adenocarcinoma. Pt only able to tolerate clears. NPO since midnight for planned EGD today. Fall precautions maintained. He is up independently without difficulty. Pt remained free from falls and injury this shift. Bed locked in lowest position, side rails up x2, call light within reach. Instructed pt to call for assistance as needed. Pt verbalized understanding. Vitals stable. Pt afebrile overnight. No acute issues overnight. Will continue to monitor.

## 2025-04-14 NOTE — NURSING
Oncology team notified that pt upset and frustrated. Pt stated no one is communicating and he doesn't know what is going on. Dr. Neal responded that they just talked to patient and he will be discharged today with follow up to his oncologist. Pt verbalized understanding.

## 2025-04-15 PROBLEM — R13.19 ESOPHAGEAL DYSPHAGIA: Status: ACTIVE | Noted: 2025-04-15

## 2025-04-15 NOTE — TELEPHONE ENCOUNTER
Patient called and patient access navigator put patient through to RN.  Informed patient he will be seen tomorrow at 10:30 am on the third floor.  Waiting for GIN Schroeder RN to open appointment slot and patient will be scheduled.

## 2025-04-16 ENCOUNTER — OFFICE VISIT (OUTPATIENT)
Dept: HEMATOLOGY/ONCOLOGY | Facility: CLINIC | Age: 70
End: 2025-04-16
Payer: MEDICARE

## 2025-04-16 VITALS
WEIGHT: 135.13 LBS | TEMPERATURE: 98 F | OXYGEN SATURATION: 100 % | BODY MASS INDEX: 20.48 KG/M2 | SYSTOLIC BLOOD PRESSURE: 119 MMHG | RESPIRATION RATE: 18 BRPM | HEART RATE: 72 BPM | DIASTOLIC BLOOD PRESSURE: 59 MMHG | HEIGHT: 68 IN

## 2025-04-16 DIAGNOSIS — D84.81 IMMUNODEFICIENCY SECONDARY TO NEOPLASM: ICD-10-CM

## 2025-04-16 DIAGNOSIS — N28.89 LEFT RENAL MASS: ICD-10-CM

## 2025-04-16 DIAGNOSIS — Z79.899 ENCOUNTER FOR MONITORING CARDIOTOXIC DRUG THERAPY: ICD-10-CM

## 2025-04-16 DIAGNOSIS — C15.9 ESOPHAGEAL ADENOCARCINOMA: Primary | ICD-10-CM

## 2025-04-16 DIAGNOSIS — I10 HYPERTENSION, UNSPECIFIED TYPE: ICD-10-CM

## 2025-04-16 DIAGNOSIS — D49.9 IMMUNODEFICIENCY SECONDARY TO NEOPLASM: ICD-10-CM

## 2025-04-16 DIAGNOSIS — E78.5 HYPERLIPIDEMIA, UNSPECIFIED HYPERLIPIDEMIA TYPE: ICD-10-CM

## 2025-04-16 DIAGNOSIS — R13.19 ESOPHAGEAL DYSPHAGIA: ICD-10-CM

## 2025-04-16 DIAGNOSIS — E43 SEVERE MALNUTRITION: ICD-10-CM

## 2025-04-16 DIAGNOSIS — Z72.0 TOBACCO USE: ICD-10-CM

## 2025-04-16 DIAGNOSIS — Z51.81 ENCOUNTER FOR MONITORING CARDIOTOXIC DRUG THERAPY: ICD-10-CM

## 2025-04-16 DIAGNOSIS — Z79.69 IMMUNODEFICIENCY SECONDARY TO CHEMOTHERAPY: ICD-10-CM

## 2025-04-16 DIAGNOSIS — D84.821 IMMUNODEFICIENCY SECONDARY TO CHEMOTHERAPY: ICD-10-CM

## 2025-04-16 DIAGNOSIS — T45.1X5A IMMUNODEFICIENCY SECONDARY TO CHEMOTHERAPY: ICD-10-CM

## 2025-04-16 PROCEDURE — 99215 OFFICE O/P EST HI 40 MIN: CPT | Mod: S$PBB,,, | Performed by: INTERNAL MEDICINE

## 2025-04-16 PROCEDURE — 99214 OFFICE O/P EST MOD 30 MIN: CPT | Mod: PBBFAC | Performed by: INTERNAL MEDICINE

## 2025-04-16 PROCEDURE — G2211 COMPLEX E/M VISIT ADD ON: HCPCS | Mod: S$PBB,,, | Performed by: INTERNAL MEDICINE

## 2025-04-16 PROCEDURE — 99999 PR PBB SHADOW E&M-EST. PATIENT-LVL IV: CPT | Mod: PBBFAC,,, | Performed by: INTERNAL MEDICINE

## 2025-04-16 NOTE — PROGRESS NOTES
MEDICAL ONCOLOGY - ESTABLISHED PATIENT VISIT    Reason for visit: esophageal adenocarcinoma     Best Contact Phone Number(s): There are no phone numbers on file.     Cancer/Stage/TNM:    Cancer Staging   Esophageal adenocarcinoma  Staging form: Esophagus - Adenocarcinoma, AJCC 8th Edition  - Clinical: Stage III (cT2, cN1, cM0, G2) - Signed by Miguel Angel Hampton Jr., MD on 7/31/2023       Oncology History   Esophageal adenocarcinoma   7/31/2023 Initial Diagnosis    Esophageal adenocarcinoma     7/31/2023 Cancer Staged    Staging form: Esophagus - Adenocarcinoma, AJCC 8th Edition  - Clinical: Stage III (cT2, cN1, cM0, G2)     9/5/2023 - 10/5/2023 Chemotherapy    Treatment Summary   Plan Name: OP ESOPHAGEAL PACLITAXEL CARBOPLATIN WEEKLY  Treatment Goal: Curative  Status: Inactive  Start Date: 9/5/2023  End Date: 10/5/2023  Provider: Sahil Singer MD  Chemotherapy: CARBOplatin (PARAPLATIN) 195 mg in sodium chloride 0.9% 304.5 mL chemo infusion, 195 mg (100 % of original dose 193.4 mg), Intravenous, Clinic/HOD 1 time, 1 of 1 cycle  Dose modification:   (original dose 193.4 mg, Cycle 1), 193.4 mg (original dose 193.4 mg, Cycle 1),   (original dose 193.4 mg, Cycle 1, Reason: MD Discretion)  Administration: 195 mg (9/5/2023), 225 mg (9/12/2023), 210 mg (9/19/2023), 180 mg (9/26/2023), 195 mg (10/5/2023)  PACLitaxeL (TAXOL) 50 mg/m2 = 96 mg in sodium chloride 0.9% 250 mL chemo infusion, 50 mg/m2 = 96 mg, Intravenous, Clinic/HOD 1 time, 1 of 1 cycle  Administration: 96 mg (9/5/2023), 96 mg (9/12/2023), 96 mg (9/19/2023), 96 mg (9/26/2023), 96 mg (10/5/2023)     9/5/2023 - 10/9/2023 Radiation Therapy    Treating physician: Anshul Neri    Course: C1 Thorax 2023    Treatment Site Ref. ID Energy Dose/Fx (Gy) #Fx Dose Correction (Gy) Total Dose (Gy) Start Date End Date Elapsed Days   IM Esophagus PTV_High 6X 2 25 / 25 0 50 9/5/2023 10/9/2023 34            1/21/2025 - 4/10/2025 Chemotherapy    Treatment Summary   Plan  Name: OP GASTROESOPHAGEAL trastuzumab + MFOLFOX6 (oxaliplatin leucovorin fluorouracil) Q2W  Treatment Goal: Control  Status: Inactive  Start Date: 1/21/2025  End Date: 4/10/2025  Provider: Sahil Singer MD  Chemotherapy: oxaliplatin (ELOXATIN) 85 mg/m2 = 148 mg in D5W 594.6 mL chemo infusion, 85 mg/m2 = 148 mg, Intravenous, Clinic/HOD 1 time, 6 of 24 cycles  Administration: 148 mg (1/30/2025), 148 mg (2/13/2025), 148 mg (2/27/2025), 148 mg (3/13/2025), 148 mg (3/27/2025), 148 mg (4/10/2025)  fluorouracil (Adrucil) 4,000 mg in 0.9% NaCl 100 mL chemo infusion, 4,175 mg, Intravenous, Over 46 hours, 6 of 24 cycles  Administration: 4,000 mg (1/30/2025), 4,000 mg (2/13/2025), 4,000 mg (2/27/2025), 4,000 mg (3/13/2025), 4,000 mg (3/27/2025), 4,000 mg (4/10/2025)  trastuzumab-anns (KANJINTI) 384 mg in 0.9% NaCl 303.3 mL chemo infusion, 6 mg/kg = 384 mg, Intravenous, Clinic/HOD 1 time, 6 of 24 cycles  Administration: 384 mg (1/30/2025), 256 mg (2/13/2025), 256 mg (2/27/2025), 256 mg (3/13/2025), 256 mg (3/27/2025), 256 mg (4/10/2025)     4/23/2025 -  Chemotherapy    Treatment Summary   Plan Name: OP GASTRIC/ ESOPHAGEAL fam-trastuzumab deruxtecan-nxki Q3W  Treatment Goal: Palliative  Status: Active  Start Date: 4/23/2025 (Planned)  End Date: 3/25/2026 (Planned)  Provider: Sahil Singer MD  Chemotherapy: FAM-TRASTUZUMAB DERUXTECAN-NXKI INFUSION 100 ML, 6.4 mg/kg, Intravenous, Clinic/HOD 1 time, 0 of 17 cycles        Interim History:  69 y.o. male with esophageal adenocarcinoma who presents for follow-up prior to cycle 7 of FOLFOX + trastuzumab for his recurrent esophageal adenocarcinoma.      He underwent EGD on 4/11/25 due to worsening dysphagia.  Was found to have a large obstructing distal esophageal mass. Was admitted to the hospital with plan for potential esophageal stent.  He ultimately decided not get the stent or a feeding tube as he wanted to discuss further with me.  He is able to get 2 Boosts by mouth  without ability to tolerate anything solid.  He has lost weight.  Has cold sensitivity but no paresthesias.    Presents with a friend today. ECOG status is 1.     ROS:   Review of Systems   Constitutional:  Positive for malaise/fatigue and weight loss. Negative for fever.   HENT:  Negative for nosebleeds.    Respiratory:  Negative for cough and shortness of breath.    Cardiovascular:  Negative for chest pain and palpitations.   Gastrointestinal:  Positive for heartburn. Negative for abdominal pain, blood in stool, constipation, diarrhea, nausea and vomiting.        Dysphagia   Genitourinary:  Negative for frequency and hematuria.   Musculoskeletal:  Negative for back pain and falls.   Skin:  Negative for itching and rash.   Neurological:  Negative for dizziness, tingling, sensory change, weakness and headaches.   Psychiatric/Behavioral:  The patient is not nervous/anxious.      Past Medical History:   Past Medical History:   Diagnosis Date    HTN (hypertension)      Past Surgical History:   Past Surgical History:   Procedure Laterality Date    ESOPHAGOGASTRODUODENOSCOPY N/A 12/19/2024    Procedure: EGD (ESOPHAGOGASTRODUODENOSCOPY);  Surgeon: Pete Buenrostro MD;  Location: [facility] ([facility]TH [facility]);  Service: Endoscopy;  Laterality: N/A;  Medically Urgent      12/16 ref by Sahil Singer MD, [facility]  12/18 - pre-call complete; [facility]    ESOPHAGOGASTRODUODENOSCOPY N/A 4/11/2025    Procedure: EGD (ESOPHAGOGASTRODUODENOSCOPY);  Surgeon: Paxton Jurado MD;  Location: [facility] (2ND FLR);  Service: Endoscopy;  Laterality: N/A;  ref by  Sahil Singer MD, 2nd floor due to availability- [facility]    INSERTION OF TUNNELED CENTRAL VENOUS CATHETER (CVC) WITH SUBCUTANEOUS PORT Right 1/27/2025    Procedure: INSERTION, SINGLE LUMEN CATHETER WITH PORT, WITH FLUOROSCOPIC GUIDANCE, left poss right;  Surgeon: Chadd Velez MD;  Location: [facility] OR [address] [place] [address];  Service: General;  Laterality: Right;    LAPAROSCOPIC  "REPAIR OF INGUINAL HERNIA Right      Family History:   No family history on file.     Social History:   Social History     Tobacco Use    Smoking status: Every Day     Current packs/day: 1.00     Types: Cigarettes    Smokeless tobacco: Never   Substance Use Topics    Alcohol use: Yes     Alcohol/week: 1.0 standard drink of alcohol     Types: 1 Glasses of wine per week     Comment: rarely      I have reviewed and updated the patient's past medical, surgical, family and social histories.    Allergies:   Review of patient's allergies indicates:  No Known Allergies     Medications:   Current Outpatient Medications   Medication Sig Dispense Refill    amlodipine-benazepril 10-20mg (LOTREL) 10-20 mg per capsule Take 1 capsule by mouth once daily.      azelastine (ASTELIN) 137 mcg (0.1 %) nasal spray 2 sprays by Nasal route.      etodolac (LODINE) 400 MG tablet Take 1 tablet by mouth 2 (two) times daily.      fluticasone propionate (FLONASE) 50 mcg/actuation nasal spray 2 sprays by Each Nostril route.      gabapentin (NEURONTIN) 300 MG capsule Take 300 mg by mouth.      LIDOcaine-prilocaine (EMLA) cream Apply topically as needed. 30 g 1    multivitamin with minerals tablet Take 1 tablet by mouth once daily. One A Day Multivitamin      OLANZapine (ZYPREXA) 5 MG tablet Take 1 tab at nighttime on nights 1 thru 3 of each chemotherapy cycle. 30 tablet 2    omeprazole (PRILOSEC) 40 MG capsule Take 1 capsule by mouth every morning.      ondansetron (ZOFRAN) 8 MG tablet Take 1 tablet (8 mg total) by mouth every 8 (eight) hours as needed. 60 tablet 2    pravastatin (PRAVACHOL) 40 MG tablet Take 40 mg by mouth.      TURMERIC ORAL Take by mouth. (Patient not taking: Reported on 1/22/2025)       No current facility-administered medications for this visit.      Physical Exam:   BP (!) 119/59 (BP Location: Left arm, Patient Position: Sitting)   Pulse 72   Temp 98 °F (36.7 °C) (Oral)   Resp 18   Ht 5' 8" (1.727 m)   Wt 61.3 kg (135 " lb 2.3 oz)   SpO2 100%   BMI 20.55 kg/m²           Physical Exam  Vitals reviewed.   Constitutional:       General: He is not in acute distress.     Appearance: Normal appearance. He is normal weight.   Eyes:      General: No scleral icterus.     Extraocular Movements: Extraocular movements intact.      Conjunctiva/sclera: Conjunctivae normal.   Cardiovascular:      Rate and Rhythm: Normal rate.   Pulmonary:      Effort: Pulmonary effort is normal. No respiratory distress.   Abdominal:      Tenderness: There is no abdominal tenderness.   Musculoskeletal:         General: No swelling. Normal range of motion.   Skin:     General: Skin is warm.      Coloration: Skin is not jaundiced.      Findings: No erythema or rash.   Neurological:      General: No focal deficit present.      Mental Status: He is alert and oriented to person, place, and time. Mental status is at baseline.      Gait: Gait normal.   Psychiatric:         Mood and Affect: Mood normal.         Behavior: Behavior normal.         Labs:      I have reviewed the pertinent labs showing mild pancytopenia.      Imaging:      CT CAP - 3/21/25:    Impression:     1. Persistent thickening of the lower esophagus in keeping with known neoplasia.  Subcentimeter adjacent nodule may represent tumor extension or lymph node, new from prior study.  2. No evidence for distant metastases.  3. Stable appearance of left renal mass, which remains concerning for neoplasm.  4. Stable appearance of subcentimeter hypodensity in the pancreatic body, perhaps IPMN.  5. Additional findings above.       Path:   12/19/24:    Gastroesophageal junction mass (biopsy):   Adenocarcinoma, poorly differentiated   HER2 immunostain:  Positive (score 3)     Immunohistochemistry (IHC) Testing for Mismatch Repair (MMR) Proteins:   MLH1, MSH2, MSH6, PMS2 - Intact nuclear expression   Background nonneoplastic tissue/internal control with intact nuclear expression     Interpretation: No loss of  nuclear expression of MMR proteins: low probability of microsatellite instability. There are exceptions to the above IHC interpretations. These results should not be considered in isolation, and clinical correlation with   genetic counseling is recommended to assess the need for germline testing.     Assessment:       1. Esophageal adenocarcinoma    2. Esophageal dysphagia    3. Severe malnutrition    4. Immunodeficiency secondary to neoplasm    5. Immunodeficiency secondary to chemotherapy    6. Hypertension, unspecified type    7. Hyperlipidemia, unspecified hyperlipidemia type    8. Tobacco use    9. Left renal mass          Plan:        # Esophageal adenocarcinoma  Mr. Lugo is a 69 y.o. male who presents to clinic for evaluation of newly diagnosed esophageal cancer. He initially presented with dysphagia, now improved s/p esophageal dilation during EUS. Biopsy confirmed adenocarcinoma.     We reviewed his diagnosis, prognosis, and treatment options with him during our initial visit. Discussed with him the high likelihood that cancer would recur if he was taken straight to surgery without neoadjuvant treatment. Our recommendation is to proceed with weekly carboplatin + taxol in conjunction with daily radiation (M-F, no holidays) for a total of 5-5.5 weeks. Handouts provided to patient on both chemotherapy medications.     He has completed Carboplatin AUC 2 + paclitaxel 50 mg/m2 for 5 treatments. Also completed concurrent radiation for definitive non-surgical treatment.    Repeat PET/CT on 11/15/23 showed persistent distal hypermetabolic esophageal tumor (decreasing SUV) without clear evidence of metastatic disease.   Repeat PET/CT on 2/21/24 showed further improvement in hypermetabolism at distal esophagus.  Repeat CT CAP on 4/24/24 showed stable thickening at distal esophagus. Stable left kidney mass c/f malignancy.   Repeat CT CAP 9/9/24 showed stable thickening at distal esophagus.  Repeat CT CAP 12/12/24  "showed stable findings.  L renal mass is slightly enlarged.     Given weight decrease, "upset stomach" and increased eructations we got repeat EGD to assess for cancer recurrence.  This was done on 12/19/24 which showed a distal esophageal mass, biopsy confirming poorly differentiated adenocarcinoma, pMMR and HER-2 positive.    Recommended FOLFOX + trastuzumab. No indication for pembro given PD-L1 being negative, per final results of [id number]-811 regimen.    Had very mild dysphagia at the time but did not need feeding tube or stent.   TTE 1/28/25 with LVEF 55-60%.  Next due end of April.   Had port placed 1/27/25 by Dr. Velez.    He received cycle 1 of FOLFOX + trastuzumab on 1/30/2025.    CT CAP after cycle 4 shows no evidence of progression or new disease.    After cycle 6, he was admitted to the hospital for dysphagia due to esophageal tumor obstruction.  Was recommended to have an esophageal stent as he was only able to tolerate liquids, but he was hesitant and went home.    Given progression of tumor locally, will plan to switch to trastuzumab deruxtecan 6.4 mg/kg.  He is reluctant to proceed with esophageal stent or enteral feeding tube at this time.  Explained that he needs to get more calories by mouth if he does not proceed with one of these options.  Will monitor weight.    RTC next week for cycle 1 of Enhertu.    Tempus xT: KRAS G12D, NOTCH1, SMARCA4, TP53; PD-L1 0  PGX: DPYD nml, UGT1A1 intermediate    # HTN, HLD  BP normal in clinic today.    Continue medical management.   Following with PCP.    # Tobacco use  Encouraged cessation.  Previously enrolled in smoking cessation clinic.     # Left renal mass  Slightly enlarged in size on 12/2024 scan.   Saw Dr. Sainz 5/2024 and agreed on surveillance with repeat imaging 5/2025.    Follow up: RTC 1 week      45 minutes were spent face to face with the patient and his family to discuss the disease, natural history, treatment options and survival statistics. " Greater than 50% of this time involved counseling or coordination of care. I have provided the patient with an opportunity to ask questions and have all questions answered to patient's satisfaction.       Patient is in agreement with the proposed treatment plan. All questions were answered to the patient's satisfaction. Pt knows to call clinic if anything is needed before the next clinic visit.    Sahil Singer MD  Hematology/Oncology  [facility]           code applied: patient requires or will require a continuous, longitudinal, and active collaborative plan of care related to this patient's health condition, esophageal cancer --the management of which requires the direction of a practitioner with specialized clinical knowledge, skill, and expertise.       Med Onc Chart Routing      Follow up with physician 1 week. for Enhertu every 3 weeks   Follow up with [facility]    Infusion scheduling note   Enhertu every 3 weeks   Injection scheduling note    Labs CBC and CMP   Scheduling:  Preferred lab:  Lab interval:     Imaging    Pharmacy appointment    Other referrals

## 2025-04-17 ENCOUNTER — PATIENT MESSAGE (OUTPATIENT)
Dept: HEMATOLOGY/ONCOLOGY | Facility: CLINIC | Age: 70
End: 2025-04-17
Payer: MEDICARE

## 2025-04-18 NOTE — ANESTHESIA POSTPROCEDURE EVALUATION
Anesthesia Post Evaluation    Patient: Jone Lugo    Procedure(s) Performed: Procedure(s) (LRB):  EGD (ESOPHAGOGASTRODUODENOSCOPY) (N/A)    Final Anesthesia Type: general      Patient location during evaluation: PACU  Patient participation: Yes- Able to Participate  Level of consciousness: awake  Post-procedure vital signs: reviewed and stable  Pain management: adequate  Airway patency: patent    PONV status at discharge: No PONV  Anesthetic complications: no      Cardiovascular status: blood pressure returned to baseline  Respiratory status: unassisted  Hydration status: euvolemic  Follow-up not needed.              Vitals Value Taken Time   /60 04/11/25 11:01   Temp 36.5 °C (97.7 °F) 04/11/25 10:05   Pulse 73 04/11/25 11:08   Resp 37 04/11/25 11:08   SpO2 96 % 04/11/25 11:08   Vitals shown include unfiled device data.      No case tracking events are documented in the log.      Pain/Joselo Score: No data recorded

## 2025-04-21 ENCOUNTER — PATIENT MESSAGE (OUTPATIENT)
Dept: HEMATOLOGY/ONCOLOGY | Facility: CLINIC | Age: 70
End: 2025-04-21
Payer: MEDICARE

## 2025-04-22 ENCOUNTER — HOSPITAL ENCOUNTER (OUTPATIENT)
Dept: CARDIOLOGY | Facility: HOSPITAL | Age: 70
Discharge: HOME | End: 2025-04-22
Attending: INTERNAL MEDICINE
Payer: MEDICARE

## 2025-04-22 VITALS
BODY MASS INDEX: 20.46 KG/M2 | SYSTOLIC BLOOD PRESSURE: 103 MMHG | WEIGHT: 135 LBS | HEIGHT: 68 IN | HEART RATE: 70 BPM | DIASTOLIC BLOOD PRESSURE: 60 MMHG

## 2025-04-22 DIAGNOSIS — Z51.81 ENCOUNTER FOR MONITORING CARDIOTOXIC DRUG THERAPY: ICD-10-CM

## 2025-04-22 DIAGNOSIS — Z79.899 ENCOUNTER FOR MONITORING CARDIOTOXIC DRUG THERAPY: ICD-10-CM

## 2025-04-22 LAB
ASCENDING AORTA: 3.09 CM
AV AREA BY CONTINUOUS VTI: 2.2 CM2
AV INDEX (PROSTH): 0.63
AV LVOT MEAN GRADIENT: 2 MMHG
AV LVOT PEAK GRADIENT: 5 MMHG
AV MEAN GRADIENT: 6 MMHG
AV PEAK GRADIENT: 13 MMHG
AV VALVE AREA BY VELOCITY RATIO: 2.3 CM²
AV VALVE AREA: 2.2 CM2
AV VELOCITY RATIO: 0.67
BSA FOR ECHO PROCEDURE: 1.71 M2
CV ECHO LV RWT: 0.54 CM
DOP CALC AO PEAK VEL: 1.8 M/S
DOP CALC AO VTI: 33 CM
DOP CALC LVOT AREA: 3.5 CM2
DOP CALC LVOT DIAMETER: 2.1 CM
DOP CALC LVOT PEAK VEL: 1.2 M/S
DOP CALC LVOT STROKE VOLUME: 72 CM3
DOP CALC RVOT AREA: 3.23 CM2
DOP CALC RVOT DIAMETER: 2.03 CM
DOP CALCLVOT PEAK VEL VTI: 20.8 CM
E WAVE DECELERATION TIME: 294 MS
E/A RATIO: 0.77
E/E' RATIO: 7 M/S
ECHO EF ESTIMATED: 53 %
ECHO LV POSTERIOR WALL: 1.1 CM (ref 0.6–1.1)
FRACTIONAL SHORTENING: 26.8 % (ref 28–44)
GLOBAL LONGITUIDAL STRAIN: 17.1 %
HR MV ECHO: 70 BPM
INTERVENTRICULAR SEPTUM: 1.1 CM (ref 0.6–1.1)
IVC DIAMETER: 1.35 CM
LA MAJOR: 4.2 CM
LA MINOR: 3.9 CM
LA WIDTH: 3.2 CM
LEFT ATRIUM SIZE: 2.7 CM
LEFT ATRIUM VOLUME INDEX MOD: 18 ML/M2
LEFT ATRIUM VOLUME INDEX: 17 ML/M2
LEFT ATRIUM VOLUME MOD: 31 ML
LEFT ATRIUM VOLUME: 30 CM3
LEFT INTERNAL DIMENSION IN SYSTOLE: 3 CM (ref 2.1–4)
LEFT VENTRICLE DIASTOLIC VOLUME INDEX: 43.93 ML/M2
LEFT VENTRICLE DIASTOLIC VOLUME: 76 ML
LEFT VENTRICLE MASS INDEX: 87.5 G/M2
LEFT VENTRICLE SYSTOLIC VOLUME INDEX: 20.8 ML/M2
LEFT VENTRICLE SYSTOLIC VOLUME: 36 ML
LEFT VENTRICULAR INTERNAL DIMENSION IN DIASTOLE: 4.1 CM (ref 3.5–6)
LEFT VENTRICULAR MASS: 151.3 G
LV LATERAL E/E' RATIO: 5.5
LV SEPTAL E/E' RATIO: 10.2
MV A" WAVE DURATION": 119.89 MS
MV MEAN GRADIENT: 1 MMHG
MV PEAK A VEL: 0.79 M/S
MV PEAK E VEL: 0.61 M/S
MV PEAK GRADIENT: 3 MMHG
OHS CV RV/LV RATIO: 0.71 CM
PISA TR MAX VEL: 2.4 M/S
PULM VEIN A" WAVE DURATION": 119.89 MS
PULM VEIN S/D RATIO: 1.57
PULMONIC VEIN PEAK A VELOCITY: 0.4 M/S
PV PEAK D VEL: 0.46 M/S
PV PEAK S VEL: 0.72 M/S
RA MAJOR: 3.64 CM
RA PRESSURE ESTIMATED: 3 MMHG
RA WIDTH: 3.05 CM
RIGHT ATRIAL AREA: 9.9 CM2
RIGHT VENTRICLE DIASTOLIC BASEL DIMENSION: 2.9 CM
RV TB RVSP: 5 MMHG
RV TISSUE DOPPLER FREE WALL SYSTOLIC VELOCITY 1 (APICAL 4 CHAMBER VIEW): 10.38 CM/S
SINUS: 3.16 CM
STJ: 2.99 CM
TDI LATERAL: 0.11 M/S
TDI SEPTAL: 0.06 M/S
TDI: 0.09 M/S
TRICUSPID ANNULAR PLANE SYSTOLIC EXCURSION: 1.65 CM
TV PEAK GRADIENT: 22 MMHG
TV REST PULMONARY ARTERY PRESSURE: 26 MMHG
Z-SCORE OF LEFT VENTRICULAR DIMENSION IN END DIASTOLE: -1.56
Z-SCORE OF LEFT VENTRICULAR DIMENSION IN END SYSTOLE: 0.09

## 2025-04-22 PROCEDURE — 93306 TTE W/DOPPLER COMPLETE: CPT | Mod: 26,,, | Performed by: INTERNAL MEDICINE

## 2025-04-22 PROCEDURE — 93306 TTE W/DOPPLER COMPLETE: CPT

## 2025-04-22 PROCEDURE — 93356 MYOCRD STRAIN IMG SPCKL TRCK: CPT | Mod: ,,, | Performed by: INTERNAL MEDICINE

## 2025-04-23 ENCOUNTER — INFUSION (OUTPATIENT)
Dept: INFUSION THERAPY | Facility: HOSPITAL | Age: 70
End: 2025-04-23
Payer: MEDICARE

## 2025-04-23 ENCOUNTER — OFFICE VISIT (OUTPATIENT)
Dept: HEMATOLOGY/ONCOLOGY | Facility: CLINIC | Age: 70
End: 2025-04-23
Payer: MEDICARE

## 2025-04-23 VITALS
WEIGHT: 133.81 LBS | HEART RATE: 66 BPM | DIASTOLIC BLOOD PRESSURE: 72 MMHG | BODY MASS INDEX: 20.28 KG/M2 | TEMPERATURE: 98 F | RESPIRATION RATE: 18 BRPM | HEIGHT: 68 IN | SYSTOLIC BLOOD PRESSURE: 131 MMHG | OXYGEN SATURATION: 96 %

## 2025-04-23 VITALS
TEMPERATURE: 98 F | DIASTOLIC BLOOD PRESSURE: 58 MMHG | OXYGEN SATURATION: 99 % | SYSTOLIC BLOOD PRESSURE: 104 MMHG | HEART RATE: 65 BPM | RESPIRATION RATE: 18 BRPM

## 2025-04-23 DIAGNOSIS — T45.1X5A IMMUNODEFICIENCY SECONDARY TO CHEMOTHERAPY: ICD-10-CM

## 2025-04-23 DIAGNOSIS — D84.821 IMMUNODEFICIENCY SECONDARY TO CHEMOTHERAPY: ICD-10-CM

## 2025-04-23 DIAGNOSIS — C15.9 ESOPHAGEAL ADENOCARCINOMA: Primary | ICD-10-CM

## 2025-04-23 DIAGNOSIS — D49.9 IMMUNODEFICIENCY SECONDARY TO NEOPLASM: ICD-10-CM

## 2025-04-23 DIAGNOSIS — D84.81 IMMUNODEFICIENCY SECONDARY TO NEOPLASM: ICD-10-CM

## 2025-04-23 DIAGNOSIS — N28.89 LEFT RENAL MASS: ICD-10-CM

## 2025-04-23 DIAGNOSIS — Z79.69 IMMUNODEFICIENCY SECONDARY TO CHEMOTHERAPY: ICD-10-CM

## 2025-04-23 DIAGNOSIS — R13.19 ESOPHAGEAL DYSPHAGIA: ICD-10-CM

## 2025-04-23 DIAGNOSIS — Z72.0 TOBACCO USE: ICD-10-CM

## 2025-04-23 DIAGNOSIS — E78.5 HYPERLIPIDEMIA, UNSPECIFIED HYPERLIPIDEMIA TYPE: ICD-10-CM

## 2025-04-23 DIAGNOSIS — I10 HYPERTENSION, UNSPECIFIED TYPE: ICD-10-CM

## 2025-04-23 DIAGNOSIS — E43 SEVERE MALNUTRITION: ICD-10-CM

## 2025-04-23 PROCEDURE — 99999 PR PBB SHADOW E&M-EST. PATIENT-LVL IV: CPT | Mod: PBBFAC,,, | Performed by: INTERNAL MEDICINE

## 2025-04-23 PROCEDURE — 63600175 PHARM REV CODE 636 W HCPCS: Performed by: INTERNAL MEDICINE

## 2025-04-23 PROCEDURE — 99215 OFFICE O/P EST HI 40 MIN: CPT | Mod: S$PBB,,, | Performed by: INTERNAL MEDICINE

## 2025-04-23 PROCEDURE — 96367 TX/PROPH/DG ADDL SEQ IV INF: CPT

## 2025-04-23 PROCEDURE — 96413 CHEMO IV INFUSION 1 HR: CPT

## 2025-04-23 PROCEDURE — G2211 COMPLEX E/M VISIT ADD ON: HCPCS | Mod: S$PBB,,, | Performed by: INTERNAL MEDICINE

## 2025-04-23 PROCEDURE — 25000003 PHARM REV CODE 250: Performed by: INTERNAL MEDICINE

## 2025-04-23 PROCEDURE — 99214 OFFICE O/P EST MOD 30 MIN: CPT | Mod: PBBFAC | Performed by: INTERNAL MEDICINE

## 2025-04-23 PROCEDURE — 96375 TX/PRO/DX INJ NEW DRUG ADDON: CPT

## 2025-04-23 RX ORDER — HEPARIN 100 UNIT/ML
500 SYRINGE INTRAVENOUS
Status: DISCONTINUED | OUTPATIENT
Start: 2025-04-23 | End: 2025-04-23 | Stop reason: HOSPADM

## 2025-04-23 RX ORDER — PROCHLORPERAZINE EDISYLATE 5 MG/ML
5 INJECTION INTRAMUSCULAR; INTRAVENOUS ONCE AS NEEDED
Status: CANCELLED | OUTPATIENT
Start: 2025-04-23

## 2025-04-23 RX ORDER — EPINEPHRINE 0.3 MG/.3ML
0.3 INJECTION SUBCUTANEOUS ONCE AS NEEDED
Status: DISCONTINUED | OUTPATIENT
Start: 2025-04-23 | End: 2025-04-23 | Stop reason: HOSPADM

## 2025-04-23 RX ORDER — DEXAMETHASONE 4 MG/1
8 TABLET ORAL DAILY
Qty: 30 TABLET | Refills: 11 | Status: SHIPPED | OUTPATIENT
Start: 2025-04-23

## 2025-04-23 RX ORDER — DIPHENHYDRAMINE HYDROCHLORIDE 50 MG/ML
50 INJECTION, SOLUTION INTRAMUSCULAR; INTRAVENOUS ONCE AS NEEDED
Status: CANCELLED | OUTPATIENT
Start: 2025-04-23

## 2025-04-23 RX ORDER — PROCHLORPERAZINE EDISYLATE 5 MG/ML
5 INJECTION INTRAMUSCULAR; INTRAVENOUS ONCE AS NEEDED
Status: DISCONTINUED | OUTPATIENT
Start: 2025-04-23 | End: 2025-04-23 | Stop reason: HOSPADM

## 2025-04-23 RX ORDER — SODIUM CHLORIDE 0.9 % (FLUSH) 0.9 %
10 SYRINGE (ML) INJECTION
Status: CANCELLED | OUTPATIENT
Start: 2025-04-23

## 2025-04-23 RX ORDER — SODIUM CHLORIDE 0.9 % (FLUSH) 0.9 %
10 SYRINGE (ML) INJECTION
Status: DISCONTINUED | OUTPATIENT
Start: 2025-04-23 | End: 2025-04-23 | Stop reason: HOSPADM

## 2025-04-23 RX ORDER — DIPHENHYDRAMINE HYDROCHLORIDE 50 MG/ML
50 INJECTION, SOLUTION INTRAMUSCULAR; INTRAVENOUS ONCE AS NEEDED
Status: DISCONTINUED | OUTPATIENT
Start: 2025-04-23 | End: 2025-04-23 | Stop reason: HOSPADM

## 2025-04-23 RX ORDER — HEPARIN 100 UNIT/ML
500 SYRINGE INTRAVENOUS
Status: CANCELLED | OUTPATIENT
Start: 2025-04-23

## 2025-04-23 RX ORDER — EPINEPHRINE 0.3 MG/.3ML
0.3 INJECTION SUBCUTANEOUS ONCE AS NEEDED
Status: CANCELLED | OUTPATIENT
Start: 2025-04-23

## 2025-04-23 RX ADMIN — APREPITANT 130 MG: 130 INJECTION, EMULSION INTRAVENOUS at 09:04

## 2025-04-23 RX ADMIN — FAM-TRASTUZUMAB DERUXTECAN-NXKI 390 MG: 100 INJECTION, POWDER, LYOPHILIZED, FOR SOLUTION INTRAVENOUS at 10:04

## 2025-04-23 RX ADMIN — HEPARIN 500 UNITS: 100 SYRINGE at 11:04

## 2025-04-23 RX ADMIN — DEXTROSE MONOHYDRATE: 5 INJECTION, SOLUTION INTRAVENOUS at 09:04

## 2025-04-23 RX ADMIN — PALONOSETRON HYDROCHLORIDE 0.25 MG: 0.25 INJECTION, SOLUTION INTRAVENOUS at 09:04

## 2025-04-23 NOTE — PLAN OF CARE
Problem: Chemotherapy Effects  Goal: Anemia Symptom Improvement  Outcome: Progressing  Goal: Safety Maintained  Outcome: Progressing  Goal: Absence of Hematuria  Outcome: Progressing  Goal: Nausea and Vomiting Relief  Outcome: Progressing  Goal: Neurotoxicity Symptom Control  Outcome: Progressing  Goal: Absence of Infection  Outcome: Progressing  Goal: Absence of Bleeding  Outcome: Progressing     Ambulatory to clinic with no c/o adverse effects or s/s of infection.  PAC accessed, flushed with out difficulty, blood return noted and infused with no problems.  Premeds and Enhertu tolerated with no problems.  Ambulatory home.  NAD.

## 2025-04-23 NOTE — PROGRESS NOTES
MEDICAL ONCOLOGY - ESTABLISHED PATIENT VISIT    Reason for visit: esophageal adenocarcinoma     Best Contact Phone Number(s): There are no phone numbers on file.     Cancer/Stage/TNM:    Cancer Staging   Esophageal adenocarcinoma  Staging form: Esophagus - Adenocarcinoma, AJCC 8th Edition  - Clinical: Stage III (cT2, cN1, cM0, G2) - Signed by Miguel Angel Hampton Jr., MD on 7/31/2023       Oncology History   Esophageal adenocarcinoma   7/31/2023 Initial Diagnosis    Esophageal adenocarcinoma     7/31/2023 Cancer Staged    Staging form: Esophagus - Adenocarcinoma, AJCC 8th Edition  - Clinical: Stage III (cT2, cN1, cM0, G2)     9/5/2023 - 10/5/2023 Chemotherapy    Treatment Summary   Plan Name: OP ESOPHAGEAL PACLITAXEL CARBOPLATIN WEEKLY  Treatment Goal: Curative  Status: Inactive  Start Date: 9/5/2023  End Date: 10/5/2023  Provider: Sahil Singer MD  Chemotherapy: CARBOplatin (PARAPLATIN) 195 mg in sodium chloride 0.9% 304.5 mL chemo infusion, 195 mg (100 % of original dose 193.4 mg), Intravenous, Clinic/HOD 1 time, 1 of 1 cycle  Dose modification:   (original dose 193.4 mg, Cycle 1), 193.4 mg (original dose 193.4 mg, Cycle 1),   (original dose 193.4 mg, Cycle 1, Reason: MD Discretion)  Administration: 195 mg (9/5/2023), 225 mg (9/12/2023), 210 mg (9/19/2023), 180 mg (9/26/2023), 195 mg (10/5/2023)  PACLitaxeL (TAXOL) 50 mg/m2 = 96 mg in sodium chloride 0.9% 250 mL chemo infusion, 50 mg/m2 = 96 mg, Intravenous, Clinic/HOD 1 time, 1 of 1 cycle  Administration: 96 mg (9/5/2023), 96 mg (9/12/2023), 96 mg (9/19/2023), 96 mg (9/26/2023), 96 mg (10/5/2023)     9/5/2023 - 10/9/2023 Radiation Therapy    Treating physician: Anshul Neri    Course: C1 Thorax 2023    Treatment Site Ref. ID Energy Dose/Fx (Gy) #Fx Dose Correction (Gy) Total Dose (Gy) Start Date End Date Elapsed Days   IM Esophagus PTV_High 6X 2 25 / 25 0 50 9/5/2023 10/9/2023 34            1/21/2025 - 4/10/2025 Chemotherapy    Treatment Summary   Plan  Name: OP GASTROESOPHAGEAL trastuzumab + MFOLFOX6 (oxaliplatin leucovorin fluorouracil) Q2W  Treatment Goal: Control  Status: Inactive  Start Date: 1/21/2025  End Date: 4/10/2025  Provider: Sahil Singer MD  Chemotherapy: oxaliplatin (ELOXATIN) 85 mg/m2 = 148 mg in D5W 594.6 mL chemo infusion, 85 mg/m2 = 148 mg, Intravenous, Clinic/HOD 1 time, 6 of 24 cycles  Administration: 148 mg (1/30/2025), 148 mg (2/13/2025), 148 mg (2/27/2025), 148 mg (3/13/2025), 148 mg (3/27/2025), 148 mg (4/10/2025)  fluorouracil (Adrucil) 4,000 mg in 0.9% NaCl 100 mL chemo infusion, 4,175 mg, Intravenous, Over 46 hours, 6 of 24 cycles  Administration: 4,000 mg (1/30/2025), 4,000 mg (2/13/2025), 4,000 mg (2/27/2025), 4,000 mg (3/13/2025), 4,000 mg (3/27/2025), 4,000 mg (4/10/2025)  trastuzumab-anns (KANJINTI) 384 mg in 0.9% NaCl 303.3 mL chemo infusion, 6 mg/kg = 384 mg, Intravenous, Clinic/HOD 1 time, 6 of 24 cycles  Administration: 384 mg (1/30/2025), 256 mg (2/13/2025), 256 mg (2/27/2025), 256 mg (3/13/2025), 256 mg (3/27/2025), 256 mg (4/10/2025)     4/23/2025 -  Chemotherapy    Treatment Summary   Plan Name: OP GASTRIC/ ESOPHAGEAL fam-trastuzumab deruxtecan-nxki Q3W  Treatment Goal: Palliative  Status: Active  Start Date: 4/23/2025 (Planned)  End Date: 3/25/2026 (Planned)  Provider: Sahil Singer MD  Chemotherapy: fam-trastuzumab deruxtecan-nxki (Enhertu) 390 mg in D5W 119.5 mL infusion, 6.4 mg/kg = 390 mg, Intravenous, Clinic/HOD 1 time, 0 of 17 cycles        Interim History:  69 y.o. male with esophageal adenocarcinoma who presents for follow-up prior to cycle 1 of trastuzumab deruxtecan for his recurrent esophageal adenocarcinoma.      His dysphagia is about the same.  He finds that when he drinks too much Boost it upsets his stomach.  He is only drinking 1 per day at this time due to this, and he is supplementing with soups.     Presents alone today. ECOG status is 1.     ROS:   Review of Systems   Constitutional:   Positive for malaise/fatigue and weight loss. Negative for fever.   HENT:  Negative for nosebleeds.    Respiratory:  Negative for cough and shortness of breath.    Cardiovascular:  Negative for chest pain and palpitations.   Gastrointestinal:  Positive for heartburn. Negative for abdominal pain, blood in stool, constipation, diarrhea, nausea and vomiting.        Dysphagia   Genitourinary:  Negative for frequency and hematuria.   Musculoskeletal:  Negative for back pain and falls.   Skin:  Negative for itching and rash.   Neurological:  Negative for dizziness, tingling, sensory change, weakness and headaches.   Psychiatric/Behavioral:  The patient is not nervous/anxious.      Past Medical History:   Past Medical History:   Diagnosis Date    HTN (hypertension)      Past Surgical History:   Past Surgical History:   Procedure Laterality Date    ESOPHAGOGASTRODUODENOSCOPY N/A 12/19/2024    Procedure: EGD (ESOPHAGOGASTRODUODENOSCOPY);  Surgeon: Pete Buenrostro MD;  Location: [facility] (4TH [facility]);  Service: Endoscopy;  Laterality: N/A;  Medically Urgent      12/16 ref by Sahil Singer MD, [facility]  12/18 - pre-call complete; MB    ESOPHAGOGASTRODUODENOSCOPY N/A 4/11/2025    Procedure: EGD (ESOPHAGOGASTRODUODENOSCOPY);  Surgeon: Paxton Jurado MD;  Location: [facility] (2ND FLR);  Service: Endoscopy;  Laterality: N/A;  ref by  Sahil Singer MD, 2nd floor due to availability- [facility]    INSERTION OF TUNNELED CENTRAL VENOUS CATHETER (CVC) WITH SUBCUTANEOUS PORT Right 1/27/2025    Procedure: INSERTION, SINGLE LUMEN CATHETER WITH PORT, WITH FLUOROSCOPIC GUIDANCE, left poss right;  Surgeon: Cahdd Velez MD;  Location: [facility] OR [address] [place] [address];  Service: General;  Laterality: Right;    LAPAROSCOPIC REPAIR OF INGUINAL HERNIA Right      Family History:   No family history on file.     Social History:   Social History     Tobacco Use    Smoking status: Every Day     Current packs/day: 1.00     Types:  "Cigarettes    Smokeless tobacco: Never   Substance Use Topics    Alcohol use: Yes     Alcohol/week: 1.0 standard drink of alcohol     Types: 1 Glasses of wine per week     Comment: rarely      I have reviewed and updated the patient's past medical, surgical, family and social histories.    Allergies:   Review of patient's allergies indicates:  No Known Allergies     Medications:   Current Outpatient Medications   Medication Sig Dispense Refill    amlodipine-benazepril 10-20mg (LOTREL) 10-20 mg per capsule Take 1 capsule by mouth once daily.      azelastine (ASTELIN) 137 mcg (0.1 %) nasal spray 2 sprays by Nasal route.      etodolac (LODINE) 400 MG tablet Take 1 tablet by mouth 2 (two) times daily.      fluticasone propionate (FLONASE) 50 mcg/actuation nasal spray 2 sprays by Each Nostril route.      gabapentin (NEURONTIN) 300 MG capsule Take 300 mg by mouth.      LIDOcaine-prilocaine (EMLA) cream Apply topically as needed. 30 g 1    multivitamin with minerals tablet Take 1 tablet by mouth once daily. One A Day Multivitamin      OLANZapine (ZYPREXA) 5 MG tablet Take 1 tab at nighttime on nights 1 thru 3 of each chemotherapy cycle. 30 tablet 2    omeprazole (PRILOSEC) 40 MG capsule Take 1 capsule by mouth every morning.      ondansetron (ZOFRAN) 8 MG tablet Take 1 tablet (8 mg total) by mouth every 8 (eight) hours as needed. 60 tablet 2    pravastatin (PRAVACHOL) 40 MG tablet Take 40 mg by mouth.      dexAMETHasone (DECADRON) 4 MG Tab Take 2 tablets (8 mg total) by mouth once daily. On days 2-4 of each chemotherapy cycle. 30 tablet 11    TURMERIC ORAL Take by mouth. (Patient not taking: Reported on 4/23/2025)       No current facility-administered medications for this visit.      Physical Exam:   /72 (BP Location: Right arm, Patient Position: Sitting)   Pulse 66   Temp 98.2 °F (36.8 °C) (Oral)   Resp 18   Ht 5' 8" (1.727 m)   Wt 60.7 kg (133 lb 13.1 oz)   SpO2 96%   BMI 20.35 kg/m²           Physical " Exam  Vitals reviewed.   Constitutional:       General: He is not in acute distress.     Appearance: Normal appearance. He is normal weight.   Eyes:      General: No scleral icterus.     Extraocular Movements: Extraocular movements intact.      Conjunctiva/sclera: Conjunctivae normal.   Cardiovascular:      Rate and Rhythm: Normal rate.   Pulmonary:      Effort: Pulmonary effort is normal. No respiratory distress.   Abdominal:      Tenderness: There is no abdominal tenderness.   Musculoskeletal:         General: No swelling. Normal range of motion.   Skin:     General: Skin is warm.      Coloration: Skin is not jaundiced.      Findings: No erythema or rash.   Neurological:      General: No focal deficit present.      Mental Status: He is alert and oriented to person, place, and time. Mental status is at baseline.      Gait: Gait normal.   Psychiatric:         Mood and Affect: Mood normal.         Behavior: Behavior normal.         Labs:      I have reviewed the pertinent labs showing mild anemia.  Normal albumin.    Imaging:      CT CAP - 3/21/25:    Impression:     1. Persistent thickening of the lower esophagus in keeping with known neoplasia.  Subcentimeter adjacent nodule may represent tumor extension or lymph node, new from prior study.  2. No evidence for distant metastases.  3. Stable appearance of left renal mass, which remains concerning for neoplasm.  4. Stable appearance of subcentimeter hypodensity in the pancreatic body, perhaps IPMN.  5. Additional findings above.       Path:   12/19/24:    Gastroesophageal junction mass (biopsy):   Adenocarcinoma, poorly differentiated   HER2 immunostain:  Positive (score 3)     Immunohistochemistry (IHC) Testing for Mismatch Repair (MMR) Proteins:   MLH1, MSH2, MSH6, PMS2 - Intact nuclear expression   Background nonneoplastic tissue/internal control with intact nuclear expression     Interpretation: No loss of nuclear expression of MMR proteins: low probability of  microsatellite instability. There are exceptions to the above IHC interpretations. These results should not be considered in isolation, and clinical correlation with   genetic counseling is recommended to assess the need for germline testing.     Assessment:       1. Esophageal adenocarcinoma    2. Esophageal dysphagia    3. Severe malnutrition    4. Immunodeficiency secondary to neoplasm    5. Immunodeficiency secondary to chemotherapy    6. Hypertension, unspecified type    7. Hyperlipidemia, unspecified hyperlipidemia type    8. Tobacco use    9. Left renal mass            Plan:        # Esophageal adenocarcinoma  Mr. Lugo is a 69 y.o. male who presents to clinic for evaluation of newly diagnosed esophageal cancer. He initially presented with dysphagia, now improved s/p esophageal dilation during EUS. Biopsy confirmed adenocarcinoma.     We reviewed his diagnosis, prognosis, and treatment options with him during our initial visit. Discussed with him the high likelihood that cancer would recur if he was taken straight to surgery without neoadjuvant treatment. Our recommendation is to proceed with weekly carboplatin + taxol in conjunction with daily radiation (M-F, no holidays) for a total of 5-5.5 weeks. Handouts provided to patient on both chemotherapy medications.     He has completed Carboplatin AUC 2 + paclitaxel 50 mg/m2 for 5 treatments. Also completed concurrent radiation for definitive non-surgical treatment.    Repeat PET/CT on 11/15/23 showed persistent distal hypermetabolic esophageal tumor (decreasing SUV) without clear evidence of metastatic disease.   Repeat PET/CT on 2/21/24 showed further improvement in hypermetabolism at distal esophagus.  Repeat CT CAP on 4/24/24 showed stable thickening at distal esophagus. Stable left kidney mass c/f malignancy.   Repeat CT CAP 9/9/24 showed stable thickening at distal esophagus.  Repeat CT CAP 12/12/24 showed stable findings.  L renal mass is slightly  "enlarged.     Given weight decrease, "upset stomach" and increased eructations we got repeat EGD to assess for cancer recurrence.  This was done on 12/19/24 which showed a distal esophageal mass, biopsy confirming poorly differentiated adenocarcinoma, pMMR and HER-2 positive.    Recommended FOLFOX + trastuzumab. No indication for pembro given PD-L1 being negative, per final results of [id number] regimen.    Had very mild dysphagia at the time but did not need feeding tube or stent.   Had port placed 1/27/25 by Dr. Velez.    He received cycle 1 of FOLFOX + trastuzumab on 1/30/2025.    CT CAP after cycle 4 showed no evidence of progression or new disease.    After cycle 6 of FOLFOX + trastuzumab, he was admitted to the hospital for dysphagia due to esophageal tumor obstruction.  Was recommended to have an esophageal stent as he was only able to tolerate liquids, but he was hesitant and went home.    Given progression of tumor locally, we opted to switch to trastuzumab deruxtecan 6.4 mg/kg.  He was reluctant to proceed with esophageal stent or enteral feeding tube at this time.  Explained that he needs to get more calories by mouth if he does not proceed with one of these options.    Presents today for cycle 1 of TDxD.    Olanzapine nights 1-3 and dexamethasone days 2-4 for nausea ppx.  Has PRN anti-emetics at home.  Labs acceptable to proceed with cycle 1 today.    Patient was consented for chemotherapy today 4/23/2025 .   An extensive discussion was had which included a thorough discussion of the risk and benefits of treatment and alternatives.  Risks, including but not limited to, possible hair loss, bone marrow damage (anemia, thrombocytopenia, immune suppression, neutropenia), damage to body organs (brain, heart, liver, kidney, lungs, nervous system, skin, and others), allergic reactions, sterility, nausea/vomiting, constipation/diarrhea, sores in the mouth, secondary cancers, local damage at possible injection " sites, and rarely death were all discussed.  The patient agrees with the plan, and all questions have been answered to their satisfaction.  Consent was signed the patient, provider, and a third party witness.     RTC in 3 weeks for next cycle.    Last TTE April 2025.    Tempus xT: KRAS G12D, NOTCH1, SMARCA4, TP53; PD-L1 0  PGX: DPYD nml, UGT1A1 intermediate    # HTN, HLD  BP normal in clinic today.    Continue medical management.   Following with PCP.    # Tobacco use  Encouraged cessation.  Previously enrolled in smoking cessation clinic.     # Left renal mass  Slightly enlarged in size on 12/2024 scan.   Saw Dr. Sainz 5/2024 and agreed on surveillance with repeat imaging 5/2025.    Follow up: RTC 3 weeks    Patient is in agreement with the proposed treatment plan. All questions were answered to the patient's satisfaction. Pt knows to call clinic if anything is needed before the next clinic visit.    Sahil Singer MD  Hematology/Oncology  [facility] [place]           code applied: patient requires or will require a continuous, longitudinal, and active collaborative plan of care related to this patient's health condition, esophageal cancer --the management of which requires the direction of a practitioner with specialized clinical knowledge, skill, and expertise.       Med Onc Chart Routing      Follow up with physician 6 weeks. for Enhertu   Follow up with [facility] 3 weeks. for Enhertu   Infusion scheduling note    Injection scheduling note    Labs CBC and CMP   Scheduling:  Preferred lab:  Lab interval:     Imaging    Pharmacy appointment    Other referrals

## 2025-04-28 ENCOUNTER — PATIENT MESSAGE (OUTPATIENT)
Dept: HEMATOLOGY/ONCOLOGY | Facility: CLINIC | Age: 70
End: 2025-04-28
Payer: MEDICARE

## 2025-04-28 DIAGNOSIS — C15.9 ESOPHAGEAL ADENOCARCINOMA: ICD-10-CM

## 2025-04-28 RX ORDER — OLANZAPINE 5 MG/1
TABLET, FILM COATED ORAL
Qty: 90 TABLET | Refills: 2 | Status: SHIPPED | OUTPATIENT
Start: 2025-04-28

## 2025-04-29 ENCOUNTER — PATIENT MESSAGE (OUTPATIENT)
Dept: HEMATOLOGY/ONCOLOGY | Facility: CLINIC | Age: 70
End: 2025-04-29
Payer: MEDICARE

## 2025-04-29 DIAGNOSIS — R42 DIZZINESS: Primary | ICD-10-CM

## 2025-04-29 DIAGNOSIS — R42 DIZZINESS AND GIDDINESS: ICD-10-CM

## 2025-04-30 RX ORDER — MECLIZINE HYDROCHLORIDE 25 MG/1
25 TABLET ORAL 3 TIMES DAILY PRN
Qty: 30 TABLET | Refills: 0 | Status: SHIPPED | OUTPATIENT
Start: 2025-04-30

## 2025-05-06 ENCOUNTER — HOSPITAL ENCOUNTER (OUTPATIENT)
Dept: RADIOLOGY | Facility: OTHER | Age: 70
Discharge: HOME | End: 2025-05-06
Attending: REGISTERED NURSE
Payer: MEDICARE

## 2025-05-06 DIAGNOSIS — R42 DIZZINESS AND GIDDINESS: ICD-10-CM

## 2025-05-06 PROCEDURE — 70553 MRI BRAIN STEM W/O & W/DYE: CPT | Mod: TC

## 2025-05-06 PROCEDURE — 70553 MRI BRAIN STEM W/O & W/DYE: CPT | Mod: 26,,, | Performed by: RADIOLOGY

## 2025-05-06 PROCEDURE — 25500020 PHARM REV CODE 255: Performed by: REGISTERED NURSE

## 2025-05-06 PROCEDURE — A9585 GADOBUTROL INJECTION: HCPCS | Performed by: REGISTERED NURSE

## 2025-05-06 RX ORDER — GADOBUTROL 604.72 MG/ML
6 INJECTION INTRAVENOUS
Status: COMPLETED | OUTPATIENT
Start: 2025-05-06 | End: 2025-05-06

## 2025-05-06 RX ADMIN — GADOBUTROL 6 ML: 604.72 INJECTION INTRAVENOUS at 07:05

## 2025-05-10 ENCOUNTER — PATIENT MESSAGE (OUTPATIENT)
Dept: HEMATOLOGY/ONCOLOGY | Facility: CLINIC | Age: 70
End: 2025-05-10
Payer: MEDICARE

## 2025-05-14 ENCOUNTER — INFUSION (OUTPATIENT)
Dept: INFUSION THERAPY | Facility: HOSPITAL | Age: 70
End: 2025-05-14
Payer: MEDICARE

## 2025-05-14 ENCOUNTER — OFFICE VISIT (OUTPATIENT)
Dept: HEMATOLOGY/ONCOLOGY | Facility: CLINIC | Age: 70
End: 2025-05-14
Payer: MEDICARE

## 2025-05-14 VITALS
OXYGEN SATURATION: 100 % | HEART RATE: 73 BPM | BODY MASS INDEX: 21 KG/M2 | TEMPERATURE: 98 F | RESPIRATION RATE: 18 BRPM | DIASTOLIC BLOOD PRESSURE: 57 MMHG | HEIGHT: 67 IN | WEIGHT: 133.81 LBS | SYSTOLIC BLOOD PRESSURE: 114 MMHG

## 2025-05-14 VITALS
HEIGHT: 67 IN | OXYGEN SATURATION: 100 % | RESPIRATION RATE: 18 BRPM | SYSTOLIC BLOOD PRESSURE: 114 MMHG | DIASTOLIC BLOOD PRESSURE: 65 MMHG | BODY MASS INDEX: 21 KG/M2 | WEIGHT: 133.81 LBS | HEART RATE: 73 BPM

## 2025-05-14 DIAGNOSIS — E78.5 HYPERLIPIDEMIA, UNSPECIFIED HYPERLIPIDEMIA TYPE: ICD-10-CM

## 2025-05-14 DIAGNOSIS — D84.81 IMMUNODEFICIENCY SECONDARY TO NEOPLASM: ICD-10-CM

## 2025-05-14 DIAGNOSIS — T45.1X5A IMMUNODEFICIENCY SECONDARY TO CHEMOTHERAPY: ICD-10-CM

## 2025-05-14 DIAGNOSIS — D84.821 IMMUNODEFICIENCY SECONDARY TO CHEMOTHERAPY: ICD-10-CM

## 2025-05-14 DIAGNOSIS — R13.19 ESOPHAGEAL DYSPHAGIA: ICD-10-CM

## 2025-05-14 DIAGNOSIS — Z79.69 IMMUNODEFICIENCY SECONDARY TO CHEMOTHERAPY: ICD-10-CM

## 2025-05-14 DIAGNOSIS — T45.1X5A CHEMOTHERAPY-INDUCED NEUROPATHY: ICD-10-CM

## 2025-05-14 DIAGNOSIS — G62.0 CHEMOTHERAPY-INDUCED NEUROPATHY: ICD-10-CM

## 2025-05-14 DIAGNOSIS — N28.89 LEFT RENAL MASS: ICD-10-CM

## 2025-05-14 DIAGNOSIS — D49.9 IMMUNODEFICIENCY SECONDARY TO NEOPLASM: ICD-10-CM

## 2025-05-14 DIAGNOSIS — Z72.0 TOBACCO USE: ICD-10-CM

## 2025-05-14 DIAGNOSIS — I10 HYPERTENSION, UNSPECIFIED TYPE: ICD-10-CM

## 2025-05-14 DIAGNOSIS — C15.9 ESOPHAGEAL ADENOCARCINOMA: Primary | ICD-10-CM

## 2025-05-14 DIAGNOSIS — R42 DIZZINESS AND GIDDINESS: ICD-10-CM

## 2025-05-14 PROCEDURE — 99215 OFFICE O/P EST HI 40 MIN: CPT | Mod: S$PBB,,, | Performed by: PHYSICIAN ASSISTANT

## 2025-05-14 PROCEDURE — A4216 STERILE WATER/SALINE, 10 ML: HCPCS | Performed by: PHYSICIAN ASSISTANT

## 2025-05-14 PROCEDURE — 96367 TX/PROPH/DG ADDL SEQ IV INF: CPT

## 2025-05-14 PROCEDURE — G2211 COMPLEX E/M VISIT ADD ON: HCPCS | Mod: S$PBB,,, | Performed by: PHYSICIAN ASSISTANT

## 2025-05-14 PROCEDURE — 96413 CHEMO IV INFUSION 1 HR: CPT

## 2025-05-14 PROCEDURE — 96375 TX/PRO/DX INJ NEW DRUG ADDON: CPT

## 2025-05-14 PROCEDURE — 99214 OFFICE O/P EST MOD 30 MIN: CPT | Mod: PBBFAC | Performed by: PHYSICIAN ASSISTANT

## 2025-05-14 PROCEDURE — 25000003 PHARM REV CODE 250: Performed by: PHYSICIAN ASSISTANT

## 2025-05-14 PROCEDURE — 99999 PR PBB SHADOW E&M-EST. PATIENT-LVL IV: CPT | Mod: PBBFAC,,, | Performed by: PHYSICIAN ASSISTANT

## 2025-05-14 PROCEDURE — 63600175 PHARM REV CODE 636 W HCPCS: Performed by: PHYSICIAN ASSISTANT

## 2025-05-14 RX ORDER — HEPARIN 100 UNIT/ML
500 SYRINGE INTRAVENOUS
Status: DISCONTINUED | OUTPATIENT
Start: 2025-05-14 | End: 2025-05-14 | Stop reason: HOSPADM

## 2025-05-14 RX ORDER — HEPARIN 100 UNIT/ML
500 SYRINGE INTRAVENOUS
Status: CANCELLED | OUTPATIENT
Start: 2025-05-14

## 2025-05-14 RX ORDER — DIPHENHYDRAMINE HYDROCHLORIDE 50 MG/ML
50 INJECTION, SOLUTION INTRAMUSCULAR; INTRAVENOUS ONCE AS NEEDED
Status: CANCELLED | OUTPATIENT
Start: 2025-05-14

## 2025-05-14 RX ORDER — PROCHLORPERAZINE EDISYLATE 5 MG/ML
5 INJECTION INTRAMUSCULAR; INTRAVENOUS ONCE AS NEEDED
Status: CANCELLED | OUTPATIENT
Start: 2025-05-14

## 2025-05-14 RX ORDER — EPINEPHRINE 0.3 MG/.3ML
0.3 INJECTION SUBCUTANEOUS ONCE AS NEEDED
Status: CANCELLED | OUTPATIENT
Start: 2025-05-14

## 2025-05-14 RX ORDER — SODIUM CHLORIDE 0.9 % (FLUSH) 0.9 %
10 SYRINGE (ML) INJECTION
Status: DISCONTINUED | OUTPATIENT
Start: 2025-05-14 | End: 2025-05-14 | Stop reason: HOSPADM

## 2025-05-14 RX ORDER — SODIUM CHLORIDE 0.9 % (FLUSH) 0.9 %
10 SYRINGE (ML) INJECTION
Status: CANCELLED | OUTPATIENT
Start: 2025-05-14

## 2025-05-14 RX ORDER — PROCHLORPERAZINE EDISYLATE 5 MG/ML
5 INJECTION INTRAMUSCULAR; INTRAVENOUS ONCE AS NEEDED
Status: DISCONTINUED | OUTPATIENT
Start: 2025-05-14 | End: 2025-05-14 | Stop reason: HOSPADM

## 2025-05-14 RX ORDER — EPINEPHRINE 0.3 MG/.3ML
0.3 INJECTION SUBCUTANEOUS ONCE AS NEEDED
Status: DISCONTINUED | OUTPATIENT
Start: 2025-05-14 | End: 2025-05-14 | Stop reason: HOSPADM

## 2025-05-14 RX ORDER — DIPHENHYDRAMINE HYDROCHLORIDE 50 MG/ML
50 INJECTION, SOLUTION INTRAMUSCULAR; INTRAVENOUS ONCE AS NEEDED
Status: DISCONTINUED | OUTPATIENT
Start: 2025-05-14 | End: 2025-05-14 | Stop reason: HOSPADM

## 2025-05-14 RX ADMIN — HEPARIN 500 UNITS: 100 SYRINGE at 12:05

## 2025-05-14 RX ADMIN — FAM-TRASTUZUMAB DERUXTECAN-NXKI 390 MG: 100 INJECTION, POWDER, LYOPHILIZED, FOR SOLUTION INTRAVENOUS at 11:05

## 2025-05-14 RX ADMIN — Medication 10 ML: at 12:05

## 2025-05-14 RX ADMIN — APREPITANT 130 MG: 130 INJECTION, EMULSION INTRAVENOUS at 11:05

## 2025-05-14 RX ADMIN — DEXAMETHASONE SODIUM PHOSPHATE 0.25 MG: 4 INJECTION, SOLUTION INTRA-ARTICULAR; INTRALESIONAL; INTRAMUSCULAR; INTRAVENOUS; SOFT TISSUE at 11:05

## 2025-05-14 RX ADMIN — DEXTROSE MONOHYDRATE: 5 INJECTION, SOLUTION INTRAVENOUS at 11:05

## 2025-05-14 NOTE — PROGRESS NOTES
MEDICAL ONCOLOGY - ESTABLISHED PATIENT VISIT    Reason for visit: esophageal adenocarcinoma     Best Contact Phone Number(s): There are no phone numbers on file.     Cancer/Stage/TNM:    Cancer Staging   Esophageal adenocarcinoma  Staging form: Esophagus - Adenocarcinoma, AJCC 8th Edition  - Clinical: Stage III (cT2, cN1, cM0, G2) - Signed by Miguel Angel Hampton Jr., MD on 7/31/2023       Oncology History   Esophageal adenocarcinoma   7/31/2023 Initial Diagnosis    Esophageal adenocarcinoma     7/31/2023 Cancer Staged    Staging form: Esophagus - Adenocarcinoma, AJCC 8th Edition  - Clinical: Stage III (cT2, cN1, cM0, G2)     9/5/2023 - 10/5/2023 Chemotherapy    Treatment Summary   Plan Name: OP ESOPHAGEAL PACLITAXEL CARBOPLATIN WEEKLY  Treatment Goal: Curative  Status: Inactive  Start Date: 9/5/2023  End Date: 10/5/2023  Provider: Sahil Singer MD  Chemotherapy: CARBOplatin (PARAPLATIN) 195 mg in sodium chloride 0.9% 304.5 mL chemo infusion, 195 mg (100 % of original dose 193.4 mg), Intravenous, Clinic/HOD 1 time, 1 of 1 cycle  Dose modification:   (original dose 193.4 mg, Cycle 1), 193.4 mg (original dose 193.4 mg, Cycle 1),   (original dose 193.4 mg, Cycle 1, Reason: MD Discretion)  Administration: 195 mg (9/5/2023), 225 mg (9/12/2023), 210 mg (9/19/2023), 180 mg (9/26/2023), 195 mg (10/5/2023)  PACLitaxeL (TAXOL) 50 mg/m2 = 96 mg in sodium chloride 0.9% 250 mL chemo infusion, 50 mg/m2 = 96 mg, Intravenous, Clinic/HOD 1 time, 1 of 1 cycle  Administration: 96 mg (9/5/2023), 96 mg (9/12/2023), 96 mg (9/19/2023), 96 mg (9/26/2023), 96 mg (10/5/2023)     9/5/2023 - 10/9/2023 Radiation Therapy    Treating physician: Anshul Neri    Course: C1 Thorax 2023    Treatment Site Ref. ID Energy Dose/Fx (Gy) #Fx Dose Correction (Gy) Total Dose (Gy) Start Date End Date Elapsed Days   IM Esophagus PTV_High 6X 2 25 / 25 0 50 9/5/2023 10/9/2023 34            1/21/2025 - 4/10/2025 Chemotherapy    Treatment Summary   Plan  Name: OP GASTROESOPHAGEAL trastuzumab + MFOLFOX6 (oxaliplatin leucovorin fluorouracil) Q2W  Treatment Goal: Control  Status: Inactive  Start Date: 1/21/2025  End Date: 4/10/2025  Provider: Sahil Singer MD  Chemotherapy: oxaliplatin (ELOXATIN) 85 mg/m2 = 148 mg in D5W 594.6 mL chemo infusion, 85 mg/m2 = 148 mg, Intravenous, Clinic/HOD 1 time, 6 of 24 cycles  Administration: 148 mg (1/30/2025), 148 mg (2/13/2025), 148 mg (2/27/2025), 148 mg (3/13/2025), 148 mg (3/27/2025), 148 mg (4/10/2025)  fluorouracil (Adrucil) 4,000 mg in 0.9% NaCl 100 mL chemo infusion, 4,175 mg, Intravenous, Over 46 hours, 6 of 24 cycles  Administration: 4,000 mg (1/30/2025), 4,000 mg (2/13/2025), 4,000 mg (2/27/2025), 4,000 mg (3/13/2025), 4,000 mg (3/27/2025), 4,000 mg (4/10/2025)  trastuzumab-anns (KANJINTI) 384 mg in 0.9% NaCl 303.3 mL chemo infusion, 6 mg/kg = 384 mg, Intravenous, Clinic/HOD 1 time, 6 of 24 cycles  Administration: 384 mg (1/30/2025), 256 mg (2/13/2025), 256 mg (2/27/2025), 256 mg (3/13/2025), 256 mg (3/27/2025), 256 mg (4/10/2025)     4/23/2025 -  Chemotherapy    Treatment Summary   Plan Name: OP GASTRIC/ ESOPHAGEAL fam-trastuzumab deruxtecan-nxki Q3W  Treatment Goal: Palliative  Status: Active  Start Date: 4/23/2025  End Date: 3/25/2026 (Planned)  Provider: Sahil Singer MD  Chemotherapy: fam-trastuzumab deruxtecan-nxki (Enhertu) 390 mg in D5W 134.5 mL infusion, 6.4 mg/kg = 390 mg, Intravenous, Clinic/HOD 1 time, 1 of 17 cycles  Administration: 390 mg (4/23/2025)        Interim History:  69 y.o. male with esophageal adenocarcinoma who presents for follow-up prior to cycle 2 of trastuzumab deruxtecan for his recurrent esophageal adenocarcinoma.      His dysphagia has improved some.  He is able to eat a little bit more, small pieces of hamburger/fish. Liquids are ok. He continues to have dizziness since his first cycle of Enhertu. No falls, headache or vision pain. He has tried multiple medications for this  without relief. Dizziness improves while driving. No nausea or vomiting. MRI brain was fairly unremarkable but did display findings suggestive of prior infarcts/contusions. Still has lingering paresthesias from prior chemotherapy. Functioning well, however.     Presents alone today. ECOG status is 1.     ROS:   Review of Systems   Constitutional:  Positive for malaise/fatigue and weight loss. Negative for fever.   HENT:  Negative for nosebleeds.    Respiratory:  Negative for cough and shortness of breath.    Cardiovascular:  Negative for chest pain and palpitations.   Gastrointestinal:  Positive for heartburn. Negative for abdominal pain, blood in stool, constipation, diarrhea, nausea and vomiting.        Dysphagia   Genitourinary:  Negative for frequency and hematuria.   Musculoskeletal:  Negative for back pain and falls.   Skin:  Negative for itching and rash.   Neurological:  Negative for dizziness, tingling, sensory change, weakness and headaches.   Psychiatric/Behavioral:  The patient is not nervous/anxious.      Past Medical History:   Past Medical History:   Diagnosis Date    HTN (hypertension)      Past Surgical History:   Past Surgical History:   Procedure Laterality Date    ESOPHAGOGASTRODUODENOSCOPY N/A 12/19/2024    Procedure: EGD (ESOPHAGOGASTRODUODENOSCOPY);  Surgeon: Pete Buenrostro MD;  Location: [facility] (4TH FLR);  Service: Endoscopy;  Laterality: N/A;  Medically Urgent      12/16 ref by Sahil Singer MD, [facility]  12/18 - pre-call complete; MB    ESOPHAGOGASTRODUODENOSCOPY N/A 4/11/2025    Procedure: EGD (ESOPHAGOGASTRODUODENOSCOPY);  Surgeon: Paxton Jurado MD;  Location: [facility] (2ND FLR);  Service: Endoscopy;  Laterality: N/A;  ref by  Sahil Singer MD, 2nd floor due to availability- [facility]    INSERTION OF TUNNELED CENTRAL VENOUS CATHETER (CVC) WITH SUBCUTANEOUS PORT Right 1/27/2025    Procedure: INSERTION, SINGLE LUMEN CATHETER WITH PORT, WITH FLUOROSCOPIC  GUIDANCE, left poss right;  Surgeon: Chadd Velez MD;  Location: [facility] OR [address] [place] [address];  Service: General;  Laterality: Right;    LAPAROSCOPIC REPAIR OF INGUINAL HERNIA Right      Family History:   No family history on file.     Social History:   Social History     Tobacco Use    Smoking status: Every Day     Current packs/day: 1.00     Types: Cigarettes    Smokeless tobacco: Never   Substance Use Topics    Alcohol use: Yes     Alcohol/week: 1.0 standard drink of alcohol     Types: 1 Glasses of wine per week     Comment: rarely      I have reviewed and updated the patient's past medical, surgical, family and social histories.    Allergies:   Review of patient's allergies indicates:  No Known Allergies     Medications:   Current Outpatient Medications   Medication Sig Dispense Refill    amlodipine-benazepril 10-20mg (LOTREL) 10-20 mg per capsule Take 1 capsule by mouth once daily.      azelastine (ASTELIN) 137 mcg (0.1 %) nasal spray 2 sprays by Nasal route.      dexAMETHasone (DECADRON) 4 MG Tab Take 2 tablets (8 mg total) by mouth once daily. On days 2-4 of each chemotherapy cycle. 30 tablet 11    etodolac (LODINE) 400 MG tablet Take 1 tablet by mouth 2 (two) times daily.      fluticasone propionate (FLONASE) 50 mcg/actuation nasal spray 2 sprays by Each Nostril route.      gabapentin (NEURONTIN) 300 MG capsule Take 300 mg by mouth.      LIDOcaine-prilocaine (EMLA) cream Apply topically as needed. 30 g 1    meclizine (ANTIVERT) 25 mg tablet Take 1 tablet (25 mg total) by mouth 3 (three) times daily as needed for Dizziness. 30 tablet 0    multivitamin with minerals tablet Take 1 tablet by mouth once daily. One A Day Multivitamin      OLANZapine (ZYPREXA) 5 MG tablet Take 1 tab at nighttime on nights 1 thru 3 of each chemotherapy cycle. 90 tablet 2    omeprazole (PRILOSEC) 40 MG capsule Take 1 capsule by mouth every morning.      ondansetron (ZOFRAN) 8 MG tablet Take 1 tablet (8 mg total) by mouth every 8  "(eight) hours as needed. 60 tablet 2    pravastatin (PRAVACHOL) 40 MG tablet Take 40 mg by mouth.      TURMERIC ORAL Take by mouth. (Patient not taking: Reported on 4/23/2025)       No current facility-administered medications for this visit.      Physical Exam:   BP (!) 114/57   Pulse 73   Temp 98.1 °F (36.7 °C) (Oral)   Resp 18   Ht 5' 7" (1.702 m)   Wt 60.7 kg (133 lb 13.1 oz)   SpO2 100%   BMI 20.96 kg/m²           Physical Exam  Vitals reviewed.   Constitutional:       General: He is not in acute distress.     Appearance: Normal appearance. He is normal weight.   Eyes:      General: No scleral icterus.     Extraocular Movements: Extraocular movements intact.      Conjunctiva/sclera: Conjunctivae normal.   Cardiovascular:      Rate and Rhythm: Normal rate.   Pulmonary:      Effort: Pulmonary effort is normal. No respiratory distress.   Abdominal:      Tenderness: There is no abdominal tenderness.   Musculoskeletal:         General: No swelling. Normal range of motion.   Skin:     General: Skin is warm.      Coloration: Skin is not jaundiced.      Findings: No erythema or rash.   Neurological:      General: No focal deficit present.      Mental Status: He is alert and oriented to person, place, and time. Mental status is at baseline.      Gait: Gait normal.   Psychiatric:         Mood and Affect: Mood normal.         Behavior: Behavior normal.         Labs:      I have personally reviewed the patient's lab work today, including CBC and CMP. ANC is adequate for treatment     Imaging:      CT CAP - 3/21/25:    Impression:     1. Persistent thickening of the lower esophagus in keeping with known neoplasia.  Subcentimeter adjacent nodule may represent tumor extension or lymph node, new from prior study.  2. No evidence for distant metastases.  3. Stable appearance of left renal mass, which remains concerning for neoplasm.  4. Stable appearance of subcentimeter hypodensity in the pancreatic body, perhaps " IPMN.  5. Additional findings above.       Path:   12/19/24:    Gastroesophageal junction mass (biopsy):   Adenocarcinoma, poorly differentiated   HER2 immunostain:  Positive (score 3)     Immunohistochemistry (IHC) Testing for Mismatch Repair (MMR) Proteins:   MLH1, MSH2, MSH6, PMS2 - Intact nuclear expression   Background nonneoplastic tissue/internal control with intact nuclear expression     Interpretation: No loss of nuclear expression of MMR proteins: low probability of microsatellite instability. There are exceptions to the above IHC interpretations. These results should not be considered in isolation, and clinical correlation with   genetic counseling is recommended to assess the need for germline testing.     Assessment:       1. Esophageal adenocarcinoma    2. 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 Hypertension, unspecified type    5. Hyperlipidemia, unspecified hyperlipidemia type    6. Tobacco use    7. Left renal mass    8. Dizziness and giddiness    9. Esophageal dysphagia    10. Chemotherapy-induced neuropathy              Plan:        # Esophageal adenocarcinoma, esophageal dysphagia  Mr. Lugo is a 69 y.o. male who presents to clinic for evaluation of newly diagnosed esophageal cancer. He initially presented with dysphagia, now improved s/p esophageal dilation during EUS. Biopsy confirmed adenocarcinoma.     We reviewed his diagnosis, prognosis, and treatment options with him during our initial visit. Discussed with him the high likelihood that cancer would recur if he was taken straight to surgery without neoadjuvant treatment. Our recommendation is to proceed with weekly carboplatin + taxol in conjunction with daily radiation (M-F, no holidays) for a total of 5-5.5 weeks. Handouts provided to patient on both chemotherapy medications.     He has completed Carboplatin AUC 2 + paclitaxel 50 mg/m2 for 5 treatments. Also completed concurrent  "radiation for definitive non-surgical treatment.    Repeat PET/CT on 11/15/23 showed persistent distal hypermetabolic esophageal tumor (decreasing SUV) without clear evidence of metastatic disease.   Repeat PET/CT on 2/21/24 showed further improvement in hypermetabolism at distal esophagus.  Repeat CT CAP on 4/24/24 showed stable thickening at distal esophagus. Stable left kidney mass c/f malignancy.   Repeat CT CAP 9/9/24 showed stable thickening at distal esophagus.  Repeat CT CAP 12/12/24 showed stable findings.  L renal mass is slightly enlarged.     Given weight decrease, "upset stomach" and increased eructations we got repeat EGD to assess for cancer recurrence.  This was done on 12/19/24 which showed a distal esophageal mass, biopsy confirming poorly differentiated adenocarcinoma, pMMR and HER-2 positive.    Recommended FOLFOX + trastuzumab. No indication for pembro given PD-L1 being negative, per final results of KEYNOTE-811 regimen.    Had very mild dysphagia at the time but did not need feeding tube or stent.   Had port placed 1/27/25 by Dr. Velez.    He received cycle 1 of FOLFOX + trastuzumab on 1/30/2025.    CT CAP after cycle 4 showed no evidence of progression or new disease.    After cycle 6 of FOLFOX + trastuzumab, he was admitted to the hospital for dysphagia due to esophageal tumor obstruction.  Was recommended to have an esophageal stent as he was only able to tolerate liquids, but he was hesitant and went home.    Given progression of tumor locally, we opted to switch to trastuzumab deruxtecan 6.4 mg/kg.  He was reluctant to proceed with esophageal stent or enteral feeding tube at this time.  Explained that he needs to get more calories by mouth if he does not proceed with one of these options.    Presents today for cycle 2 of TDxD.    Doing better today. Tolerated cycle 1 ok. His dysphagia has improved some.  He is able to eat a little bit more, small pieces of hamburger/fish. Liquids are ok. " He continues to have dizziness since his first cycle of Enhertu. MRI brain was fairly unremarkable but did display findings suggestive of prior infarcts/contusions. Still has lingering paresthesias from prior chemotherapy. Functioning well, however.   I have personally reviewed the patient's lab work today, including CBC and CMP. ANC is adequate for treatment   Olanzapine nights 1-3 and dexamethasone days 2-4 for nausea ppx.  Has PRN anti-emetics at home.  Proceed with cycle 2 of Enhertu (TDxD) today    RTC in 3 weeks for next cycle.    Last TTE April 2025.    Tempus xT: KRAS G12D, NOTCH1, SMARCA4, TP53; PD-L1 0  PGX: DPYD nml, UGT1A1 intermediate    # HTN, HLD  BP normal in clinic today.    Continue medical management.   Following with PCP.    # Tobacco use  Encouraged cessation.  Previously enrolled in smoking cessation clinic.     # Left renal mass, SARITA, dizziness  Slightly enlarged in size on 12/2024 scan.   Saw Dr. Sainz 5/2024 and agreed on surveillance with repeat imaging 5/2025.    2/2 from previous chemotherapy. He feels it's worsening some but he functions well. No falls. Will continue to monitor as he is not receiving Oxaliplatin    MRI brain negative. Not finding much relief with Meclizine. ?2/2 Enhertu. Will continue to monitor.     Follow up: RTC 3 weeks    Patient and family members displayed understanding of the above encounter and treatment plan. All thoughtful questions were answered to their satisfaction. Patient was advised to notify the care team or proceed to the ER if signs and symptoms worsen.     30 minutes were spent today on this encounter including face to face time with the patient, data gathering/interpretation and documentation. Greater than 50% of this time involved counseling or coordination of care. I have provided the patient with an opportunity to ask questions and have all questions answered to patient's satisfaction.     Visit today included increased complexity associated with  the care of the episodic problem chemotherapy  addressed and managing the longitudinal care of the patient due to the serious and/or complex managed problem(s) GI malignancies/cancer. In addition, the above encounter addresses an illness that poses a significant threat to life, bodily function and overall performance status.      code applied: patient requires or will require a continuous, longitudinal, and active collaborative plan of care related to this patient's health condition, GI malignancies/cancer --the management of which requires the direction of a practitioner with specialized clinical knowledge, skill, and expertise       LUIS F Michelle, PA-C  [facility]  Dept of Hematology/Oncology  PA-C to GI Oncology team             Med Onc Chart Routing  Urgent    Follow up with physician 3 weeks and 6 weeks. Please adjust schedule. Patient will need to see Dr Singer in 6 weeks with lab work, CT CAP and treatment discussion with Enhertu   Follow up with LEO . 9 weeks for Enhertu   Infusion scheduling note    Injection scheduling note    Labs CBC and CMP   Scheduling:  Preferred lab:  Lab interval: every 3 weeks     Imaging CT chest abdomen pelvis   Please reschedule to 6 weeks prior to clinic visit with Dr Singer   Pharmacy appointment    Other referrals

## 2025-05-14 NOTE — PLAN OF CARE
Patient completed Enhertu infusion, tolerated well.  Port de accessed, flushed, blood return noted, heparin locked.  Pt  to RTC 6/4/25  Pt ambulated off floor independently, NAD.

## 2025-05-14 NOTE — PLAN OF CARE
Patient seated in chair, VSS, assessment done.  Port accessed, flushed, blood return noted, started D5W @ 25cc/hr KVO while waiting for Enhertu from pharmacy.  WCTM for safety

## 2025-05-20 ENCOUNTER — PATIENT MESSAGE (OUTPATIENT)
Dept: UROLOGY | Facility: CLINIC | Age: 70
End: 2025-05-20
Payer: MEDICARE

## 2025-05-20 ENCOUNTER — TELEPHONE (OUTPATIENT)
Dept: UROLOGY | Facility: CLINIC | Age: 70
End: 2025-05-20
Payer: MEDICARE

## 2025-05-26 ENCOUNTER — LAB VISIT (OUTPATIENT)
Dept: LAB | Facility: HOSPITAL | Age: 70
End: 2025-05-26
Payer: MEDICARE

## 2025-05-26 DIAGNOSIS — N28.89 LEFT RENAL MASS: ICD-10-CM

## 2025-05-26 LAB
ABSOLUTE EOSINOPHIL (OHS): 0.04 K/UL
ABSOLUTE MONOCYTE (OHS): 0.67 K/UL (ref 0.3–1)
ABSOLUTE NEUTROPHIL COUNT (OHS): 4.72 K/UL (ref 1.8–7.7)
ALBUMIN SERPL BCP-MCNC: 3.1 G/DL (ref 3.5–5.2)
ALP SERPL-CCNC: 147 UNIT/L (ref 40–150)
ALT SERPL W/O P-5'-P-CCNC: 30 UNIT/L (ref 10–44)
ANION GAP (OHS): 12 MMOL/L (ref 8–16)
AST SERPL-CCNC: 23 UNIT/L (ref 11–45)
BASOPHILS # BLD AUTO: 0.02 K/UL
BASOPHILS NFR BLD AUTO: 0.3 %
BILIRUB SERPL-MCNC: 0.8 MG/DL (ref 0.1–1)
BUN SERPL-MCNC: 17 MG/DL (ref 8–23)
CALCIUM SERPL-MCNC: 9.3 MG/DL (ref 8.7–10.5)
CHLORIDE SERPL-SCNC: 104 MMOL/L (ref 95–110)
CO2 SERPL-SCNC: 20 MMOL/L (ref 23–29)
CREAT SERPL-MCNC: 1.5 MG/DL (ref 0.5–1.4)
ERYTHROCYTE [DISTWIDTH] IN BLOOD BY AUTOMATED COUNT: 14.9 % (ref 11.5–14.5)
GFR SERPLBLD CREATININE-BSD FMLA CKD-EPI: 50 ML/MIN/1.73/M2
GLUCOSE SERPL-MCNC: 103 MG/DL (ref 70–110)
HCT VFR BLD AUTO: 32.3 % (ref 40–54)
HGB BLD-MCNC: 11 GM/DL (ref 14–18)
IMM GRANULOCYTES # BLD AUTO: 0.1 K/UL (ref 0–0.04)
IMM GRANULOCYTES NFR BLD AUTO: 1.6 % (ref 0–0.5)
LYMPHOCYTES # BLD AUTO: 0.75 K/UL (ref 1–4.8)
MCH RBC QN AUTO: 30.9 PG (ref 27–31)
MCHC RBC AUTO-ENTMCNC: 34.1 G/DL (ref 32–36)
MCV RBC AUTO: 91 FL (ref 82–98)
NUCLEATED RBC (/100WBC) (OHS): 0 /100 WBC
PLATELET # BLD AUTO: 169 K/UL (ref 150–450)
PMV BLD AUTO: 9.1 FL (ref 9.2–12.9)
POTASSIUM SERPL-SCNC: 3.6 MMOL/L (ref 3.5–5.1)
PROT SERPL-MCNC: 6.4 GM/DL (ref 6–8.4)
RBC # BLD AUTO: 3.56 M/UL (ref 4.6–6.2)
RELATIVE EOSINOPHIL (OHS): 0.6 %
RELATIVE LYMPHOCYTE (OHS): 11.9 % (ref 18–48)
RELATIVE MONOCYTE (OHS): 10.6 % (ref 4–15)
RELATIVE NEUTROPHIL (OHS): 75 % (ref 38–73)
SODIUM SERPL-SCNC: 136 MMOL/L (ref 136–145)
WBC # BLD AUTO: 6.3 K/UL (ref 3.9–12.7)

## 2025-05-26 PROCEDURE — 36415 COLL VENOUS BLD VENIPUNCTURE: CPT

## 2025-05-26 PROCEDURE — 80053 COMPREHEN METABOLIC PANEL: CPT

## 2025-05-26 PROCEDURE — 85025 COMPLETE CBC W/AUTO DIFF WBC: CPT

## 2025-05-27 ENCOUNTER — HOSPITAL ENCOUNTER (OUTPATIENT)
Dept: RADIOLOGY | Facility: HOSPITAL | Age: 70
Discharge: HOME | End: 2025-05-27
Payer: MEDICARE

## 2025-05-27 ENCOUNTER — OFFICE VISIT (OUTPATIENT)
Dept: UROLOGY | Facility: CLINIC | Age: 70
End: 2025-05-27
Payer: MEDICARE

## 2025-05-27 VITALS
SYSTOLIC BLOOD PRESSURE: 89 MMHG | WEIGHT: 131.19 LBS | HEART RATE: 72 BPM | HEIGHT: 67 IN | BODY MASS INDEX: 20.59 KG/M2 | DIASTOLIC BLOOD PRESSURE: 56 MMHG

## 2025-05-27 DIAGNOSIS — C15.9 ESOPHAGEAL ADENOCARCINOMA: ICD-10-CM

## 2025-05-27 DIAGNOSIS — N28.89 LEFT RENAL MASS: ICD-10-CM

## 2025-05-27 DIAGNOSIS — N28.89 LEFT RENAL MASS: Primary | ICD-10-CM

## 2025-05-27 PROCEDURE — G2211 COMPLEX E/M VISIT ADD ON: HCPCS | Mod: S$PBB,,, | Performed by: UROLOGY

## 2025-05-27 PROCEDURE — 99999 PR PBB SHADOW E&M-EST. PATIENT-LVL III: CPT | Mod: PBBFAC,,, | Performed by: UROLOGY

## 2025-05-27 PROCEDURE — 99215 OFFICE O/P EST HI 40 MIN: CPT | Mod: S$PBB,,, | Performed by: UROLOGY

## 2025-05-27 PROCEDURE — 74178 CT ABD&PLV WO CNTR FLWD CNTR: CPT | Mod: TC

## 2025-05-27 PROCEDURE — 25500020 PHARM REV CODE 255

## 2025-05-27 PROCEDURE — 99213 OFFICE O/P EST LOW 20 MIN: CPT | Mod: PBBFAC,25 | Performed by: UROLOGY

## 2025-05-27 PROCEDURE — 74178 CT ABD&PLV WO CNTR FLWD CNTR: CPT | Mod: 26,,, | Performed by: RADIOLOGY

## 2025-05-27 RX ADMIN — IOHEXOL 100 ML: 350 INJECTION, SOLUTION INTRAVENOUS at 10:05

## 2025-05-27 NOTE — PROGRESS NOTES
Clinic Note  5/27/2025      Subjective:         Chief Complaint:   HPI  Jone Lugo is a 69 y.o. male with Stage III esophageal cancer who was referred for evaluation of a left renal mass.  CT Abd W WO-4/24/2024- 1.7 x 1.9 cm solid enancing LUP renal mass. Stable in size compared to CT scan on 7/3/2023. Denies hematuria. Current smoker.  RENAL score-4x.  Owns [employer].  CT 3/21/2025- stable left renal mass.    Current esophageal therapy:  Treatment Summary   Plan Name: OP GASTRIC/ ESOPHAGEAL fam-trastuzumab deruxtecan-nxki Q3W  Treatment Goal: Palliative  Status: Active  Start Date: 4/23/2025  End Date: 3/25/2026 (Planned)  Provider: Sahil Singer MD  Chemotherapy: fam-trastuzumab deruxtecan-nxki (Enhertu) 390 mg in D5W 134.5 mL infusion, 6.4 mg/kg = 390 mg, Intravenous, Clinic/HOD 1 time, 1 of 17 cycles  Administration: 390 mg (4/23/2025)    Past Medical History:   Diagnosis Date    HTN (hypertension)      No family history on file.  Social History[1]  Past Surgical History:   Procedure Laterality Date    ESOPHAGOGASTRODUODENOSCOPY N/A 12/19/2024    Procedure: EGD (ESOPHAGOGASTRODUODENOSCOPY);  Surgeon: Pete Buenrostro MD;  Location: [facility] (4TH FLR);  Service: Endoscopy;  Laterality: N/A;  Medically Urgent      12/16 ref by Sahil Singer MD, portal-st  12/18 - pre-call complete; MB    ESOPHAGOGASTRODUODENOSCOPY N/A 4/11/2025    Procedure: EGD (ESOPHAGOGASTRODUODENOSCOPY);  Surgeon: Paxton Jurado MD;  Location: [facility] (2ND FLR);  Service: Endoscopy;  Laterality: N/A;  ref by  Sahil Singer MD, 2nd floor due to availability- portal -ml    INSERTION OF TUNNELED CENTRAL VENOUS CATHETER (CVC) WITH SUBCUTANEOUS PORT Right 1/27/2025    Procedure: INSERTION, SINGLE LUMEN CATHETER WITH PORT, WITH FLUOROSCOPIC GUIDANCE, left poss right;  Surgeon: Chadd Velez MD;  Location: [facility] OR [address] [place] [address];  Service: General;  Laterality: Right;    LAPAROSCOPIC REPAIR OF  "INGUINAL HERNIA Right      Problem List[2]  Review of Systems   Constitutional:  Positive for chills and fever.   HENT:  Positive for trouble swallowing.    Respiratory: Negative.     Cardiovascular: Negative.    Genitourinary:  Negative for difficulty urinating, dysuria and hematuria.         Objective:      There were no vitals taken for this visit.  Estimated body mass index is 20.96 kg/m² as calculated from the following:    Height as of 5/14/25: 5' 7" (1.702 m).    Weight as of 5/14/25: 60.7 kg (133 lb 13.1 oz).  Physical Exam  Constitutional:       Appearance: Normal appearance.   Cardiovascular:      Rate and Rhythm: Normal rate.   Pulmonary:      Effort: Pulmonary effort is normal.   Abdominal:      General: Abdomen is flat.      Palpations: Abdomen is soft.   Skin:     General: Skin is warm.   Neurological:      General: No focal deficit present.      Mental Status: He is alert and oriented to person, place, and time.   Psychiatric:         Mood and Affect: Mood normal.           Assessment and Plan:   Long discussion about management of Small Renal Masses. Discussed malignant vs benign histology.  Discussed surveillance and surveillance  protocol, biopsy, ablation techniques ( including cryoabalation and HFRA), and resection techniques including robotic and open partial.Also discussed open, lap and robotic nephrectomy. Discussed risks and complications of all procedures, risk of reoperation and risks of recurrence and need for additional therapy Answered questions and addressed concerns.      Plan for observation with CT a/p w.w.o and cbc/cmp in 12 months.   code applied: patient requires or will require a continuous, longitudinal, and active collaborative plan of care related to this patient's health condition, the management of which requires the direction of a practitioner with specialized clinical knowledge, skill, and expertise.        Problem List Items Addressed This Visit    None      Follow " up:   Follow up in 12 months with CT A/P w and w/o contrast     Fede Sainz             [1]   Social History  Socioeconomic History    Marital status: Single   Tobacco Use    Smoking status: Every Day     Current packs/day: 1.00     Types: Cigarettes    Smokeless tobacco: Never   Substance and Sexual Activity    Alcohol use: Yes     Alcohol/week: 1.0 standard drink of alcohol     Types: 1 Glasses of wine per week     Comment: rarely    Drug use: Never     Social Drivers of Health     Financial Resource Strain: Patient Declined (4/12/2025)    Overall Financial Resource Strain (CARDIA)     Difficulty of Paying Living Expenses: Patient declined   Food Insecurity: Patient Declined (4/12/2025)    Hunger Vital Sign     Worried About Running Out of Food in the Last Year: Patient declined     Ran Out of Food in the Last Year: Patient declined   Transportation Needs: No Transportation Needs (4/11/2025)    PRAPARE - Transportation     Lack of Transportation (Medical): No     Lack of Transportation (Non-Medical): No   Physical Activity: Unknown (4/7/2025)    Received from [facility]    Exercise Vital Sign     Days of Exercise per Week: Patient declined   Recent Concern: Physical Activity - Insufficiently Active (3/17/2025)    Exercise Vital Sign     Days of Exercise per Week: 2 days     Minutes of Exercise per Session: 20 min   Stress: Patient Declined (4/12/2025)    [employer] - Occupational Stress Questionnaire     Feeling of Stress : Patient declined   Recent Concern: Stress - Stress Concern Present (3/17/2025)    [employer] - Occupational Stress Questionnaire     Feeling of Stress : To some extent   Housing Stability: Patient Declined (4/12/2025)    Housing Stability Vital Sign     Unable to Pay for Housing in the Last Year: Patient declined     Number of Times Moved in the Last Year: 0     Homeless in the Last Year: Patient declined   [2]   Patient Active  Problem List  Diagnosis    Esophageal adenocarcinoma    H/O traumatic brain injury    Tobacco abuse    Left renal mass    HTN (hypertension)    Obstruction of esophagus    Tobacco dependency    Esophageal dysphagia

## 2025-06-04 ENCOUNTER — OFFICE VISIT (OUTPATIENT)
Dept: HEMATOLOGY/ONCOLOGY | Facility: CLINIC | Age: 70
End: 2025-06-04
Payer: MEDICARE

## 2025-06-04 ENCOUNTER — LAB VISIT (OUTPATIENT)
Dept: LAB | Facility: HOSPITAL | Age: 70
End: 2025-06-04
Attending: INTERNAL MEDICINE
Payer: MEDICARE

## 2025-06-04 ENCOUNTER — INFUSION (OUTPATIENT)
Dept: INFUSION THERAPY | Facility: HOSPITAL | Age: 70
End: 2025-06-04
Payer: MEDICARE

## 2025-06-04 VITALS
HEART RATE: 75 BPM | WEIGHT: 128.06 LBS | OXYGEN SATURATION: 100 % | BODY MASS INDEX: 20.1 KG/M2 | TEMPERATURE: 98 F | HEIGHT: 67 IN | DIASTOLIC BLOOD PRESSURE: 73 MMHG | SYSTOLIC BLOOD PRESSURE: 101 MMHG | RESPIRATION RATE: 18 BRPM

## 2025-06-04 VITALS
SYSTOLIC BLOOD PRESSURE: 94 MMHG | BODY MASS INDEX: 20.1 KG/M2 | HEIGHT: 67 IN | RESPIRATION RATE: 18 BRPM | HEART RATE: 82 BPM | DIASTOLIC BLOOD PRESSURE: 59 MMHG | TEMPERATURE: 98 F | WEIGHT: 128.06 LBS

## 2025-06-04 DIAGNOSIS — D84.821 IMMUNODEFICIENCY SECONDARY TO CHEMOTHERAPY: ICD-10-CM

## 2025-06-04 DIAGNOSIS — Z79.69 IMMUNODEFICIENCY SECONDARY TO CHEMOTHERAPY: ICD-10-CM

## 2025-06-04 DIAGNOSIS — C15.9 ESOPHAGEAL ADENOCARCINOMA: ICD-10-CM

## 2025-06-04 DIAGNOSIS — R13.19 ESOPHAGEAL DYSPHAGIA: ICD-10-CM

## 2025-06-04 DIAGNOSIS — T45.1X5A IMMUNODEFICIENCY SECONDARY TO CHEMOTHERAPY: ICD-10-CM

## 2025-06-04 DIAGNOSIS — N28.89 LEFT RENAL MASS: ICD-10-CM

## 2025-06-04 DIAGNOSIS — Z72.0 TOBACCO USE: ICD-10-CM

## 2025-06-04 DIAGNOSIS — D84.81 IMMUNODEFICIENCY SECONDARY TO NEOPLASM: ICD-10-CM

## 2025-06-04 DIAGNOSIS — C15.9 ESOPHAGEAL ADENOCARCINOMA: Primary | ICD-10-CM

## 2025-06-04 DIAGNOSIS — D49.9 IMMUNODEFICIENCY SECONDARY TO NEOPLASM: ICD-10-CM

## 2025-06-04 DIAGNOSIS — R42 DIZZINESS AND GIDDINESS: ICD-10-CM

## 2025-06-04 DIAGNOSIS — E78.5 HYPERLIPIDEMIA, UNSPECIFIED HYPERLIPIDEMIA TYPE: ICD-10-CM

## 2025-06-04 LAB
ABSOLUTE NEUTROPHIL COUNT (OHS): 6.28 K/UL (ref 1.8–7.7)
ALBUMIN SERPL BCP-MCNC: 3.2 G/DL (ref 3.5–5.2)
ALP SERPL-CCNC: 123 UNIT/L (ref 40–150)
ALT SERPL W/O P-5'-P-CCNC: 15 UNIT/L (ref 10–44)
ANION GAP (OHS): 12 MMOL/L (ref 8–16)
AST SERPL-CCNC: 20 UNIT/L (ref 11–45)
BILIRUB SERPL-MCNC: 0.7 MG/DL (ref 0.1–1)
BUN SERPL-MCNC: 18 MG/DL (ref 8–23)
CALCIUM SERPL-MCNC: 9.7 MG/DL (ref 8.7–10.5)
CHLORIDE SERPL-SCNC: 106 MMOL/L (ref 95–110)
CO2 SERPL-SCNC: 21 MMOL/L (ref 23–29)
CREAT SERPL-MCNC: 1.8 MG/DL (ref 0.5–1.4)
ERYTHROCYTE [DISTWIDTH] IN BLOOD BY AUTOMATED COUNT: 15 % (ref 11.5–14.5)
GFR SERPLBLD CREATININE-BSD FMLA CKD-EPI: 40 ML/MIN/1.73/M2
GLUCOSE SERPL-MCNC: 95 MG/DL (ref 70–110)
HCT VFR BLD AUTO: 33.6 % (ref 40–54)
HGB BLD-MCNC: 11.2 GM/DL (ref 14–18)
IMM GRANULOCYTES # BLD AUTO: 0.11 K/UL (ref 0–0.04)
MCH RBC QN AUTO: 30.7 PG (ref 27–31)
MCHC RBC AUTO-ENTMCNC: 33.3 G/DL (ref 32–36)
MCV RBC AUTO: 92 FL (ref 82–98)
PLATELET # BLD AUTO: 325 K/UL (ref 150–450)
PMV BLD AUTO: 9.3 FL (ref 9.2–12.9)
POTASSIUM SERPL-SCNC: 4.2 MMOL/L (ref 3.5–5.1)
PROT SERPL-MCNC: 6.6 GM/DL (ref 6–8.4)
RBC # BLD AUTO: 3.65 M/UL (ref 4.6–6.2)
SODIUM SERPL-SCNC: 139 MMOL/L (ref 136–145)
WBC # BLD AUTO: 8.22 K/UL (ref 3.9–12.7)

## 2025-06-04 PROCEDURE — 36415 COLL VENOUS BLD VENIPUNCTURE: CPT

## 2025-06-04 PROCEDURE — 99215 OFFICE O/P EST HI 40 MIN: CPT | Mod: S$PBB,,, | Performed by: INTERNAL MEDICINE

## 2025-06-04 PROCEDURE — 25000003 PHARM REV CODE 250: Performed by: INTERNAL MEDICINE

## 2025-06-04 PROCEDURE — 96361 HYDRATE IV INFUSION ADD-ON: CPT

## 2025-06-04 PROCEDURE — A4216 STERILE WATER/SALINE, 10 ML: HCPCS | Performed by: INTERNAL MEDICINE

## 2025-06-04 PROCEDURE — 99999 PR PBB SHADOW E&M-EST. PATIENT-LVL IV: CPT | Mod: PBBFAC,,, | Performed by: INTERNAL MEDICINE

## 2025-06-04 PROCEDURE — 99214 OFFICE O/P EST MOD 30 MIN: CPT | Mod: PBBFAC,25 | Performed by: INTERNAL MEDICINE

## 2025-06-04 PROCEDURE — 63600175 PHARM REV CODE 636 W HCPCS: Mod: JW,TB | Performed by: INTERNAL MEDICINE

## 2025-06-04 PROCEDURE — 96413 CHEMO IV INFUSION 1 HR: CPT

## 2025-06-04 PROCEDURE — 96375 TX/PRO/DX INJ NEW DRUG ADDON: CPT

## 2025-06-04 PROCEDURE — 96367 TX/PROPH/DG ADDL SEQ IV INF: CPT

## 2025-06-04 PROCEDURE — G2211 COMPLEX E/M VISIT ADD ON: HCPCS | Mod: S$PBB,,, | Performed by: INTERNAL MEDICINE

## 2025-06-04 RX ORDER — EPINEPHRINE 0.3 MG/.3ML
0.3 INJECTION SUBCUTANEOUS ONCE AS NEEDED
Status: CANCELLED | OUTPATIENT
Start: 2025-06-04

## 2025-06-04 RX ORDER — HEPARIN 100 UNIT/ML
500 SYRINGE INTRAVENOUS
Status: DISCONTINUED | OUTPATIENT
Start: 2025-06-04 | End: 2025-06-04 | Stop reason: HOSPADM

## 2025-06-04 RX ORDER — HEPARIN 100 UNIT/ML
500 SYRINGE INTRAVENOUS
Status: CANCELLED | OUTPATIENT
Start: 2025-06-04

## 2025-06-04 RX ORDER — PROCHLORPERAZINE EDISYLATE 5 MG/ML
5 INJECTION INTRAMUSCULAR; INTRAVENOUS ONCE AS NEEDED
Status: DISCONTINUED | OUTPATIENT
Start: 2025-06-04 | End: 2025-06-04 | Stop reason: HOSPADM

## 2025-06-04 RX ORDER — EPINEPHRINE 0.3 MG/.3ML
0.3 INJECTION SUBCUTANEOUS ONCE AS NEEDED
Status: DISCONTINUED | OUTPATIENT
Start: 2025-06-04 | End: 2025-06-04 | Stop reason: HOSPADM

## 2025-06-04 RX ORDER — DIPHENHYDRAMINE HYDROCHLORIDE 50 MG/ML
50 INJECTION, SOLUTION INTRAMUSCULAR; INTRAVENOUS ONCE AS NEEDED
Status: DISCONTINUED | OUTPATIENT
Start: 2025-06-04 | End: 2025-06-04 | Stop reason: HOSPADM

## 2025-06-04 RX ORDER — SODIUM CHLORIDE 0.9 % (FLUSH) 0.9 %
10 SYRINGE (ML) INJECTION
Status: CANCELLED | OUTPATIENT
Start: 2025-06-04

## 2025-06-04 RX ORDER — PROCHLORPERAZINE EDISYLATE 5 MG/ML
5 INJECTION INTRAMUSCULAR; INTRAVENOUS ONCE AS NEEDED
Status: CANCELLED | OUTPATIENT
Start: 2025-06-04

## 2025-06-04 RX ORDER — DIPHENHYDRAMINE HYDROCHLORIDE 50 MG/ML
50 INJECTION, SOLUTION INTRAMUSCULAR; INTRAVENOUS ONCE AS NEEDED
Status: CANCELLED | OUTPATIENT
Start: 2025-06-04

## 2025-06-04 RX ORDER — SODIUM CHLORIDE 0.9 % (FLUSH) 0.9 %
10 SYRINGE (ML) INJECTION
Status: DISCONTINUED | OUTPATIENT
Start: 2025-06-04 | End: 2025-06-04 | Stop reason: HOSPADM

## 2025-06-04 RX ADMIN — FAM-TRASTUZUMAB DERUXTECAN-NXKI 390 MG: 100 INJECTION, POWDER, LYOPHILIZED, FOR SOLUTION INTRAVENOUS at 12:06

## 2025-06-04 RX ADMIN — Medication 10 ML: at 12:06

## 2025-06-04 RX ADMIN — DEXAMETHASONE SODIUM PHOSPHATE 0.25 MG: 4 INJECTION, SOLUTION INTRA-ARTICULAR; INTRALESIONAL; INTRAMUSCULAR; INTRAVENOUS; SOFT TISSUE at 11:06

## 2025-06-04 RX ADMIN — HEPARIN 500 UNITS: 100 SYRINGE at 12:06

## 2025-06-04 RX ADMIN — SODIUM CHLORIDE 1000 ML: 0.9 INJECTION, SOLUTION INTRAVENOUS at 10:06

## 2025-06-04 RX ADMIN — APREPITANT 130 MG: 130 INJECTION, EMULSION INTRAVENOUS at 11:06

## 2025-06-23 ENCOUNTER — PATIENT MESSAGE (OUTPATIENT)
Dept: HEMATOLOGY/ONCOLOGY | Facility: CLINIC | Age: 70
End: 2025-06-23
Payer: MEDICARE

## 2025-06-25 ENCOUNTER — LAB VISIT (OUTPATIENT)
Dept: LAB | Facility: HOSPITAL | Age: 70
End: 2025-06-25
Attending: INTERNAL MEDICINE
Payer: MEDICARE

## 2025-06-25 ENCOUNTER — OFFICE VISIT (OUTPATIENT)
Dept: HEMATOLOGY/ONCOLOGY | Facility: CLINIC | Age: 70
End: 2025-06-25
Payer: MEDICARE

## 2025-06-25 VITALS
TEMPERATURE: 98 F | HEIGHT: 67 IN | RESPIRATION RATE: 18 BRPM | BODY MASS INDEX: 20.06 KG/M2 | DIASTOLIC BLOOD PRESSURE: 43 MMHG | OXYGEN SATURATION: 100 % | HEART RATE: 70 BPM | SYSTOLIC BLOOD PRESSURE: 63 MMHG

## 2025-06-25 DIAGNOSIS — R42 DIZZINESS AND GIDDINESS: ICD-10-CM

## 2025-06-25 DIAGNOSIS — T45.1X5A IMMUNODEFICIENCY SECONDARY TO CHEMOTHERAPY: ICD-10-CM

## 2025-06-25 DIAGNOSIS — R13.19 ESOPHAGEAL DYSPHAGIA: ICD-10-CM

## 2025-06-25 DIAGNOSIS — T45.1X5A CHEMOTHERAPY-INDUCED NEUROPATHY: ICD-10-CM

## 2025-06-25 DIAGNOSIS — C15.9 ESOPHAGEAL ADENOCARCINOMA: ICD-10-CM

## 2025-06-25 DIAGNOSIS — G62.0 CHEMOTHERAPY-INDUCED NEUROPATHY: ICD-10-CM

## 2025-06-25 DIAGNOSIS — Z79.69 IMMUNODEFICIENCY SECONDARY TO CHEMOTHERAPY: ICD-10-CM

## 2025-06-25 DIAGNOSIS — C15.9 ESOPHAGEAL ADENOCARCINOMA: Primary | ICD-10-CM

## 2025-06-25 DIAGNOSIS — D49.9 IMMUNODEFICIENCY SECONDARY TO NEOPLASM: ICD-10-CM

## 2025-06-25 DIAGNOSIS — N17.9 AKI (ACUTE KIDNEY INJURY): ICD-10-CM

## 2025-06-25 DIAGNOSIS — N28.89 LEFT RENAL MASS: ICD-10-CM

## 2025-06-25 DIAGNOSIS — I10 HYPERTENSION, UNSPECIFIED TYPE: ICD-10-CM

## 2025-06-25 DIAGNOSIS — Z72.0 TOBACCO USE: ICD-10-CM

## 2025-06-25 DIAGNOSIS — D84.821 IMMUNODEFICIENCY SECONDARY TO CHEMOTHERAPY: ICD-10-CM

## 2025-06-25 DIAGNOSIS — R79.89 ELEVATED LFTS: ICD-10-CM

## 2025-06-25 DIAGNOSIS — E78.5 HYPERLIPIDEMIA, UNSPECIFIED HYPERLIPIDEMIA TYPE: ICD-10-CM

## 2025-06-25 DIAGNOSIS — D84.81 IMMUNODEFICIENCY SECONDARY TO NEOPLASM: ICD-10-CM

## 2025-06-25 PROBLEM — D61.810 ANTINEOPLASTIC CHEMOTHERAPY INDUCED PANCYTOPENIA: Status: ACTIVE | Noted: 2025-06-25

## 2025-06-25 PROBLEM — R19.7 DIARRHEA: Status: ACTIVE | Noted: 2025-06-25

## 2025-06-25 PROBLEM — E83.39 HYPERPHOSPHATEMIA: Status: ACTIVE | Noted: 2025-06-25

## 2025-06-25 PROBLEM — R74.01 TRANSAMINITIS: Status: ACTIVE | Noted: 2025-06-25

## 2025-06-25 PROBLEM — N19 ACUTE PRERENAL AZOTEMIA: Status: ACTIVE | Noted: 2025-06-25

## 2025-06-25 PROBLEM — R57.1 HYPOVOLEMIC SHOCK: Status: ACTIVE | Noted: 2025-06-25

## 2025-06-25 PROBLEM — R53.1 WEAKNESS: Status: ACTIVE | Noted: 2025-06-25

## 2025-06-25 LAB
ABSOLUTE NEUTROPHIL COUNT (OHS): 11.27 K/UL (ref 1.8–7.7)
ALBUMIN SERPL BCP-MCNC: 2.2 G/DL (ref 3.5–5.2)
ALP SERPL-CCNC: 622 UNIT/L (ref 40–150)
ALT SERPL W/O P-5'-P-CCNC: 129 UNIT/L (ref 10–44)
ANION GAP (OHS): 18 MMOL/L (ref 8–16)
AST SERPL-CCNC: 88 UNIT/L (ref 11–45)
BILIRUB SERPL-MCNC: 0.9 MG/DL (ref 0.1–1)
BUN SERPL-MCNC: 74 MG/DL (ref 8–23)
CALCIUM SERPL-MCNC: 9.4 MG/DL (ref 8.7–10.5)
CHLORIDE SERPL-SCNC: 106 MMOL/L (ref 95–110)
CO2 SERPL-SCNC: 13 MMOL/L (ref 23–29)
CREAT SERPL-MCNC: 7.7 MG/DL (ref 0.5–1.4)
ERYTHROCYTE [DISTWIDTH] IN BLOOD BY AUTOMATED COUNT: 15.6 % (ref 11.5–14.5)
GFR SERPLBLD CREATININE-BSD FMLA CKD-EPI: 7 ML/MIN/1.73/M2
GLUCOSE SERPL-MCNC: 121 MG/DL (ref 70–110)
HCT VFR BLD AUTO: 25.9 % (ref 40–54)
HGB BLD-MCNC: 8.8 GM/DL (ref 14–18)
IMM GRANULOCYTES # BLD AUTO: 0.38 K/UL (ref 0–0.04)
MCH RBC QN AUTO: 30.4 PG (ref 27–31)
MCHC RBC AUTO-ENTMCNC: 34 G/DL (ref 32–36)
MCV RBC AUTO: 90 FL (ref 82–98)
PLATELET # BLD AUTO: 480 K/UL (ref 150–450)
PMV BLD AUTO: 9.5 FL (ref 9.2–12.9)
POTASSIUM SERPL-SCNC: 3.7 MMOL/L (ref 3.5–5.1)
PROT SERPL-MCNC: 6.2 GM/DL (ref 6–8.4)
RBC # BLD AUTO: 2.89 M/UL (ref 4.6–6.2)
SODIUM SERPL-SCNC: 137 MMOL/L (ref 136–145)
WBC # BLD AUTO: 13.51 K/UL (ref 3.9–12.7)

## 2025-06-25 PROCEDURE — 99999 PR PBB SHADOW E&M-EST. PATIENT-LVL IV: CPT | Mod: PBBFAC,,, | Performed by: REGISTERED NURSE

## 2025-06-25 PROCEDURE — 36415 COLL VENOUS BLD VENIPUNCTURE: CPT

## 2025-06-25 PROCEDURE — 85027 COMPLETE CBC AUTOMATED: CPT

## 2025-06-25 PROCEDURE — 84450 TRANSFERASE (AST) (SGOT): CPT

## 2025-06-25 PROCEDURE — 99214 OFFICE O/P EST MOD 30 MIN: CPT | Mod: PBBFAC,25 | Performed by: REGISTERED NURSE

## 2025-06-25 NOTE — SUBJECTIVE & OBJECTIVE
No current facility-administered medications on file prior to encounter.     Current Outpatient Medications on File Prior to Encounter   Medication Sig    amlodipine-benazepril 10-20mg (LOTREL) 10-20 mg per capsule Take 1 capsule by mouth once daily.    azelastine (ASTELIN) 137 mcg (0.1 %) nasal spray 2 sprays by Nasal route.    dexAMETHasone (DECADRON) 4 MG Tab Take 2 tablets (8 mg total) by mouth once daily. On days 2-4 of each chemotherapy cycle.    etodolac (LODINE) 400 MG tablet Take 1 tablet by mouth 2 (two) times daily.    fluticasone propionate (FLONASE) 50 mcg/actuation nasal spray 2 sprays by Each Nostril route.    gabapentin (NEURONTIN) 300 MG capsule Take 300 mg by mouth.    LIDOcaine-prilocaine (EMLA) cream Apply topically as needed.    meclizine (ANTIVERT) 25 mg tablet Take 1 tablet (25 mg total) by mouth 3 (three) times daily as needed for Dizziness.    multivitamin with minerals tablet Take 1 tablet by mouth once daily. One A Day Multivitamin    OLANZapine (ZYPREXA) 5 MG tablet Take 1 tab at nighttime on nights 1 thru 3 of each chemotherapy cycle.    omeprazole (PRILOSEC) 40 MG capsule Take 1 capsule by mouth every morning.    ondansetron (ZOFRAN) 8 MG tablet Take 1 tablet (8 mg total) by mouth every 8 (eight) hours as needed.    pravastatin (PRAVACHOL) 40 MG tablet Take 40 mg by mouth.    TURMERIC ORAL Take by mouth.       Review of patient's allergies indicates:  No Known Allergies    Past Medical History:   Diagnosis Date    HTN (hypertension)      Past Surgical History:   Procedure Laterality Date    ESOPHAGOGASTRODUODENOSCOPY N/A 12/19/2024    Procedure: EGD (ESOPHAGOGASTRODUODENOSCOPY);  Surgeon: Pete Buenrostro MD;  Location: [address]);  Service: Endoscopy;  Laterality: N/A;  Medically Urgent      12/16 ref by Sahil Singer MD, [facility]  12/18 - pre-call complete; MB    ESOPHAGOGASTRODUODENOSCOPY N/A 4/11/2025    Procedure: EGD (ESOPHAGOGASTRODUODENOSCOPY);  Surgeon:  Paxton Jurado MD;  Location: [facility] ENDO (2ND FLR);  Service: Endoscopy;  Laterality: N/A;  ref by  Sahil Singer MD, 2nd floor due to availability- portal -ml    INSERTION OF TUNNELED CENTRAL VENOUS CATHETER (CVC) WITH SUBCUTANEOUS PORT Right 1/27/2025    Procedure: INSERTION, SINGLE LUMEN CATHETER WITH PORT, WITH FLUOROSCOPIC GUIDANCE, left poss right;  Surgeon: Chadd Velez MD;  [facility];  Service: General;  Laterality: Right;    LAPAROSCOPIC REPAIR OF INGUINAL HERNIA Right      Family History    None       Tobacco Use    Smoking status: Every Day     Current packs/day: 1.00     Types: Cigarettes    Smokeless tobacco: Never   Substance and Sexual Activity    Alcohol use: Yes     Alcohol/week: 1.0 standard drink of alcohol     Types: 1 Glasses of wine per week     Comment: rarely    Drug use: Never    Sexual activity: Not on file     Review of Systems   Constitutional:  Negative for chills and fever.   Respiratory:  Negative for cough and shortness of breath.    Cardiovascular:  Negative for chest pain.   Neurological:  Positive for dizziness and light-headedness. Negative for syncope.     Objective:     Vital Signs (Most Recent):  Temp: 96.9 °F (36.1 °C) (06/25/25 1515)  Pulse: 78 (06/25/25 1600)  Resp: 20 (06/25/25 1515)  BP: (!) 101/59 (06/25/25 1600)  SpO2: 99 % (06/25/25 1600) Vital Signs (24h Range):  Temp:  [96.6 °F (35.9 °C)-97.9 °F (36.6 °C)] 96.9 °F (36.1 °C)  Pulse:  [66-84] 78  Resp:  [12-20] 20  SpO2:  [95 %-100 %] 99 %  BP: ()/(32-63) 101/59     Weight: 55.3 kg (122 lb)  Body mass index is 19.11 kg/m².     Physical Exam  Vitals and nursing note reviewed.   Constitutional:       General: He is not in acute distress.     Appearance: He is ill-appearing.   HENT:      Head: Normocephalic and atraumatic.      Mouth/Throat:      Mouth: Mucous membranes are dry.   Cardiovascular:      Rate and Rhythm: Normal rate and regular rhythm.   Pulmonary:      Effort:  Pulmonary effort is normal. No respiratory distress.   Abdominal:      General: There is no distension.      Palpations: Abdomen is soft.      Tenderness: There is no abdominal tenderness.   Skin:     General: Skin is warm and dry.   Neurological:      General: No focal deficit present.      Mental Status: He is alert and oriented to person, place, and time.            I have reviewed all pertinent lab results within the past 24 hours.  CBC:   Recent Labs   Lab 06/25/25  1100   WBC 12.57   RBC 3.15*   HGB 9.6*   HCT 28.7*   *   MCV 91   MCH 30.5   MCHC 33.4     CMP:   Recent Labs   Lab 06/25/25  1100   *   CALCIUM 9.9   ALBUMIN 2.4*   PROT 7.0      K 3.6   CO2 13*      BUN 72*   CREATININE 8.0*   ALKPHOS 658*   *   AST 88*   BILITOT 0.9       Significant Diagnostics:  I have reviewed all pertinent imaging results/findings within the past 24 hours.

## 2025-06-25 NOTE — ASSESSMENT & PLAN NOTE
ANTOINE is likely due to pre-renal azotemia due to intravascular volume depletion 2/2 poor fluid intake, diarrhea, BP medications. Baseline creatinine is 1.5-1.8. Most recent creatinine and eGFR are listed below. No signs of fluid overload.   Recent Labs     06/25/25  0910 06/25/25  1100   CREATININE 7.7* 8.0*   EGFRNORACEVR 7* 7*      Plan  - ANTOINE is worsening. Will adjust treatment as follows: IVF resuscitation  - Avoid nephrotoxins and renally dose meds for GFR listed above  - Monitor urine output, serial BMP, and adjust therapy as needed  - consulted nephrology  - f/u urine labs

## 2025-06-25 NOTE — CONSULTS
[facility] - Emergency Dept  General Surgery  Consult Note    Patient Name: Jone Lugo  MRN: [id number]  Code Status: Full Code  Admission Date: 6/25/2025  Hospital Length of Stay: 0 days  Attending Physician: Maddison Weston MD  Primary Care Provider: [place] Primary Doctor    Patient information was obtained from patient, past medical records, and ER records.     Inpatient consult to Surgical Oncology  Consult performed by: Regine Khan MD  Consult ordered by: Ricardo Mcghee MD        Subjective:     Principal Problem: Acute renal failure    History of Present Illness: Jone Lugo is a 69yoM s/p definitive chemoradiotherapy for esophageal adenocarcinoma now found with persistent/local recurrence. He is established with Dr. Singer and is planning for initiation of FOLFOX + trastuzumab presented for a heme/onc appointment today and was severely dizzy and found to be hypotensive. Per patient his dysphagia has been worsening and the past three days has not been able to tolerate solids or liquids by mouth. He was sent to the ED and found to be in acute renal failure likely due to dehydration from poor PO intake. Surgical oncology consulted for possible procedure for enteral access.     No current facility-administered medications on file prior to encounter.     Current Outpatient Medications on File Prior to Encounter   Medication Sig    amlodipine-benazepril 10-20mg (LOTREL) 10-20 mg per capsule Take 1 capsule by mouth once daily.    azelastine (ASTELIN) 137 mcg (0.1 %) nasal spray 2 sprays by Nasal route.    dexAMETHasone (DECADRON) 4 MG Tab Take 2 tablets (8 mg total) by mouth once daily. On days 2-4 of each chemotherapy cycle.    etodolac (LODINE) 400 MG tablet Take 1 tablet by mouth 2 (two) times daily.    fluticasone propionate (FLONASE) 50 mcg/actuation nasal spray 2 sprays by Each Nostril route.    gabapentin (NEURONTIN) 300 MG capsule Take 300 mg by mouth.    LIDOcaine-prilocaine (EMLA) cream Apply  topically as needed.    meclizine (ANTIVERT) 25 mg tablet Take 1 tablet (25 mg total) by mouth 3 (three) times daily as needed for Dizziness.    multivitamin with minerals tablet Take 1 tablet by mouth once daily. One A Day Multivitamin    OLANZapine (ZYPREXA) 5 MG tablet Take 1 tab at nighttime on nights 1 thru 3 of each chemotherapy cycle.    omeprazole (PRILOSEC) 40 MG capsule Take 1 capsule by mouth every morning.    ondansetron (ZOFRAN) 8 MG tablet Take 1 tablet (8 mg total) by mouth every 8 (eight) hours as needed.    pravastatin (PRAVACHOL) 40 MG tablet Take 40 mg by mouth.    TURMERIC ORAL Take by mouth.       Review of patient's allergies indicates:  No Known Allergies    Past Medical History:   Diagnosis Date    HTN (hypertension)      Past Surgical History:   Procedure Laterality Date    ESOPHAGOGASTRODUODENOSCOPY N/A 12/19/2024    Procedure: EGD (ESOPHAGOGASTRODUODENOSCOPY);  Surgeon: Pete Buenrostro MD;  Location: [facility] (4TH FLR);  Service: Endoscopy;  Laterality: N/A;  Medically Urgent      12/16 ref by Sahil Singer MD, [facility]  12/18 - pre-call complete; MB    ESOPHAGOGASTRODUODENOSCOPY N/A 4/11/2025    Procedure: EGD (ESOPHAGOGASTRODUODENOSCOPY);  Surgeon: Paxton Jurado MD;  Location: [facility] (2ND FLR);  Service: Endoscopy;  Laterality: N/A;  ref by  Sahil Singer MD, 2nd floor due to availability- [facility]    INSERTION OF TUNNELED CENTRAL VENOUS CATHETER (CVC) WITH SUBCUTANEOUS PORT Right 1/27/2025    Procedure: INSERTION, SINGLE LUMEN CATHETER WITH PORT, WITH FLUOROSCOPIC GUIDANCE, left poss right;  Surgeon: Chadd Velez MD;  Location: [facility] OR 2ND FLR;  Service: General;  Laterality: Right;    LAPAROSCOPIC REPAIR OF INGUINAL HERNIA Right      Family History    None       Tobacco Use    Smoking status: Every Day     Current packs/day: 1.00     Types: Cigarettes    Smokeless tobacco: Never   Substance and Sexual Activity    Alcohol use: Yes      Alcohol/week: 1.0 standard drink of alcohol     Types: 1 Glasses of wine per week     Comment: rarely    Drug use: Never    Sexual activity: Not on file     Review of Systems   Constitutional:  Negative for chills and fever.   Respiratory:  Negative for cough and shortness of breath.    Cardiovascular:  Negative for chest pain.   Neurological:  Positive for dizziness and light-headedness. Negative for syncope.     Objective:     Vital Signs (Most Recent):  Temp: 96.9 °F (36.1 °C) (06/25/25 1515)  Pulse: 78 (06/25/25 1600)  Resp: 20 (06/25/25 1515)  BP: (!) 101/59 (06/25/25 1600)  SpO2: 99 % (06/25/25 1600) Vital Signs (24h Range):  Temp:  [96.6 °F (35.9 °C)-97.9 °F (36.6 °C)] 96.9 °F (36.1 °C)  Pulse:  [66-84] 78  Resp:  [12-20] 20  SpO2:  [95 %-100 %] 99 %  BP: ()/(32-63) 101/59     Weight: 55.3 kg (122 lb)  Body mass index is 19.11 kg/m².     Physical Exam  Vitals and nursing note reviewed.   Constitutional:       General: He is not in acute distress.     Appearance: He is ill-appearing.   HENT:      Head: Normocephalic and atraumatic.      Mouth/Throat:      Mouth: Mucous membranes are dry.   Cardiovascular:      Rate and Rhythm: Normal rate and regular rhythm.   Pulmonary:      Effort: Pulmonary effort is normal. No respiratory distress.   Abdominal:      General: There is no distension.      Palpations: Abdomen is soft.      Tenderness: There is no abdominal tenderness.   Skin:     General: Skin is warm and dry.   Neurological:      General: No focal deficit present.      Mental Status: He is alert and oriented to person, place, and time.            I have reviewed all pertinent lab results within the past 24 hours.  CBC:   Recent Labs   Lab 06/25/25  1100   WBC 12.57   RBC 3.15*   HGB 9.6*   HCT 28.7*   *   MCV 91   MCH 30.5   MCHC 33.4     CMP:   Recent Labs   Lab 06/25/25  1100   *   CALCIUM 9.9   ALBUMIN 2.4*   PROT 7.0      K 3.6   CO2 13*      BUN 72*   CREATININE 8.0*    ALKPHOS 658*   *   AST 88*   BILITOT 0.9       Significant Diagnostics:  I have reviewed all pertinent imaging results/findings within the past 24 hours.    Assessment/Plan:     Esophageal dysphagia  69 year old male with a history of esophageal adenocarcinoma s/p definitive chemoradiation in 2023, now with recurrence and on chemotherapy. Presented to the ED with inability to tolerate PO, hypotension and acute renal failure.     Recommend fluid resuscitation and treatment of his renal failure prior to consideration of procedures such as g tube or stent.   Would avoid oral medications due to his inability to tolerate PO intake. Is still able to manage his own secretions.   Would keep strict NPO at this time due to aspiration risk  Can consider TPN for nutritional support while awaiting decision regarding timing of procedures      VTE Risk Mitigation (From admission, onward)           Ordered     Reason for No Pharmacological VTE Prophylaxis  Once        Question:  Reasons:  Answer:  Risk of Bleeding    06/25/25 1536     IP VTE HIGH RISK PATIENT  Once         06/25/25 1536     Place sequential compression device  Until discontinued         06/25/25 1536                    Thank you for your consult. I will follow-up with patient. Please contact us if you have any additional questions.    Regine Khan MD  General Surgery  [facility] - Emergency Dept

## 2025-06-25 NOTE — ASSESSMENT & PLAN NOTE
Reports onset of loose watery diarrhea since previous chemotherapy treatment. Possibly related to chemotherapy. Likely contributed to hypovolemic shock and acute renal failure.     - f/u stool culture  - f/u cdiff panel

## 2025-06-25 NOTE — PROGRESS NOTES
MEDICAL ONCOLOGY - ESTABLISHED PATIENT VISIT    Reason for visit: esophageal adenocarcinoma     Best Contact Phone Number(s): There are no phone numbers on file.     Cancer/Stage/TNM:    Cancer Staging   Esophageal adenocarcinoma  Staging form: Esophagus - Adenocarcinoma, AJCC 8th Edition  - Clinical: Stage III (cT2, cN1, cM0, G2) - Signed by Miguel Angel Hampton Jr., MD on 7/31/2023       Oncology History   Esophageal adenocarcinoma   7/31/2023 Initial Diagnosis    Esophageal adenocarcinoma     7/31/2023 Cancer Staged    Staging form: Esophagus - Adenocarcinoma, AJCC 8th Edition  - Clinical: Stage III (cT2, cN1, cM0, G2)     9/5/2023 - 10/5/2023 Chemotherapy    Treatment Summary   Plan Name: OP ESOPHAGEAL PACLITAXEL CARBOPLATIN WEEKLY  Treatment Goal: Curative  Status: Inactive  Start Date: 9/5/2023  End Date: 10/5/2023  Provider: Sahil Singer MD  Chemotherapy: CARBOplatin (PARAPLATIN) 195 mg in sodium chloride 0.9% 304.5 mL chemo infusion, 195 mg (100 % of original dose 193.4 mg), Intravenous, Clinic/HOD 1 time, 1 of 1 cycle  Dose modification:   (original dose 193.4 mg, Cycle 1), 193.4 mg (original dose 193.4 mg, Cycle 1),   (original dose 193.4 mg, Cycle 1, Reason: MD Discretion)  Administration: 195 mg (9/5/2023), 225 mg (9/12/2023), 210 mg (9/19/2023), 180 mg (9/26/2023), 195 mg (10/5/2023)  PACLitaxeL (TAXOL) 50 mg/m2 = 96 mg in sodium chloride 0.9% 250 mL chemo infusion, 50 mg/m2 = 96 mg, Intravenous, Clinic/HOD 1 time, 1 of 1 cycle  Administration: 96 mg (9/5/2023), 96 mg (9/12/2023), 96 mg (9/19/2023), 96 mg (9/26/2023), 96 mg (10/5/2023)     9/5/2023 - 10/9/2023 Radiation Therapy    Treating physician: Anshul Neri    Course: C1 Thorax 2023    Treatment Site Ref. ID Energy Dose/Fx (Gy) #Fx Dose Correction (Gy) Total Dose (Gy) Start Date End Date Elapsed Days   IM Esophagus PTV_High 6X 2 25 / 25 0 50 9/5/2023 10/9/2023 34            1/21/2025 - 4/10/2025 Chemotherapy    Treatment Summary   Plan  Name: OP GASTROESOPHAGEAL trastuzumab + MFOLFOX6 (oxaliplatin leucovorin fluorouracil) Q2W  Treatment Goal: Control  Status: Inactive  Start Date: 1/21/2025  End Date: 4/10/2025  Provider: Sahil Singer MD  Chemotherapy: oxaliplatin (ELOXATIN) 85 mg/m2 = 148 mg in D5W 594.6 mL chemo infusion, 85 mg/m2 = 148 mg, Intravenous, Clinic/HOD 1 time, 6 of 24 cycles  Administration: 148 mg (1/30/2025), 148 mg (2/13/2025), 148 mg (2/27/2025), 148 mg (3/13/2025), 148 mg (3/27/2025), 148 mg (4/10/2025)  fluorouracil (Adrucil) 4,000 mg in 0.9% NaCl 100 mL chemo infusion, 4,175 mg, Intravenous, Over 46 hours, 6 of 24 cycles  Administration: 4,000 mg (1/30/2025), 4,000 mg (2/13/2025), 4,000 mg (2/27/2025), 4,000 mg (3/13/2025), 4,000 mg (3/27/2025), 4,000 mg (4/10/2025)  trastuzumab-anns (KANJINTI) 384 mg in 0.9% NaCl 303.3 mL chemo infusion, 6 mg/kg = 384 mg, Intravenous, Clinic/HOD 1 time, 6 of 24 cycles  Administration: 384 mg (1/30/2025), 256 mg (2/13/2025), 256 mg (2/27/2025), 256 mg (3/13/2025), 256 mg (3/27/2025), 256 mg (4/10/2025)     4/23/2025 -  Chemotherapy    Treatment Summary   Plan Name: OP GASTRIC/ ESOPHAGEAL fam-trastuzumab deruxtecan-nxki Q3W  Treatment Goal: Palliative  Status: Active  Start Date: 4/23/2025  End Date: 3/25/2026 (Planned)  Provider: Sahil Singer MD  Chemotherapy: fam-trastuzumab deruxtecan-nxki (Enhertu) 390 mg in D5W 134.5 mL infusion, 6.4 mg/kg = 390 mg, Intravenous, Clinic/HOD 1 time, 3 of 17 cycles  Administration: 390 mg (4/23/2025), 390 mg (5/14/2025), 390 mg (6/4/2025)        Interim History:  69 y.o. male with esophageal adenocarcinoma who presents for follow-up prior to cycle 4 of trastuzumab deruxtecan for his recurrent esophageal adenocarcinoma. He has been feeling worse daily since last cycle. Dysphagia has worsened, not tolerating much PO intake at this time and almost complete restriction. He is concerned that he didn't have much improvement in swallowing with  treatment yet. Dizziness persists and feeling weak. Discussed recommendation for ED evaluation. He is worried about missing chemotherapy.     Presents alone today. ECOG status is 2.     ROS:   Review of Systems   Constitutional:  Positive for malaise/fatigue and weight loss. Negative for fever.   HENT:  Negative for nosebleeds.    Respiratory:  Negative for cough and shortness of breath.    Cardiovascular:  Negative for chest pain and palpitations.   Gastrointestinal:  Positive for heartburn. Negative for abdominal pain, blood in stool, constipation, diarrhea, nausea and vomiting.        Dysphagia   Genitourinary:  Negative for frequency and hematuria.   Musculoskeletal:  Negative for back pain and falls.   Skin:  Negative for itching and rash.   Neurological:  Positive for dizziness and weakness. Negative for tingling, sensory change and headaches.   Psychiatric/Behavioral:  The patient is not nervous/anxious.      Past Medical History:   Past Medical History:   Diagnosis Date    HTN (hypertension)      Past Surgical History:   Past Surgical History:   Procedure Laterality Date    ESOPHAGOGASTRODUODENOSCOPY N/A 12/19/2024    Procedure: EGD (ESOPHAGOGASTRODUODENOSCOPY);  Surgeon: Pete Buenrostro MD;  Location: [facility]);  Service: Endoscopy;  Laterality: N/A;  Medically Urgent      12/16 ref by Sahil Singer MD, [facility]  12/18 - pre-call complete; [facility]    ESOPHAGOGASTRODUODENOSCOPY N/A 4/11/2025    Procedure: EGD (ESOPHAGOGASTRODUODENOSCOPY);  Surgeon: Paxton Jurado MD;  Location: [facility] (2ND FLR);  Service: Endoscopy;  Laterality: N/A;  ref by  Sahil Singer MD, 2nd floor due to availability- [facility]    INSERTION OF TUNNELED CENTRAL VENOUS CATHETER (CVC) WITH SUBCUTANEOUS PORT Right 1/27/2025    Procedure: INSERTION, SINGLE LUMEN CATHETER WITH PORT, WITH FLUOROSCOPIC GUIDANCE, left poss right;  Surgeon: Chadd Velez MD;  Location: [facility] OR [address] [place] [address];  Service:  General;  Laterality: Right;    LAPAROSCOPIC REPAIR OF INGUINAL HERNIA Right      Family History:   No family history on file.     Social History:   Social History     Tobacco Use    Smoking status: Every Day     Current packs/day: 1.00     Types: Cigarettes    Smokeless tobacco: Never   Substance Use Topics    Alcohol use: Yes     Alcohol/week: 1.0 standard drink of alcohol     Types: 1 Glasses of wine per week     Comment: rarely      I have reviewed and updated the patient's past medical, surgical, family and social histories.    Allergies:   Review of patient's allergies indicates:  No Known Allergies     Medications:   Current Outpatient Medications   Medication Sig Dispense Refill    amlodipine-benazepril 10-20mg (LOTREL) 10-20 mg per capsule Take 1 capsule by mouth once daily.      azelastine (ASTELIN) 137 mcg (0.1 %) nasal spray 2 sprays by Nasal route.      dexAMETHasone (DECADRON) 4 MG Tab Take 2 tablets (8 mg total) by mouth once daily. On days 2-4 of each chemotherapy cycle. 30 tablet 11    etodolac (LODINE) 400 MG tablet Take 1 tablet by mouth 2 (two) times daily.      fluticasone propionate (FLONASE) 50 mcg/actuation nasal spray 2 sprays by Each Nostril route.      gabapentin (NEURONTIN) 300 MG capsule Take 300 mg by mouth.      LIDOcaine-prilocaine (EMLA) cream Apply topically as needed. 30 g 1    meclizine (ANTIVERT) 25 mg tablet Take 1 tablet (25 mg total) by mouth 3 (three) times daily as needed for Dizziness. 30 tablet 0    multivitamin with minerals tablet Take 1 tablet by mouth once daily. One A Day Multivitamin      OLANZapine (ZYPREXA) 5 MG tablet Take 1 tab at nighttime on nights 1 thru 3 of each chemotherapy cycle. 90 tablet 2    omeprazole (PRILOSEC) 40 MG capsule Take 1 capsule by mouth every morning.      ondansetron (ZOFRAN) 8 MG tablet Take 1 tablet (8 mg total) by mouth every 8 (eight) hours as needed. 60 tablet 2    pravastatin (PRAVACHOL) 40 MG tablet Take 40 mg by mouth.       "TURMERIC ORAL Take by mouth.       No current facility-administered medications for this visit.      Physical Exam:   BP (!) 63/43 (BP Location: Right arm, Patient Position: Sitting)   Pulse 70   Temp 97.9 °F (36.6 °C) (Temporal)   Resp 18   Ht 5' 7" (1.702 m)   SpO2 100%   BMI 20.06 kg/m²           Physical Exam  Vitals reviewed.   Constitutional:       General: He is not in acute distress.     Appearance: He is ill-appearing and toxic-appearing.   Eyes:      General: No scleral icterus.     Extraocular Movements: Extraocular movements intact.      Conjunctiva/sclera: Conjunctivae normal.   Cardiovascular:      Rate and Rhythm: Normal rate.   Pulmonary:      Effort: Pulmonary effort is normal. No respiratory distress.   Abdominal:      Tenderness: There is no abdominal tenderness.   Musculoskeletal:         General: Normal range of motion.   Skin:     General: Skin is warm.      Findings: No erythema or rash.   Neurological:      Mental Status: He is alert and oriented to person, place, and time.      Motor: Weakness present.      Gait: Gait abnormal.   Psychiatric:         Mood and Affect: Mood normal.         Labs:      Labs reviewed.     Imaging:      CT CAP - 3/21/25:    Impression:     1. Persistent thickening of the lower esophagus in keeping with known neoplasia.  Subcentimeter adjacent nodule may represent tumor extension or lymph node, new from prior study.  2. No evidence for distant metastases.  3. Stable appearance of left renal mass, which remains concerning for neoplasm.  4. Stable appearance of subcentimeter hypodensity in the pancreatic body, perhaps IPMN.  5. Additional findings above.       Path:   12/19/24:    Gastroesophageal junction mass (biopsy):   Adenocarcinoma, poorly differentiated   HER2 immunostain:  Positive (score 3)     Immunohistochemistry (IHC) Testing for Mismatch Repair (MMR) Proteins:   MLH1, MSH2, MSH6, PMS2 - Intact nuclear expression   Background nonneoplastic " tissue/internal control with intact nuclear expression     Interpretation: No loss of nuclear expression of MMR proteins: low probability of microsatellite instability. There are exceptions to the above IHC interpretations. These results should not be considered in isolation, and clinical correlation with   genetic counseling is recommended to assess the need for germline testing.     Assessment:       1. Esophageal adenocarcinoma    2. 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 Hyperlipidemia, unspecified hyperlipidemia type    5. Hypertension, unspecified type    6. Tobacco use    7. Left renal mass    8. Esophageal dysphagia    9. Dizziness and giddiness    10. Chemotherapy-induced neuropathy    11. Elevated LFTs    12. ANTOINE (acute kidney injury)        Plan:        # Esophageal adenocarcinoma, esophageal dysphagia  Mr. Lugo is a 69 y.o. male who presents to clinic for evaluation of newly diagnosed esophageal cancer. He initially presented with dysphagia, now improved s/p esophageal dilation during EUS. Biopsy confirmed adenocarcinoma.     We reviewed his diagnosis, prognosis, and treatment options with him during our initial visit. Discussed with him the high likelihood that cancer would recur if he was taken straight to surgery without neoadjuvant treatment. Our recommendation is to proceed with weekly carboplatin + taxol in conjunction with daily radiation (M-F, no holidays) for a total of 5-5.5 weeks. Handouts provided to patient on both chemotherapy medications.     He has completed Carboplatin AUC 2 + paclitaxel 50 mg/m2 for 5 treatments. Also completed concurrent radiation for definitive non-surgical treatment.    Repeat PET/CT on 11/15/23 showed persistent distal hypermetabolic esophageal tumor (decreasing SUV) without clear evidence of metastatic disease.   Repeat PET/CT on 2/21/24 showed further improvement in hypermetabolism at distal  "esophagus.  Repeat CT CAP on 4/24/24 showed stable thickening at distal esophagus. Stable left kidney mass c/f malignancy.   Repeat CT CAP 9/9/24 showed stable thickening at distal esophagus.  Repeat CT CAP 12/12/24 showed stable findings.  L renal mass is slightly enlarged.     Given weight decrease, "upset stomach" and increased eructations we got repeat EGD to assess for cancer recurrence.  This was done on 12/19/24 which showed a distal esophageal mass, biopsy confirming poorly differentiated adenocarcinoma, pMMR and HER-2 positive.    Recommended FOLFOX + trastuzumab. No indication for pembro given PD-L1 being negative, per final results of [id number] regimen.    Had very mild dysphagia at the time but did not need feeding tube or stent.   Had port placed 1/27/25 by Dr. Velez.    He received cycle 1 of FOLFOX + trastuzumab on 1/30/2025.    CT CAP after cycle 4 showed no evidence of progression or new disease.    After cycle 6 of FOLFOX + trastuzumab, he was admitted to the hospital for dysphagia due to esophageal tumor obstruction.  Was recommended to have an esophageal stent as he was only able to tolerate liquids, but he was hesitant and went home.    Given progression of tumor locally, we opted to switch to trastuzumab deruxtecan 6.4 mg/kg.  He was reluctant to proceed with esophageal stent or enteral feeding tube at this time.  Explained that he needs to get more calories by mouth if he does not proceed with one of these options.    Presents today for cycle 4 of TDxD.    Significant dysphagia as reported above, previously discussed potential need for stent vs. PEG for nutritional support.  Labs reviewed with Cr 7.7. BP significant low with generalized weakness and clinically not feeling well.     Hold treatment today.   Send to ED for evaluation and likely admission.   Report called to ED RN.     Olanzapine nights 1-3 and dexamethasone days 2-4 for nausea ppx.  Has PRN anti-emetics at home.    RTC in 3 " weeks for next cycle.    Last TTE April 2025.    Tempus xT: KRAS G12D, NOTCH1, SMARCA4, TP53; PD-L1 0  PGX: DPYD nml, UGT1A1 intermediate    # HTN, HLD  Hypotensive in clinic today   Instructed to hold BP meds  Sending to ED today.     # Tobacco use  Encouraged cessation.  Previously enrolled in smoking cessation clinic.     # Left renal mass, SARITA, dizziness  Slightly enlarged in size on 12/2024 scan.   Saw Dr. Sainz 5/2024 and agreed on surveillance with repeat imaging 5/2025. Plans for 1 year follow-up    # ANTOINE  Cr of 7.7 today, secondary to poor oral intake  Holding BP meds  If continued will be forced to re-visit stent vs. PEG tube  Sending to ED.     MRI brain negative. Not finding much relief with Meclizine. ?2/2 Enhertu. Will continue to monitor.     Patient is in agreement with the proposed treatment plan. All questions were answered to the patient's satisfaction. Pt knows to call clinic if anything is needed before the next clinic visit.    Patient discussed with collaborating physician, Dr. Singer.    At least 40 minutes were spent today on this encounter including face to face time with the patient, data gathering/interpretation and documentation.       Aleshia Giraldo, MSN, APRN, ACCNS-  Hematology and Medical Oncology  Clinical Nurse Specialist to Dr. Singer, Dr. Wheeler & Dr. Cooper         code applied: patient requires or will require a continuous, longitudinal, and active collaborative plan of care related to this patient's health condition, esophageal cancer --the management of which requires the direction of a practitioner with specialized clinical knowledge, skill, and expertise.       Med Onc Chart Routing      Follow up with physician 6 weeks. with labs for chemo   Follow up with LEO 3 weeks. as scheduled   Infusion scheduling note   infusion every 3 weeks   Injection scheduling note    Labs CBC and CMP   Scheduling:  Preferred lab:  Lab interval: every 3 weeks     Imaging    Pharmacy  appointment    Other referrals

## 2025-06-25 NOTE — ED PROVIDER NOTES
Encounter Date: 6/25/2025       History     Chief Complaint   Patient presents with    Hypotension     Pt arrives from the cancer center for hypotension in the 60s. Pt is cool and clammy in triage. Reports fatigue. Hx of esophogial ca with treatment three weeks ago       HPI  69-year-old with past medical history as noted below, most notably history esophageal cancer on chemo coming in sent in from oncology clinic for hypotension.  Patient states that over the last week he has not been able to take almost any p.o. additionally complains of watery stool and abdominal discomfort.  He is complaining also of generalized weakness and dizziness.  No chest pain, no shortness a breath.    Review of patient's allergies indicates:  No Known Allergies  Past Medical History:   Diagnosis Date    HTN (hypertension)      Past Surgical History:   Procedure Laterality Date    ESOPHAGOGASTRODUODENOSCOPY N/A 12/19/2024    Procedure: EGD (ESOPHAGOGASTRODUODENOSCOPY);  Surgeon: Pete Buenrostro MD;  Location: [facility] (4TH FLR);  Service: Endoscopy;  Laterality: N/A;  Medically Urgent      12/16 ref by Sahil Singer MD, [place]  12/18 - pre-call complete; MB    ESOPHAGOGASTRODUODENOSCOPY N/A 4/11/2025    Procedure: EGD (ESOPHAGOGASTRODUODENOSCOPY);  Surgeon: Paxton Jurado MD;  Location: [facility] (2ND FLR);  Service: Endoscopy;  Laterality: N/A;  ref by  Sahil Singer MD, 2nd floor due to availability- [place] -    INSERTION OF TUNNELED CENTRAL VENOUS CATHETER (CVC) WITH SUBCUTANEOUS PORT Right 1/27/2025    Procedure: INSERTION, SINGLE LUMEN CATHETER WITH PORT, WITH FLUOROSCOPIC GUIDANCE, left poss right;  Surgeon: Chadd Velez MD;  Location: [facility] OR 2ND FLR;  Service: General;  Laterality: Right;    LAPAROSCOPIC REPAIR OF INGUINAL HERNIA Right      No family history on file.  Social History[1]  Review of Systems    Physical Exam     Initial Vitals [06/25/25 1053]   BP Pulse Resp Temp SpO2   (!)  64/32 70 12 97.4 °F (36.3 °C) 100 %      MAP       --         Physical Exam    Physical Exam:  CONSTITUTIONAL: Well developed, well nourished, in no acute distress.  HENT: Normocephalic, atraumatic    EYES: Sclerae anicteric   NECK: Supple, no thyroid enlargement  CARDIOVASCULAR: Regular rate and rhythm without any murmurs, gallops, rubs.  RESPIRATORY: Speaking in full sentences. Breathing comfortably. Auscultation of the lungs revealed normal breath sounds b/l, no wheezing, no rales, no rhonchi.  ABDOMEN: Soft and nontender, no masses, no rebound or guarding   Rectal:  Brown stool, guaiac negative.  NEUROLOGIC: Alert, interacting well but appears mildly confused. No facial droop. Voice is clear. Speech is fluent.  MSK: Moving all four extremities.  SKIN: Warm and dry. No visible rash on exposed areas of skin.    Psych: Mood and affect normal.       ED Course   Procedures  Labs Reviewed   COMPREHENSIVE METABOLIC PANEL - Abnormal       Result Value    Sodium 138      Potassium 3.6      Chloride 105      CO2 13 (*)     Glucose 117 (*)     BUN 72 (*)     Creatinine 8.0 (*)     Calcium 9.9      Protein Total 7.0      Albumin 2.4 (*)     Bilirubin Total 0.9       (*)     AST 88 (*)      (*)     Anion Gap 20 (*)     eGFR 7 (*)    PHOSPHORUS - Abnormal    Phosphorus Level 5.7 (*)    PROCALCITONIN - Abnormal    Procalcitonin 1.63 (*)    CBC WITH DIFFERENTIAL - Abnormal    WBC 12.57      RBC 3.15 (*)     HGB 9.6 (*)     HCT 28.7 (*)     MCV 91      MCH 30.5      MCHC 33.4      RDW 15.5 (*)     Platelet Count 482 (*)     MPV 9.2      Nucleated RBC 0      Neut % 82.0 (*)     Lymph % 4.9 (*)     Mono % 8.6      Eos % 0.2      Basophil % 0.3      Imm Grans % 4.0 (*)     Neut # 10.31 (*)     Lymph # 0.62 (*)     Mono # 1.08 (*)     Eos # 0.02      Baso # 0.04      Imm Grans # 0.50 (*)    GAMMA GT - Abnormal    Gamma Glutamyl Transferase 502 (*)    IRON AND TIBC - Abnormal    Iron Level 24 (*)     Transferrin 138 (*)      Iron Binding Capacity Total 204 (*)     Iron Saturation 12 (*)    MAGNESIUM - Normal    Magnesium  1.8     B-TYPE NATRIURETIC PEPTIDE - Normal    BNP 37     TROPONIN I HIGH SENSITIVITY - Normal    Troponin High Sensitive 22     LIPASE - Normal    Lipase Level 6     CULTURE, BLOOD   CULTURE, BLOOD   CULTURE, STOOL   CLOSTRIDIOIDES DIFFICILE   CBC W/ AUTO DIFFERENTIAL    Narrative:     The following orders were created for panel order CBC auto differential.  Procedure                               Abnormality         Status                     ---------                               -----------         ------                     CBC with Differential[[id number]]       Abnormal            Final result                 Please view results for these tests on the individual orders.   URINALYSIS, REFLEX TO URINE CULTURE   CREATININE, URINE, RANDOM   PROTEIN / CREATININE RATIO, URINE   SODIUM, URINE, RANDOM   UREA NITROGEN, URINE, RANDOM   FERRITIN   PROTIME-INR   APTT   WBC, STOOL   POCT GLUCOSE, HAND-HELD DEVICE   POCT H. PYLORI        ECG Results              EKG 12-lead (Final result)        Collection Time Result Time QRS Duration OHS QTC Calculation    06/25/25 11:20:38 06/25/25 12:16:06 94 437                     Final result by Interface, [facility] (06/25/25 12:16:12)                   Narrative:    Test Reason : Z13.6,    Vent. Rate :  67 BPM     Atrial Rate :  67 BPM     P-R Int : 160 ms          QRS Dur :  94 ms      QT Int : 414 ms       P-R-T Axes :    164 142 degrees    QTcB Int : 437 ms     Suspect arm lead reversal, interpretation assumes no reversal  Unusual P axis, possible ectopic atrial rhythm  Right axis deviation  Nonspecific ST and T wave abnormality  Abnormal ECG  No previous ECGs available  Confirmed by Marc Loyola ([id number]) on 6/25/2025 12:16:04 PM    Referred By: AAAREFERRAL SELF           Confirmed By: Marc Loyola                                  Imaging Results              X-Ray  Chest AP Portable (Final result)  Result time 06/25/25 12:11:15      Final result by Derick Vogt MD (06/25/25 12:11:15)                   Impression:      See above      Electronically signed by: Derick Vogt MD  Date:    06/25/2025  Time:    12:11               Narrative:    EXAMINATION:  XR CHEST AP PORTABLE    CLINICAL HISTORY:  Sepsis;    TECHNIQUE:  Single frontal view of the chest was performed.    COMPARISON:  No 01/27/2025 ne    FINDINGS:  Central venous catheter in the SVC.  Heart size normal.  No significant airspace consolidation or pleural effusion identified                                       CT Abdomen Pelvis  Without Contrast (Final result)  Result time 06/25/25 13:57:36      Final result by Fede Anderson MD (06/25/25 13:57:36)                   Impression:      1. Moderate volume abdominopelvic ascites and mesenteric congestion, new when compared to prior CT of 05/27/2025.  2. Nonspecific intraluminal fluid throughout normal caliber loops of colon, as may be seen the setting of diarrheal illness.  3. No short-term change in unenhanced appearance of right renal upper pole mass remaining concerning for malignancy.  4. Persistent nonspecific circumferential wall thickening of the lower esophagus in a patient with reported history of esophageal cancer, for which attention on follow-up recommended.  5. Additional details of chronic and incidental findings, as provided in the body of the report.      Electronically signed by: Fede Anderson  Date:    06/25/2025  Time:    13:57               Narrative:    EXAMINATION:  CT ABDOMEN PELVIS WITHOUT CONTRAST    CLINICAL HISTORY:  Abdominal pain, acute, nonlocalized;hx of esphageal CA, abdominal pain, renal failure;    TECHNIQUE:  Low dose axial images, sagittal and coronal reformations were obtained from the lung bases to the pubic symphysis.    COMPARISON:  CT of the abdomen and pelvis performed 05/27/2025.    FINDINGS:  This examination is limited  due to lack of intravenous contrast.    Lower chest: Partially imaged cardiac valvular and coronary artery calcifications.  Atelectasis and/or scar at the visualized lung bases.  2-3 mm solid nodule left lower lobe (series 2, image 48), appears stable since 07/03/2023 CT.  Attention on follow-up recommended.    Liver: Grossly normal contour.    Gallbladder and bile ducts: Status post cholecystectomy.    Pancreas: Normal contour.    Spleen: Normal contour.  At least 1 accessory splenule suggested at the hilum.    Adrenals: Normal contour.    Kidneys: Indeterminate exophytic lesion measuring on the order of 16 mm projecting at the medial upper pole left kidney, relatively similar to prior CT of 05/27/2025.  Nonspecific bilateral perinephric stranding.    Lymph nodes: Scattered mildly prominent number, but small mesenteric and retroperitoneal lymph nodes are seen, potentially reactive in etiology.    Bowel and mesentery: Small hiatal hernia.  Partially imaged circumferential wall thickening of the visualized lower esophagus, for which close attention on follow-up recommended given reported history of esophageal cancer.  No evidence of bowel obstruction.  Colonic diverticulosis.  Unremarkable appearance of the appendix.  Nonspecific intraluminal fluid within normal caliber loops of colon.    Abdominal aorta: Nonaneurysmal.  Heavy atherosclerosis.    Inferior vena cava: Unremarkable.    Free fluid or free air: Moderate volume abdominopelvic ascites new since 05/27/2025 CT.  Mesenteric congestion and stranding also new.  No definite free air.    Pelvis: Enlarged prostate gland.    Urinary bladder: Unremarkable.    Body wall: Bilateral gynecomastia.  Bilateral inguinal hernias fat containing on the left and fat and fluid containing on the right.    Bones: Remote right-sided rib fractures.  New short-term change and marked height loss of the L3-4 intervertebral disc space height with adjoining endplate sclerosis and cystic  change compared to the 05/27/2025 CT.  This has progressed when compared to more remote examinations of 04/24/2024 and 07/23/2020.  Differential considerations would include degenerative changes versus infectious or noninfectious inflammatory spondyloarthropathy.  Mild right lateral listhesis of L3 on L4 is again appreciated.  Degenerative findings noted elsewhere involving the lumbar spine as well as at the hips.                                       Medications   sodium chloride 0.9% flush 10 mL (has no administration in time range)   naloxone 0.4 mg/mL injection 0.02 mg (has no administration in time range)   glucose chewable tablet 16 g (has no administration in time range)   glucose chewable tablet 24 g (has no administration in time range)   dextrose 50% injection 12.5 g (has no administration in time range)   dextrose 50% injection 25 g (has no administration in time range)   glucagon (human recombinant) injection 1 mg (has no administration in time range)   acetaminophen tablet 650 mg (has no administration in time range)   ondansetron injection 4 mg (has no administration in time range)   pantoprazole EC tablet 80 mg (has no administration in time range)   lactated ringers infusion (has no administration in time range)   sevelamer carbonate tablet 800 mg (has no administration in time range)   sodium bicarbonate tablet 650 mg (has no administration in time range)   lactated ringers bolus 1,000 mL (has no administration in time range)   lactated ringers bolus 1,000 mL (0 mLs Intravenous Stopped 6/25/25 1332)   ceFEPIme injection 2 g (2 g Intravenous Given 6/25/25 1329)   lactated ringers bolus 1,000 mL (0 mLs Intravenous Stopped 6/25/25 1523)     Medical Decision Making  Amount and/or Complexity of Data Reviewed  Labs: ordered. Decision-making details documented in ED Course.  Radiology: ordered.    Risk  Prescription drug management.  Decision regarding hospitalization.      69-year-old male with past  history as noted coming in with hypotension, fatigue, decreased p.o., watery stools and abdominal pain.    Exam as noted above.  No focal findings.    Concern for sepsis/infection versus dehydration versus electrolyte or metabolic abnormalities.  Septic workup initiated.  Given his abdominal pain will undertake CT abdomen pelvis to look for any intra-abdominal pathology explaining his symptoms.    Patient was found to be in significant renal failure of unclear etiology possible dehydration.  Possible chemo related.  Bladder scan showed a residual 400 but that was prior to attempting to urinate will have him attempt to urinate to see if he is in retention.    Lactate 1.8.  Does appear to have a drop in his hemoglobin, his rectal exam shows brown stool that is guaiac negative does not appear to have a GI bleed at this time.    IV fluids.  Given his chemo status and hypotension will cover with broad-spectrum antibiotics empirically.    Update:  After 2 L IV fluids blood pressure is improving.  CT with findings as noted above.    No acute surgical findings.  No indication for emergent dialysis at this time.     Discussed with heme Onc they will admit for further care, monitoring and evaluation.    Critical Care Time:     The high probability of sudden, clinically significant deterioration in the patient's condition required the highest level of my preparedness to intervene urgently.    Services included the following: chart data review, reviewing nursing notes and/or old charts, documentation time, consultant collaboration regarding findings and treatment options, medication orders and management, direct patient care, vital sign assessments and ordering, interpreting and reviewing diagnostic studies/lab tests.     I spent 40 minutes on total Critical Care time, which includes only time during which I was engaged in work directly related to the patient's care, as described above, whether at the bedside or elsewhere in  "the Emergency Department.  It did not include time spent on separately billable procedures nor did it include the time spent by residents, students, nurses or physician assistants on this patient's care.    Critical Care was needed secondary to the following conditions: hypotension, acute renal failure.                ED Course as of 06/25/25 1658 Wed Jun 25, 2025   1151 POC Lactate: 1.8  Not elevated [BA]      ED Course User Index  [BA] Chas Tian MD    Sepsis Perfusion Assessment: "I attest a sepsis perfusion exam was performed within 6 hours of sepsis, severe sepsis, or septic shock presentation, following fluid resuscitation."                      Clinical Impression:  Final diagnoses:  [Z13.6] Screening for cardiovascular condition  [I95.9] Hypotension, unspecified hypotension type (Primary)  [N19] Renal failure, unspecified chronicity          ED Disposition Condition    Admit                       [1]   Social History  Tobacco Use    Smoking status: Every Day     Current packs/day: 1.00     Types: Cigarettes    Smokeless tobacco: Never   Substance Use Topics    Alcohol use: Yes     Alcohol/week: 1.0 standard drink of alcohol     Types: 1 Glasses of wine per week     Comment: rarely    Drug use: Never        Chas Tian MD  06/25/25 1658    "

## 2025-06-25 NOTE — CONSULTS
[facility] - Medical / Clinical  Nephrology  CONSULT Note      Patient Name: Jone Lugo   MRN: [id number]   Current Provider: Maddison Weston MD  Primary Care Provider: [place], Primary Doctor   Admission Date: 6/25/2025   Hospital Day: 0  Bed: ED 03/03  Principal Problem: Acute renal failure       SUBJECTIVE    Jone Lugo is a 69 y.o. male ,is admitted on 6/25/2025  with past medical history of   esophageal adenocarcinoma stage III, smoking    Admitted with abnormal vitals including hypotension from the chemotherapy center.     Last chemo was 3 weeks ago - since then not feeling well. Has low oral intake and not drinking enough. He has watery stools, passed 5 times yesterday. Took imodium. No stool today. Stool sample sent to RO C-diff.     This morning while  went for chemo he had low BP so he was sent for sepsis evaluation. In ED his BP was low (86) was given 2L IVF bolus and bladder scan showed 400mL. Sepsis workup done and was given vanco on 6/25. Blood Cx sent    Nephrology consulted for  ANTOINE 3 in setting of low PO intake, hypotension (lowest SBP 86), diarrhea. Also he received vancomycine on 6/26 and had urine retension of 400 mL with out feeling of passing urine.     Review of Systems: Negative except for as stated in history of present illness    OBJECTIVE     Vitals:  BP (!) 101/59   Pulse 78   Temp 96.9 °F (36.1 °C) (Axillary)   Resp 20   Wt 55.3 kg (122 lb)   SpO2 99%   BMI 19.11 kg/m²    No intake/output data recorded.   Net IO Since Admission: 2,250 mL [06/25/25 1625]    Physical Examination:  Constitutional: Looks hypovolumic and cachetic with low muscle mass   HEENT: Mucosa is dry. No JVD  Chest:  Chest is clear BL. No Crackles. No wheezes  Cardiovascular:  S1+S2+0. Regular. No murmur  Abdominal: SNT, ND  Extremities: No pedal edema     MEDICATIONS & LABS       lactated ringers   Intravenous Continuous          [START ON 6/26/2025] pantoprazole  80 mg Oral Daily    sevelamer carbonate   "800 mg Oral TID WM     Current Outpatient Medications   Medication Instructions    amlodipine-benazepril 10-20mg (LOTREL) 10-20 mg per capsule 1 capsule, Daily    azelastine (ASTELIN) 137 mcg (0.1 %) nasal spray 2 sprays    dexAMETHasone (DECADRON) 8 mg, Oral, Daily, On days 2-4 of each chemotherapy cycle.    etodolac (LODINE) 400 MG tablet 1 tablet, 2 times daily    fluticasone propionate (FLONASE) 50 mcg/actuation nasal spray 2 sprays    gabapentin (NEURONTIN) 300 mg    LIDOcaine-prilocaine (EMLA) cream Topical (Top), As needed (PRN)    meclizine (ANTIVERT) 25 mg, Oral, 3 times daily PRN    multivitamin with minerals tablet 1 tablet, Daily    OLANZapine (ZYPREXA) 5 MG tablet Take 1 tab at nighttime on nights 1 thru 3 of each chemotherapy cycle.    omeprazole (PRILOSEC) 40 MG capsule 1 capsule, Every morning    ondansetron (ZOFRAN) 8 mg, Oral, Every 8 hours PRN    pravastatin (PRAVACHOL) 40 mg    TURMERIC ORAL Take by mouth.       Relevant Data:  Trend: No results found for: "CYSTATINCSER"  Trend:   Lab Results   Component Value Date    BUN 72 (H) 06/25/2025    BUN 74 (H) 06/25/2025    BUN 18 06/04/2025     Trend:     No results found for: "HCO3"  Trend:   Vitals:    06/25/25 1447 06/25/25 1500 06/25/25 1515 06/25/25 1600   BP: (!) 146/62 (!) 112/56 (!) 114/59 (!) 101/59   BP Location: Right arm      Patient Position: Lying      Pulse: 84 77 80 78   Resp: 20  20    Temp:   96.9 °F (36.1 °C)    TempSrc:   Axillary    SpO2: 100% 100% 100% 99%   Weight:           No intake/output data recorded.   Net IO Since Admission: 2,250 mL [06/25/25 1625]  Trend: No results found for: "PHOSPHORUS"  Trend:   Lab Results   Component Value Date    HGB 9.6 (L) 06/25/2025    HGB 8.8 (L) 06/25/2025    HGB 11.2 (L) 06/04/2025         Trend:   Calcium   Date Value Ref Range Status   06/25/2025 9.9 8.7 - 10.5 mg/dL Final   06/25/2025 9.4 8.7 - 10.5 mg/dL Final   06/04/2025 9.7 8.7 - 10.5 mg/dL Final     Albumin   Date Value Ref Range " Status   06/25/2025 2.4 (L) 3.5 - 5.2 g/dL Final   06/25/2025 2.2 (L) 3.5 - 5.2 g/dL Final       MEDICAL HISTORY    Past Medical History:  Past Medical History:   Diagnosis Date    HTN (hypertension)        Past Surgical History:  Past Surgical History:   Procedure Laterality Date    ESOPHAGOGASTRODUODENOSCOPY N/A 12/19/2024    Procedure: EGD (ESOPHAGOGASTRODUODENOSCOPY);  Surgeon: Pete Buenrostro MD;  Location: [facility] (4TH FLR);  Service: Endoscopy;  Laterality: N/A;  Medically Urgent      12/16 ref by Sahil Singer MD, portal-st  12/18 - pre-call complete; MB    ESOPHAGOGASTRODUODENOSCOPY N/A 4/11/2025    Procedure: EGD (ESOPHAGOGASTRODUODENOSCOPY);  Surgeon: Paxton Jurado MD;  Location: [facility] (2ND FLR);  Service: Endoscopy;  Laterality: N/A;  ref by  Sahil Singer MD, 2nd floor due to availability- portal -ml    INSERTION OF TUNNELED CENTRAL VENOUS CATHETER (CVC) WITH SUBCUTANEOUS PORT Right 1/27/2025    Procedure: INSERTION, SINGLE LUMEN CATHETER WITH PORT, WITH FLUOROSCOPIC GUIDANCE, left poss right;  Surgeon: Chadd Velez MD;  Location: [facility] OR 2ND FLR;  Service: General;  Laterality: Right;    LAPAROSCOPIC REPAIR OF INGUINAL HERNIA Right        Family History:   No family history on file.   Review of patient's allergies indicates:  No Known Allergies  Social History[1]         ASSESSMENT AND PLAN:    Jone Lugo is a 69 y.o. male ,is admitted on 6/25/2025  with past medical history of    esophageal adenocarcinoma stage III, smoking.    Admitted with hypotension from chemotherapy unit.    Nephrology  is consulted for ANTOINE 3 in setting of low PO intake, hypotension (lowest SBP 86), diarrhea. Also he received vancomycine on 6/26 and had urine retension of 400 mL with out feeling of passing urine. Sepsis work up sent. He received 2L of IVF bolus      Impression:  ANTOINE KDIGO stage 3  Baseline Creatinine: 1.1-1.3  Creatinine at time of consult: 8  Etiology of ANTOINE:  Likely pre-renal from low PO intake, diarrhea leading to hypotension. Secondary factor could be Vancomycin    Recommendations :   - IV F at rate of 100cc/hr for 24 hours   - Repeat labs in the evening   - Please add UA, UPCR, US retroperitoneal, Urine sodium, Urine Cr, Urine potassium,   - Please take Blood gas (VBG or ABG)  - US bladder - if retention then pass santiago's  - Encourage oral hydration    - No urgent indications for dialysis at this time - Consent for dialysis taken  -   Monitor serum chemistries daily, strict intake and output Qshift , daily weights if able.  Renal protective measures: Please adjust medications for reduced clearance  Avoid nephrotoxic medications (NSAID, IV contrast)  Avoid ACEi and ARBs in the setting of ANTOINE  Maintain MAP > 65    Transfuse for Hb <7    Thank you for allowing us to participate in the care of this patient.  Plan discussed with attending. Please call with any questions or concerns.           Jay Juarez MD.  Clinical Nephrology Fellow, PGY-4  [facility] [address]          [1]   Social History  Socioeconomic History    Marital status: Single   Tobacco Use    Smoking status: Every Day     Current packs/day: 1.00     Types: Cigarettes    Smokeless tobacco: Never   Substance and Sexual Activity    Alcohol use: Yes     Alcohol/week: 1.0 standard drink of alcohol     Types: 1 Glasses of wine per week     Comment: rarely    Drug use: Never     Social Drivers of Health     Financial Resource Strain: Patient Declined (4/12/2025)    Overall Financial Resource Strain (CARDIA)     Difficulty of Paying Living Expenses: Patient declined   Food Insecurity: Patient Declined (4/12/2025)    Hunger Vital Sign     Worried About Running Out of Food in the Last Year: Patient declined     Ran Out of Food in the Last Year: Patient declined   Transportation Needs: No Transportation Needs (4/11/2025)    PRAPARE - Transportation     Lack of Transportation (Medical): No      Lack of Transportation (Non-Medical): No   Physical Activity: Unknown (4/7/2025)    Received from [facility]    Exercise Vital Sign     Days of Exercise per Week: Patient declined   Recent Concern: Physical Activity - Insufficiently Active (3/17/2025)    Exercise Vital Sign     Days of Exercise per Week: 2 days     Minutes of Exercise per Session: 20 min   Stress: Patient Declined (4/12/2025)    [employer] - Occupational Stress Questionnaire     Feeling of Stress : Patient declined   Recent Concern: Stress - Stress Concern Present (3/17/2025)    [employer] - Occupational Stress Questionnaire     Feeling of Stress : To some extent   Housing Stability: Patient Declined (4/12/2025)    Housing Stability Vital Sign     Unable to Pay for Housing in the Last Year: Patient declined     Number of Times Moved in the Last Year: 0     Homeless in the Last Year: Patient declined

## 2025-06-25 NOTE — HPI
Jone Lugo is a 69yoM s/p definitive chemoradiotherapy for esophageal adenocarcinoma now found with persistent/local recurrence. He is established with Dr. Singer and is planning for initiation of FOLFOX + trastuzumab presented for a heme/onc appointment today and was severely dizzy and found to be hypotensive. Per patient his dysphagia has been worsening and the past three days has not been able to tolerate solids or liquids by mouth. He was sent to the ED and found to be in acute renal failure likely due to dehydration from poor PO intake. Surgical oncology consulted for possible procedure for enteral access.

## 2025-06-25 NOTE — SUBJECTIVE & OBJECTIVE
Oncology Treatment Plan:   OP GASTRIC/ ESOPHAGEAL fam-trastuzumab deruxtecan-nxki Q3W    Medications:  Continuous Infusions:  Scheduled Meds:  PRN Meds:     Review of patient's allergies indicates:  No Known Allergies     Past Medical History:   Diagnosis Date    HTN (hypertension)      Past Surgical History:   Procedure Laterality Date    ESOPHAGOGASTRODUODENOSCOPY N/A 12/19/2024    Procedure: EGD (ESOPHAGOGASTRODUODENOSCOPY);  Surgeon: Pete Buenrostro MD;  Location: [facility] (4TH FLR);  Service: Endoscopy;  Laterality: N/A;  Medically Urgent      12/16 ref by Sahil Singer MD, [facility]  12/18 - pre-call complete; MB    ESOPHAGOGASTRODUODENOSCOPY N/A 4/11/2025    Procedure: EGD (ESOPHAGOGASTRODUODENOSCOPY);  Surgeon: Paxton Jurado MD;  Location: [facility] (2ND FLR);  Service: Endoscopy;  Laterality: N/A;  ref by  Sahil Singer MD, 2nd floor due to availability- [facility]    INSERTION OF TUNNELED CENTRAL VENOUS CATHETER (CVC) WITH SUBCUTANEOUS PORT Right 1/27/2025    Procedure: INSERTION, SINGLE LUMEN CATHETER WITH PORT, WITH FLUOROSCOPIC GUIDANCE, left poss right;  Surgeon: Chadd Velez MD;  Location: [facility] OR 2ND FLR;  Service: General;  Laterality: Right;    LAPAROSCOPIC REPAIR OF INGUINAL HERNIA Right      Family History    None       Tobacco Use    Smoking status: Every Day     Current packs/day: 1.00     Types: Cigarettes    Smokeless tobacco: Never   Substance and Sexual Activity    Alcohol use: Yes     Alcohol/week: 1.0 standard drink of alcohol     Types: 1 Glasses of wine per week     Comment: rarely    Drug use: Never    Sexual activity: Not on file       Review of Systems  Objective:     Vital Signs (Most Recent):  Temp: 96.6 °F (35.9 °C) (06/25/25 1315)  Pulse: 77 (06/25/25 1500)  Resp: 20 (06/25/25 1447)  BP: (!) 112/56 (06/25/25 1500)  SpO2: 100 % (06/25/25 1500) Vital Signs (24h Range):  Temp:  [96.6 °F (35.9 °C)-97.9 °F (36.6 °C)] 96.6 °F (35.9 °C)  Pulse:   [66-84] 77  Resp:  [12-20] 20  SpO2:  [95 %-100 %] 100 %  BP: ()/(32-63) 112/56     Weight: 55.3 kg (122 lb)  Body mass index is 19.11 kg/m².  Body surface area is 1.62 meters squared.      Intake/Output Summary (Last 24 hours) at 6/25/2025 1519  Last data filed at 6/25/2025 1332  Gross per 24 hour   Intake 1000 ml   Output --   Net 1000 ml        Physical Exam  Constitutional:       Appearance: Normal appearance.   Cardiovascular:      Rate and Rhythm: Normal rate and regular rhythm.      Pulses: Normal pulses.   Pulmonary:      Effort: Pulmonary effort is normal. No respiratory distress.      Breath sounds: Normal breath sounds.   Abdominal:      General: Abdomen is flat.      Palpations: Abdomen is soft. There is mass (palpable liver edge).      Tenderness: There is no abdominal tenderness.   Musculoskeletal:      Right lower leg: No edema.      Left lower leg: No edema.   Skin:     General: Skin is warm and dry.      Capillary Refill: Capillary refill takes more than 3 seconds.   Neurological:      Mental Status: He is alert and oriented to person, place, and time. Mental status is at baseline.      Motor: Weakness (generalized) present.          Significant Labs:   All pertinent labs from the last 24 hours have been reviewed.    Diagnostic Results:  I have reviewed all pertinent imaging results/findings within the past 24 hours.

## 2025-06-25 NOTE — ASSESSMENT & PLAN NOTE
Patient's most recent phosphorus results are listed below.   Recent Labs     06/25/25  1100   PHOS 5.7*       Plan  - Patient's hyperphosphatemia is stable  - see acute renal failure

## 2025-06-25 NOTE — ASSESSMENT & PLAN NOTE
69M with esophageal adenocarcinoma initially presenting with dysphagia (improved post-EUS dilation). Biopsy confirmed poorly differentiated adenocarcinoma, pMMR, HER2+, PD-L1 0. Received neoadjuvant carboplatin/paclitaxel + radiation (non-surgical). Imaging showed stable disease. Recurrent tumor confirmed on EGD (12/2024); started FOLFOX + trastuzumab, with stable imaging after 4 cycles. After 6 cycles, developed worsening dysphagia due to local tumor progression. Declined stent/PEG at last hospitalization. Switched to trastuzumab deruxtecan (TDxD). Prior admission presented for cycle 4 with significant dysphagia, poor PO intake, Cr 7.7, hypotension, weakness. Treatment held, sent to ED.     - likely causing poor po intake > hypovolemic shock > acute renal failure

## 2025-06-25 NOTE — ED NOTES
"C/C: 69 y.o. male arrived to the ED from [facility] for hypotension. Patient with hx of esophageal cancer, on chemo - last tx 6/4/25, presents for low blood pressure readings. He went for his second chemo treatment this morning and noted to be hypotensive, SBP 60s. He is on Lotrel for BP management, however, denies taking medication this morning. Patient endorses increased fatigue, dizziness, decreased appetite, and nausea. "This is the dizziest I've ever been today." He also reports non-bloody diarrhea x3 weeks and generalized abdominal pain x2 days. Denies fever, chest pain, SOB, syncope, or vomiting.     APPEARANCE: awake, alert, and appears to be in NAD. Pain score 0/10. Placed on cont cardiac monitoring, auto BP cuff, cont pulse ox, and changed into hospital gown. Bed in lowest and locked position w/ side rails up x2.  SKIN: warm, dry and intact. No visible breakdown or bruising noted to extremities.   MUSCULOSKELETAL: Patient moving all extremities spontaneously, no obvious swelling or deformities noted. Ambulates independently.  RESPIRATORY: Airway open and patent. Denies shortness of breath. Respirations even, unlabored, equal bilaterally on inspiration and expiration.   CARDIAC: Regular HR. Denies CP, 2+ distal pulses; no peripheral edema  ABDOMEN: S/ND. Generalized abdominal pain, Denies vomiting. +nausea and diarrhea   : Pt voids spontaneously, denies difficulty  NEUROLOGIC: AAO x 4; speaking and following commands appropriately. Equal strength in all extremities; denies numbness/tingling. +dizziness. Denies visual changes or HA  "

## 2025-06-25 NOTE — ASSESSMENT & PLAN NOTE
Vitals:    06/25/25 1102 06/25/25 1132 06/25/25 1158 06/25/25 1245   BP: (!) 102/50 (!) 91/52 (!) 87/55 (!) 100/54    06/25/25 1300 06/25/25 1315 06/25/25 1330 06/25/25 1400   BP: (!) 86/48 (!) 86/51 (!) 89/53 (!) 86/50    06/25/25 1447 06/25/25 1500   BP: (!) 146/62 (!) 112/56     Presented with BP  87/55 responding with 2 LR fluid resuscitation. Lactic acid wnl. Likely 2/2 to poor fluid tolerance 2/2 esophageal adenocarcinoma. Previous EGD in April 2025 with partial obstruction. States that placing an esophageal stent with restrictions of types of foods he would be able to eat is not inline with his goals. He is more amendable to PEG placement to provide extra support for fluids and nutrition. Do not suspect septic shock at this time as no findings on CT chest A/P. No fevers. Wbcs wnl.     - GI unable to place PEG 2/2 partial obstruction of the esophagus.  - IR unable to advance NGT for procedure 2/2 partial obstruction.    - Will consult surgical oncology for consultation of PEG tube  - IVF maintenance fluids  - F/u BC's

## 2025-06-25 NOTE — HPI
70 yo M esophageal adenocarcinoma stage III, smoking presenting with poor PO intake.     In the ED initially afebrile 87/55 improving to 146 systolic after 2 LR resuscitation on RA. Wbcs 13.51, Hb 8.8 ( BL 11.2), Cr 8.0 ( BL 1.5-1.8), bicarb 13.  (123 BL) . Procal 1.63. Lactate 1.8. A CT chest A/P significant for moderate volume abdominopelvic ascites and mesenteric congestion. UA not collected. He was treated with vanc/cefepime and fluid resucitated with 2L of LR.     6/25 oncology appointment, pt reported worsening dysphagia with poor PO intake with weakness.   In the ED he notes poor food/water intake he attributes to stomach pain after swallowing food. He also notes loose watery stools that has not improved with immodium. Pt reports symptoms started after he most recent chemotherapy. Associated with lightheadedness worse with standing, fatigue. Has had decreased urine output. Denies chest pain, shortness of breath, melena, hematochezia. Otherwise noted an episode severe dysphagia recently with swallowing water and pills.

## 2025-06-25 NOTE — ASSESSMENT & PLAN NOTE
Patient's most recent phosphorus results are listed below.   Recent Labs     06/25/25  1100   PHOS 5.7*     Plan  - Will treat hyperphosphatemia with sevelemer 800 mg TID  - Patient's hyperphosphatemia is stable  - 2/2 acute renal failure

## 2025-06-25 NOTE — ASSESSMENT & PLAN NOTE
69M with esophageal adenocarcinoma initially presenting with dysphagia (improved post-EUS dilation). Biopsy confirmed poorly differentiated adenocarcinoma, pMMR, HER2+, PD-L1 0. Received neoadjuvant carboplatin/paclitaxel + radiation (non-surgical). Imaging showed stable disease. Recurrent tumor confirmed on EGD (12/2024); started FOLFOX + trastuzumab, with stable imaging after 4 cycles. After 6 cycles, developed worsening dysphagia due to local tumor progression. Declined stent/PEG at last hospitalization. Switched to trastuzumab deruxtecan (TDxD). Prior admission presented for cycle 4 with significant dysphagia, poor PO intake, Cr 7.7, hypotension, weakness. Treatment held, sent to ED. Previous EGD in April 2025 with partial obstruction. States that placing an esophageal stent with restrictions of types of foods he would be able to eat is not inline with his goals. He is more amendable to PEG placement to provide extra support for fluids and nutrition.     - likely causing poor po intake > hypovolemic shock > acute renal failure  - Surgical oncology recommending strict NPO due to his report that he cannot tolerate anything by mouth due to the aspiration risk. We would not consider any intervention until his hypovolemia and acute renal failure are addressed.     - f/u nutrition RD recommendations may consider TPN while waiting for PEG tube

## 2025-06-25 NOTE — H&P
[facility] - Emergency Dept  Hematology/Oncology  H&P    Patient Name: Jone Lugo  MRN: [id number]  Admission Date: 6/25/2025  Code Status: Full Code   Attending Provider: Maddison Weston MD  Primary Care Physician: [place], Primary Doctor  Principal Problem:Acute renal failure    Subjective:     HPI: 68 yo M esophageal adenocarcinoma stage III, smoking presenting with poor PO intake.     In the ED initially afebrile 87/55 improving to 146 systolic after 2 LR resuscitation on RA. Wbcs 13.51, Hb 8.8 ( BL 11.2), Cr 8.0 ( BL 1.5-1.8), bicarb 13.  (123 BL) . Procal 1.63. Lactate 1.8. A CT chest A/P significant for moderate volume abdominopelvic ascites and mesenteric congestion. UA not collected. He was treated with vanc/cefepime and fluid resucitated with 2L of LR.     6/25 oncology appointment, pt reported worsening dysphagia with poor PO intake with weakness.   In the ED he notes poor food/water intake he attributes to stomach pain after swallowing food. He also notes loose watery stools that has not improved with immodium. Pt reports symptoms started after he most recent chemotherapy. Associated with lightheadedness worse with standing, fatigue. Has had decreased urine output. Denies chest pain, shortness of breath, melena, hematochezia. Otherwise noted an episode severe dysphagia recently with swallowing water and pills.      Oncology Treatment Plan:   OP GASTRIC/ ESOPHAGEAL fam-trastuzumab deruxtecan-nxki Q3W    Medications:  Continuous Infusions:  Scheduled Meds:  PRN Meds:     Review of patient's allergies indicates:  No Known Allergies     Past Medical History:   Diagnosis Date    HTN (hypertension)      Past Surgical History:   Procedure Laterality Date    ESOPHAGOGASTRODUODENOSCOPY N/A 12/19/2024    Procedure: EGD (ESOPHAGOGASTRODUODENOSCOPY);  Surgeon: Pete Buenrostro MD;  Location: [address];  Service: Endoscopy;  Laterality: N/A;  Medically Urgent      12/16 ref by Enmanuel  Sahil MARIE MD, [facility]  12/18 - pre-call complete; MB    ESOPHAGOGASTRODUODENOSCOPY N/A 4/11/2025    Procedure: EGD (ESOPHAGOGASTRODUODENOSCOPY);  Surgeon: Paxton Jurado MD;  Location: [facility] (2ND FLR);  Service: Endoscopy;  Laterality: N/A;  ref by  Sahil Singer MD, 2nd floor due to availability- portal -[facility]    INSERTION OF TUNNELED CENTRAL VENOUS CATHETER (CVC) WITH SUBCUTANEOUS PORT Right 1/27/2025    Procedure: INSERTION, SINGLE LUMEN CATHETER WITH PORT, WITH FLUOROSCOPIC GUIDANCE, left poss right;  Surgeon: Chadd Velez MD;  Location: [facility] OR 2ND FLR;  Service: General;  Laterality: Right;    LAPAROSCOPIC REPAIR OF INGUINAL HERNIA Right      Family History    None       Tobacco Use    Smoking status: Every Day     Current packs/day: 1.00     Types: Cigarettes    Smokeless tobacco: Never   Substance and Sexual Activity    Alcohol use: Yes     Alcohol/week: 1.0 standard drink of alcohol     Types: 1 Glasses of wine per week     Comment: rarely    Drug use: Never    Sexual activity: Not on file       Review of Systems  Objective:     Vital Signs (Most Recent):  Temp: 96.6 °F (35.9 °C) (06/25/25 1315)  Pulse: 77 (06/25/25 1500)  Resp: 20 (06/25/25 1447)  BP: (!) 112/56 (06/25/25 1500)  SpO2: 100 % (06/25/25 1500) Vital Signs (24h Range):  Temp:  [96.6 °F (35.9 °C)-97.9 °F (36.6 °C)] 96.6 °F (35.9 °C)  Pulse:  [66-84] 77  Resp:  [12-20] 20  SpO2:  [95 %-100 %] 100 %  BP: ()/(32-63) 112/56     Weight: 55.3 kg (122 lb)  Body mass index is 19.11 kg/m².  Body surface area is 1.62 meters squared.      Intake/Output Summary (Last 24 hours) at 6/25/2025 1519  Last data filed at 6/25/2025 1332  Gross per 24 hour   Intake 1000 ml   Output --   Net 1000 ml        Physical Exam  Constitutional:       Appearance: Normal appearance.   Cardiovascular:      Rate and Rhythm: Normal rate and regular rhythm.      Pulses: Normal pulses.   Pulmonary:      Effort: Pulmonary effort is normal. No  respiratory distress.      Breath sounds: Normal breath sounds.   Abdominal:      General: Abdomen is flat.      Palpations: Abdomen is soft. There is mass (palpable liver edge).      Tenderness: There is no abdominal tenderness.   Musculoskeletal:      Right lower leg: No edema.      Left lower leg: No edema.   Skin:     General: Skin is warm and dry.      Capillary Refill: Capillary refill takes more than 3 seconds.   Neurological:      Mental Status: He is alert and oriented to person, place, and time. Mental status is at baseline.      Motor: Weakness (generalized) present.          Significant Labs:   All pertinent labs from the last 24 hours have been reviewed.    Diagnostic Results:  I have reviewed all pertinent imaging results/findings within the past 24 hours.  Assessment/Plan:     * Acute renal failure  ANTOINE is likely due to pre-renal azotemia due to intravascular volume depletion 2/2 poor fluid intake, diarrhea, BP medications. Baseline creatinine is 1.5-1.8. Most recent creatinine and eGFR are listed below. No signs of fluid overload. With metabolic acidosis, bicarb 13 in ED.     Recent Labs     06/25/25  0910 06/25/25  1100   CREATININE 7.7* 8.0*   EGFRNORACEVR 7* 7*        Plan  - ANTOINE is worsening. Will adjust treatment as follows: IVF resuscitation  - Avoid nephrotoxins and renally dose meds for GFR listed above  - Monitor urine output, serial BMP, and adjust therapy as needed  - cannot give oral bicarb on sevelamer 2/2 aspiration risk   - F/u renal US to r/o obstructive cause of ANTOINE  - consulted nephrology  - f/u urine labs  - holding home gabapentin and amlodipine-benzepril      Hypovolemic shock  Vitals:    06/25/25 1158 06/25/25 1245 06/25/25 1300 06/25/25 1315   BP: (!) 87/55 (!) 100/54 (!) 86/48 (!) 86/51    06/25/25 1330 06/25/25 1400 06/25/25 1447 06/25/25 1500   BP: (!) 89/53 (!) 86/50 (!) 146/62 (!) 112/56    06/25/25 1515 06/25/25 1600   BP: (!) 114/59 (!) 101/59     Presented with BP   87/55 responding with 2 LR fluid resuscitation. Lactic acid wnl. Likely 2/2 to poor fluid tolerance 2/2 esophageal adenocarcinoma. Previous EGD in April 2025 with partial obstruction. States that placing an esophageal stent with restrictions of types of foods he would be able to eat is not inline with his goals. He is more amendable to PEG placement to provide extra support for fluids and nutrition. Do not suspect septic shock at this time as no findings on CT chest A/P. No fevers. Wbcs wnl.     - GI unable to place PEG 2/2 partial obstruction of the esophagus.  - IR unable to advance NGT for procedure 2/2 partial obstruction.   - Will consult surgical oncology for consultation of PEG tube   - will attempt following resolution of acute renal failure and if BP wnl     - IVF maintenance fluids  - F/u BC's     Antineoplastic chemotherapy induced pancytopenia  Anemia is likely due to chronic disease due to Malignancy and drug toxicity from chemotherapy. Most recent hemoglobin and hematocrit are listed below.  Baseline 11.2. Likely 2/2 chemo. Less likely GIB. He has noted ongoing abdominal pain with associated belching. No hematochezia/melena. ED performed MONTRELL negative negative guaiac fecal occult blood test.     Recent Labs     06/25/25  0910 06/25/25  1100   HGB 8.8* 9.6*   HCT 25.9* 28.7*     Plan  - Monitor serial CBC: Daily  - Transfuse PRBC if patient becomes hemodynamically unstable, symptomatic or H/H drops below 7/21.  - Patient has not received any PRBC transfusions to date  - Patient's anemia is currently stable  - holding anticoaugulation for now will re-evaluate cbc tomorrow   - f/u iron studies       Esophageal adenocarcinoma  69M with esophageal adenocarcinoma initially presenting with dysphagia (improved post-EUS dilation). Biopsy confirmed poorly differentiated adenocarcinoma, pMMR, HER2+, PD-L1 0. Received neoadjuvant carboplatin/paclitaxel + radiation (non-surgical). Imaging showed stable disease.  Recurrent tumor confirmed on EGD (12/2024); started FOLFOX + trastuzumab, with stable imaging after 4 cycles. After 6 cycles, developed worsening dysphagia due to local tumor progression. Declined stent/PEG at last hospitalization. Switched to trastuzumab deruxtecan (TDxD). Prior admission presented for cycle 4 with significant dysphagia, poor PO intake, Cr 7.7, hypotension, weakness. Treatment held, sent to ED. Previous EGD in April 2025 with partial obstruction. States that placing an esophageal stent with restrictions of types of foods he would be able to eat is not inline with his goals. He is more amendable to PEG placement to provide extra support for fluids and nutrition.     - likely causing poor po intake > hypovolemic shock > acute renal failure  - Surgical oncology recommending strict NPO due to his report that he cannot tolerate anything by mouth due to the aspiration risk. We would not consider any intervention until his hypovolemia and acute renal failure are addressed.           Diarrhea  Reports onset of loose watery diarrhea since previous chemotherapy treatment. Possibly related to chemotherapy. Likely contributed to hypovolemic shock and acute renal failure.     - f/u stool culture  - f/u cdiff panel       Hyperphosphatemia  Patient's most recent phosphorus results are listed below.   Recent Labs     06/25/25  1100   PHOS 5.7*       Plan  - Patient's hyperphosphatemia is stable  - see acute renal failure     Weakness  2/2 poor po intake, hypovolemia.      PT/OT    Esophageal dysphagia  See esophageal carcinoma    Obstruction of esophagus  See esophageal adenocarcinoma          Ricardo Mcghee MD  Hematology/Oncology  [facility] - Emergency Dept

## 2025-06-25 NOTE — ASSESSMENT & PLAN NOTE
Vitals:    06/25/25 1600 06/25/25 1645 06/25/25 1700 06/25/25 1800   BP: (!) 101/59 (!) 90/55 (!) 84/51 (!) 89/52    06/25/25 1853 06/25/25 1957 06/25/25 2055 06/25/25 2349   BP: (!) 106/55 (!) 84/52 (!) 113/56 104/64    06/26/25 0509 06/26/25 0838   BP: (!) 93/59 (!) 92/55     Presented with BP  87/55 responding with 2 LR fluid resuscitation. Lactic acid wnl. Likely 2/2 to poor fluid tolerance 2/2 esophageal adenocarcinoma. Previous EGD in April 2025 with partial obstruction. States that placing an esophageal stent with restrictions of types of foods he would be able to eat is not inline with his goals. He is more amendable to PEG placement to provide extra support for fluids and nutrition. Do not suspect septic shock at this time as no findings on CT chest A/P. No fevers. Wbcs wnl.     - GI unable to place PEG 2/2 partial obstruction of the esophagus.  - IR unable to advance NGT for procedure 2/2 partial obstruction.   - Will consult surgical oncology for consultation of PEG tube   - will attempt following resolution of acute renal failure and if BP wnl     - IVF maintenance fluids with IVF bolus prn  - F/u BC's

## 2025-06-25 NOTE — ASSESSMENT & PLAN NOTE
Anemia is likely due to chronic disease due to Malignancy and drug toxicity from chemotherapy. Most recent hemoglobin and hematocrit are listed below.  Baseline 11.2. Likely 2/2 chemo. Less likely GIB. He has noted ongoing abdominal pain with associated belching. No hematochezia/melena. ED performed MONTRELL negative negative guaiac fecal occult blood test.     Recent Labs     06/25/25  0910 06/25/25  1100   HGB 8.8* 9.6*   HCT 25.9* 28.7*     Plan  - Monitor serial CBC: Daily  - Transfuse PRBC if patient becomes hemodynamically unstable, symptomatic or H/H drops below 7/21.  - Patient has not received any PRBC transfusions to date  - Patient's anemia is currently stable  - holding anticoaugulation for now will re-evaluate cbc tomorrow   - f/u iron studies

## 2025-06-25 NOTE — ASSESSMENT & PLAN NOTE
69 year old male with a history of esophageal adenocarcinoma s/p definitive chemoradiation in 2023, now with recurrence and on chemotherapy. Presented to the ED with inability to tolerate PO, hypotension and acute renal failure.     Recommend fluid resuscitation and treatment of his renal failure prior to consideration of procedures such as g tube or stent.   Would avoid oral medications due to his inability to tolerate PO intake. Is still able to manage his own secretions.   Would keep strict NPO at this time due to aspiration risk  Can consider TPN for nutritional support while awaiting decision regarding timing of procedures

## 2025-06-26 PROBLEM — R53.0 NEOPLASTIC (MALIGNANT) RELATED FATIGUE: Status: ACTIVE | Noted: 2025-06-26

## 2025-06-26 PROBLEM — D68.59 HYPERCOAGULABLE STATE: Status: ACTIVE | Noted: 2025-06-26

## 2025-06-26 PROBLEM — E87.6 HYPOKALEMIA: Status: ACTIVE | Noted: 2025-06-26

## 2025-06-26 PROBLEM — D84.9 IMMUNOCOMPROMISED STATE: Status: ACTIVE | Noted: 2025-06-26

## 2025-06-26 PROBLEM — Z91.89 AT RISK FOR MALNUTRITION: Status: ACTIVE | Noted: 2025-06-26

## 2025-06-26 PROBLEM — E83.42 HYPOMAGNESEMIA: Status: ACTIVE | Noted: 2025-06-26

## 2025-06-26 NOTE — PROGRESS NOTES
Anupam Alcantara - Oncology ([facility])  Hematology/Oncology  Progress Note    Patient Name: Jone Lugo  Admission Date: 6/25/2025  Hospital Length of Stay: 1 days  Code Status: Full Code     Subjective:     HPI:  68 yo M esophageal adenocarcinoma stage III, smoking presenting with poor PO intake.     In the ED initially afebrile 87/55 improving to 146 systolic after 2 LR resuscitation on RA. Wbcs 13.51, Hb 8.8 ( BL 11.2), Cr 8.0 ( BL 1.5-1.8), bicarb 13.  (123 BL) . Procal 1.63. Lactate 1.8. A CT chest A/P significant for moderate volume abdominopelvic ascites and mesenteric congestion. UA not collected. He was treated with vanc/cefepime and fluid resucitated with 2L of LR.     6/25 oncology appointment, pt reported worsening dysphagia with poor PO intake with weakness.   In the ED he notes poor food/water intake he attributes to stomach pain after swallowing food. He also notes loose watery stools that has not improved with immodium. Pt reports symptoms started after he most recent chemotherapy. Associated with lightheadedness worse with standing, fatigue. Has had decreased urine output. Denies chest pain, shortness of breath, melena, hematochezia. Otherwise noted an episode severe dysphagia recently with swallowing water and pills.     Interval History: BP initially tenuous MAPS <65 but responding to IVF bolus. Cr with significant improvement following IVF.     Oncology Treatment Plan:   OP GASTRIC/ ESOPHAGEAL fam-trastuzumab deruxtecan-nxki Q3W    Medications:  Continuous Infusions:   lactated ringers   Intravenous Continuous 100 mL/hr at 06/26/25 0525 New Bag at 06/26/25 0525     Scheduled Meds:   heparin (porcine)  5,000 Units Subcutaneous Q8H    lactated ringers  1,000 mL Intravenous Once    pantoprazole  80 mg Intravenous Daily     PRN Meds:  Current Facility-Administered Medications:     acetaminophen, 650 mg, Oral, Q4H PRN    dextrose 50%, 12.5 g, Intravenous, PRN    dextrose 50%, 25 g, Intravenous,  PRN    glucagon (human recombinant), 1 mg, Intramuscular, PRN    glucose, 16 g, Oral, PRN    glucose, 24 g, Oral, PRN    naloxone, 0.02 mg, Intravenous, PRN    ondansetron, 4 mg, Intravenous, Q8H PRN    sodium chloride 0.9%, 10 mL, Intravenous, Q12H PRN     Review of Systems  Objective:     Vital Signs (Most Recent):  Temp: 98.5 °F (36.9 °C) (06/26/25 0838)  Pulse: 91 (06/26/25 0838)  Resp: 18 (06/26/25 0838)  BP: (!) 92/55 (06/26/25 0838)  SpO2: (!) 91 % (06/26/25 0839) Vital Signs (24h Range):  Temp:  [96.6 °F (35.9 °C)-98.8 °F (37.1 °C)] 98.5 °F (36.9 °C)  Pulse:  [66-93] 91  Resp:  [12-20] 18  SpO2:  [91 %-100 %] 91 %  BP: ()/(32-64) 92/55     Weight: 55.8 kg (123 lb)  Body mass index is 19.26 kg/m².  Body surface area is 1.62 meters squared.      Intake/Output Summary (Last 24 hours) at 6/26/2025 0926  Last data filed at 6/26/2025 0509  Gross per 24 hour   Intake 2250 ml   Output 155 ml   Net 2095 ml        Physical Exam  Constitutional:       Appearance: Normal appearance.   Cardiovascular:      Rate and Rhythm: Normal rate and regular rhythm.      Pulses: Normal pulses.   Pulmonary:      Effort: Pulmonary effort is normal. No respiratory distress.      Breath sounds: Normal breath sounds.   Abdominal:      General: Abdomen is flat.      Palpations: Abdomen is soft. There is mass (palpable liver edge).      Tenderness: There is no abdominal tenderness.   Musculoskeletal:      Right lower leg: No edema.      Left lower leg: No edema.   Skin:     General: Skin is warm and dry.      Capillary Refill: Capillary refill takes less than 2 seconds.   Neurological:      Mental Status: He is alert and oriented to person, place, and time. Mental status is at baseline.      Motor: Weakness (generalized) present.          Significant Labs:   All pertinent labs from the last 24 hours have been reviewed.    Diagnostic Results:  I have reviewed all pertinent imaging results/findings within the past 24  hours.  Assessment/Plan:     * Acute renal failure  ANTOINE is likely due to pre-renal azotemia due to intravascular volume depletion 2/2 poor fluid intake, diarrhea, BP medications. Baseline creatinine is 1.5-1.8. Most recent creatinine and eGFR are listed below. No signs of fluid overload. With metabolic acidosis, bicarb 13 in ED.     Recent Labs     06/25/25  1100 06/25/25 2016 06/26/25  0240   CREATININE 8.0* 6.5* 5.8*  5.8*   EGFRNORACEVR 7* 9* 10*  10*   Urine creatinine 142  Urine protein 78  Urine protein/ cr ratio 0.56  Urine urea nitrogen 319  Urine potassium 34     Plan  - ANTOINE is improving   - Avoid nephrotoxins and renally dose meds for GFR listed above  - Monitor urine output, serial BMP, and adjust therapy as needed  - cannot give oral bicarb on sevelamer 2/2 aspiration risk   - F/u renal US to r/o obstructive cause of ANTOINE  - consulted nephrology  - f/u urine labs  - holding home gabapentin and amlodipine-benzepril  - IVF resucitation      Hypovolemic shock  Vitals:    06/25/25 1600 06/25/25 1645 06/25/25 1700 06/25/25 1800   BP: (!) 101/59 (!) 90/55 (!) 84/51 (!) 89/52    06/25/25 1853 06/25/25 1957 06/25/25 2055 06/25/25 2349   BP: (!) 106/55 (!) 84/52 (!) 113/56 104/64    06/26/25 0509 06/26/25 0838   BP: (!) 93/59 (!) 92/55     Presented with BP  87/55 responding with 2 LR fluid resuscitation. Lactic acid wnl. Likely 2/2 to poor fluid tolerance 2/2 esophageal adenocarcinoma. Previous EGD in April 2025 with partial obstruction. States that placing an esophageal stent with restrictions of types of foods he would be able to eat is not inline with his goals. He is more amendable to PEG placement to provide extra support for fluids and nutrition. Do not suspect septic shock at this time as no findings on CT chest A/P. No fevers. Wbcs wnl.     - GI unable to place PEG 2/2 partial obstruction of the esophagus.  - IR unable to advance NGT for procedure 2/2 partial obstruction.   - Will consult surgical  oncology for consultation of PEG tube   - will attempt following resolution of acute renal failure and if BP wnl     - IVF maintenance fluids with IVF bolus prn  - F/u BC's     Antineoplastic chemotherapy induced pancytopenia  Anemia is likely due to chronic disease due to Malignancy and drug toxicity from chemotherapy. Most recent hemoglobin and hematocrit are listed below.  Baseline 11.2. Likely 2/2 chemo. Less likely GIB. He has noted ongoing abdominal pain with associated belching. No hematochezia/melena. ED performed MONTRELL negative negative guaiac fecal occult blood test.     Recent Labs     06/25/25  0910 06/25/25  1100   HGB 8.8* 9.6*   HCT 25.9* 28.7*     Plan  - Monitor serial CBC: Daily  - Transfuse PRBC if patient becomes hemodynamically unstable, symptomatic or H/H drops below 7/21.  - Patient has not received any PRBC transfusions to date  - Patient's anemia is currently stable  - holding anticoaugulation for now will re-evaluate cbc tomorrow   - f/u iron studies       Esophageal adenocarcinoma  69M with esophageal adenocarcinoma initially presenting with dysphagia (improved post-EUS dilation). Biopsy confirmed poorly differentiated adenocarcinoma, pMMR, HER2+, PD-L1 0. Received neoadjuvant carboplatin/paclitaxel + radiation (non-surgical). Imaging showed stable disease. Recurrent tumor confirmed on EGD (12/2024); started FOLFOX + trastuzumab, with stable imaging after 4 cycles. After 6 cycles, developed worsening dysphagia due to local tumor progression. Declined stent/PEG at last hospitalization. Switched to trastuzumab deruxtecan (TDxD). Prior admission presented for cycle 4 with significant dysphagia, poor PO intake, Cr 7.7, hypotension, weakness. Treatment held, sent to ED. Previous EGD in April 2025 with partial obstruction. States that placing an esophageal stent with restrictions of types of foods he would be able to eat is not inline with his goals. He is more amendable to PEG placement to  provide extra support for fluids and nutrition.     - likely causing poor po intake > hypovolemic shock > acute renal failure  - Surgical oncology recommending strict NPO due to his report that he cannot tolerate anything by mouth due to the aspiration risk. We would not consider any intervention until his hypovolemia and acute renal failure are addressed.     - f/u nutrition RD recommendations may consider TPN while waiting for PEG tube          Diarrhea  Reports onset of loose watery diarrhea since previous chemotherapy treatment. Possibly related to chemotherapy. Likely contributed to hypovolemic shock and acute renal failure.     - f/u stool culture  - f/u cdiff panel       Transaminitis  S/p kaleb. No findings supportive of liver/biliary pathology on imaging. GGT elevated as well. Likely shock liver. Will continue to monitor CMP.     Hyperphosphatemia  Patient's most recent phosphorus results are listed below.   Recent Labs     06/25/25  1100   PHOS 5.7*       Plan  - Patient's hyperphosphatemia is stable  - see acute renal failure     Weakness  2/2 poor po intake, hypovolemia.      PT/OT    Esophageal dysphagia  See esophageal carcinoma    Obstruction of esophagus  See esophageal adenocarcinoma               Ricardo Mcghee MD  Hematology/Oncology  [facility] - Oncology ([facility])       Ambulatory

## 2025-06-26 NOTE — PROGRESS NOTES
Admit Assessment    Patient Identification  Jone Lugo   :  1955  Admit Date:  2025  Attending Provider:  Maddison Weston MD              Referral:   Pt was admitted to medical oncology with a diagnosis of Acute renal failure, and Esophageal cancer stage III. He was admitted this hospital stay due to Screening for cardiovascular condition [Z13.6]  Chest pain [R07.9]  Acute prerenal azotemia [N19]  Hypotension, unspecified hypotension type [I95.9]  Renal failure, unspecified chronicity [N19].   is involved was and referred to the Social Work Department via (routine referral).  Patient presents as a 69 y.o. year old not  male.    Persons interviewed and answered all questions appropriately.  Patient's permission: unknown.  If Without, Explanation.    Sw'er met with patient @ bedside to complete Sw'er admit assessment.  Harris Mixon, friend, @ bedside.  Patient is aao x4 and lives alone.  Patient was transported to hospital by uber and will need a ride home.  Patient has no mobility devices but has a built in shower bench and grab bars in the shower and by the toilet seat.  Patient stated that he use to enjoy working on an old car that he owns, but now he is unable to work on it.  Patient reports no concerns or needs @ time of assessment.    Living Situation:      Resides at [address] [place] [address], phone: [phone]      (RETIRED) Functional Status Prior  Ambulation Prior: 0-->independent  Transferrin-->independent  Toiletin-->independent    Current or Past Agencies and Description of Services/Supplies    DME  Agency Name: n/a  Agency Phone Number: n/a  Equipment Currently Used at Home:  (walk in shower, built in bench, grab bars in tub and by toilet)    Home Health  Agency Name: n/a  Agency Phone Number: n/a  Services: n/a    IV Infusion  Agency Name: n/a  Agency Phone Number: n/a    Nutrition:   Diet per protocol  "by Nutritionist    Outpatient Pharmacy:     [facility]/pharmacy #[id number] - [place], [place] - [address]  [address] [address]  Phone: [phone] Fax: [phone]      Patient Preference of agencies include:   [facility].    Patient/Caregiver informed of right to choose providers or agencies.  Patient provides permission to release any necessary information to [facility] and to Non[facility] agencies as needed to facilitate patient care, treatment planning, and patient discharge planning.  Written and verbal resources provided. 'er will continue to follow and assist as needed.      Coping:  Patient is coping as well as to be expected.        Catholic Preference:  Orthodoxy    Adjustment to Diagnosis and Treatment  Patient states, "I don't have a choice".    Emotional/Behavioral/Cognitive Issues     Patient emotions are in tact but affect is flat.  Patient's appetite is poor and he is very weak.  No changes in his cognition.        History/Current Symptoms of Anxiety/Depression: No   History/Current Substance Use: No  Social History     Tobacco Use    Smoking status: Every Day     Current packs/day: 1.00     Types: Cigarettes    Smokeless tobacco: Never   Substance and Sexual Activity    Alcohol use: Yes     Alcohol/week: 1.0 standard drink of alcohol     Types: 1 Glasses of wine per week     Comment: rarely    Drug use: Never    Sexual activity: Not on file       Indications of Abuse/Neglect: No  Abuse Screen (yes response referral indicated)  Feels Unsafe at Home or Work/School: no  Feels Threatened by Someone: no  Does anyone try to keep you from having contact with others or doing things outside your home?: no  Physical Signs of Abuse Present: no    Financial:  Payer/Plan Subscr  Sex Relation Sub. Ins. ID Effective Group Num   1. MEDICARE - ME* GABRIEL CARTER OSCAR* 1955 Male Self [id number] 20                                    PO    2. [facility]* AGBRIEL CARTER " OSCAR* 1955 Male Self [id number] 8/22/23                                    PO                             Other identified concerns/needs:    Plan:    Interventions/Referrals:   Not at time of Sw'er admit assessment.    Patient/caregiver engaged in treatment planning process.     providing psychosocial and supportive counseling, resources, education, assistance and discharge planning as appropriate.  Patient/caregiver state understanding of  available resources,  following, and remains available for any further concerns.    YANELIS Hopson, LMSW  [facility] - Oncology Dept  Email:[email or web address]  Office ph# [phone]

## 2025-06-26 NOTE — PROGRESS NOTES
"Anupam Alcantara - Medical / Clinical  Nephrology  CONSULT Note      Patient Name: Jone Lugo   MRN: [id number]   Current Provider: Maddison Weston MD  Primary Care Provider: [place], Primary Doctor   Admission Date: 6/25/2025   Hospital Day: 1  Bed: 855/855 A  Principal Problem: Acute renal failure       SUBJECTIVE    Jone Lugo is a 69 y.o. male ,is admitted on 6/25/2025  with past medical history of   esophageal adenocarcinoma stage III, smoking    Admitted with abnormal vitals including hypotension from the chemotherapy center.     Last chemo was 3 weeks ago - since then not feeling well. Has low oral intake and not drinking enough. He has watery stools, passed 5 times yesterday. Took imodium. No stool today. Stool sample sent to RO C-diff.     This morning while  went for chemo he had low BP so he was sent for sepsis evaluation. In ED his BP was low (86) was given 2L IVF bolus and bladder scan showed 400mL. Sepsis workup done and was given vanco on 6/25. Blood Cx sent    Nephrology consulted for  ANTOINE 3 in setting of low PO intake, hypotension (lowest SBP 86), diarrhea. Also he received vancomycine on 6/26 and had urine retension of 400 mL with out feeling of passing urine.       OBJECTIVE     Vitals:  BP (!) 95/54 (BP Location: Left arm, Patient Position: Lying) Comment: MD notified  Pulse 87   Temp 98.3 °F (36.8 °C) (Oral)   Resp 20   Ht 5' 7" (1.702 m)   Wt 55.8 kg (123 lb)   SpO2 (!) 94%   BMI 19.26 kg/m²    I/O last 3 completed shifts:  In: 2250 [IV Piggyback:2250]  Out: 155 [Urine:155]   Net IO Since Admission: 5,540.81 mL [06/26/25 1723]    Physical Examination:  HEENT: NC.   Neck: atrumatic  Heart: audible heart sounds, no edema  Lungs: CTAB, no w/r/r    MEDICATIONS & LABS       sodium bicarbonate 150 mEq in D5W 1,000 mL infusion   Intravenous Continuous 100 mL/hr at 06/26/25 1425 New Bag at 06/26/25 1425    Standard Custom Day One ADULT TPN for patient WITH electrolyte abnormality or renal " "dysfunction (CENTRAL)   Intravenous Continuous          heparin (porcine)  5,000 Units Subcutaneous Q8H    lactated ringers  1,000 mL Intravenous Once    nicotine  1 patch Transdermal Daily    pantoprazole  80 mg Intravenous Daily     Current Outpatient Medications   Medication Instructions    amlodipine-benazepril 10-20mg (LOTREL) 10-20 mg per capsule 1 capsule, Daily    azelastine (ASTELIN) 137 mcg (0.1 %) nasal spray 2 sprays    dexAMETHasone (DECADRON) 8 mg, Oral, Daily, On days 2-4 of each chemotherapy cycle.    etodolac (LODINE) 400 MG tablet 1 tablet, 2 times daily    fluticasone propionate (FLONASE) 50 mcg/actuation nasal spray 2 sprays    gabapentin (NEURONTIN) 300 mg    LIDOcaine-prilocaine (EMLA) cream Topical (Top), As needed (PRN)    meclizine (ANTIVERT) 25 mg, Oral, 3 times daily PRN    multivitamin with minerals tablet 1 tablet, Daily    OLANZapine (ZYPREXA) 5 MG tablet Take 1 tab at nighttime on nights 1 thru 3 of each chemotherapy cycle.    omeprazole (PRILOSEC) 40 MG capsule 1 capsule, Every morning    ondansetron (ZOFRAN) 8 mg, Oral, Every 8 hours PRN    pravastatin (PRAVACHOL) 40 mg    TURMERIC ORAL Take by mouth.       Relevant Data:  Trend: No results found for: "CYSTATINCSER"  Trend:   Lab Results   Component Value Date    BUN 64 (H) 06/26/2025    BUN 68 (H) 06/26/2025    BUN 68 (H) 06/26/2025     Trend:     POC HCO3   Date Value Ref Range Status   06/25/2025 18.6 (L) 24.0 - 28.0 mmol/l Final     Comment:     Value below reference range     Trend:   Vitals:    06/26/25 1201 06/26/25 1404 06/26/25 1444 06/26/25 1624   BP:    (!) 95/54   BP Location:    Left arm   Patient Position:    Lying   Pulse:   84 87   Resp:    20   Temp:    98.3 °F (36.8 °C)   TempSrc:    Oral   SpO2: 95% 95%  (!) 94%   Weight:       Height:           I/O last 3 completed shifts:  In: 2250 [IV Piggyback:2250]  Out: 155 [Urine:155]   Net IO Since Admission: 5,540.81 mL [06/26/25 1723]  Trend: No results found for: " ""PHOSPHORUS"  Trend:   Lab Results   Component Value Date    HGB 8.2 (L) 06/26/2025    HGB 7.8 (L) 06/25/2025    HGB 9.6 (L) 06/25/2025         Trend:   Calcium   Date Value Ref Range Status   06/26/2025 8.7 8.7 - 10.5 mg/dL Final   06/26/2025 8.8 8.7 - 10.5 mg/dL Final   06/26/2025 8.8 8.7 - 10.5 mg/dL Final     Albumin   Date Value Ref Range Status   06/26/2025 1.6 (L) 3.5 - 5.2 g/dL Final   06/26/2025 1.8 (L) 3.5 - 5.2 g/dL Final   06/26/2025 1.8 (L) 3.5 - 5.2 g/dL Final       MEDICAL HISTORY    Past Medical History:  Past Medical History:   Diagnosis Date    HTN (hypertension)        Past Surgical History:  Past Surgical History:   Procedure Laterality Date    ESOPHAGOGASTRODUODENOSCOPY N/A 12/19/2024    Procedure: EGD (ESOPHAGOGASTRODUODENOSCOPY);  Surgeon: Pete Buenrostro MD;  Location: [facility] ([facility]  Service: Endoscopy;  Laterality: N/A;  Medically Urgent      12/16 ref by Sahil Singer MD, [facility]  12/18 - pre-call complete; MB    ESOPHAGOGASTRODUODENOSCOPY N/A 4/11/2025    Procedure: EGD (ESOPHAGOGASTRODUODENOSCOPY);  Surgeon: Paxton Jurado MD;  Location: [facility] ([facility]);  Service: Endoscopy;  Laterality: N/A;  ref by  Sahil Singer MD, 2nd floor due to availability- portal -ml    INSERTION OF TUNNELED CENTRAL VENOUS CATHETER (CVC) WITH SUBCUTANEOUS PORT Right 1/27/2025    Procedure: INSERTION, SINGLE LUMEN CATHETER WITH PORT, WITH FLUOROSCOPIC GUIDANCE, left poss right;  Surgeon: Chadd Velez MD;  Location: [facility] OR [address] [place] [address];  Service: General;  Laterality: Right;    LAPAROSCOPIC REPAIR OF INGUINAL HERNIA Right        Family History:   No family history on file.   Review of patient's allergies indicates:  No Known Allergies  Social History[1]         ASSESSMENT AND PLAN:    Jone Lugo is a 69 y.o. male ,is admitted on 6/25/2025  with past medical history of    esophageal adenocarcinoma stage III, smoking.    Admitted with hypotension from " chemotherapy unit.    Nephrology  is consulted for ANTOINE 3 in setting of low PO intake, hypotension (lowest SBP 86), diarrhea. Also he received vancomycine on 6/26 and had urine retension of 400 mL with out feeling of passing urine. Sepsis work up sent. He received 2L of IVF bolus      Impression:  ANTOINE KDIGO stage 3  Baseline Creatinine: 1.1-1.3  Creatinine at time of consult: 8  Etiology of ANTOINE: Likely pre-renal from low PO intake, diarrhea leading to hypotension. Secondary factor could be Vancomycin    6/26: Cr improved to 4.6 from 8 with IV fluid replacement.    Recommendations :   - Isotonic bicarbonate (150mEq in 1L) at rate of 100cc/hr for 10 hrs   - Encourage oral hydration    - No urgent indications for dialysis at this time - Consent for dialysis taken    Monitor serum chemistries daily, strict intake and output Qshift , daily weights if able.  Renal protective measures: Please adjust medications for reduced clearance  Avoid nephrotoxic medications (NSAID, IV contrast)  Avoid ACEi and ARBs in the setting of ANTOINE  Maintain MAP > 65    Transfuse for Hb <7    Thank you for allowing us to participate in the care of this patient.  Plan discussed with attending. Please call with any questions or concerns.     Jay Juarez MD.  Clinical Nephrology Fellow, PGY-4  [facility] [address]          [1]   Social History  Socioeconomic History    Marital status: Single   Tobacco Use    Smoking status: Every Day     Current packs/day: 1.00     Types: Cigarettes    Smokeless tobacco: Never   Substance and Sexual Activity    Alcohol use: Yes     Alcohol/week: 1.0 standard drink of alcohol     Types: 1 Glasses of wine per week     Comment: rarely    Drug use: Never     Social Drivers of Health     Financial Resource Strain: Patient Declined (6/25/2025)    Overall Financial Resource Strain (CARDIA)     Difficulty of Paying Living Expenses: Patient declined   Food Insecurity: Patient Declined (6/25/2025)     Hunger Vital Sign     Worried About Running Out of Food in the Last Year: Patient declined     Ran Out of Food in the Last Year: Patient declined   Transportation Needs: Patient Declined (6/25/2025)    PRAPARE - Transportation     Lack of Transportation (Medical): Patient declined     Lack of Transportation (Non-Medical): Patient declined   Physical Activity: Unknown (4/7/2025)    Received from [facility]    Exercise Vital Sign     Days of Exercise per Week: Patient declined   Recent Concern: Physical Activity - Insufficiently Active (3/17/2025)    Exercise Vital Sign     Days of Exercise per Week: 2 days     Minutes of Exercise per Session: 20 min   Stress: Patient Declined (6/25/2025)    [place] [employer] - Occupational Stress Questionnaire     Feeling of Stress : Patient declined   Housing Stability: Patient Declined (6/25/2025)    Housing Stability Vital Sign     Unable to Pay for Housing in the Last Year: Patient declined     Number of Times Moved in the Last Year: 0     Homeless in the Last Year: Patient declined

## 2025-06-26 NOTE — SUBJECTIVE & OBJECTIVE
Interval History: No acute events overnight. Some soft BP but these seem to be fluid responsive. Kidney function improving. Is having some pain with laying the bed and has noticed significant weakness.     Medications:  Continuous Infusions:   sodium bicarbonate 150 mEq in D5W 1,000 mL infusion   Intravenous Continuous 100 mL/hr at 06/26/25 1425 New Bag at 06/26/25 1425    Standard Custom Day One ADULT TPN for patient WITH electrolyte abnormality or renal dysfunction ([facility])   Intravenous Continuous         Scheduled Meds:   heparin (porcine)  5,000 Units Subcutaneous Q8H    nicotine  1 patch Transdermal Daily    pantoprazole  80 mg Intravenous Daily     PRN Meds:  Current Facility-Administered Medications:     acetaminophen, 650 mg, Oral, Q4H PRN    dextrose 50%, 12.5 g, Intravenous, PRN    dextrose 50%, 25 g, Intravenous, PRN    glucagon (human recombinant), 1 mg, Intramuscular, PRN    glucose, 16 g, Oral, PRN    glucose, 24 g, Oral, PRN    naloxone, 0.02 mg, Intravenous, PRN    ondansetron, 4 mg, Intravenous, Q8H PRN    sodium chloride 0.9%, 10 mL, Intravenous, Q12H PRN     Review of patient's allergies indicates:  No Known Allergies  Objective:     Vital Signs (Most Recent):  Temp: 98 °F (36.7 °C) (06/26/25 1118)  Pulse: 84 (06/26/25 1444)  Resp: 20 (06/26/25 1118)  BP: (!) 92/52 (06/26/25 1118)  SpO2: 95 % (06/26/25 1404) Vital Signs (24h Range):  Temp:  [98 °F (36.7 °C)-98.8 °F (37.1 °C)] 98 °F (36.7 °C)  Pulse:  [75-93] 84  Resp:  [18-20] 20  SpO2:  [91 %-100 %] 95 %  BP: ()/(51-64) 92/52     Weight: 55.8 kg (123 lb)  Body mass index is 19.26 kg/m².    Intake/Output - Last 3 Shifts         06/24 0700  06/25 0659 06/25 0700 06/26 0659 06/26 0700 06/27 0659    P.O.   0    I.V. (mL/kg)   2411.9 (43.2)    IV Piggyback  2250 1033.9    Total Intake(mL/kg)  2250 (40.3) 3445.8 (61.8)    Urine (mL/kg/hr)  155     Stool  0     Total Output  155     Net  +2095 +3445.8           Unmeasured Urine Occurrence  1 x  2 x    Unmeasured Stool Occurrence  0 x 2 x             Physical Exam  Vitals and nursing note reviewed.   Constitutional:       General: He is not in acute distress.  HENT:      Head: Normocephalic and atraumatic.      Mouth/Throat:      Mouth: Mucous membranes are moist.   Cardiovascular:      Rate and Rhythm: Normal rate and regular rhythm.   Pulmonary:      Effort: Pulmonary effort is normal. No respiratory distress.   Abdominal:      General: There is no distension.      Palpations: Abdomen is soft.      Tenderness: There is no abdominal tenderness.   Skin:     General: Skin is warm and dry.   Neurological:      General: No focal deficit present.      Mental Status: He is alert and oriented to person, place, and time.          Significant Labs:  I have reviewed all pertinent lab results within the past 24 hours.  CBC:   Recent Labs   Lab 06/26/25  0240   WBC 8.90   RBC 2.60*   HGB 8.2*   HCT 23.2*      MCV 89   MCH 31.5*   MCHC 35.3     CMP:   Recent Labs   Lab 06/26/25  0240 06/26/25  1217   GLU 90  90 91   CALCIUM 8.8  8.8 8.7   ALBUMIN 1.8*  1.8* 1.6*   PROT 5.1*  --      137 137   K 3.8  3.8 4.1   CO2 13*  13* 14*     108 111*   BUN 68*  68* 64*   CREATININE 5.8*  5.8* 4.6*   ALKPHOS 457*  --    ALT 81*  --    AST 47*  --    BILITOT 0.6  --        Significant Diagnostics:  I have reviewed all pertinent imaging results/findings within the past 24 hours.

## 2025-06-26 NOTE — PLAN OF CARE
Recommendations    1. Recommend renal diet when diet is advanced and clinically indicated     - please continue to document PO % intake via flowsheets     2. Encourage good intake    3. RD to monitor weight, labs, intake, tolerance    Goals:   1. Provide nutrition within 48 hrs      2. Maintain weight during admission    Nutrition Goal Status: new  Communication of RD Recs:  (POC)    Nutrition Discharge Planning     Nutrition Discharge Planning: Too early to determine, pending clinical course

## 2025-06-26 NOTE — PT/OT/SLP EVAL
Occupational Therapy  Co- Evaluation/tx    Name: Jone Lugo  MRN: [id number]  Admitting Diagnosis: Acute renal failure  Recent Surgery: * No surgery found *      Recommendations:     Discharge Recommendations: Low Intensity Therapy  Discharge Equipment Recommendations:  none  Barriers to discharge:  None    Assessment:     Jone Lugo is a 69 y.o. male with a medical diagnosis of Acute renal failure.  He presents with deficits in mobility and ADL task performance. Pt. Reports having difficulty with diarrhea for a few days. Patient would benefit from continued OT services to maximize level of safety and independence with self-care tasks.   . Performance deficits affecting function: weakness, impaired endurance, impaired self care skills, impaired functional mobility, gait instability.    Co-treatment performed due to patient's multiple deficits requiring two skilled therapists  to appropriately and safely assess patient's strength and endurance while facilitating functional tasks in addition to accommodating for patient's activity tolerance.     Rehab Prognosis: Good; patient would benefit from acute skilled OT services to address these deficits and reach maximum level of function.       Plan:     Patient to be seen 3 x/week to address the above listed problems via self-care/home management, therapeutic activities, therapeutic exercises  Plan of Care Expires: 07/10/25  Plan of Care Reviewed with: patient    Subjective     Chief Complaint: Pain in buttocks/diarrhea  Patient/Family Comments/goals: to get better    Occupational Profile:  Living Environment: pt. Resides in 2 story house with full bath and bedroom on second floor. Pt. Has 6 steps to enter house with LHR in back (which is the entrance he utilizes). There are 19 steps to the second floor with LHR . Pt. Has a WIS with a bench and grab bar  Previous level of function: Pt. 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  Patient found right sidelying with peripheral IV, telemetry upon OT entry to room.    General Precautions: Standard, fall, special contact, NPO  Orthopedic Precautions: N/A  Braces: N/A  Respiratory Status: Room air    Occupational Performance:    Bed Mobility:    Patient completed Supine to Sit with modified independence    Functional Mobility/Transfers:  Patient completed Sit <> Stand Transfer with stand by assistance  with  no assistive device   Patient completed Toilet Transfer Step Transfer technique with stand by assistance with  no AD  Functional Mobility: Pt. Ambulated to and from doorway pushing IV pole with SBA    Activities of Daily Living:  Toileting: stand by assistance with use of bedside commode 2/2 urgency    Cognitive/Visual Perceptual:  Cognitive/Psychosocial Skills:     -       Oriented to: Person, Place, Time, and Situation   -       Follows Commands/attention:Follows multistep  commands  -       Communication: clear/fluent  -       Memory: No Deficits noted  -       Safety awareness/insight to disability: intact   -       Mood/Affect/Coping skills/emotional control: Appropriate to situation  Visual/Perceptual:      -Intact      Physical Exam:  Balance: -       sit: good; stand: SBA  Postural examination/scapula alignment:    -       Rounded shoulders  Upper Extremity Range of Motion:     -       Right Upper Extremity: WFL  -       Left Upper Extremity: WFL   Strength:    -        Right Upper Extremity: WFL  -       Left Upper Extremity: WFL    AMPAC 6 Click ADL:  AMPAC Total Score: 21    Treatment & Education:  Pt. Educated on role of OT and pOC  Pt. Encouraged to sit up in chair and instructed on need for staff assist for all mobility    Patient left up in chair with all lines intact, call button in reach, and BLE elevated    GOALS:   Multidisciplinary Problems       Occupational Therapy Goals          Problem: Occupational Therapy    Goal Priority Disciplines Outcome Interventions   Occupational Therapy Goal     OT, PT/OT Progressing    Description: Goals to be met by: 07-10-25     Patient will increase functional independence with ADLs by performing:    LE Dressing with Modified [place].  Grooming while standing at sink with Modified [place].  Toileting from toilet with Modified [place] for hygiene and clothing management.   Step transfer with Modified [place]  Toilet transfer to toilet with Modified [place].                         DME Justifications:  No DME recommended requiring DME justifications    History:     Past Medical History:   Diagnosis Date    HTN (hypertension)          Past Surgical History:   Procedure Laterality Date    ESOPHAGOGASTRODUODENOSCOPY N/A 12/19/2024    Procedure: EGD (ESOPHAGOGASTRODUODENOSCOPY);  Surgeon: Pete Buenrostro MD;  Location: [facility] (4TH FLR);  Service: Endoscopy;  Laterality: N/A;  Medically Urgent      12/16 ref by Sahil Singer MD, [place]-  12/18 - pre-call complete; MB    ESOPHAGOGASTRODUODENOSCOPY N/A 4/11/2025    Procedure: EGD (ESOPHAGOGASTRODUODENOSCOPY);  Surgeon: Paxton Jurado MD;  Location: [facility] (2ND FLR);  Service: Endoscopy;  Laterality: N/A;  ref by  Sahil Singer MD, 2nd floor due to availability- [place] -    INSERTION OF TUNNELED CENTRAL VENOUS CATHETER (CVC) WITH SUBCUTANEOUS PORT Right 1/27/2025    Procedure: INSERTION, SINGLE LUMEN CATHETER WITH PORT, WITH  FLUOROSCOPIC GUIDANCE, left poss right;  Surgeon: Chadd Velez MD;  Location: [facility] OR [address] [place] [address];  Service: General;  Laterality: Right;    LAPAROSCOPIC REPAIR OF INGUINAL HERNIA Right        Time Tracking:     OT Date of Treatment: 06/26/25  OT Start Time: 0915  OT Stop Time: 0948  OT Total Time (min): 33 min    Billable Minutes:Evaluation 18  Self Care/Home Management 15    6/26/2025

## 2025-06-26 NOTE — PLAN OF CARE
Problem: Occupational Therapy  Goal: Occupational Therapy Goal  Description: Goals to be met by: 07-10-25     Patient will increase functional independence with ADLs by performing:    LE Dressing with Modified [place].  Grooming while standing at sink with Modified [place].  Toileting from toilet with Modified [place] for hygiene and clothing management.   Step transfer with Modified [place]  Toilet transfer to toilet with Modified [place].    Outcome: Progressing

## 2025-06-26 NOTE — PLAN OF CARE
Pt is AAOx4. Patient with hx of esophageal cancer st.3, on chemo (FOLFOX)- last tx 6/4/25. Pt c/o weakness, dysphagia ,and hypotension. Pt is NPO. K riders x2 IVPB and mg run x1 IVPB for hypokalemia. Safety rounds q1hr, urinal in reach, Call bell in reach. Pt refused bed alarm. Pt instructed on importance. Pt verbalized understanding. 1 loose BM noted this shift. Right CW PORT accessed.

## 2025-06-26 NOTE — CONSULTS
"  [facility] ([facility])  Adult Nutrition  Consult Note    SUMMARY     Recommendations    1. Recommend renal diet when diet is advanced and clinically indicated     - please continue to document PO % intake via flowsheets     2. Encourage good intake    3. RD to monitor weight, labs, intake, tolerance    Goals:   1. Provide nutrition within 48 hrs      2. Maintain weight during admission    Nutrition Goal Status: new  Communication of RD Recs:  (POC)    Nutrition Discharge Planning     Nutrition Discharge Planning: Too early to determine, pending clinical course    Reason for Assessment    Reason For Assessment: low BMI, consult  Diagnosis: renal disease  General Information Comments: Pt admitted for acute renal failure. PMHx HTN, dysphagia. No wounds or edema noted. Attempted to see pt twice but pt with medical staff. Pt currently NPO. Per MD note "Last chemo was 3 weeks ago - since then not feeling well. Has low oral intake and not drinking enough. He has watery stools, passed 5 times yesterday. Took imodium. No stool today" Per chart review, wt loss noted of 27.2% x 1 yr. NFPE to be performed at f/u.     Nutrition/Diet History    Spiritual, Cultural Beliefs, Confucianism Practices, Values that Affect Care: no  Factors Affecting Nutritional Intake: decreased appetite, diarrhea  Nutrition-related SDOH: Unable to assess at this time    Anthropometrics    Height: 5' 7" (170.2 cm)  Height (inches): 67 in  Height Method: Stated  Weight: 55.8 kg (123 lb)  Weight (lb): 123 lb  Weight Method: Standard Scale  Ideal Body Weight (IBW), Male: 148 lb  % Ideal Body Weight, Male (lb): 83.11 %  BMI (Calculated): 19.3  BMI Grade: less than 23 (older than 65 years) - underweight    Lab/Procedures/Meds    Pertinent Labs Reviewed: reviewed  Pertinent Labs Comments: Cl 111, BUN 64, Cr 4.6, GFR 13, , protein total 5.1, Albumin 1.6, AST 47, ALT 81  Pertinent Medications Reviewed: reviewed  Pertinent Medications Comments: " Heparin, lactated ringers bolus, pantoprazole sodium bicarbonate    Estimated/Assessed Needs    Weight Used For Calorie Calculations: 55.8 kg (123 lb 0.3 oz)  Energy Calorie Requirements (kcal): 2498-0557 kcal (30-35 kcal/kg)  Energy Need Method: [place]-St Jeor  Protein Requirements: 56-67 g/pro (1.0-1.2 g/kg)  Weight Used For Protein Calculations: 55.8 kg (123 lb 0.3 oz)    RDA Method (mL): 1674  CHO Requirement: 209-244    Nutrition Prescription Ordered    Current Diet Order: NPO    Evaluation of Received Nutrient/Fluid Intake    I/O: +5,540.8  Energy Calories Required: not meeting needs  Protein Required: not meeting needs  Fluid Required:  (per MD)  Comments: LBM 6/26  Tolerance: not tolerating  % Intake of Estimated Energy Needs: 0 - 25 %  % Meal Intake: NPO    PES Statement  Inadequate energy intake related to Acute illness as evidenced by NPO/CL status due to medical condition  Status: New    Nutrition Risk    Level of Risk/Frequency of Follow-up: moderate - high (1-2/wk)       Monitor and Evaluation    Monitor and Evaluation: Skin, Nutrition focused physical findings, Lipid profile, Inflammatory profile, Glucose/endocrine profile, Gastrointestinal profile, Electrolyte and renal panel, Beliefs and attitudes, Weight       Nutrition Follow-Up    RD Follow-up?: Yes

## 2025-06-26 NOTE — PROGRESS NOTES
[facility] - Oncology ([facility]  General Surgery  Progress Note    Subjective:     History of Present Illness:  Jone Lugo is a 69yoM s/p definitive chemoradiotherapy for esophageal adenocarcinoma now found with persistent/local recurrence. He is established with Dr. Singer and is planning for initiation of FOLFOX + trastuzumab presented for a heme/onc appointment today and was severely dizzy and found to be hypotensive. Per patient his dysphagia has been worsening and the past three days has not been able to tolerate solids or liquids by mouth. He was sent to the ED and found to be in acute renal failure likely due to dehydration from poor PO intake. Surgical oncology consulted for possible procedure for enteral access.     Post-Op Info:  * No surgery found *         Interval History: No acute events overnight. Some soft BP but these seem to be fluid responsive. Kidney function improving. Is having some pain with laying the bed and has noticed significant weakness.     Medications:  Continuous Infusions:   sodium bicarbonate 150 mEq in D5W 1,000 mL infusion   Intravenous Continuous 100 mL/hr at 06/26/25 1425 New Bag at 06/26/25 1425    Standard Custom Day One ADULT TPN for patient WITH electrolyte abnormality or renal dysfunction ([facility]AL)   Intravenous Continuous         Scheduled Meds:   heparin (porcine)  5,000 Units Subcutaneous Q8H    nicotine  1 patch Transdermal Daily    pantoprazole  80 mg Intravenous Daily     PRN Meds:  Current Facility-Administered Medications:     acetaminophen, 650 mg, Oral, Q4H PRN    dextrose 50%, 12.5 g, Intravenous, PRN    dextrose 50%, 25 g, Intravenous, PRN    glucagon (human recombinant), 1 mg, Intramuscular, PRN    glucose, 16 g, Oral, PRN    glucose, 24 g, Oral, PRN    naloxone, 0.02 mg, Intravenous, PRN    ondansetron, 4 mg, Intravenous, Q8H PRN    sodium chloride 0.9%, 10 mL, Intravenous, Q12H PRN     Review of patient's allergies indicates:  No Known Allergies  Objective:      Vital Signs (Most Recent):  Temp: 98 °F (36.7 °C) (06/26/25 1118)  Pulse: 84 (06/26/25 1444)  Resp: 20 (06/26/25 1118)  BP: (!) 92/52 (06/26/25 1118)  SpO2: 95 % (06/26/25 1404) Vital Signs (24h Range):  Temp:  [98 °F (36.7 °C)-98.8 °F (37.1 °C)] 98 °F (36.7 °C)  Pulse:  [75-93] 84  Resp:  [18-20] 20  SpO2:  [91 %-100 %] 95 %  BP: ()/(51-64) 92/52     Weight: 55.8 kg (123 lb)  Body mass index is 19.26 kg/m².    Intake/Output - Last 3 Shifts         06/24 0700  06/25 0659 06/25 0700  06/26 0659 06/26 0700  06/27 0659    P.O.   0    I.V. (mL/kg)   2411.9 (43.2)    IV Piggyback  2250 1033.9    Total Intake(mL/kg)  2250 (40.3) 3445.8 (61.8)    Urine (mL/kg/hr)  155     Stool  0     Total Output  155     Net  +2095 +3445.8           Unmeasured Urine Occurrence  1 x 2 x    Unmeasured Stool Occurrence  0 x 2 x             Physical Exam  Vitals and nursing note reviewed.   Constitutional:       General: He is not in acute distress.  HENT:      Head: Normocephalic and atraumatic.      Mouth/Throat:      Mouth: Mucous membranes are moist.   Cardiovascular:      Rate and Rhythm: Normal rate and regular rhythm.   Pulmonary:      Effort: Pulmonary effort is normal. No respiratory distress.   Abdominal:      General: There is no distension.      Palpations: Abdomen is soft.      Tenderness: There is no abdominal tenderness.   Skin:     General: Skin is warm and dry.   Neurological:      General: No focal deficit present.      Mental Status: He is alert and oriented to person, place, and time.          Significant Labs:  I have reviewed all pertinent lab results within the past 24 hours.  CBC:   Recent Labs   Lab 06/26/25  0240   WBC 8.90   RBC 2.60*   HGB 8.2*   HCT 23.2*      MCV 89   MCH 31.5*   MCHC 35.3     CMP:   Recent Labs   Lab 06/26/25  0240 06/26/25  1217   GLU 90  90 91   CALCIUM 8.8  8.8 8.7   ALBUMIN 1.8*  1.8* 1.6*   PROT 5.1*  --      137 137   K 3.8  3.8 4.1   CO2 13*  13* 14*      108 111*   BUN 68*  68* 64*   CREATININE 5.8*  5.8* 4.6*   ALKPHOS 457*  --    ALT 81*  --    AST 47*  --    BILITOT 0.6  --        Significant Diagnostics:  I have reviewed all pertinent imaging results/findings within the past 24 hours.  Assessment/Plan:     Esophageal dysphagia  69 year old male with a history of esophageal adenocarcinoma s/p definitive chemoradiation in 2023, now with recurrence and on chemotherapy. Presented to the ED with inability to tolerate PO, hypotension and acute renal failure.     Recommend fluid resuscitation and treatment of his renal failure prior to consideration of procedures such as g tube or stent.   Would avoid oral medications due to his inability to tolerate PO intake. Is still able to manage his own secretions.   Would keep strict NPO at this time due to aspiration risk  Can consider TPN for nutritional support while awaiting decision regarding timing of procedures  Cr improving today. Will continue to follow along.         Regine Khan MD  General Surgery  [facility] - Oncology ([facility])

## 2025-06-26 NOTE — PLAN OF CARE
Patient is AAOx4; no acute events this shift. Up with assist. Afebrile & VSS on room air. C. Diff sample sent and pending. Sodium bicab going @ 100 mL/hr. No PRNs needed. 3L bolus of LR given for low BPs. Electrolytes closely monitored and replaced per PRN scale. Ambulates independently to bathroom; educated on importance of I/O recording. CHG wipes provided and encouraged use. Pt is currently NPO but will start TPN tonight and is voiding without difficulty. Pt remaining free from falls or injury throughout shift; bed locked and in lowest position; call light within reach. POC reviewed with patient at bedside. Patient involved in care. All needs addressed. Frequent rounding performed. Patient is stable at this time.

## 2025-06-26 NOTE — CARE UPDATE
"RAPID RESPONSE NURSE CHART REVIEW        Chart Reviewed: 06/26/2025, 12:59 AM    MRN: [id number]  Bed: 335/855 A    Dx: Acute renal failure    Jone Lugo has a past medical history of HTN (hypertension).    Last VS: /64 (BP Location: Right arm, Patient Position: Lying)   Pulse 90   Temp 98.8 °F (37.1 °C) (Oral)   Resp 18   Ht 5' 7" (1.702 m)   Wt 55.8 kg (123 lb)   SpO2 (!) 93%   BMI 19.26 kg/m²     24H Vital Sign Range:  Temp:  [96.6 °F (35.9 °C)-98.8 °F (37.1 °C)]   Pulse:  [66-90]   Resp:  [12-20]   BP: ()/(32-64)   SpO2:  [93 %-100 %]     Level of Consciousness (AVPU): alert    Recent Labs     06/25/25  0910 06/25/25  1100 06/25/25 2016   WBC 13.51* 12.57 8.45   HGB 8.8* 9.6* 7.8*   HCT 25.9* 28.7* 22.3*   * 482* 392       Recent Labs     06/25/25  0910 06/25/25  1100 06/25/25 2016    138 136   K 3.7 3.6 3.3*    105 106   CO2 13* 13* 15*   BUN 74* 72* 67*   CREATININE 7.7* 8.0* 6.5*   * 117* 81   PHOS  --  5.7* 4.9*   MG  --  1.8  --         Recent Labs     06/25/25  1705   PH 7.393   PCO2 28.8*   PO2 31.4*   HCO3 18.6*        OXYGEN: room air             MEWS score: 1    Rounding completed with charge nurse Kourtney for hypotension reports BP improved, pt on continuous IVF at 100cc/hr. Received 1L fluid bolus x3 earlier during the day. No additional concerns verbalized at this time. Instructed to call [phone] for further concerns or assistance.    MIKKI Benavides RN       "

## 2025-06-26 NOTE — PT/OT/SLP EVAL
Physical Therapy  Co-Evaluation and treatment with OT    Patient Name:  Jone Lugo   MRN:  [id number]    Recommendations:     Discharge Recommendations: Low Intensity Therapy   Discharge Equipment Recommendations: none   Barriers to discharge: Inaccessible home and Decreased caregiver support    Assessment:     Jone Lugo is a 69 y.o. male admitted with a medical diagnosis of Acute renal failure.  He presents with the following impairments/functional limitations: impaired endurance, gait instability, impaired functional mobility, impaired balance, decreased safety awareness pt tolerated treatment well but diarrhea decreased functional mobility. Pt will benefit from skilled PT 2x/wk to progress physically.  Pt is significantly below previous functional level, increased risk of falls and increased burden of care currently. Patient currently demonstrates a need for moderate intensity therapy on a daily basis post acute secondary to a decline in functional status due to illness. Pt came to ED from cancer center with hypotension. Pt has h/o esophageal CA.      Rehab Prognosis: Good; patient would benefit from acute skilled PT services to address these deficits and reach maximum level of function.    Recent Surgery: * No surgery found *      Plan:     During this hospitalization, patient to be seen 2 x/week to address the identified rehab impairments via gait training, therapeutic activities and progress toward the following goals:    Plan of Care Expires:  07/24/25    Subjective     Chief Complaint: pt c/o having diarrhea and pain in his tailbone.   Patient/Family Comments/goals: to have less pain and go home.   Pain/Comfort:  Pain Rating 1:  (pt states pain in buttocks ranges from 2-10)  Pain Addressed 1: Distraction  Pain Rating Post-Intervention 1:  (see above)    Patients cultural, spiritual, Jewish conflicts given the current situation: no    Living Environment:  Pt lives alone in 2 story with B&B  upstairs, (19 steps L handrail) and 6 steps, L handrail to entrance. Pt works full-time co-owning a business.   Prior to admission, patients level of function was Independent.  Equipment used at home:  (walk in shower, built in bench, grab bars in tub and by toilet).  DME owned (not currently used): none.  Upon discharge, patient will have assistance from brother? He lives on the [facility]..    Objective:     Communicated with nurse  prior to session.  Patient found supine with telemetry, peripheral IV  upon PT entry to room.    General Precautions: Standard, fall, special contact (wear mask in room, pt wear mask in álvarez, pt was hands prior to leaving room and upon returning to room.)  Orthopedic Precautions:    Braces:    Respiratory Status: Room air    Exams:  Cognitive Exam:  Patient is oriented to Person, Place, Time, and Situation  RLE ROM: WFL  RLE Strength: WFL  LLE ROM: WFL  LLE Strength: WFL    Functional Mobility:  Bed Mobility:     Rolling Left:  modified independence  Supine to Sit: modified independence    Transfers:     Sit to Stand:  stand by assistance with no AD from bed to/from [facility]    Gait: pt received gait training ~16 ft in the room with SBA and using rolling IV pole for balance.     Balance: pt sat on EOB with supervision.     PT concentrated on BLE MMT, ROM and gait training with evaluation and treatment  Pt white board updated with current therapists name and level of mobility assistance needed.         AM-PAC 6 CLICK MOBILITY  Total Score:20       Treatment & Education:  Pt received verbal instructions in role of PT, POC and mobilization with hospital staff only. Pt verbalized understanding of such.     Patient left up in chair with all lines intact and call button in reach.    GOALS:   Multidisciplinary Problems       Physical Therapy Goals          Problem: Physical Therapy    Goal Priority Disciplines Outcome Interventions   Physical Therapy Goal     PT, PT/OT Progressing     Description: Goals to be met by: 25     Patient will increase functional independence with mobility by performin. Sit to stand transfer with Modified [place]  3. Gait  x 250 feet with Supervision using AD if needed.   4. Ascend/descend 8 stair with left Handrails Stand-by Assistance .                          DME Justifications:  No DME recommended requiring DME justifications    History:     Past Medical History:   Diagnosis Date    HTN (hypertension)        Past Surgical History:   Procedure Laterality Date    ESOPHAGOGASTRODUODENOSCOPY N/A 2024    Procedure: EGD (ESOPHAGOGASTRODUODENOSCOPY);  Surgeon: Pete Buenrostro MD;  Location: [facility] (4TH FLR);  Service: Endoscopy;  Laterality: N/A;  Medically Urgent       ref by Sahil Singer MD, [facility]   - pre-call complete; MB    ESOPHAGOGASTRODUODENOSCOPY N/A 2025    Procedure: EGD (ESOPHAGOGASTRODUODENOSCOPY);  Surgeon: Paxton Jurado MD;  Location: [facility] (2ND FLR);  Service: Endoscopy;  Laterality: N/A;  ref by  Sahil Singer MD, 2nd floor due to availability- portal -ml    INSERTION OF TUNNELED CENTRAL VENOUS CATHETER (CVC) WITH SUBCUTANEOUS PORT Right 2025    Procedure: INSERTION, SINGLE LUMEN CATHETER WITH PORT, WITH FLUOROSCOPIC GUIDANCE, left poss right;  Surgeon: Chadd Velez MD;  Location: [facility] OR 2ND FLR;  Service: General;  Laterality: Right;    LAPAROSCOPIC REPAIR OF INGUINAL HERNIA Right        Time Tracking:     PT Received On: 25  PT Start Time: 921     PT Stop Time: 948  PT Total Time (min): 27 min     Billable Minutes: Evaluation 8 min  and Gait Training 10 min       2025

## 2025-06-26 NOTE — HOSPITAL COURSE
BP initially tenuous MAPS <65 but responding to IVF bolus. Cr with significant improvement following IVF. Was started on TPN via port. Metabolic acidemia treated with bicarb infusion. Had mild elevation after reducing aggressive IVF resuscitation. He intermittently required oxygen for asymptomatic hypoxia, possibly related to COPD. CXR with evidence of atelectasis for which incentive spirometry was given. He developed worsening ascites 2/2 hypoalbuminemia along with tachycardia. Paracentesis 6/30 drained 5L and given IV albumin. Ascitic fluid sent to labs.     On 07/08-The patient stated that he had a HA, confusion, dizziness, abdominal pain, SOB, and an impending sense of doom. BP could not be measured on several machines. Patient had increased confusion, solumence, pallor, diffuse crackles in lungs, 3+ pitting edema in BLE. Was 87% O2 on 3 L, increased with 5 L O2 NC. Rapid response was called. ABG: pH 7.02, pCO2 33.4, HCO3 8.6. Dr. Weston had an extensive discussion with patient's brother who has taken over power of . The patient's brother was called and he requested the patient be placed on DNR. Comfort care initiated. Time of death 0930.

## 2025-06-26 NOTE — PLAN OF CARE
Problem: Physical Therapy  Goal: Physical Therapy Goal  Description: Goals to be met by: 25     Patient will increase functional independence with mobility by performin. Sit to stand transfer with Modified [place]  3. Gait  x 250 feet with Supervision using AD if needed.   4. Ascend/descend 8 stair with left Handrails Stand-by Assistance .     Outcome: Progressing   Evaluation completed and goals appropriate. 2025

## 2025-06-26 NOTE — ASSESSMENT & PLAN NOTE
69 year old male with a history of esophageal adenocarcinoma s/p definitive chemoradiation in 2023, now with recurrence and on chemotherapy. Presented to the ED with inability to tolerate PO, hypotension and acute renal failure.     Recommend fluid resuscitation and treatment of his renal failure prior to consideration of procedures such as g tube or stent.   Would avoid oral medications due to his inability to tolerate PO intake. Is still able to manage his own secretions.   Would keep strict NPO at this time due to aspiration risk  Can consider TPN for nutritional support while awaiting decision regarding timing of procedures  Cr improving today. Will continue to follow along.

## 2025-06-26 NOTE — CARE UPDATE
Unit LEO Care Support Interaction      I have reviewed the chart of Jone Lugo who is hospitalized for Acute renal failure. The patient is currently located in the following unit: ONC/BMT        I have assisted the primary physician in management of the following:      MRSA Decolonization - CHG ordered     I have seen and examined the patient and provided the following support:     Readmission Reduction - Acute Care at Home Referral and Digital Medicine Referral       FADY RUTH  Unit Based LEO

## 2025-06-26 NOTE — SUBJECTIVE & OBJECTIVE
Interval History: BP initially tenuous MAPS <65 but responding to IVF bolus. Cr with significant improvement following IVF.     Oncology Treatment Plan:   OP GASTRIC/ ESOPHAGEAL fam-trastuzumab deruxtecan-nxki Q3W    Medications:  Continuous Infusions:   lactated ringers   Intravenous Continuous 100 mL/hr at 06/26/25 0525 New Bag at 06/26/25 0525     Scheduled Meds:   heparin (porcine)  5,000 Units Subcutaneous Q8H    lactated ringers  1,000 mL Intravenous Once    pantoprazole  80 mg Intravenous Daily     PRN Meds:  Current Facility-Administered Medications:     acetaminophen, 650 mg, Oral, Q4H PRN    dextrose 50%, 12.5 g, Intravenous, PRN    dextrose 50%, 25 g, Intravenous, PRN    glucagon (human recombinant), 1 mg, Intramuscular, PRN    glucose, 16 g, Oral, PRN    glucose, 24 g, Oral, PRN    naloxone, 0.02 mg, Intravenous, PRN    ondansetron, 4 mg, Intravenous, Q8H PRN    sodium chloride 0.9%, 10 mL, Intravenous, Q12H PRN     Review of Systems  Objective:     Vital Signs (Most Recent):  Temp: 98.5 °F (36.9 °C) (06/26/25 0838)  Pulse: 91 (06/26/25 0838)  Resp: 18 (06/26/25 0838)  BP: (!) 92/55 (06/26/25 0838)  SpO2: (!) 91 % (06/26/25 0839) Vital Signs (24h Range):  Temp:  [96.6 °F (35.9 °C)-98.8 °F (37.1 °C)] 98.5 °F (36.9 °C)  Pulse:  [66-93] 91  Resp:  [12-20] 18  SpO2:  [91 %-100 %] 91 %  BP: ()/(32-64) 92/55     Weight: 55.8 kg (123 lb)  Body mass index is 19.26 kg/m².  Body surface area is 1.62 meters squared.      Intake/Output Summary (Last 24 hours) at 6/26/2025 0926  Last data filed at 6/26/2025 0509  Gross per 24 hour   Intake 2250 ml   Output 155 ml   Net 2095 ml        Physical Exam  Constitutional:       Appearance: Normal appearance.   Cardiovascular:      Rate and Rhythm: Normal rate and regular rhythm.      Pulses: Normal pulses.   Pulmonary:      Effort: Pulmonary effort is normal. No respiratory distress.      Breath sounds: Normal breath sounds.   Abdominal:      General: Abdomen is flat.       Palpations: Abdomen is soft. There is mass (palpable liver edge).      Tenderness: There is no abdominal tenderness.   Musculoskeletal:      Right lower leg: No edema.      Left lower leg: No edema.   Skin:     General: Skin is warm and dry.      Capillary Refill: Capillary refill takes less than 2 seconds.   Neurological:      Mental Status: He is alert and oriented to person, place, and time. Mental status is at baseline.      Motor: Weakness (generalized) present.          Significant Labs:   All pertinent labs from the last 24 hours have been reviewed.    Diagnostic Results:  I have reviewed all pertinent imaging results/findings within the past 24 hours.

## 2025-06-27 NOTE — ASSESSMENT & PLAN NOTE
ANTOINE is likely due to pre-renal azotemia due to intravascular volume depletion 2/2 poor fluid intake, diarrhea, BP medications. Baseline creatinine is 1.5-1.8. Most recent creatinine and eGFR are listed below. No signs of fluid overload. With metabolic acidosis, bicarb 13 in ED. EUGENIA negative for obstruction.    Recent Labs     06/26/25  1217 06/26/25  1749 06/27/25  0305   CREATININE 4.6* 4.0* 2.9*  2.9*   EGFRNORACEVR 13* 15* 23*  23*     Urine creatinine 142  Urine protein 78  Urine protein/ cr ratio 0.56  Urine urea nitrogen 319  Urine potassium 34     Plan  - ANTOINE is improving   - Avoid nephrotoxins and renally dose meds for GFR listed above  - Monitor urine output, serial BMP, and adjust therapy as needed  - cannot give oral bicarb on sevelamer 2/2 aspiration risk   - consulted nephrology  - f/u urine labs  - holding home gabapentin and amlodipine-benzepril  - IVF resucitation

## 2025-06-27 NOTE — PROGRESS NOTES
[facility] - Oncology ([facility])  Hematology/Oncology  Progress Note    Patient Name: Jone Lugo  Admission Date: 6/25/2025  Hospital Length of Stay: 2 days  Code Status: Full Code     Subjective:     HPI:  70 yo M esophageal adenocarcinoma stage III, smoking presenting with poor PO intake.     In the ED initially afebrile 87/55 improving to 146 systolic after 2 LR resuscitation on RA. Wbcs 13.51, Hb 8.8 ( BL 11.2), Cr 8.0 ( BL 1.5-1.8), bicarb 13.  (123 BL) . Procal 1.63. Lactate 1.8. A CT chest A/P significant for moderate volume abdominopelvic ascites and mesenteric congestion. UA not collected. He was treated with vanc/cefepime and fluid resucitated with 2L of LR.     6/25 oncology appointment, pt reported worsening dysphagia with poor PO intake with weakness.   In the ED he notes poor food/water intake he attributes to stomach pain after swallowing food. He also notes loose watery stools that has not improved with immodium. Pt reports symptoms started after he most recent chemotherapy. Associated with lightheadedness worse with standing, fatigue. Has had decreased urine output. Denies chest pain, shortness of breath, melena, hematochezia. Otherwise noted an episode severe dysphagia recently with swallowing water and pills.     Interval History: Started TPN. ANTOINE continues to improve. C diff negative. Diarrhea has improved. Biggest complaint is thirst.     Oncology Treatment Plan:   OP GASTRIC/ ESOPHAGEAL fam-trastuzumab deruxtecan-nxki Q3W    Medications:  Continuous Infusions:   lactated ringers   Intravenous Continuous        Standard Custom Day One ADULT TPN for patient WITH electrolyte abnormality or renal dysfunction (CENTRAL)   Intravenous Continuous 42 mL/hr at 06/26/25 2207 New Bag at 06/26/25 2207     Scheduled Meds:   heparin (porcine)  5,000 Units Subcutaneous Q8H    nicotine  1 patch Transdermal Daily    pantoprazole  40 mg Intravenous BID    potassium chloride  10 mEq Intravenous Q1H      PRN Meds:  Current Facility-Administered Medications:     acetaminophen, 650 mg, Oral, Q4H PRN    dextrose 50%, 12.5 g, Intravenous, PRN    dextrose 50%, 25 g, Intravenous, PRN    glucagon (human recombinant), 1 mg, Intramuscular, PRN    glucose, 16 g, Oral, PRN    glucose, 24 g, Oral, PRN    naloxone, 0.02 mg, Intravenous, PRN    ondansetron, 4 mg, Intravenous, Q8H PRN    sodium chloride 0.9%, 10 mL, Intravenous, Q12H PRN     Review of Systems  10 point ROS negative except as noted above.     Objective:     Vital Signs (Most Recent):  Temp: 97.5 °F (36.4 °C) (06/27/25 0945)  Pulse: 93 (06/27/25 0945)  Resp: 18 (06/27/25 0945)  BP: (!) 115/55 (06/27/25 0945)  SpO2: (!) 91 % (06/27/25 0945) Vital Signs (24h Range):  Temp:  [97.5 °F (36.4 °C)-99.3 °F (37.4 °C)] 97.5 °F (36.4 °C)  Pulse:  [] 93  Resp:  [18-20] 18  SpO2:  [89 %-95 %] 91 %  BP: ()/(52-66) 115/55     Weight: 55.8 kg (123 lb)  Body mass index is 19.26 kg/m².  Body surface area is 1.62 meters squared.      Intake/Output Summary (Last 24 hours) at 6/27/2025 1005  Last data filed at 6/27/2025 0957  Gross per 24 hour   Intake 3445.81 ml   Output 750 ml   Net 2695.81 ml        Physical Exam  Constitutional:       Appearance: Normal appearance.   HENT:      Head: Normocephalic and atraumatic.      Mouth/Throat:      Mouth: Mucous membranes are dry.      Pharynx: Oropharynx is clear.   Eyes:      Extraocular Movements: Extraocular movements intact.      Pupils: Pupils are equal, round, and reactive to light.   Cardiovascular:      Rate and Rhythm: Normal rate and regular rhythm.      Pulses: Normal pulses.   Pulmonary:      Effort: Pulmonary effort is normal. No respiratory distress.      Breath sounds: Normal breath sounds.   Abdominal:      General: Abdomen is flat.      Palpations: Abdomen is soft. There is mass (palpable liver edge).      Tenderness: There is no abdominal tenderness.   Musculoskeletal:      Cervical back: Normal range of  motion. No rigidity.      Right lower leg: No edema.      Left lower leg: No edema.   Skin:     General: Skin is warm and dry.      Capillary Refill: Capillary refill takes less than 2 seconds.   Neurological:      Mental Status: He is alert and oriented to person, place, and time. Mental status is at baseline.      Motor: Weakness (generalized) present.          Significant Labs:   CBC:   Recent Labs   Lab 06/25/25 2016 06/26/25  0240 06/27/25  0305   WBC 8.45 8.90 10.13   HGB 7.8* 8.2* 8.2*   HCT 22.3* 23.2* 23.6*    375 455*    and CMP:   Recent Labs   Lab 06/25/25  1100 06/25/25 2016 06/26/25  0240 06/26/25  1217 06/26/25  1749 06/27/25  0305      < > 137  137 137 139 138  138   K 3.6   < > 3.8  3.8 4.1 4.2 3.2*  3.2*      < > 108  108 111* 108 105  105   CO2 13*   < > 13*  13* 14* 17* 22*  22*   *   < > 90  90 91 92 163*  163*   BUN 72*   < > 68*  68* 64* 60* 53*  53*   CREATININE 8.0*   < > 5.8*  5.8* 4.6* 4.0* 2.9*  2.9*   CALCIUM 9.9   < > 8.8  8.8 8.7 8.9 8.9  8.9   PROT 7.0  --  5.1*  --   --  5.2*   ALBUMIN 2.4*   < > 1.8*  1.8* 1.6* 1.7* 1.7*  1.7*   BILITOT 0.9  --  0.6  --   --  0.5   ALKPHOS 658*  --  457*  --   --  410*   AST 88*  --  47*  --   --  34   *  --  81*  --   --  63*   ANIONGAP 20*   < > 16  16 12 14 11  11    < > = values in this interval not displayed.       Diagnostic Results:  I have reviewed all pertinent imaging results/findings within the past 24 hours.  Assessment/Plan:     * Acute renal failure  ANTOINE is likely due to pre-renal azotemia due to intravascular volume depletion 2/2 poor fluid intake, diarrhea, BP medications. Baseline creatinine is 1.5-1.8. Most recent creatinine and eGFR are listed below. No signs of fluid overload. With metabolic acidosis, bicarb 13 in ED. EUGENIA negative for obstruction.    Recent Labs     06/26/25  1217 06/26/25  1749 06/27/25  0305   CREATININE 4.6* 4.0* 2.9*  2.9*   EGFRNORACEVR 13* 15* 23*   23*     Urine creatinine 142  Urine protein 78  Urine protein/ cr ratio 0.56  Urine urea nitrogen 319  Urine potassium 34     Plan  - ANTOINE is improving   - Avoid nephrotoxins and renally dose meds for GFR listed above  - Monitor urine output, serial BMP, and adjust therapy as needed  - cannot give oral bicarb on sevelamer 2/2 aspiration risk   - consulted nephrology  - f/u urine labs  - holding home gabapentin and amlodipine-benzepril  - IVF resucitation      Transaminitis  S/p kaleb. No findings supportive of liver/biliary pathology on imaging. GGT elevated as well. Likely shock liver. Will continue to monitor CMP.     Diarrhea  Reports onset of loose watery diarrhea since previous chemotherapy treatment. Possibly related to chemotherapy. Likely contributed to hypovolemic shock and acute renal failure.     - f/u stool culture  - f/u cdiff panel       Hyperphosphatemia  Patient's most recent phosphorus results are listed below.   Recent Labs     06/25/25  1100   PHOS 5.7*       Plan  - Patient's hyperphosphatemia is stable  - see acute renal failure     Weakness  2/2 poor po intake, hypovolemia.      PT/OT    Antineoplastic chemotherapy induced pancytopenia  Anemia is likely due to chronic disease due to Malignancy and drug toxicity from chemotherapy. Most recent hemoglobin and hematocrit are listed below.  Baseline 11.2. Likely 2/2 chemo. Less likely GIB. He has noted ongoing abdominal pain with associated belching. No hematochezia/melena. ED performed MONTRELL negative negative guaiac fecal occult blood test.     Recent Labs     06/25/25  0910 06/25/25  1100   HGB 8.8* 9.6*   HCT 25.9* 28.7*     Plan  - Monitor serial CBC: Daily  - Transfuse PRBC if patient becomes hemodynamically unstable, symptomatic or H/H drops below 7/21.  - Patient has not received any PRBC transfusions to date  - Patient's anemia is currently stable  - holding anticoaugulation for now will re-evaluate cbc tomorrow   - f/u iron studies        Hypovolemic shock  Vitals:    06/25/25 1600 06/25/25 1645 06/25/25 1700 06/25/25 1800   BP: (!) 101/59 (!) 90/55 (!) 84/51 (!) 89/52    06/25/25 1853 06/25/25 1957 06/25/25 2055 06/25/25 2349   BP: (!) 106/55 (!) 84/52 (!) 113/56 104/64    06/26/25 0509 06/26/25 0838   BP: (!) 93/59 (!) 92/55     Presented with BP  87/55 responding with 2 LR fluid resuscitation. Lactic acid wnl. Likely 2/2 to poor fluid tolerance 2/2 esophageal adenocarcinoma. Previous EGD in April 2025 with partial obstruction. States that placing an esophageal stent with restrictions of types of foods he would be able to eat is not inline with his goals. He is more amendable to PEG placement to provide extra support for fluids and nutrition. Do not suspect septic shock at this time as no findings on CT chest A/P. No fevers. Wbcs wnl.     - GI unable to place PEG 2/2 partial obstruction of the esophagus.  - IR unable to advance NGT for procedure 2/2 partial obstruction.   - Will consult surgical oncology for consultation of PEG tube   - will attempt following resolution of acute renal failure and if BP wnl     - IVF maintenance fluids with IVF bolus prn  - F/u BC's     Esophageal dysphagia  See esophageal carcinoma    Obstruction of esophagus  See esophageal adenocarcinoma      Esophageal adenocarcinoma  69M with esophageal adenocarcinoma initially presenting with dysphagia (improved post-EUS dilation). Biopsy confirmed poorly differentiated adenocarcinoma, pMMR, HER2+, PD-L1 0. Received neoadjuvant carboplatin/paclitaxel + radiation (non-surgical). Imaging showed stable disease. Recurrent tumor confirmed on EGD (12/2024); started FOLFOX + trastuzumab, with stable imaging after 4 cycles. After 6 cycles, developed worsening dysphagia due to local tumor progression. Declined stent/PEG at last hospitalization. Switched to trastuzumab deruxtecan (TDxD). Prior admission presented for cycle 4 with significant dysphagia, poor PO intake, Cr 7.7,  hypotension, weakness. Treatment held, sent to ED. Previous EGD in April 2025 with partial obstruction. States that placing an esophageal stent with restrictions of types of foods he would be able to eat is not inline with his goals. He is more amendable to PEG placement to provide extra support for fluids and nutrition.     - likely causing poor po intake > hypovolemic shock > acute renal failure  - Surgical oncology recommending strict NPO due to his report that he cannot tolerate anything by mouth due to the aspiration risk. We would not consider any intervention until his hypovolemia and acute renal failure are addressed.     - Started TPN on 6/26  - Maintenance IVF @75cc/h                 Hansel Bal DO  Hematology/Oncology Fellow, PGY-IV  [facility] [place]

## 2025-06-27 NOTE — PT/OT/SLP EVAL
Speech Language Pathology Evaluation  Bedside Swallow    Patient Name:  Jone Lugo   MRN:  [id number]  Admitting Diagnosis: Acute renal failure    Recommendations:                 General Recommendations:  ongoing swallowing assessment  Diet recommendations:   , Full liquids, Thin liquids - IDDSI Level 0   Aspiration Precautions: 1 bite/sip at a time, Alternating bites/sips, Consider alternate means of nutrition in order to sustain adequate nutrition, Frequent oral care, HOB to 90 degrees, Meds whole buried in puree, Meds whole 1 at a time, Monitor for s/s of aspiration, Remain upright 30 minutes post meal, Small bites/sips, and Strict aspiration precautions   General Precautions: Standard, aspiration, fall  Communication strategies:  none  Discharge recommendations:  Low Intensity Therapy     Assessment:     Jone Lugo is a 69 y.o. male with an SLP diagnosis of Dysphagia associated with esophageal adenocarcinoma.     History:     Past Medical History:   Diagnosis Date    HTN (hypertension)        Past Surgical History:   Procedure Laterality Date    ESOPHAGOGASTRODUODENOSCOPY N/A 12/19/2024    Procedure: EGD (ESOPHAGOGASTRODUODENOSCOPY);  Surgeon: Pete Buenrostro MD;  Location: [facility] ([facility]);  Service: Endoscopy;  Laterality: N/A;  Medically Urgent      12/16 ref by Sahil Singer MD, [facility]  12/18 - pre-call complete; [facility]    ESOPHAGOGASTRODUODENOSCOPY N/A 4/11/2025    Procedure: EGD (ESOPHAGOGASTRODUODENOSCOPY);  Surgeon: Paxton Jurado MD;  Location: [facility] ([facility]);  Service: Endoscopy;  Laterality: N/A;  ref by  Sahil Singer MD, 2nd floor due to availability- [facility]    INSERTION OF TUNNELED CENTRAL VENOUS CATHETER (CVC) WITH SUBCUTANEOUS PORT Right 1/27/2025    Procedure: INSERTION, SINGLE LUMEN CATHETER WITH PORT, WITH FLUOROSCOPIC GUIDANCE, left poss right;  Surgeon: Chadd Velez MD;  Location: [facility] OR [address] [place] [address];  Service: General;  Laterality:  "Right;    LAPAROSCOPIC REPAIR OF INGUINAL HERNIA Right      HPI: 68 yo M esophageal adenocarcinoma stage III, smoking presenting with poor PO intake.      In the ED initially afebrile 87/55 improving to 146 systolic after 2 LR resuscitation on RA. Wbcs 13.51, Hb 8.8 ( BL 11.2), Cr 8.0 ( BL 1.5-1.8), bicarb 13.  (123 BL) . Procal 1.63. Lactate 1.8. A CT chest A/P significant for moderate volume abdominopelvic ascites and mesenteric congestion. UA not collected. He was treated with vanc/cefepime and fluid resucitated with 2L of LR.      6/25 oncology appointment, pt reported worsening dysphagia with poor PO intake with weakness.   In the ED he notes poor food/water intake he attributes to stomach pain after swallowing food. He also notes loose watery stools that has not improved with immodium. Pt reports symptoms started after he most recent chemotherapy. Associated with lightheadedness worse with standing, fatigue. Has had decreased urine output. Denies chest pain, shortness of breath, melena, hematochezia. Otherwise noted an episode severe dysphagia recently with swallowing water and pills.      Prior Intubation HX:  none during this admission    Modified Barium Swallow: none on file    Esophagram: 6/15/23: Findings: The swallowing mechanism appears normal. There was some retention of contrast within the piriform sinuses noted.   There were poor primary peristaltic waves with some tertiary contractions noted, suggesting mild presbyesophagus.   A 13 mm barium tablet demonstrated difficulty passage at the distal esophagus with some narrowing of the distal esophagus noted. There is irregularity of the mucosa of the distal esophagus.. There is also mass effect noted of the distal esophagus.     Chest X-Rays: 6/25/25: Central venous catheter in the SVC. Heart size normal. No significant airspace consolidation or pleural effusion identified     Prior diet: currently NPO      Subjective     "I've been burping for " "months." "I really want a Coke."    Pain/Comfort:  Pain Rating 1:  (did not rate, exhibited pain during repositioning in bed)  Pain Addressed 1: Reposition    Respiratory Status: Nasal cannula, flow 2 L/min    Objective:     Oral Musculature Evaluation  Oral Musculature: WNL  Dentition: present and adequate  Secretion Management: adequate  Mucosal Quality: dry  Mandibular Strength and Mobility: WNL  Oral Labial Strength and Mobility: WNL  Lingual Strength and Mobility: WNL  Velar Elevation: WNL  Buccal Strength and Mobility: WNL  Volitional Cough: adequate  Volitional Swallow: elicited  Voice Prior to PO Intake: dry, clear    Bedside Swallow Eval:   Consistencies Assessed:  Thin liquids ice chip x 1, 1/2 tsp x 1, full tsp x 1, cup sips water x 2 and straw x 3; over 4oz apple juice via cup  Puree multiple bites of ice cream, a few bites of applesauce  Solids declined 2/2 concern for difficulty swallowing and decreased interest/taste in most foods     Oral Phase:   WNL    Pharyngeal Phase:   Delayed presence of change in vocal quality and throat clear (after consumption of >4oz of thin liquids, as well as ice cream and applesauce    Compensatory Strategies  None    Treatment: Education was provided to pt and visitor regarding role of SLP, purpose of swallowing assessment, aspiration, s/s of aspiration, concerns for ability to maintain nutrition 2/2 esophageal cancer (causing obstruction) and poor appetite, continued recommendations for consideration of alternative means of nutrition/hydration, diet recommendations, wet vocal quality indications, aspiration and reflux precautions, medication administration options, wet vocal quality indications,  and SLP treatment plan and POC.  Pt demonstrated understanding of education provided, but will benefit from continued reinforcement.       Goals:   Multidisciplinary Problems       SLP Goals          Problem: SLP    Goal Priority Disciplines Outcome   SLP Goal     SLP  "   Description: Speech Language Pathology Goals  Goals expected to be met by 7/3:  1. Pt will tolerate full liquid diet and thin liquids without s/s of aspiration.   2. Pt will participate in ongoing swallowing assessment to determine if able to tolerate more advanced textures.                                Plan:     Patient to be seen:  4 x/week   Plan of Care expires:  07/27/25  Plan of Care reviewed with:  patient, other (see comments) (visitor)   SLP Follow-Up:  Yes       Time Tracking:     SLP Treatment Date:   06/27/25  Speech Start Time:  1221  Speech Stop Time:  1306     Speech Total Time (min):  45 min    Billable Minutes: Eval Swallow and Oral Function 20 and Self Care/Home Management Training 25 06/27/2025

## 2025-06-27 NOTE — ASSESSMENT & PLAN NOTE
69M with esophageal adenocarcinoma initially presenting with dysphagia (improved post-EUS dilation). Biopsy confirmed poorly differentiated adenocarcinoma, pMMR, HER2+, PD-L1 0. Received neoadjuvant carboplatin/paclitaxel + radiation (non-surgical). Imaging showed stable disease. Recurrent tumor confirmed on EGD (12/2024); started FOLFOX + trastuzumab, with stable imaging after 4 cycles. After 6 cycles, developed worsening dysphagia due to local tumor progression. Declined stent/PEG at last hospitalization. Switched to trastuzumab deruxtecan (TDxD). Prior admission presented for cycle 4 with significant dysphagia, poor PO intake, Cr 7.7, hypotension, weakness. Treatment held, sent to ED. Previous EGD in April 2025 with partial obstruction. States that placing an esophageal stent with restrictions of types of foods he would be able to eat is not inline with his goals. He is more amendable to PEG placement to provide extra support for fluids and nutrition.     - likely causing poor po intake > hypovolemic shock > acute renal failure  - Surgical oncology recommending strict NPO due to his report that he cannot tolerate anything by mouth due to the aspiration risk. We would not consider any intervention until his hypovolemia and acute renal failure are addressed.     - Started TPN on 6/26  - Maintenance IVF @75cc/h

## 2025-06-27 NOTE — PROGRESS NOTES
"Pt presents with dysphagia and PO intake has been worsening for the past three days. Pt with acute renal failure due to dehydration from poor PO intake. Pharmacy private messaged for TPN recs. Per MD note "In the interim, nutrition consult and will start TPN ".    -----TPN recs: 65 g of AA + 275 g D + Daily IV lipids to provide 1695 kcal, 65 g protein; GIR: 3.4  "

## 2025-06-27 NOTE — CARE UPDATE
Unit [facility] Care Support Interaction      I have reviewed the chart of Jone Lugo who is hospitalized for Acute renal failure. The patient is currently located in the following unit: ONC/BMT        I have assisted the primary physician in management of the following:      MRSA Decolonization - Mupirocin ordered and CHG ordered. Port accessed 6/26/25 for TPN.          LAURA GUIDO, FADY  Unit Based [facility]

## 2025-06-27 NOTE — SUBJECTIVE & OBJECTIVE
Interval History: Started TPN. ANTOINE continues to improve. C diff negative. Diarrhea has improved. Biggest complaint is thirst.     Oncology Treatment Plan:   OP GASTRIC/ ESOPHAGEAL fam-trastuzumab deruxtecan-nxki Q3W    Medications:  Continuous Infusions:   lactated ringers   Intravenous Continuous        Standard Custom Day One ADULT TPN for patient WITH electrolyte abnormality or renal dysfunction (CENTRAL)   Intravenous Continuous 42 mL/hr at 06/26/25 2207 New Bag at 06/26/25 2207     Scheduled Meds:   heparin (porcine)  5,000 Units Subcutaneous Q8H    nicotine  1 patch Transdermal Daily    pantoprazole  40 mg Intravenous BID    potassium chloride  10 mEq Intravenous Q1H     PRN Meds:  Current Facility-Administered Medications:     acetaminophen, 650 mg, Oral, Q4H PRN    dextrose 50%, 12.5 g, Intravenous, PRN    dextrose 50%, 25 g, Intravenous, PRN    glucagon (human recombinant), 1 mg, Intramuscular, PRN    glucose, 16 g, Oral, PRN    glucose, 24 g, Oral, PRN    naloxone, 0.02 mg, Intravenous, PRN    ondansetron, 4 mg, Intravenous, Q8H PRN    sodium chloride 0.9%, 10 mL, Intravenous, Q12H PRN     Review of Systems  10 point ROS negative except as noted above.     Objective:     Vital Signs (Most Recent):  Temp: 97.5 °F (36.4 °C) (06/27/25 0945)  Pulse: 93 (06/27/25 0945)  Resp: 18 (06/27/25 0945)  BP: (!) 115/55 (06/27/25 0945)  SpO2: (!) 91 % (06/27/25 0945) Vital Signs (24h Range):  Temp:  [97.5 °F (36.4 °C)-99.3 °F (37.4 °C)] 97.5 °F (36.4 °C)  Pulse:  [] 93  Resp:  [18-20] 18  SpO2:  [89 %-95 %] 91 %  BP: ()/(52-66) 115/55     Weight: 55.8 kg (123 lb)  Body mass index is 19.26 kg/m².  Body surface area is 1.62 meters squared.      Intake/Output Summary (Last 24 hours) at 6/27/2025 1005  Last data filed at 6/27/2025 0957  Gross per 24 hour   Intake 3445.81 ml   Output 750 ml   Net 2695.81 ml        Physical Exam  Constitutional:       Appearance: Normal appearance.   HENT:      Head: Normocephalic  and atraumatic.      Mouth/Throat:      Mouth: Mucous membranes are dry.      Pharynx: Oropharynx is clear.   Eyes:      Extraocular Movements: Extraocular movements intact.      Pupils: Pupils are equal, round, and reactive to light.   Cardiovascular:      Rate and Rhythm: Normal rate and regular rhythm.      Pulses: Normal pulses.   Pulmonary:      Effort: Pulmonary effort is normal. No respiratory distress.      Breath sounds: Normal breath sounds.   Abdominal:      General: Abdomen is flat.      Palpations: Abdomen is soft. There is mass (palpable liver edge).      Tenderness: There is no abdominal tenderness.   Musculoskeletal:      Cervical back: Normal range of motion. No rigidity.      Right lower leg: No edema.      Left lower leg: No edema.   Skin:     General: Skin is warm and dry.      Capillary Refill: Capillary refill takes less than 2 seconds.   Neurological:      Mental Status: He is alert and oriented to person, place, and time. Mental status is at baseline.      Motor: Weakness (generalized) present.          Significant Labs:   CBC:   Recent Labs   Lab 06/25/25 2016 06/26/25  0240 06/27/25  0305   WBC 8.45 8.90 10.13   HGB 7.8* 8.2* 8.2*   HCT 22.3* 23.2* 23.6*    375 455*    and CMP:   Recent Labs   Lab 06/25/25  1100 06/25/25 2016 06/26/25  0240 06/26/25  1217 06/26/25  1749 06/27/25  0305      < > 137  137 137 139 138  138   K 3.6   < > 3.8  3.8 4.1 4.2 3.2*  3.2*      < > 108  108 111* 108 105  105   CO2 13*   < > 13*  13* 14* 17* 22*  22*   *   < > 90  90 91 92 163*  163*   BUN 72*   < > 68*  68* 64* 60* 53*  53*   CREATININE 8.0*   < > 5.8*  5.8* 4.6* 4.0* 2.9*  2.9*   CALCIUM 9.9   < > 8.8  8.8 8.7 8.9 8.9  8.9   PROT 7.0  --  5.1*  --   --  5.2*   ALBUMIN 2.4*   < > 1.8*  1.8* 1.6* 1.7* 1.7*  1.7*   BILITOT 0.9  --  0.6  --   --  0.5   ALKPHOS 658*  --  457*  --   --  410*   AST 88*  --  47*  --   --  34   *  --  81*  --   --  63*    ANIONGAP 20*   < > 16  16 12 14 11  11    < > = values in this interval not displayed.       Diagnostic Results:  I have reviewed all pertinent imaging results/findings within the past 24 hours.

## 2025-06-27 NOTE — CARE UPDATE
Anupam Alcantara  Nephrology        Patient Name: Jone Lugo   MRN: [id number]   Current Provider: Maddison Weston MD  Primary Care Provider: [place], Primary Doctor   Admission Date: 6/25/2025   Hospital Day: 2  Bed: 855/855 A  Principal Problem: Acute renal failure            Nephrology Chart Review      Intake/Output Summary (Last 24 hours) at 6/27/2025 1306  Last data filed at 6/27/2025 0957  Gross per 24 hour   Intake 717.49 ml   Output 750 ml   Net -32.51 ml       Vitals:    06/27/25 0516 06/27/25 0544 06/27/25 0945 06/27/25 1055   BP:   (!) 115/55 111/68   BP Location:   Left arm    Patient Position:   Lying    Pulse: 91 85 93 93   Resp: 18 18 18 20   Temp:   97.5 °F (36.4 °C) 98.1 °F (36.7 °C)   TempSrc:   Oral Oral   SpO2: (!) 93% 95% (!) 91% 96%   Weight:       Height:           Recent Labs   Lab 06/26/25  1749 06/27/25  0305 06/27/25  1026    138  138 140   K 4.2 3.2*  3.2* 3.2*    105  105 105   CO2 17* 22*  22* 23   BUN 60* 53*  53* 51*   CREATININE 4.0* 2.9*  2.9* 2.4*   CALCIUM 8.9 8.9  8.9 9.0   PHOS 3.9 3.1 2.4*       - 1L LR at rate of 100cc/hr for 10 hours   - Will continue to monitor    No other related issues identified. Please call Nephrology as needed; We will continue to follow.      Jay Juarez MD.  Clinical Nephrology Fellow, PGY-4  [facility], [address]

## 2025-06-27 NOTE — PLAN OF CARE
Plan of care reviewed with pt. Pt up to bedside commode x1 assist, urinal at bedside. TPN infusion initiated as ordered. Sodium bicarb infusion completed and d/c'd as ordered. Cdiff negative. Denies N/V/D, pain, numbness, tingling. Was placed on 3L o2 via NC by RRT, bumped down to 2L o2 via NC. Pt in no distress denies sob. All VSS, afebrile, free from falls or injuries; no acute events this shift. Pt in bed with non-skid socks on, bed locked and in lowest position, side rails up x2, call light and personal items within reach. Pt instructed to call for assistance as needed.

## 2025-06-28 PROBLEM — D61.9 ANEMIA DUE TO BONE MARROW FAILURE: Status: ACTIVE | Noted: 2025-06-25

## 2025-06-28 PROBLEM — J96.01 ACUTE HYPOXEMIC RESPIRATORY FAILURE: Status: ACTIVE | Noted: 2025-06-28

## 2025-06-28 NOTE — ASSESSMENT & PLAN NOTE
RESOLVED     Reports onset of loose watery diarrhea since previous chemotherapy treatment. Possibly related to chemotherapy. Likely contributed to hypovolemic shock and acute renal failure.   Improving. Negative stool studies/c.diff.

## 2025-06-28 NOTE — ASSESSMENT & PLAN NOTE
Anemia is likely due to chronic disease due to Malignancy and drug toxicity from chemotherapy. Most recent hemoglobin and hematocrit are listed below.  Baseline 11.2. Likely 2/2 chemo. Less likely GIB. He has noted ongoing abdominal pain with associated belching. No hematochezia/melena. ED performed MONTRELL negative negative guaiac fecal occult blood test.     Recent Labs     06/26/25  0240 06/27/25  0305 06/28/25  0300   HGB 8.2* 8.2* 7.8*   HCT 23.2* 23.6* 23.2*       Plan  - Monitor serial CBC: Daily  - Transfuse PRBC if patient becomes hemodynamically unstable, symptomatic or H/H drops below 7/21.  - Patient has not received any PRBC transfusions to date  - Patient's anemia is currently stable  - holding anticoaugulation for now will re-evaluate cbc tomorrow   - f/u iron studies with low iron saturation, may benefit from iron infusions as outpatient

## 2025-06-28 NOTE — PHYSICIAN QUERY
Please provide the Pancytopenia diagnosis associated with the clinical findings:  Pancytopenia ruled out, anemia due to malignancy and drug toxicity from chemotherapy only

## 2025-06-28 NOTE — ASSESSMENT & PLAN NOTE
Vitals:    06/26/25 2338 06/27/25 0259 06/27/25 0945 06/27/25 1055   BP: 106/62 112/66 (!) 115/55 111/68    06/27/25 1516 06/27/25 1935 06/27/25 2328 06/28/25 0422   BP: 113/67 110/61 107/67 112/72    06/28/25 0823 06/28/25 1105   BP: 116/71 124/81     Presented with BP  87/55 responding with 2 LR fluid resuscitation. Lactic acid wnl. Likely 2/2 to poor fluid tolerance 2/2 esophageal adenocarcinoma. Previous EGD in April 2025 with partial obstruction. States that placing an esophageal stent with restrictions of types of foods he would be able to eat is not inline with his goals. He is more amendable to PEG placement to provide extra support for fluids and nutrition. Do not suspect septic shock at this time as no findings on CT chest A/P. No fevers. Wbcs wnl. BC NGTD    - GI unable to place PEG 2/2 partial obstruction of the esophagus.  - IR unable to advance NGT for procedure 2/2 partial obstruction.   - Will consult surgical oncology for consultation of PEG tube   - will attempt following resolution of acute renal failure and if BP wnl     - IVF maintenance fluids with IVF bolus prn

## 2025-06-28 NOTE — NURSING
Plan of care reviewed with pt. Pt primarily in bed. Urinal at bedside. States is too weak to independently use urinal sitting at the edge of the bed. TPN infusing @42 mL/hr as ordered. Complain of back pain, pain medication offered, refused by the patient. Emesis X1, prn zofran provided. POCT glucose monitored, prn insulin provided as per sliding scale. Multiple blood samples sent. O2 going at 2L via NC this morning, tried to wean O2, SPO2 at 91% in room air, started O2 at 1.5 L/min, Spo2 at 95%. All VSS, afebrile, free from falls or injuries; no acute events this shift. Pt in bed with non-skid socks on, bed locked and in lowest position, side rails up x2, call light and personal items within reach. Pt instructed to call for assistance as needed.

## 2025-06-28 NOTE — CARE UPDATE
Intake/Output Summary (Last 24 hours) at 6/28/2025 1351  Last data filed at 6/28/2025 1337  Gross per 24 hour   Intake 298 ml   Output 1100 ml   Net -802 ml       Vitals:    06/28/25 0700 06/28/25 0823 06/28/25 1105 06/28/25 1300   BP:  116/71 124/81    BP Location:  Left arm     Patient Position:  Lying Lying    Pulse:  92 102    Resp:  18 17    Temp:  98.1 °F (36.7 °C) 98.3 °F (36.8 °C)    TempSrc:  Oral Oral    SpO2: 95% 95% (!) 94% (!) 94%   Weight:       Height:           Recent Labs   Lab 06/28/25  0300 06/28/25  0834 06/28/25  0953    138 138   K 3.7 3.6 3.9    102 102   CO2 23 24 25   BUN 46* 47* 47*   CREATININE 1.8* 1.7* 1.8*   CALCIUM 8.7 8.9 9.2   PHOS 3.7 3.0 3.0         Labs stable. No new recommendations at this time, will continue to observe.    No other related issues identified. Please call Nephrology as needed; We will continue to follow.

## 2025-06-28 NOTE — PLAN OF CARE
Plan of care reviewed with pt. Pt primarily in bed. Urinal at bedside. States is too weak to independently use urinal sitting at edge of bed. LR and potassium phosphate administered and completed as ordered. TPN infusing @42 mL/hr as ordered. Denies any pain N/V/D, numbness, tingling. O2 going at 2L via NC. All VSS, afebrile, free from falls or injuries; no acute events this shift. Pt in bed with non-skid socks on, bed locked and in lowest position, side rails up x2, call light and personal items within reach. Pt instructed to call for assistance as needed.

## 2025-06-28 NOTE — PROGRESS NOTES
[facility]up[facility] Alcantara - Oncology ([facility])  Hematology/Oncology  Progress Note    Patient Name: Jone Lugo  Admission Date: 6/25/2025  Hospital Length of Stay: 3 days  Code Status: Full Code     Subjective:     HPI:  68 yo M esophageal adenocarcinoma stage III, smoking presenting with poor PO intake.     In the ED initially afebrile 87/55 improving to 146 systolic after 2 LR resuscitation on RA. Wbcs 13.51, Hb 8.8 ( BL 11.2), Cr 8.0 ( BL 1.5-1.8), bicarb 13.  (123 BL) . Procal 1.63. Lactate 1.8. A CT chest A/P significant for moderate volume abdominopelvic ascites and mesenteric congestion. UA not collected. He was treated with vanc/cefepime and fluid resucitated with 2L of LR.     6/25 oncology appointment, pt reported worsening dysphagia with poor PO intake with weakness.   In the ED he notes poor food/water intake he attributes to stomach pain after swallowing food. He also notes loose watery stools that has not improved with immodium. Pt reports symptoms started after he most recent chemotherapy. Associated with lightheadedness worse with standing, fatigue. Has had decreased urine output. Denies chest pain, shortness of breath, melena, hematochezia. Otherwise noted an episode severe dysphagia recently with swallowing water and pills.     Interval History: TPN. ANTOINE continues to improve. [facility] consulted by surgical oncology for PEG tube.     Oncology Treatment Plan:   OP GASTRIC/ ESOPHAGEAL fam-trastuzumab deruxtecan-nxki Q3W    Medications:  Continuous Infusions:   TPN ADULT CENTRAL LINE CUSTOM   Intravenous Continuous 42 mL/hr at 06/27/25 2231 New Bag at 06/27/25 2231     Scheduled Meds:   heparin (porcine)  5,000 Units Subcutaneous Q8H    mupirocin   Nasal BID    nicotine  1 patch Transdermal Daily    pantoprazole  40 mg Intravenous BID     PRN Meds:  Current Facility-Administered Medications:     acetaminophen, 650 mg, Oral, Q4H PRN    dextrose 50%, 12.5 g, Intravenous, PRN    dextrose 50%, 12.5 g,  Intravenous, PRN    dextrose 50%, 25 g, Intravenous, PRN    dextrose 50%, 25 g, Intravenous, PRN    glucagon (human recombinant), 1 mg, Intramuscular, PRN    glucagon (human recombinant), 1 mg, Intramuscular, PRN    glucose, 16 g, Oral, PRN    glucose, 24 g, Oral, PRN    insulin aspart U-100, 0-5 Units, Subcutaneous, Q4H PRN    naloxone, 0.02 mg, Intravenous, PRN    ondansetron, 4 mg, Intravenous, Q8H PRN    sodium chloride 0.9%, 10 mL, Intravenous, Q12H PRN     Review of Systems  Objective:     Vital Signs (Most Recent):  Temp: 98.3 °F (36.8 °C) (06/28/25 1105)  Pulse: 102 (06/28/25 1105)  Resp: 17 (06/28/25 1105)  BP: 124/81 (06/28/25 1105)  SpO2: (!) 94 % (06/28/25 1105) Vital Signs (24h Range):  Temp:  [98 °F (36.7 °C)-98.9 °F (37.2 °C)] 98.3 °F (36.8 °C)  Pulse:  [] 102  Resp:  [17-20] 17  SpO2:  [94 %-96 %] 94 %  BP: (107-124)/(61-81) 124/81     Weight: 55.8 kg (123 lb)  Body mass index is 19.26 kg/m².  Body surface area is 1.62 meters squared.      Intake/Output Summary (Last 24 hours) at 6/28/2025 1126  Last data filed at 6/28/2025 0947  Gross per 24 hour   Intake 444 ml   Output 1650 ml   Net -1206 ml        Physical Exam  Constitutional:       Appearance: Normal appearance.   Cardiovascular:      Rate and Rhythm: Normal rate and regular rhythm.      Pulses: Normal pulses.   Pulmonary:      Effort: Pulmonary effort is normal. No respiratory distress.      Breath sounds: Normal breath sounds.   Abdominal:      General: Abdomen is flat.      Palpations: Abdomen is soft. Mass: palpable liver edge.      Tenderness: There is no abdominal tenderness.   Musculoskeletal:      Right lower leg: No edema.      Left lower leg: No edema.   Skin:     General: Skin is warm and dry.      Capillary Refill: Capillary refill takes less than 2 seconds.   Neurological:      Mental Status: He is alert and oriented to person, place, and time. Mental status is at baseline.      Motor: Weakness (generalized) present.           Significant Labs:   All pertinent labs from the last 24 hours have been reviewed.    Diagnostic Results:  I have reviewed all pertinent imaging results/findings within the past 24 hours.  Assessment/Plan:     * Acute renal failure  ANTOINE is likely due to pre-renal azotemia due to intravascular volume depletion 2/2 poor fluid intake, diarrhea, BP medications. Baseline creatinine is 1.5-1.8. Most recent creatinine and eGFR are listed below. No signs of fluid overload. With metabolic acidosis, bicarb 13 in ED. EUGENIA negative for obstruction.    Recent Labs     06/28/25  0300 06/28/25  0834 06/28/25  0953   CREATININE 1.8* 1.7* 1.8*   EGFRNORACEVR 40* 43* 40*   Urine creatinine 142  Urine protein 78  Urine protein/ cr ratio 0.56  Urine urea nitrogen 319  Urine potassium 34     Plan  - ANTOINE is improving   - Avoid nephrotoxins and renally dose meds for GFR listed above  - Monitor urine output, serial BMP, and adjust therapy as needed  - cannot give oral bicarb on sevelamer 2/2 aspiration risk   - consulted nephrology  - f/u urine labs  - holding home gabapentin and amlodipine-benzepril  - IVF resucitation      Hypovolemic shock  Vitals:    06/26/25 2338 06/27/25 0259 06/27/25 0945 06/27/25 1055   BP: 106/62 112/66 (!) 115/55 111/68    06/27/25 1516 06/27/25 1935 06/27/25 2328 06/28/25 0422   BP: 113/67 110/61 107/67 112/72    06/28/25 0823 06/28/25 1105   BP: 116/71 124/81     Presented with BP  87/55 responding with 2 LR fluid resuscitation. Lactic acid wnl. Likely 2/2 to poor fluid tolerance 2/2 esophageal adenocarcinoma. Previous EGD in April 2025 with partial obstruction. States that placing an esophageal stent with restrictions of types of foods he would be able to eat is not inline with his goals. He is more amendable to PEG placement to provide extra support for fluids and nutrition. Do not suspect septic shock at this time as no findings on CT chest A/P. No fevers. Wbcs wnl. BC NGTD    - GI unable to place PEG 2/2  partial obstruction of the esophagus.  - IR unable to advance NGT for procedure 2/2 partial obstruction.   - Will consult surgical oncology for consultation of PEG tube   - will attempt following resolution of acute renal failure and if BP wnl     - IVF maintenance fluids with IVF bolus prn      Anemia due to bone marrow failure  Anemia is likely due to chronic disease due to Malignancy and drug toxicity from chemotherapy. Most recent hemoglobin and hematocrit are listed below.  Baseline 11.2. Likely 2/2 chemo. Less likely GIB. He has noted ongoing abdominal pain with associated belching. No hematochezia/melena. ED performed MONTRELL negative negative guaiac fecal occult blood test.     Recent Labs     06/25/25  0910 06/25/25  1100   HGB 8.8* 9.6*   HCT 25.9* 28.7*     Plan  - Monitor serial CBC: Daily  - Transfuse PRBC if patient becomes hemodynamically unstable, symptomatic or H/H drops below 7/21.  - Patient has not received any PRBC transfusions to date  - Patient's anemia is currently stable  - holding anticoaugulation for now will re-evaluate cbc tomorrow   - f/u iron studies       Esophageal adenocarcinoma  69M with esophageal adenocarcinoma initially presenting with dysphagia (improved post-EUS dilation). Biopsy confirmed poorly differentiated adenocarcinoma, pMMR, HER2+, PD-L1 0. Received neoadjuvant carboplatin/paclitaxel + radiation (non-surgical). Imaging showed stable disease. Recurrent tumor confirmed on EGD (12/2024); started FOLFOX + trastuzumab, with stable imaging after 4 cycles. After 6 cycles, developed worsening dysphagia due to local tumor progression. Declined stent/PEG at last hospitalization. Switched to trastuzumab deruxtecan (TDxD). Prior admission presented for cycle 4 with significant dysphagia, poor PO intake, Cr 7.7, hypotension, weakness. Treatment held, sent to ED. Previous EGD in April 2025 with partial obstruction. States that placing an esophageal stent with restrictions of types of  foods he would be able to eat is not inline with his goals. He is more amendable to PEG placement to provide extra support for fluids and nutrition.     - likely causing poor po intake > hypovolemic shock > acute renal failure  - Surgical oncology recommending strict NPO due to his report that he cannot tolerate anything by mouth due to the aspiration risk. We would not consider any intervention until his hypovolemia and acute renal failure are addressed.     - Started TPN on 6/26  - Maintenance IVF @75cc/h          Diarrhea  Reports onset of loose watery diarrhea since previous chemotherapy treatment. Possibly related to chemotherapy. Likely contributed to hypovolemic shock and acute renal failure.     - f/u stool culture  - f/u cdiff panel       Transaminitis  S/p kaleb. No findings supportive of liver/biliary pathology on imaging. GGT elevated as well. Likely shock liver. Will continue to monitor CMP.     Hyperphosphatemia  Patient's most recent phosphorus results are listed below.   Recent Labs     06/25/25  1100   PHOS 5.7*       Plan  - Patient's hyperphosphatemia is stable  - see acute renal failure     Weakness  2/2 poor po intake, hypovolemia.      PT/OT    Esophageal dysphagia  See esophageal carcinoma    Obstruction of esophagus  See esophageal adenocarcinoma               Ricardo Mcghee MD  Hematology/Oncology  [facility]up[facility] Alcantara - Oncology ([facility])

## 2025-06-28 NOTE — CARE UPDATE
Patient's Cr continues to improve and he is approaching an appropriate place for possible enteral intervention. Will reach out to [facility] to discuss possible stent as well as PEG placement.     Consult placed and patient discussed with [facility].     Regine Khan MD

## 2025-06-28 NOTE — ASSESSMENT & PLAN NOTE
Vitals:    06/27/25 1935 06/27/25 2328 06/28/25 0422 06/28/25 0823   BP: 110/61 107/67 112/72 116/71    06/28/25 1105 06/28/25 1600 06/28/25 1936 06/28/25 2300   BP: 124/81 99/62 113/75 105/68    06/29/25 0538 06/29/25 0820   BP: 98/65 105/71     Presented with BP  87/55 responding with 2 LR fluid resuscitation. Lactic acid wnl. Likely 2/2 to poor fluid tolerance 2/2 esophageal adenocarcinoma. Previous EGD in April 2025 with partial obstruction. States that placing an esophageal stent with restrictions of types of foods he would be able to eat is not inline with his goals. He is more amendable to PEG placement to provide extra support for fluids and nutrition. Do not suspect septic shock at this time as no findings on CT chest A/P. No fevers. Wbcs wnl. BC NGTD. Improving after several days of IVF resucitation    - GI unable to place PEG 2/2 partial obstruction of the esophagus.  - IR unable to advance NGT for procedure 2/2 partial obstruction.   - Will consult surgical oncology for consultation of PEG tube   - will attempt following resolution of acute renal failure and if BP wnl    - Surgical oncology consulted AES      - IVF maintenance fluids with IVF bolus prn

## 2025-06-28 NOTE — ASSESSMENT & PLAN NOTE
ANTOINE is likely due to pre-renal azotemia due to intravascular volume depletion 2/2 poor fluid intake, diarrhea, BP medications. Baseline creatinine is 1.5-1.8. Most recent creatinine and eGFR are listed below. No signs of fluid overload. With metabolic acidosis, bicarb 13 in ED. EUGENIA negative for obstruction.    Recent Labs     06/28/25  0300 06/28/25  0834 06/28/25  0953   CREATININE 1.8* 1.7* 1.8*   EGFRNORACEVR 40* 43* 40*   Urine creatinine 142  Urine protein 78  Urine protein/ cr ratio 0.56  Urine urea nitrogen 319  Urine potassium 34     Plan  - ANTOINE is improving   - Avoid nephrotoxins and renally dose meds for GFR listed above  - Monitor urine output, serial BMP, and adjust therapy as needed  - cannot give oral bicarb on sevelamer 2/2 aspiration risk   - consulted nephrology  - f/u urine labs  - holding home gabapentin and amlodipine-benzepril  - IVF resucitation

## 2025-06-28 NOTE — PLAN OF CARE
Plan of care reviewed with patient.  Fall precautions maintained, side rails up x2, call light in reach, bed in low position and locked, nonskid socks on.  Instructed to eat and drink.  Speech came and evaluated and treated the patient.  On 2 liters pulse ox 95%,  Accuchecks 4x daily . Received k-riders and will receive potassium phosphate tonight. Voiding without difficulty.

## 2025-06-28 NOTE — ASSESSMENT & PLAN NOTE
Patient with Hypoxic Respiratory failure which is Acute.  he is not on home oxygen. Supplemental oxygen was provided and noted-    Ddx: pulmonary edema from IVF resuscitation vs aspiration/micro aspiration 2/2 esophageal cancer. Noted history of grade I diastolic function on echo 4/2025. Oxygen requirements around the time of being cleared for clear liquid diet. He is asymptomatic.     CXR with evidence of atelectasis, unlikely pulmonary edema. Will likely be able to tolerate additional IVF.   - incentive spirometry   - wean o2

## 2025-06-28 NOTE — SUBJECTIVE & OBJECTIVE
Interval History: TPN. ANTOINE continues to improve. [facility] consulted by surgical oncology for PEG tube.     Oncology Treatment Plan:   OP GASTRIC/ ESOPHAGEAL fam-trastuzumab deruxtecan-nxki Q3W    Medications:  Continuous Infusions:   TPN ADULT CENTRAL LINE CUSTOM   Intravenous Continuous 42 mL/hr at 06/27/25 2231 New Bag at 06/27/25 2231     Scheduled Meds:   heparin (porcine)  5,000 Units Subcutaneous Q8H    mupirocin   Nasal BID    nicotine  1 patch Transdermal Daily    pantoprazole  40 mg Intravenous BID     PRN Meds:  Current Facility-Administered Medications:     acetaminophen, 650 mg, Oral, Q4H PRN    dextrose 50%, 12.5 g, Intravenous, PRN    dextrose 50%, 12.5 g, Intravenous, PRN    dextrose 50%, 25 g, Intravenous, PRN    dextrose 50%, 25 g, Intravenous, PRN    glucagon (human recombinant), 1 mg, Intramuscular, PRN    glucagon (human recombinant), 1 mg, Intramuscular, PRN    glucose, 16 g, Oral, PRN    glucose, 24 g, Oral, PRN    insulin aspart U-100, 0-5 Units, Subcutaneous, Q4H PRN    naloxone, 0.02 mg, Intravenous, PRN    ondansetron, 4 mg, Intravenous, Q8H PRN    sodium chloride 0.9%, 10 mL, Intravenous, Q12H PRN     Review of Systems  Objective:     Vital Signs (Most Recent):  Temp: 98.3 °F (36.8 °C) (06/28/25 1105)  Pulse: 102 (06/28/25 1105)  Resp: 17 (06/28/25 1105)  BP: 124/81 (06/28/25 1105)  SpO2: (!) 94 % (06/28/25 1105) Vital Signs (24h Range):  Temp:  [98 °F (36.7 °C)-98.9 °F (37.2 °C)] 98.3 °F (36.8 °C)  Pulse:  [] 102  Resp:  [17-20] 17  SpO2:  [94 %-96 %] 94 %  BP: (107-124)/(61-81) 124/81     Weight: 55.8 kg (123 lb)  Body mass index is 19.26 kg/m².  Body surface area is 1.62 meters squared.      Intake/Output Summary (Last 24 hours) at 6/28/2025 1126  Last data filed at 6/28/2025 0947  Gross per 24 hour   Intake 444 ml   Output 1650 ml   Net -1206 ml        Physical Exam  Constitutional:       Appearance: Normal appearance.   Cardiovascular:      Rate and Rhythm: Normal rate and regular  rhythm.      Pulses: Normal pulses.   Pulmonary:      Effort: Pulmonary effort is normal. No respiratory distress.      Breath sounds: Normal breath sounds.   Abdominal:      General: Abdomen is flat.      Palpations: Abdomen is soft. Mass: palpable liver edge.      Tenderness: There is no abdominal tenderness.   Musculoskeletal:      Right lower leg: No edema.      Left lower leg: No edema.   Skin:     General: Skin is warm and dry.      Capillary Refill: Capillary refill takes less than 2 seconds.   Neurological:      Mental Status: He is alert and oriented to person, place, and time. Mental status is at baseline.      Motor: Weakness (generalized) present.          Significant Labs:   All pertinent labs from the last 24 hours have been reviewed.    Diagnostic Results:  I have reviewed all pertinent imaging results/findings within the past 24 hours.

## 2025-06-28 NOTE — ASSESSMENT & PLAN NOTE
S/p kaleb. No findings supportive of liver/biliary pathology on imaging. GGT elevated as well. Likely shock liver. Will continue to monitor CMP.    - improving with IVF

## 2025-06-29 PROBLEM — R73.9 HYPERGLYCEMIA: Status: ACTIVE | Noted: 2025-06-29

## 2025-06-29 NOTE — ASSESSMENT & PLAN NOTE
ANTOINE is likely due to pre-renal azotemia due to intravascular volume depletion 2/2 poor fluid intake, diarrhea, BP medications. Baseline creatinine is 1.5-1.8. Most recent creatinine and eGFR are listed below. No signs of fluid overload. With metabolic acidosis, bicarb 13 in ED. EUGENIA negative for obstruction.    Recent Labs     06/28/25  1737 06/28/25  1952 06/29/25  0255   CREATININE 2.0* 2.1* 2.4*   EGFRNORACEVR 35* 33* 28*   Urine creatinine 142  Urine protein 78  Urine protein/ cr ratio 0.56  Urine urea nitrogen 319  Urine potassium 34     Plan  - ANTOINE is improving with IVF, Cr increased with reduction of IVF bolus, will resume.   - Avoid nephrotoxins and renally dose meds for GFR listed above  - Monitor urine output, serial BMP, and adjust therapy as needed  - consulted nephrology  - holding home gabapentin and amlodipine-benzepril  - IVF resucitation

## 2025-06-29 NOTE — SUBJECTIVE & OBJECTIVE
Interval History: Creatinine increased today, patient reports decreased PO.     Review of patient's allergies indicates:  No Known Allergies  Current Facility-Administered Medications   Medication Frequency    0.9%  NaCl infusion (for blood administration) Q24H PRN    acetaminophen tablet 650 mg Q4H PRN    dextrose 50% injection 12.5 g PRN    dextrose 50% injection 12.5 g PRN    dextrose 50% injection 25 g PRN    dextrose 50% injection 25 g PRN    glucagon (human recombinant) injection 1 mg PRN    glucagon (human recombinant) injection 1 mg PRN    glucose chewable tablet 16 g PRN    glucose chewable tablet 24 g PRN    heparin (porcine) injection 5,000 Units Q8H    insulin aspart U-100 pen 0-10 Units Q4H PRN    lactated ringers infusion Continuous    mupirocin 2 % ointment BID    naloxone 0.4 mg/mL injection 0.02 mg PRN    nicotine 21 mg/24 hr 1 patch Daily    ondansetron injection 4 mg Q8H PRN    pantoprazole injection 40 mg BID    sodium chloride 0.9% flush 10 mL Q12H PRN    thiamine injection 100 mg Daily    TPN ADULT CENTRAL LINE CUSTOM Continuous    TPN ADULT CENTRAL LINE CUSTOM Continuous       Objective:     Vital Signs (Most Recent):  Temp: 98.2 °F (36.8 °C) (06/29/25 1127)  Pulse: 101 (06/29/25 1127)  Resp: 16 (06/29/25 1127)  BP: 121/76 (06/29/25 1127)  SpO2: (!) 94 % (06/29/25 1127) Vital Signs (24h Range):  Temp:  [97 °F (36.1 °C)-98.2 °F (36.8 °C)] 98.2 °F (36.8 °C)  Pulse:  [101-105] 101  Resp:  [16-20] 16  SpO2:  [91 %-95 %] 94 %  BP: ()/(62-76) 121/76     Weight: 55.8 kg (123 lb) (06/25/25 1957)  Body mass index is 19.26 kg/m².  Body surface area is 1.62 meters squared.    I/O last 3 completed shifts:  In: 388 [P.O.:388]  Out: 750 [Urine:700; Emesis/NG output:50]     Physical Exam  Constitutional:       General: He is not in acute distress.     Appearance: Normal appearance. He is not ill-appearing or toxic-appearing.   HENT:      Head: Normocephalic and atraumatic.      Right Ear: External ear  normal.      Left Ear: External ear normal.      Nose: Nose normal.      Mouth/Throat:      Mouth: Mucous membranes are dry.   Eyes:      Extraocular Movements: Extraocular movements intact.   Cardiovascular:      Rate and Rhythm: Normal rate and regular rhythm.      Pulses: Normal pulses.      Heart sounds: Normal heart sounds.   Pulmonary:      Effort: Pulmonary effort is normal.      Breath sounds: Normal breath sounds.   Abdominal:      General: Abdomen is flat.   Musculoskeletal:      Cervical back: Normal range of motion.      Right lower leg: No edema.      Left lower leg: No edema.   Skin:     General: Skin is warm.      Capillary Refill: Capillary refill takes less than 2 seconds.   Neurological:      General: No focal deficit present.          Significant Labs:  CBC:   Recent Labs   Lab 06/29/25 0255   WBC 12.42   RBC 2.74*   HGB 8.4*   HCT 25.0*   *   MCV 91   MCH 30.7   MCHC 33.6     CMP:   Recent Labs   Lab 06/28/25 1952 06/29/25  0255 06/29/25  1123   *   < > 184*   CALCIUM 9.3   < > 9.2   ALBUMIN 1.6*   < > 1.5*   PROT 5.2*  --   --       < > 138   K 3.7   < > 3.4*   CO2 23   < > 23      < > 102   BUN 52*   < > 60*   CREATININE 2.1*   < > 2.5*   ALKPHOS 370*  --   --    ALT 59*  --   --    AST 49*  --   --    BILITOT 0.4  --   --     < > = values in this interval not displayed.     All labs within the past 24 hours have been reviewed.

## 2025-06-29 NOTE — SUBJECTIVE & OBJECTIVE
Interval History: Increase in Cr, will resume aggressive IVF resuscitation. CXR w/o evidence of pulmonary edema, has atelectasis. Will give incentive spirometry. Increasing hyperglycemia with TPN increasing to medium sliding scale.     Oncology Treatment Plan:   OP GASTRIC/ ESOPHAGEAL fam-trastuzumab deruxtecan-nxki Q3W    Medications:  Continuous Infusions:   lactated ringers   Intravenous Continuous        TPN ADULT CENTRAL LINE CUSTOM   Intravenous Continuous 42 mL/hr at 06/28/25 2256 New Bag at 06/28/25 2256    TPN ADULT CENTRAL LINE CUSTOM   Intravenous Continuous         Scheduled Meds:   heparin (porcine)  5,000 Units Subcutaneous Q8H    lactated ringers  1,000 mL Intravenous Once    mupirocin   Nasal BID    nicotine  1 patch Transdermal Daily    pantoprazole  40 mg Intravenous BID    thiamine (B-1) injection  100 mg Intravenous Daily     PRN Meds:  Current Facility-Administered Medications:     0.9%  NaCl infusion (for blood administration), , Intravenous, Q24H PRN    acetaminophen, 650 mg, Oral, Q4H PRN    dextrose 50%, 12.5 g, Intravenous, PRN    dextrose 50%, 12.5 g, Intravenous, PRN    dextrose 50%, 25 g, Intravenous, PRN    dextrose 50%, 25 g, Intravenous, PRN    glucagon (human recombinant), 1 mg, Intramuscular, PRN    glucagon (human recombinant), 1 mg, Intramuscular, PRN    glucose, 16 g, Oral, PRN    glucose, 24 g, Oral, PRN    insulin aspart U-100, 0-10 Units, Subcutaneous, Q4H PRN    naloxone, 0.02 mg, Intravenous, PRN    ondansetron, 4 mg, Intravenous, Q8H PRN    sodium chloride 0.9%, 10 mL, Intravenous, Q12H PRN     Review of Systems  Objective:     Vital Signs (Most Recent):  Temp: 98.1 °F (36.7 °C) (06/29/25 0820)  Pulse: 101 (06/29/25 0820)  Resp: 16 (06/29/25 0820)  BP: 105/71 (06/29/25 0820)  SpO2: 95 % (06/29/25 0820) Vital Signs (24h Range):  Temp:  [97 °F (36.1 °C)-98.3 °F (36.8 °C)] 98.1 °F (36.7 °C)  Pulse:  [101-105] 101  Resp:  [16-20] 16  SpO2:  [91 %-95 %] 95 %  BP:  ()/(62-81) 105/71     Weight: 55.8 kg (123 lb)  Body mass index is 19.26 kg/m².  Body surface area is 1.62 meters squared.      Intake/Output Summary (Last 24 hours) at 6/29/2025 0914  Last data filed at 6/28/2025 2100  Gross per 24 hour   Intake 388 ml   Output 300 ml   Net 88 ml        Physical Exam  Constitutional:       Appearance: Normal appearance.   Cardiovascular:      Rate and Rhythm: Normal rate and regular rhythm.      Pulses: Normal pulses.   Pulmonary:      Effort: Pulmonary effort is normal. No respiratory distress.      Breath sounds: Normal breath sounds.   Abdominal:      General: Abdomen is flat.      Palpations: Mass: palpable liver edge.   Musculoskeletal:      Right lower leg: No edema.      Left lower leg: No edema.   Skin:     General: Skin is warm and dry.      Capillary Refill: Capillary refill takes less than 2 seconds.   Neurological:      Mental Status: He is alert and oriented to person, place, and time. Mental status is at baseline.      Motor: Weakness (generalized) present.          Significant Labs:   All pertinent labs from the last 24 hours have been reviewed.    Diagnostic Results:  I have reviewed all pertinent imaging results/findings within the past 24 hours.

## 2025-06-29 NOTE — PROGRESS NOTES
[facility] - Oncology ([facility])  General Surgery  Progress Note    Subjective:     History of Present Illness:  Jone Lugo is a 69yoM s/p definitive chemoradiotherapy for esophageal adenocarcinoma now found with persistent/local recurrence. He is established with Dr. Singer and is planning for initiation of FOLFOX + trastuzumab presented for a heme/onc appointment today and was severely dizzy and found to be hypotensive. Per patient his dysphagia has been worsening and the past three days has not been able to tolerate solids or liquids by mouth. He was sent to the ED and found to be in acute renal failure likely due to dehydration from poor PO intake. Surgical oncology consulted for possible procedure for enteral access.     Post-Op Info:  * No surgery found *         Interval History: No acute events overnight. HDS on RA. Given clears yesterday. Has been having retching and vomiting.     Medications:  Continuous Infusions:   lactated ringers   Intravenous Continuous 75 mL/hr at 06/29/25 1018 New Bag at 06/29/25 1018    TPN ADULT CENTRAL LINE CUSTOM   Intravenous Continuous 42 mL/hr at 06/28/25 2256 New Bag at 06/28/25 2256    TPN ADULT CENTRAL LINE CUSTOM   Intravenous Continuous         Scheduled Meds:   heparin (porcine)  5,000 Units Subcutaneous Q8H    mupirocin   Nasal BID    nicotine  1 patch Transdermal Daily    pantoprazole  40 mg Intravenous BID    thiamine (B-1) injection  100 mg Intravenous Daily     PRN Meds:  Current Facility-Administered Medications:     0.9%  NaCl infusion (for blood administration), , Intravenous, Q24H PRN    acetaminophen, 650 mg, Oral, Q4H PRN    dextrose 50%, 12.5 g, Intravenous, PRN    dextrose 50%, 12.5 g, Intravenous, PRN    dextrose 50%, 25 g, Intravenous, PRN    dextrose 50%, 25 g, Intravenous, PRN    glucagon (human recombinant), 1 mg, Intramuscular, PRN    glucagon (human recombinant), 1 mg, Intramuscular, PRN    glucose, 16 g, Oral, PRN    glucose, 24 g, Oral, PRN     insulin aspart U-100, 0-10 Units, Subcutaneous, Q4H PRN    naloxone, 0.02 mg, Intravenous, PRN    ondansetron, 4 mg, Intravenous, Q8H PRN    sodium chloride 0.9%, 10 mL, Intravenous, Q12H PRN     Review of patient's allergies indicates:  No Known Allergies  Objective:     Vital Signs (Most Recent):  Temp: 98.1 °F (36.7 °C) (06/29/25 0820)  Pulse: 101 (06/29/25 0820)  Resp: 16 (06/29/25 0820)  BP: 105/71 (06/29/25 0820)  SpO2: 95 % (06/29/25 0820) Vital Signs (24h Range):  Temp:  [97 °F (36.1 °C)-98.3 °F (36.8 °C)] 98.1 °F (36.7 °C)  Pulse:  [101-105] 101  Resp:  [16-20] 16  SpO2:  [91 %-95 %] 95 %  BP: ()/(62-81) 105/71     Weight: 55.8 kg (123 lb)  Body mass index is 19.26 kg/m².    Intake/Output - Last 3 Shifts         06/27 0700  06/28 0659 06/28 0700  06/29 0659 06/29 0700  06/30 0659    P.O. 236 388     I.V. (mL/kg)       IV Piggyback       Total Intake(mL/kg) 236 (4.2) 388 (7)     Urine (mL/kg/hr) 2100 (1.6) 250 (0.2)     Emesis/NG output  50     Stool  0     Total Output 2100 300     Net -1864 +88            Unmeasured Urine Occurrence  1 x     Unmeasured Stool Occurrence  0 x     Unmeasured Emesis Occurrence  1 x              Physical Exam  Vitals and nursing note reviewed.   Constitutional:       General: He is not in acute distress.  HENT:      Head: Normocephalic and atraumatic.      Mouth/Throat:      Mouth: Mucous membranes are moist.   Cardiovascular:      Rate and Rhythm: Normal rate and regular rhythm.   Pulmonary:      Effort: Pulmonary effort is normal. No respiratory distress.   Abdominal:      General: There is no distension.      Palpations: Abdomen is soft.      Tenderness: There is no abdominal tenderness.   Skin:     General: Skin is warm and dry.   Neurological:      General: No focal deficit present.      Mental Status: He is alert and oriented to person, place, and time.          Significant Labs:  I have reviewed all pertinent lab results within the past 24 hours.  CBC:   Recent  Labs   Lab 06/29/25  0255   WBC 12.42   RBC 2.74*   HGB 8.4*   HCT 25.0*   *   MCV 91   MCH 30.7   MCHC 33.6     CMP:   Recent Labs   Lab 06/28/25 1952 06/29/25  0255   * 193*   CALCIUM 9.3 9.5   ALBUMIN 1.6* 1.6*   PROT 5.2*  --     138   K 3.7 3.6   CO2 23 24    101   BUN 52* 56*   CREATININE 2.1* 2.4*   ALKPHOS 370*  --    ALT 59*  --    AST 49*  --    BILITOT 0.4  --        Significant Diagnostics:  I have reviewed all pertinent imaging results/findings within the past 24 hours.  Assessment/Plan:     Esophageal dysphagia  69 year old male with a history of esophageal adenocarcinoma s/p definitive chemoradiation in 2023, now with recurrence and on chemotherapy. Presented to the ED with inability to tolerate PO, hypotension and acute renal failure.     Although SLP has cleared for liquids, patient has esophageal obstruction secondary to his mass and has a high aspiration risk. Recommend strict NPO.   Long discussion with patient about goals and expectations from his treatment. He expressed that his primary goal is that he go back to being able to eat a regular diet. He feels that a feeding tube will be able to get him nutritionally optimized to be able to go back on chemotherapy which would reduce his obstruction and allow him to eat. Explained that his symptoms have progressed despite being on chemotherapy and that there is low chance that we will ever be able to get him back to eating a regular diet. Discussed that a feeding tube would provide nutrition but as he is right now, he would not be able to eat or drink anything. Explained that the stent would be permanent, however he would be able to drink liquids. He expressed that he would not like it if he was unable to eat or drink anything at all, however is concerned because the stent would permanently commit him to a liquid diet. Patient would like to consider options and we will [place] back with him in the morning. Discussed with  primary team who will attempt to discuss prognosis and expectations of ongoing chemotherapy.   Recommend fluid resuscitation and treatment of his renal failure prior to consideration of procedures such as g tube or stent.   Agree with TPN, would increase calorie content          Regine Khan MD  General Surgery  [facility] - Oncology ([facility])

## 2025-06-29 NOTE — ASSESSMENT & PLAN NOTE
2/2 TPN    Last A1c reviewed-   Lab Results   Component Value Date    HGBA1C 5.8 (H) 07/01/2024     Most recent fingerstick glucose reviewed-   Recent Labs   Lab 06/28/25 2036 06/29/25  0045 06/29/25  0426 06/29/25  0735   POCTGLUCOSE 201* 195* 217* 209*     Current correctional scale  Medium  Maintain anti-hyperglycemic dose as follows-   Antihyperglycemics (From admission, onward)      Start     Stop Route Frequency Ordered    06/29/25 0907  insulin aspart U-100 pen 0-10 Units         -- SubQ Every 4 hours PRN 06/29/25 0908

## 2025-06-29 NOTE — CONSULTS
[facility] VASCULAR ACCESS NOTE       Bed:805/261 A    [facility] consulted for Peripheral Access.     20G x 1.75IN PIV placed in Left Forearm by MATT using Ultrasound Guidance.    Indication: PVA  Attempts: 1      Recommended Care and Maintenance:  Rotate site based on clinical indication or when catheter related complication is suspected.  Dressing Change every 7 days or if dressing is not intact, loose, moist or blood is present.  Flush and Lock every 12 hours (once per shift if not in use) and PRN before and after each use.          Collin Romo RN

## 2025-06-29 NOTE — ASSESSMENT & PLAN NOTE
69 year old male with a history of esophageal adenocarcinoma s/p definitive chemoradiation in 2023, now with recurrence and on chemotherapy. Presented to the ED with inability to tolerate PO, hypotension and acute renal failure.     Although SLP has cleared for liquids, patient has esophageal obstruction secondary to his mass and has a high aspiration risk. Recommend strict NPO.   Long discussion with patient about goals and expectations from his treatment. He expressed that his primary goal is that he go back to being able to eat a regular diet. He feels that a feeding tube will be able to get him nutritionally optimized to be able to go back on chemotherapy which would reduce his obstruction and allow him to eat. Explained that his symptoms have progressed despite being on chemotherapy and that there is low chance that we will ever be able to get him back to eating a regular diet. Discussed that a feeding tube would provide nutrition but as he is right now, he would not be able to eat or drink anything. Explained that the stent would be permanent, however he would be able to drink liquids. He expressed that he would not like it if he was unable to eat or drink anything at all, however is concerned because the stent would permanently commit him to a liquid diet. Patient would like to consider options and we will [place] back with him in the morning. Discussed with primary team who will attempt to discuss prognosis and expectations of ongoing chemotherapy.   Recommend fluid resuscitation and treatment of his renal failure prior to consideration of procedures such as g tube or stent.   Agree with TPN, would increase calorie content     uses scooter

## 2025-06-29 NOTE — ASSESSMENT & PLAN NOTE
Patient with Hypoxic Respiratory failure which is Acute.  he is not on home oxygen. Supplemental oxygen was provided and noted-    Ddx: pulmonary edema from IVF resuscitation vs aspiration/micro aspiration 2/2 esophageal cancer. Noted history of grade I diastolic function on echo 4/2025. Oxygen requirements around the time of being cleared for clear liquid diet. He is asymptomatic.     CXR with evidence of atelectasis, unlikely pulmonary edema. JVP noted 6/30   - f/u echo   - incentive spirometry   - wean o2 >90%  - possibly related to undiagnosed COPD 2/2 smoking history, had plans for formal PFT but was not done

## 2025-06-29 NOTE — SUBJECTIVE & OBJECTIVE
Interval History: No acute events overnight. HDS on RA. Given clears yesterday. Has been having retching and vomiting.     Medications:  Continuous Infusions:   lactated ringers   Intravenous Continuous 75 mL/hr at 06/29/25 1018 New Bag at 06/29/25 1018    TPN ADULT CENTRAL LINE CUSTOM   Intravenous Continuous 42 mL/hr at 06/28/25 2256 New Bag at 06/28/25 2256    TPN ADULT CENTRAL LINE CUSTOM   Intravenous Continuous         Scheduled Meds:   heparin (porcine)  5,000 Units Subcutaneous Q8H    mupirocin   Nasal BID    nicotine  1 patch Transdermal Daily    pantoprazole  40 mg Intravenous BID    thiamine (B-1) injection  100 mg Intravenous Daily     PRN Meds:  Current Facility-Administered Medications:     0.9%  NaCl infusion (for blood administration), , Intravenous, Q24H PRN    acetaminophen, 650 mg, Oral, Q4H PRN    dextrose 50%, 12.5 g, Intravenous, PRN    dextrose 50%, 12.5 g, Intravenous, PRN    dextrose 50%, 25 g, Intravenous, PRN    dextrose 50%, 25 g, Intravenous, PRN    glucagon (human recombinant), 1 mg, Intramuscular, PRN    glucagon (human recombinant), 1 mg, Intramuscular, PRN    glucose, 16 g, Oral, PRN    glucose, 24 g, Oral, PRN    insulin aspart U-100, 0-10 Units, Subcutaneous, Q4H PRN    naloxone, 0.02 mg, Intravenous, PRN    ondansetron, 4 mg, Intravenous, Q8H PRN    sodium chloride 0.9%, 10 mL, Intravenous, Q12H PRN     Review of patient's allergies indicates:  No Known Allergies  Objective:     Vital Signs (Most Recent):  Temp: 98.1 °F (36.7 °C) (06/29/25 0820)  Pulse: 101 (06/29/25 0820)  Resp: 16 (06/29/25 0820)  BP: 105/71 (06/29/25 0820)  SpO2: 95 % (06/29/25 0820) Vital Signs (24h Range):  Temp:  [97 °F (36.1 °C)-98.3 °F (36.8 °C)] 98.1 °F (36.7 °C)  Pulse:  [101-105] 101  Resp:  [16-20] 16  SpO2:  [91 %-95 %] 95 %  BP: ()/(62-81) 105/71     Weight: 55.8 kg (123 lb)  Body mass index is 19.26 kg/m².    Intake/Output - Last 3 Shifts         06/27 0700 06/28 0659 06/28 0700 06/29 0659  06/29 0700  06/30 0659    P.O. 236 388     I.V. (mL/kg)       IV Piggyback       Total Intake(mL/kg) 236 (4.2) 388 (7)     Urine (mL/kg/hr) 2100 (1.6) 250 (0.2)     Emesis/NG output  50     Stool  0     Total Output 2100 300     Net -1864 +88            Unmeasured Urine Occurrence  1 x     Unmeasured Stool Occurrence  0 x     Unmeasured Emesis Occurrence  1 x              Physical Exam  Vitals and nursing note reviewed.   Constitutional:       General: He is not in acute distress.  HENT:      Head: Normocephalic and atraumatic.      Mouth/Throat:      Mouth: Mucous membranes are moist.   Cardiovascular:      Rate and Rhythm: Normal rate and regular rhythm.   Pulmonary:      Effort: Pulmonary effort is normal. No respiratory distress.   Abdominal:      General: There is no distension.      Palpations: Abdomen is soft.      Tenderness: There is no abdominal tenderness.   Skin:     General: Skin is warm and dry.   Neurological:      General: No focal deficit present.      Mental Status: He is alert and oriented to person, place, and time.          Significant Labs:  I have reviewed all pertinent lab results within the past 24 hours.  CBC:   Recent Labs   Lab 06/29/25  0255   WBC 12.42   RBC 2.74*   HGB 8.4*   HCT 25.0*   *   MCV 91   MCH 30.7   MCHC 33.6     CMP:   Recent Labs   Lab 06/28/25 1952 06/29/25  0255   * 193*   CALCIUM 9.3 9.5   ALBUMIN 1.6* 1.6*   PROT 5.2*  --     138   K 3.7 3.6   CO2 23 24    101   BUN 52* 56*   CREATININE 2.1* 2.4*   ALKPHOS 370*  --    ALT 59*  --    AST 49*  --    BILITOT 0.4  --        Significant Diagnostics:  I have reviewed all pertinent imaging results/findings within the past 24 hours.

## 2025-06-29 NOTE — PLAN OF CARE
Plan of care reviewed with pt. Pt primarily in bed. Urinal at bedside. Chest x-ray completed. One episode of emesis 50mL. Administered zofran prn. BG Monitored insulin administered as ordered. TPN infusing @42 mL/hr as ordered. Denies any pain N/V/D, numbness, tingling. O2 going at 1L via NC. Got patient up to bedside commode this morning. Had small BM. O2 had dipped to 91-92 when placing back in bed so increased o2 via NC to 2L. All VSS, afebrile, free from falls or injuries; no acute events this shift. Pt in bed with non-skid socks on, bed locked and in lowest position, side rails up x2, call light and personal items within reach. Pt instructed to call for assistance as needed.

## 2025-06-29 NOTE — PLAN OF CARE
Plan of care reviewed with patient, verbalized understanding. No c/o pain voiced. Zofran given x 1. Assisted to couch in room, tolerated well. New wound to coccyx area noted while cleaning patient, area cleansed and applied mepilex. Refuses to turn throughout the day. Educated on importance of repositioning while lying in bed to prevent further breakdown.

## 2025-06-29 NOTE — PROGRESS NOTES
[facility] - Oncology ([facility])  Nephrology  Progress Note    Patient Name: Jone Lugo  MRN: [id number]  Admission Date: 6/25/2025  Hospital Length of Stay: 4 days  Attending Provider: Marie Garnica MD   Primary Care Physician: [place], Primary Doctor  Principal Problem:Acute renal failure    Subjective:     HPI: No notes on file    Interval History: Creatinine increased today, patient reports decreased PO.     Review of patient's allergies indicates:  No Known Allergies  Current Facility-Administered Medications   Medication Frequency    0.9%  NaCl infusion (for blood administration) Q24H PRN    acetaminophen tablet 650 mg Q4H PRN    dextrose 50% injection 12.5 g PRN    dextrose 50% injection 12.5 g PRN    dextrose 50% injection 25 g PRN    dextrose 50% injection 25 g PRN    glucagon (human recombinant) injection 1 mg PRN    glucagon (human recombinant) injection 1 mg PRN    glucose chewable tablet 16 g PRN    glucose chewable tablet 24 g PRN    heparin (porcine) injection 5,000 Units Q8H    insulin aspart U-100 pen 0-10 Units Q4H PRN    lactated ringers infusion Continuous    mupirocin 2 % ointment BID    naloxone 0.4 mg/mL injection 0.02 mg PRN    nicotine 21 mg/24 hr 1 patch Daily    ondansetron injection 4 mg Q8H PRN    pantoprazole injection 40 mg BID    sodium chloride 0.9% flush 10 mL Q12H PRN    thiamine injection 100 mg Daily    TPN ADULT CENTRAL LINE CUSTOM Continuous    TPN ADULT CENTRAL LINE CUSTOM Continuous       Objective:     Vital Signs (Most Recent):  Temp: 98.2 °F (36.8 °C) (06/29/25 1127)  Pulse: 101 (06/29/25 1127)  Resp: 16 (06/29/25 1127)  BP: 121/76 (06/29/25 1127)  SpO2: (!) 94 % (06/29/25 1127) Vital Signs (24h Range):  Temp:  [97 °F (36.1 °C)-98.2 °F (36.8 °C)] 98.2 °F (36.8 °C)  Pulse:  [101-105] 101  Resp:  [16-20] 16  SpO2:  [91 %-95 %] 94 %  BP: ()/(62-76) 121/76     Weight: 55.8 kg (123 lb) (06/25/25 1957)  Body mass index is 19.26 kg/m².  Body surface area is 1.62 meters  squared.    I/O last 3 completed shifts:  In: 388 [P.O.:388]  Out: 750 [Urine:700; Emesis/NG output:50]     Physical Exam  Constitutional:       General: He is not in acute distress.     Appearance: Normal appearance. He is not ill-appearing or toxic-appearing.   HENT:      Head: Normocephalic and atraumatic.      Right Ear: External ear normal.      Left Ear: External ear normal.      Nose: Nose normal.      Mouth/Throat:      Mouth: Mucous membranes are dry.   Eyes:      Extraocular Movements: Extraocular movements intact.   Cardiovascular:      Rate and Rhythm: Normal rate and regular rhythm.      Pulses: Normal pulses.      Heart sounds: Normal heart sounds.   Pulmonary:      Effort: Pulmonary effort is normal.      Breath sounds: Normal breath sounds.   Abdominal:      General: Abdomen is flat.   Musculoskeletal:      Cervical back: Normal range of motion.      Right lower leg: No edema.      Left lower leg: No edema.   Skin:     General: Skin is warm.      Capillary Refill: Capillary refill takes less than 2 seconds.   Neurological:      General: No focal deficit present.          Significant Labs:  CBC:   Recent Labs   Lab 06/29/25 0255   WBC 12.42   RBC 2.74*   HGB 8.4*   HCT 25.0*   *   MCV 91   MCH 30.7   MCHC 33.6     CMP:   Recent Labs   Lab 06/28/25  1952 06/29/25  0255 06/29/25  1123   *   < > 184*   CALCIUM 9.3   < > 9.2   ALBUMIN 1.6*   < > 1.5*   PROT 5.2*  --   --       < > 138   K 3.7   < > 3.4*   CO2 23   < > 23      < > 102   BUN 52*   < > 60*   CREATININE 2.1*   < > 2.5*   ALKPHOS 370*  --   --    ALT 59*  --   --    AST 49*  --   --    BILITOT 0.4  --   --     < > = values in this interval not displayed.     All labs within the past 24 hours have been reviewed.   Assessment/Plan:     Jone Lugo is a 69 y.o. male ,is admitted on 6/25/2025  with past medical history of    esophageal adenocarcinoma stage III, smoking.     Admitted with hypotension from  chemotherapy unit.     Nephrology  is consulted for ANTOINE 3 in setting of low PO intake, hypotension (lowest SBP 86), diarrhea. Also he received vancomycine on 6/26 and had urine retension of 400 mL with out feeling of passing urine. Sepsis work up sent. He received 2L of IVF bolus        Impression:  ANTOINE KDIGO stage 3  Baseline Creatinine: 1.1-1.3  Creatinine at time of consult: 8  Etiology of ANTOINE: Likely pre-renal from low PO intake, diarrhea leading to hypotension. Secondary factor could be Vancomycin     6/26: Cr improved to 4.6 from 8 with IV fluid replacement.     Recommendations :   - Continue IV fluids, suspect that increase in creatinine is 2/2 to decreased PO intake.   - Encourage oral hydration    - No urgent indications for dialysis at this time - Consent for dialysis taken     Monitor serum chemistries daily, strict intake and output Qshift , daily weights if able.  Renal protective measures: Please adjust medications for reduced clearance  Avoid nephrotoxic medications (NSAID, IV contrast)  Avoid ACEi and ARBs in the setting of ANTOINE  Maintain MAP > 65    Thank you for your consult. I will follow-up with patient. Please contact us if you have any additional questions.    Tino Cintron MD  Nephrology  [facility] - Oncology ([facility])

## 2025-06-29 NOTE — CONSULTS
[facility] Consult Note    Attending: Marie Garnica MD   Admit Date: 6/25/2025  Today's Date: 06/29/2025    SUBJECTIVE:     HPI:  Jone Lugo is a 69 y.o. male with past medical history of esophageal adenocarcinoma s/p chemo and radiation in 2023 now with recurrence on chemotherapy who presents with inability tolerate p.o. intake and ANTOINE from dehydration.  AES consulted for discussion of esophageal stent placement and consideration of PEG tube.  Patient denies having any abdominal pain but reports that with any p.o. intake he is having retching and not able to keep it down.  Denies any fevers, chills, diarrhea, constipation.    Speech therapy evaluated the patient and recommended full liquids, thin liquids and to take 1 bite/sip at a time.  He remains an aspiration risk however.    Most Recent Endoscopic Procedures:   Impression:            - Normal examined duodenum.                          - Erythematous mucosa in the cardia.                          - Partially obstructing, malignant esophageal                          tumor was found in the lower third of the                          esophagus.                          - No specimens collected.     Review of patient's allergies indicates:  No Known Allergies    Past Medical History:   Diagnosis Date    HTN (hypertension)      Past Surgical History:   Procedure Laterality Date    ESOPHAGOGASTRODUODENOSCOPY N/A 12/19/2024    Procedure: EGD (ESOPHAGOGASTRODUODENOSCOPY);  Surgeon: Pete Buenrostro MD;  Location: [facility] (4TH FLR);  Service: Endoscopy;  Laterality: N/A;  Medically Urgent      12/16 ref by Sahil Singer MD, [facility]  12/18 - pre-call complete; MB    ESOPHAGOGASTRODUODENOSCOPY N/A 4/11/2025    Procedure: EGD (ESOPHAGOGASTRODUODENOSCOPY);  Surgeon: Paxton Jurado MD;  Location: [facility] (2ND FLR);  Service: Endoscopy;  Laterality: N/A;  ref by  Sahil Singer MD, 2nd floor due to  availability- portal -ml    INSERTION OF TUNNELED CENTRAL VENOUS CATHETER (CVC) WITH SUBCUTANEOUS PORT Right 1/27/2025    Procedure: INSERTION, SINGLE LUMEN CATHETER WITH PORT, WITH FLUOROSCOPIC GUIDANCE, left poss right;  Surgeon: Chadd Velez MD;  Location: [facility] OR [address] [place] [address];  Service: General;  Laterality: Right;    LAPAROSCOPIC REPAIR OF INGUINAL HERNIA Right      No family history on file.  Social History[1]    All home medications reviewed.    ROS - negative other than what is stated in the HPI    OBJECTIVE:     Vital Signs Trends/History Reviewed  Vitals:    06/28/25 2300 06/29/25 0538 06/29/25 0820 06/29/25 1127   BP: 105/68 98/65 105/71 121/76   BP Location: Right arm  Right arm Left arm   Patient Position: Lying  Lying Lying   Pulse: 104 104 101 101   Resp: 20 20 16 16   Temp: 98 °F (36.7 °C) 97.3 °F (36.3 °C) 98.1 °F (36.7 °C) 98.2 °F (36.8 °C)   TempSrc: Oral Oral Oral Axillary   SpO2: 95% (!) 92% 95% (!) 94%   Weight:       Height:           Physical Exam      Laboratory: All labs results within last 24 hours have been reviewed.     Imaging: All imaging results within the last 24 hours have been reviewed.       Infusions:     lactated ringers   Intravenous Continuous 75 mL/hr at 06/29/25 1018 New Bag at 06/29/25 1018    TPN ADULT CENTRAL LINE CUSTOM   Intravenous Continuous 42 mL/hr at 06/28/25 2256 New Bag at 06/28/25 2256    TPN ADULT CENTRAL LINE CUSTOM   Intravenous Continuous           Scheduled Medications:    heparin (porcine)  5,000 Units Subcutaneous Q8H    mupirocin   Nasal BID    nicotine  1 patch Transdermal Daily    pantoprazole  40 mg Intravenous BID    thiamine (B-1) injection  100 mg Intravenous Daily       PRN Medications:     Current Facility-Administered Medications:     0.9%  NaCl infusion (for blood administration), , Intravenous, Q24H PRN    acetaminophen, 650 mg, Oral, Q4H PRN    dextrose 50%, 12.5 g, Intravenous, PRN    dextrose 50%, 12.5 g, Intravenous, PRN    dextrose  50%, 25 g, Intravenous, PRN    dextrose 50%, 25 g, Intravenous, PRN    glucagon (human recombinant), 1 mg, Intramuscular, PRN    glucagon (human recombinant), 1 mg, Intramuscular, PRN    glucose, 16 g, Oral, PRN    glucose, 24 g, Oral, PRN    insulin aspart U-100, 0-10 Units, Subcutaneous, Q4H PRN    naloxone, 0.02 mg, Intravenous, PRN    ondansetron, 4 mg, Intravenous, Q8H PRN    sodium chloride 0.9%, 10 mL, Intravenous, Q12H PRN    ASSESSMENT:      69 y.o. male with past medical history of esophageal adenocarcinoma s/p chemo and radiation in 2023 now with recurrence on chemotherapy who presents with inability tolerate p.o. intake and ANTOINE from dehydration.  AES consulted for discussion of esophageal stent placement and consideration of PEG tube.     Problems:  Intolerance of p.o. intake  Nausea, vomiting  Esophageal adenocarcinoma      RECOMMENDATIONS:      - patient declines esophageal stent placement  - patient amenable for enteral feeding tube placement to improve nutritional status.  However, GI/AES will not be able to place PEG tube due to risk of tumor seeding in the ostomy.  - recommend direct G-tube placement with IR/general surgery  - AES will sign off    Thank you for allowing us to participate in the care of this patient. Please call with questions.    Discussed with Dr. Yudi Yusuf MD , PGY-IV  Gastroenterology Fellow  [facility]       [1]   Social History  Tobacco Use    Smoking status: Every Day     Current packs/day: 1.00     Types: Cigarettes    Smokeless tobacco: Never   Substance Use Topics    Alcohol use: Yes     Alcohol/week: 1.0 standard drink of alcohol     Types: 1 Glasses of wine per week     Comment: rarely    Drug use: Never

## 2025-06-29 NOTE — PROGRESS NOTES
[facility] - Oncology ([facility])  Hematology/Oncology  Progress Note    Patient Name: Jone Lugo  Admission Date: 6/25/2025  Hospital Length of Stay: 4 days  Code Status: Full Code     Subjective:     HPI:  70 yo M esophageal adenocarcinoma stage III, smoking presenting with poor PO intake.     In the ED initially afebrile 87/55 improving to 146 systolic after 2 LR resuscitation on RA. Wbcs 13.51, Hb 8.8 ( BL 11.2), Cr 8.0 ( BL 1.5-1.8), bicarb 13.  (123 BL) . Procal 1.63. Lactate 1.8. A CT chest A/P significant for moderate volume abdominopelvic ascites and mesenteric congestion. UA not collected. He was treated with vanc/cefepime and fluid resucitated with 2L of LR.     6/25 oncology appointment, pt reported worsening dysphagia with poor PO intake with weakness.   In the ED he notes poor food/water intake he attributes to stomach pain after swallowing food. He also notes loose watery stools that has not improved with immodium. Pt reports symptoms started after he most recent chemotherapy. Associated with lightheadedness worse with standing, fatigue. Has had decreased urine output. Denies chest pain, shortness of breath, melena, hematochezia. Otherwise noted an episode severe dysphagia recently with swallowing water and pills.     Interval History: Increase in Cr, will resume aggressive IVF resuscitation. CXR w/o evidence of pulmonary edema, has atelectasis. Will give incentive spirometry. Increasing hyperglycemia with TPN increasing to medium sliding scale.     Oncology Treatment Plan:   OP GASTRIC/ ESOPHAGEAL fam-trastuzumab deruxtecan-nxki Q3W    Medications:  Continuous Infusions:   lactated ringers   Intravenous Continuous        TPN ADULT CENTRAL LINE CUSTOM   Intravenous Continuous 42 mL/hr at 06/28/25 2256 New Bag at 06/28/25 2256    TPN ADULT CENTRAL LINE CUSTOM   Intravenous Continuous         Scheduled Meds:   heparin (porcine)  5,000 Units Subcutaneous Q8H    lactated ringers  1,000 mL  Intravenous Once    mupirocin   Nasal BID    nicotine  1 patch Transdermal Daily    pantoprazole  40 mg Intravenous BID    thiamine (B-1) injection  100 mg Intravenous Daily     PRN Meds:  Current Facility-Administered Medications:     0.9%  NaCl infusion (for blood administration), , Intravenous, Q24H PRN    acetaminophen, 650 mg, Oral, Q4H PRN    dextrose 50%, 12.5 g, Intravenous, PRN    dextrose 50%, 12.5 g, Intravenous, PRN    dextrose 50%, 25 g, Intravenous, PRN    dextrose 50%, 25 g, Intravenous, PRN    glucagon (human recombinant), 1 mg, Intramuscular, PRN    glucagon (human recombinant), 1 mg, Intramuscular, PRN    glucose, 16 g, Oral, PRN    glucose, 24 g, Oral, PRN    insulin aspart U-100, 0-10 Units, Subcutaneous, Q4H PRN    naloxone, 0.02 mg, Intravenous, PRN    ondansetron, 4 mg, Intravenous, Q8H PRN    sodium chloride 0.9%, 10 mL, Intravenous, Q12H PRN     Review of Systems  Objective:     Vital Signs (Most Recent):  Temp: 98.1 °F (36.7 °C) (06/29/25 0820)  Pulse: 101 (06/29/25 0820)  Resp: 16 (06/29/25 0820)  BP: 105/71 (06/29/25 0820)  SpO2: 95 % (06/29/25 0820) Vital Signs (24h Range):  Temp:  [97 °F (36.1 °C)-98.3 °F (36.8 °C)] 98.1 °F (36.7 °C)  Pulse:  [101-105] 101  Resp:  [16-20] 16  SpO2:  [91 %-95 %] 95 %  BP: ()/(62-81) 105/71     Weight: 55.8 kg (123 lb)  Body mass index is 19.26 kg/m².  Body surface area is 1.62 meters squared.      Intake/Output Summary (Last 24 hours) at 6/29/2025 0914  Last data filed at 6/28/2025 2100  Gross per 24 hour   Intake 388 ml   Output 300 ml   Net 88 ml        Physical Exam  Constitutional:       Appearance: Normal appearance.   Cardiovascular:      Rate and Rhythm: Normal rate and regular rhythm.      Pulses: Normal pulses.   Pulmonary:      Effort: Pulmonary effort is normal. No respiratory distress.      Breath sounds: Normal breath sounds.   Abdominal:      General: Abdomen is flat.      Palpations: Mass: palpable liver edge.   Musculoskeletal:       Right lower leg: No edema.      Left lower leg: No edema.   Skin:     General: Skin is warm and dry.      Capillary Refill: Capillary refill takes less than 2 seconds.   Neurological:      Mental Status: He is alert and oriented to person, place, and time. Mental status is at baseline.      Motor: Weakness (generalized) present.          Significant Labs:   All pertinent labs from the last 24 hours have been reviewed.    Diagnostic Results:  I have reviewed all pertinent imaging results/findings within the past 24 hours.  Assessment/Plan:     * Acute renal failure  ANTOINE is likely due to pre-renal azotemia due to intravascular volume depletion 2/2 poor fluid intake, diarrhea, BP medications. Baseline creatinine is 1.5-1.8. Most recent creatinine and eGFR are listed below. No signs of fluid overload. With metabolic acidosis, bicarb 13 in ED. EUGENIA negative for obstruction.    Recent Labs     06/28/25  1737 06/28/25  1952 06/29/25  0255   CREATININE 2.0* 2.1* 2.4*   EGFRNORACEVR 35* 33* 28*   Urine creatinine 142  Urine protein 78  Urine protein/ cr ratio 0.56  Urine urea nitrogen 319  Urine potassium 34     Plan  - ANTOINE is improving with IVF, Cr increased with reduction of IVF bolus, will resume.   - Avoid nephrotoxins and renally dose meds for GFR listed above  - Monitor urine output, serial BMP, and adjust therapy as needed  - consulted nephrology  - holding home gabapentin and amlodipine-benzepril  - IVF resucitation      Acute hypoxemic respiratory failure  Patient with Hypoxic Respiratory failure which is Acute.  he is not on home oxygen. Supplemental oxygen was provided and noted-    Ddx: pulmonary edema from IVF resuscitation vs aspiration/micro aspiration 2/2 esophageal cancer. Noted history of grade I diastolic function on echo 4/2025. Oxygen requirements around the time of being cleared for clear liquid diet. He is asymptomatic.     CXR with evidence of atelectasis, unlikely pulmonary edema. Will likely be able  to tolerate additional IVF.   - incentive spirometry   - wean o2 >90%  - possibly related to undiagnosed COPD 2/2 smoking history, had plans for formal PFT but was not done    Hypovolemic shock  Vitals:    06/27/25 1935 06/27/25 2328 06/28/25 0422 06/28/25 0823   BP: 110/61 107/67 112/72 116/71    06/28/25 1105 06/28/25 1600 06/28/25 1936 06/28/25 2300   BP: 124/81 99/62 113/75 105/68    06/29/25 0538 06/29/25 0820   BP: 98/65 105/71     Presented with BP  87/55 responding with 2 LR fluid resuscitation. Lactic acid wnl. Likely 2/2 to poor fluid tolerance 2/2 esophageal adenocarcinoma. Previous EGD in April 2025 with partial obstruction. States that placing an esophageal stent with restrictions of types of foods he would be able to eat is not inline with his goals. He is more amendable to PEG placement to provide extra support for fluids and nutrition. Do not suspect septic shock at this time as no findings on CT chest A/P. No fevers. Wbcs wnl. BC NGTD. Improving after several days of IVF resucitation    - GI unable to place PEG 2/2 partial obstruction of the esophagus.  - IR unable to advance NGT for procedure 2/2 partial obstruction.   - Will consult surgical oncology for consultation of PEG tube   - will attempt following resolution of acute renal failure and if BP wnl    - Surgical oncology consulted AES      - IVF maintenance fluids with IVF bolus prn      Hyperglycemia  2/2 TPN    Last A1c reviewed-   Lab Results   Component Value Date    HGBA1C 5.8 (H) 07/01/2024     Most recent fingerstick glucose reviewed-   Recent Labs   Lab 06/28/25  2036 06/29/25  0045 06/29/25  0426 06/29/25  0735   POCTGLUCOSE 201* 195* 217* 209*     Current correctional scale  Medium  Maintain anti-hyperglycemic dose as follows-   Antihyperglycemics (From admission, onward)      Start     Stop Route Frequency Ordered    06/29/25 0907  insulin aspart U-100 pen 0-10 Units         -- SubQ Every 4 hours PRN 06/29/25 0908               Esophageal adenocarcinoma  69M with esophageal adenocarcinoma initially presenting with dysphagia (improved post-EUS dilation). Biopsy confirmed poorly differentiated adenocarcinoma, pMMR, HER2+, PD-L1 0. Received neoadjuvant carboplatin/paclitaxel + radiation (non-surgical). Imaging showed stable disease. Recurrent tumor confirmed on EGD (12/2024); started FOLFOX + trastuzumab, with stable imaging after 4 cycles. After 6 cycles, developed worsening dysphagia due to local tumor progression. Declined stent/PEG at last hospitalization. Switched to trastuzumab deruxtecan (TDxD). Prior admission presented for cycle 4 with significant dysphagia, poor PO intake, Cr 7.7, hypotension, weakness. Treatment held, sent to ED. Previous EGD in April 2025 with partial obstruction. States that placing an esophageal stent with restrictions of types of foods he would be able to eat is not inline with his goals. He is more amendable to PEG placement to provide extra support for fluids and nutrition.     - likely causing poor po intake > hypovolemic shock > acute renal failure  - Surgical oncology recommending strict NPO due to his report that he cannot tolerate anything by mouth due to the aspiration risk. We would not consider any intervention until his hypovolemia and acute renal failure are addressed.     - Started TPN on 6/26  - Maintenance IVF @75cc/h          Anemia due to bone marrow failure  Anemia is likely due to chronic disease due to Malignancy and drug toxicity from chemotherapy. Most recent hemoglobin and hematocrit are listed below.  Baseline 11.2. Likely 2/2 chemo. Less likely GIB. He has noted ongoing abdominal pain with associated belching. No hematochezia/melena. ED performed MONTRELL negative negative guaiac fecal occult blood test.     Recent Labs     06/26/25  0240 06/27/25  0305 06/28/25  0300   HGB 8.2* 8.2* 7.8*   HCT 23.2* 23.6* 23.2*       Plan  - Monitor serial CBC: Daily  - Transfuse PRBC if patient  becomes hemodynamically unstable, symptomatic or H/H drops below 7/21.  - Patient has not received any PRBC transfusions to date  - Patient's anemia is currently stable  - holding anticoaugulation for now will re-evaluate cbc tomorrow   - f/u iron studies with low iron saturation, may benefit from iron infusions as outpatient       Transaminitis  S/p kaleb. No findings supportive of liver/biliary pathology on imaging. GGT elevated as well. Likely shock liver. Will continue to monitor CMP.    - improving with IVF     Diarrhea  RESOLVED     Reports onset of loose watery diarrhea since previous chemotherapy treatment. Possibly related to chemotherapy. Likely contributed to hypovolemic shock and acute renal failure.   Improving. Negative stool studies/c.diff.     At risk for malnutrition  RD consulted  TPN ordered and possible PEG tube placement. Pt is NPO currently    Hyperphosphatemia  Patient's most recent phosphorus results are listed below.   Recent Labs     06/25/25  1100   PHOS 5.7*       Plan  - Patient's hyperphosphatemia is stable  - see acute renal failure     Weakness  2/2 poor po intake, hypovolemia.      PT/OT  - No DME recommended requiring DME justifications     Esophageal dysphagia  See esophageal carcinoma    Obstruction of esophagus  See esophageal adenocarcinoma               Ricardo Mcghee MD  Hematology/Oncology  [facility] - Oncology ([facility])

## 2025-06-30 PROBLEM — R18.8 OTHER ASCITES: Status: ACTIVE | Noted: 2025-06-30

## 2025-06-30 PROBLEM — I50.32 CHRONIC HEART FAILURE WITH PRESERVED EJECTION FRACTION: Status: ACTIVE | Noted: 2025-06-30

## 2025-06-30 NOTE — PROGRESS NOTES
Anupam Alcantara - Oncology ([facility])  Hematology/Oncology  Progress Note    Patient Name: Jone Lugo  Admission Date: 6/25/2025  Hospital Length of Stay: 5 days  Code Status: Full Code     Subjective:     HPI:  70 yo M esophageal adenocarcinoma stage III, smoking presenting with poor PO intake.     In the ED initially afebrile 87/55 improving to 146 systolic after 2 LR resuscitation on RA. Wbcs 13.51, Hb 8.8 ( BL 11.2), Cr 8.0 ( BL 1.5-1.8), bicarb 13.  (123 BL) . Procal 1.63. Lactate 1.8. A CT chest A/P significant for moderate volume abdominopelvic ascites and mesenteric congestion. UA not collected. He was treated with vanc/cefepime and fluid resucitated with 2L of LR.     6/25 oncology appointment, pt reported worsening dysphagia with poor PO intake with weakness.   In the ED he notes poor food/water intake he attributes to stomach pain after swallowing food. He also notes loose watery stools that has not improved with immodium. Pt reports symptoms started after he most recent chemotherapy. Associated with lightheadedness worse with standing, fatigue. Has had decreased urine output. Denies chest pain, shortness of breath, melena, hematochezia. Otherwise noted an episode severe dysphagia recently with swallowing water and pills.     Interval History: Cr not improving. He developed worsening ascites 2/2 hypoalbuminemia along with tachycardia. Echo ordered as JVP was elevated and to evaluate for right heart strain. Hospital medicine consulted to evaluate for potential of paracentesis. Reached out to nephrology to assess if albumin would be needed after para.     Oncology Treatment Plan:   OP GASTRIC/ ESOPHAGEAL fam-trastuzumab deruxtecan-nxki Q3W    Medications:  Continuous Infusions:   lactated ringers   Intravenous Continuous 100 mL/hr at 06/29/25 1824 Rate Change at 06/29/25 1824    TPN ADULT CENTRAL LINE CUSTOM   Intravenous Continuous 42 mL/hr at 06/29/25 2357 New Bag at 06/29/25 2357     Scheduled  Meds:   heparin, porcine (PF)  5 mL Intravenous Once    magnesium sulfate 2 g IVPB  2 g Intravenous Once    mupirocin   Nasal BID    nicotine  1 patch Transdermal Daily    pantoprazole  40 mg Intravenous BID    potassium chloride  20 mEq Intravenous Once    thiamine (B-1) injection  100 mg Intravenous Daily     PRN Meds:  Current Facility-Administered Medications:     0.9%  NaCl infusion (for blood administration), , Intravenous, Q24H PRN    acetaminophen, 650 mg, Oral, Q4H PRN    dextrose 50%, 12.5 g, Intravenous, PRN    dextrose 50%, 12.5 g, Intravenous, PRN    dextrose 50%, 25 g, Intravenous, PRN    dextrose 50%, 25 g, Intravenous, PRN    glucagon (human recombinant), 1 mg, Intramuscular, PRN    glucagon (human recombinant), 1 mg, Intramuscular, PRN    glucose, 16 g, Oral, PRN    glucose, 24 g, Oral, PRN    insulin aspart U-100, 0-10 Units, Subcutaneous, Q4H PRN    naloxone, 0.02 mg, Intravenous, PRN    ondansetron, 4 mg, Intravenous, Q8H PRN    sodium chloride 0.9%, 10 mL, Intravenous, Q12H PRN     Review of Systems  Objective:     Vital Signs (Most Recent):  Temp: 98.5 °F (36.9 °C) (06/30/25 0830)  Pulse: 107 (06/30/25 0830)  Resp: 18 (06/30/25 0830)  BP: 131/69 (06/30/25 0830)  SpO2: (!) 91 % (06/30/25 0830) Vital Signs (24h Range):  Temp:  [97.4 °F (36.3 °C)-98.5 °F (36.9 °C)] 98.5 °F (36.9 °C)  Pulse:  [100-111] 107  Resp:  [16-20] 18  SpO2:  [90 %-100 %] 91 %  BP: ()/(65-76) 131/69     Weight: 55.8 kg (123 lb)  Body mass index is 19.26 kg/m².  Body surface area is 1.62 meters squared.      Intake/Output Summary (Last 24 hours) at 6/30/2025 1045  Last data filed at 6/30/2025 0546  Gross per 24 hour   Intake --   Output 400 ml   Net -400 ml        Physical Exam  Constitutional:       Appearance: Normal appearance.   Cardiovascular:      Rate and Rhythm: Regular rhythm. Tachycardia present.      Pulses: Normal pulses.      Heart sounds: Heart sounds are distant.      Comments: Elevated JVP to  jaw  Pulmonary:      Effort: Pulmonary effort is normal. No respiratory distress.      Breath sounds: Normal breath sounds.   Abdominal:      General: There is distension.      Palpations: Mass: palpable liver edge.      Tenderness: There is no abdominal tenderness.   Musculoskeletal:      Right lower leg: No edema.      Left lower leg: No edema.   Skin:     General: Skin is warm and dry.      Capillary Refill: Capillary refill takes less than 2 seconds.   Neurological:      Mental Status: He is alert and oriented to person, place, and time. Mental status is at baseline.      Motor: Weakness (generalized) present.          Significant Labs:   All pertinent labs from the last 24 hours have been reviewed.    Diagnostic Results:  I have reviewed all pertinent imaging results/findings within the past 24 hours.  Assessment/Plan:     * Acute renal failure  ANTOINE is likely due to pre-renal azotemia due to intravascular volume depletion 2/2 poor fluid intake, diarrhea, BP medications. Baseline creatinine is 1.5-1.8. Most recent creatinine and eGFR are listed below. No signs of fluid overload. With metabolic acidosis, bicarb 13 in ED. EUGENIA negative for obstruction.    Recent Labs     06/29/25  1738 06/30/25  0337 06/30/25  0342   CREATININE 2.8*  2.7* 2.6* 2.6*   EGFRNORACEVR 24*  25* 26* 26*     Urine creatinine 142  Urine protein 78  Urine protein/ cr ratio 0.56  Urine urea nitrogen 319  Urine potassium 34     Plan  - Avoid nephrotoxins and renally dose meds for GFR listed above  - Monitor urine output, serial BMP, and adjust therapy as needed  - consulted nephrology  - holding home gabapentin and amlodipine-benzepril  - IVF resucitation  - f/u nephrology      Acute hypoxemic respiratory failure  Patient with Hypoxic Respiratory failure which is Acute.  he is not on home oxygen. Supplemental oxygen was provided and noted-    Ddx: pulmonary edema from IVF resuscitation vs aspiration/micro aspiration 2/2 esophageal cancer.  Noted history of grade I diastolic function on echo 4/2025. Oxygen requirements around the time of being cleared for clear liquid diet. He is asymptomatic.     CXR with evidence of atelectasis, unlikely pulmonary edema. JVP noted 6/30   - f/u echo   - incentive spirometry   - wean o2 >90%  - possibly related to undiagnosed COPD 2/2 smoking history, had plans for formal PFT but was not done    Other ascites  CT 6/25 on admission with moderate volume abdominopelvic ascites and mesenteric congestion that was not appreciated on exam. 6/30 with distension concerning for worsening ascites 2/2 hypoalbuminemia (1.5) causing discomfort. Concern that increasing pressure is altering renal perfusion.     - f/u hospital medicine regarding if pocket available for paracentesis  - f/u nephrology to assess if albumin necessary   - may complicate plans for PEG tube     Hypovolemic shock  Vitals:    06/28/25 1936 06/28/25 2300 06/29/25 0538 06/29/25 0820   BP: 113/75 105/68 98/65 105/71    06/29/25 1127 06/29/25 1553 06/29/25 1953 06/29/25 2335   BP: 121/76 98/67 111/71 113/65    06/30/25 0524 06/30/25 0830   BP: 115/68 131/69     Presented with BP  87/55 responding with 2 LR fluid resuscitation. Lactic acid wnl. Likely 2/2 to poor fluid tolerance 2/2 esophageal adenocarcinoma. Previous EGD in April 2025 with partial obstruction. States that placing an esophageal stent with restrictions of types of foods he would be able to eat is not inline with his goals. He is more amendable to PEG placement to provide extra support for fluids and nutrition. Do not suspect septic shock at this time as no findings on CT chest A/P. No fevers. Wbcs wnl. BC NGTD. Improving after several days of IVF resucitation    - GI unable to place PEG 2/2 partial obstruction of the esophagus.  - IR unable to advance NGT for procedure 2/2 partial obstruction.   - Will consult surgical oncology for consultation of PEG tube   - will attempt following resolution of  acute renal failure and if BP wnl    - Surgical oncology consulted [facility]      - IVF maintenance fluids with IVF bolus prn when hypotensive      Chronic heart failure with preserved ejection fraction  Results for orders placed during the hospital encounter of 04/22/25    Echo    Interpretation Summary    Left Ventricle: The left ventricle is normal in size. Mildly increased wall thickness. There is normal systolic function with a visually estimated ejection fraction of 55 - 60%. Global longitudinal strain is -17.1%. Global longitudinal strain is normal. Grade I diastolic dysfunction.    Right Ventricle: The right ventricle is normal in size. Wall thickness is normal. Systolic function is normal.    IVC/SVC: Normal venous pressure at 3 mmHg.     Developing tachycardia starting 6/29 possibly related to increased fluid load vs pain/discomfort. Less likely PE as has been on heparin  - f/u repeat echo as JVP elevated 6/30 with fluid resuscitation   - f/u troponin     Hyperglycemia  2/2 TPN    Last A1c reviewed-   Lab Results   Component Value Date    HGBA1C 5.8 (H) 07/01/2024     Most recent fingerstick glucose reviewed-   Recent Labs   Lab 06/28/25  2036 06/29/25  0045 06/29/25  0426 06/29/25  0735   POCTGLUCOSE 201* 195* 217* 209*     Current correctional scale  Medium  Maintain anti-hyperglycemic dose as follows-   Antihyperglycemics (From admission, onward)      Start     Stop Route Frequency Ordered    06/29/25 0907  insulin aspart U-100 pen 0-10 Units         -- SubQ Every 4 hours PRN 06/29/25 0908              Anemia due to bone marrow failure  Anemia is likely due to chronic disease due to Malignancy and drug toxicity from chemotherapy. Most recent hemoglobin and hematocrit are listed below.  Baseline 11.2. Likely 2/2 chemo. Less likely GIB. He has noted ongoing abdominal pain with associated belching. No hematochezia/melena. ED performed MONTRELL negative negative guaiac fecal occult blood test.     Recent Labs      06/26/25  0240 06/27/25  0305 06/28/25  0300   HGB 8.2* 8.2* 7.8*   HCT 23.2* 23.6* 23.2*       Plan  - Monitor serial CBC: Daily  - Transfuse PRBC if patient becomes hemodynamically unstable, symptomatic or H/H drops below 7/21.  - Patient has not received any PRBC transfusions to date  - Patient's anemia is currently stable  - holding anticoaugulation for now will re-evaluate cbc tomorrow   - f/u iron studies with low iron saturation, may benefit from iron infusions as outpatient       Esophageal adenocarcinoma  69M with esophageal adenocarcinoma initially presenting with dysphagia (improved post-EUS dilation). Biopsy confirmed poorly differentiated adenocarcinoma, pMMR, HER2+, PD-L1 0. Received neoadjuvant carboplatin/paclitaxel + radiation (non-surgical). Imaging showed stable disease. Recurrent tumor confirmed on EGD (12/2024); started FOLFOX + trastuzumab, with stable imaging after 4 cycles. After 6 cycles, developed worsening dysphagia due to local tumor progression. Declined stent/PEG at last hospitalization. Switched to trastuzumab deruxtecan (TDxD). Prior admission presented for cycle 4 with significant dysphagia, poor PO intake, Cr 7.7, hypotension, weakness. Treatment held, sent to ED. Previous EGD in April 2025 with partial obstruction. States that placing an esophageal stent with restrictions of types of foods he would be able to eat is not inline with his goals. He is more amendable to PEG placement to provide extra support for fluids and nutrition.     - likely causing poor po intake > hypovolemic shock > acute renal failure  - Surgical oncology recommending strict NPO due to his report that he cannot tolerate anything by mouth due to the aspiration risk. We would not consider any intervention until his hypovolemia and acute renal failure are addressed.     - Started TPN on 6/26  - Maintenance IVF @75cc/h          Transaminitis  S/p kaleb. No findings supportive of liver/biliary pathology on imaging.  GGT elevated as well. Likely shock liver. Will continue to monitor CMP.    - improving with IVF     Diarrhea  RESOLVED     Reports onset of loose watery diarrhea since previous chemotherapy treatment. Possibly related to chemotherapy. Likely contributed to hypovolemic shock and acute renal failure.   Improving. Negative stool studies/c.diff.     At risk for malnutrition  RD consulted  TPN ordered and possible PEG tube placement. Pt is NPO currently    Hyperphosphatemia  Patient's most recent phosphorus results are listed below.   Recent Labs     06/25/25  1100   PHOS 5.7*       Plan  - Patient's hyperphosphatemia is stable  - see acute renal failure     Weakness  2/2 poor po intake, hypovolemia.      PT/OT  - No DME recommended requiring DME justifications     Esophageal dysphagia  See esophageal carcinoma    Obstruction of esophagus  See esophageal adenocarcinoma               Ricardo Mcghee MD  Hematology/Oncology  Anupam Alcantara - Oncology ([facility])

## 2025-06-30 NOTE — PROCEDURES
"Jone Lugo is a 69 y.o. male patient.    Temp: 97.6 °F (36.4 °C) (25)  Pulse: 102 (25)  Resp: 20 (25)  BP: 113/66 (25)  SpO2: (!) 93 % (25)  Weight: 55.8 kg (123 lb) (25)  Height: 5' 7" (170.2 cm) (25)       Paracentesis    Date/Time: 2025 5:27 PM  Location procedure was performed: [facility] MEDICINE    Performed by: Papito Fernando DO  Authorized by: Sarah Chapin MD  Assisting provider: Sarah Chapin MD  Pre-operative diagnosis: Ascites  Post-operative diagnosis: Ascites  Consent Done: Yes  Consent given by: patient  Patient understanding: patient states understanding of the procedure being performed  Patient consent: the patient's understanding of the procedure matches consent given  Procedure consent: procedure consent matches procedure scheduled  Relevant documents: relevant documents present and verified  Test results: test results available and properly labeled  Site marked: the operative site was marked  Patient identity confirmed: , MRN and name  Initial or subsequent exam: initial  Procedure purpose: diagnostic and therapeutic  Indications: abdominal discomfort secondary to ascites and new onset ascites  Anesthesia: local infiltration    Anesthesia:  Anesthetic total: 9 mL  Fluid removed: 5(ml)  Fluid appearance: serous and clear  Complications: No  Estimated blood loss (mL): 0  Specimens: Yes  Implants: No  Comments: Patient tolerated procedure at bedside well.  Two attempts were made.  First attempt was aborted due to not having catheter completely into the peritoneal cavity.  The catheter was removed. 2nd attempt was performed by Dr. Edgar with complete insertion of the catheter into the cavity.  5 L of clear yellow serous fluid was removed.  Site was bandaged with gauze and Tegaderm.  Recommend IV albumin per protocol to be given.           2025    "

## 2025-06-30 NOTE — PLAN OF CARE
Recommendations    1.TPN recs: 65 g of AA + 275 g D + Daily IV lipids to provide 1695 kcal, 65 g protein; GIR: 3.4  2. Recommend Ramirez BID  3. RD to monitor weight, labs, intake, tolerance     Goals:   1. % nutritional needs met with diet during admission   2. Maintain weight during admission  3. Display s/s of wound healing during admission     Nutrition Goal Status: new  Communication of RD Recs:  (POC)    Nutrition Discharge Planning     Nutrition Discharge Planning: Too early to determine, pending clinical course

## 2025-06-30 NOTE — PROGRESS NOTES
Per rounds, patient has an ultra sound of the abdomen.  Patient was labor breathing refusing oxygen. Legs swollen , tachycardiac and new ascites.  Albumin not looking good.  Patient currently on TPN and possible peg tube placement.  NPO for now.    Sw'er will continue to follow for medical team thoughts on possible LTAC placement.    YANELIS Hopson, SW  [facility] - Oncology Dept  Email:[email or web address]  Office ph# [phone]

## 2025-06-30 NOTE — ASSESSMENT & PLAN NOTE
ANTOINE is likely due to pre-renal azotemia due to intravascular volume depletion 2/2 poor fluid intake, diarrhea, BP medications. Baseline creatinine is 1.5-1.8. Most recent creatinine and eGFR are listed below. No signs of fluid overload. With metabolic acidosis, bicarb 13 in ED. EUGENIA negative for obstruction.    Recent Labs     06/29/25  1738 06/30/25  0337 06/30/25  0342   CREATININE 2.8*  2.7* 2.6* 2.6*   EGFRNORACEVR 24*  25* 26* 26*     Urine creatinine 142  Urine protein 78  Urine protein/ cr ratio 0.56  Urine urea nitrogen 319  Urine potassium 34     Plan  - Avoid nephrotoxins and renally dose meds for GFR listed above  - Monitor urine output, serial BMP, and adjust therapy as needed  - consulted nephrology  - holding home gabapentin and amlodipine-benzepril  - IVF resucitation  - f/u nephrology

## 2025-06-30 NOTE — PT/OT/SLP PROGRESS
Occupational Therapy      Patient Name:  Jone Lugo   MRN:  [id number]    Patient not seen today secondary to  (MD hold upon attempt (1551) due to beginnning paracentesis). Will follow-up next scheduled visit.    6/30/2025

## 2025-06-30 NOTE — CONSULTS
"  Anupam Alcantara - Oncology ([facility])  Adult Nutrition  Consult Note    SUMMARY     Recommendations    1.TPN recs: 65 g of AA + 275 g D + Daily IV lipids to provide 1695 kcal, 65 g protein; GIR: 3.4  2. Recommend Ramirez BID  3. RD to monitor weight, labs, intake, tolerance     Goals:   1. % nutritional needs met with diet during admission   2. Maintain weight during admission  3. Display s/s of wound healing during admission     Nutrition Goal Status: new  Communication of RD Recs:  (POC)    Nutrition Discharge Planning     Nutrition Discharge Planning: Too early to determine, pending clinical course    Reason for Assessment    Reason For Assessment: consult, pressure injury/ wound  Diagnosis: renal disease  General Information Comments: Pt admitted for acute renal failure. PMHx HTN, dysphagia, esophageal adenocarcinoma stage III, smoking presenting with poor PO intake.   Per MD note "er patient his dysphagia has been worsening and the past three days has not been able to tolerate solids or liquids by mouth. He was sent to the ED and found to be in acute renal failure likely due to dehydration from poor PO intake."     Per MD note:    "- not safe for oral diet at high aspiration risk    -feeding PEG (by any method), this is also permanent.  It is not an option as he has ascites.  It may never be an option.    -TPN this is ultimately a placeholder and I would not continue it longterm as it will worsen the ascites without a meaningful improvement in nutritional status, QOL or survival"     Pressure injury stage 3 noted 6/29. Pt NPO, TPN recs provided on 6/27. Per chart review, significant wt loss noted of 27.2% x 1 yr per ASPEN criteria. MD in room at time of visit, NFPE to be done at f/u.    Nutrition/Diet History    Spiritual, Cultural Beliefs, Denominational Practices, Values that Affect Care: no  Factors Affecting Nutritional Intake: NPO  Nutrition-related SDOH: Unable to assess at this " "time    Anthropometrics    Height: 5' 7" (170.2 cm)  Height (inches): 67 in  Height Method: Stated  Weight: 55.8 kg (123 lb)  Weight (lb): 123 lb  Weight Method: Standard Scale  Ideal Body Weight (IBW), Male: 148 lb  % Ideal Body Weight, Male (lb): 83.11 %  BMI (Calculated): 19.3  BMI Grade: less than 23 (older than 65 years) - underweight    Lab/Procedures/Meds    Pertinent Labs Reviewed: reviewed  Pertinent Labs Comments: K 3.3, BUN 63, Cr 2.6, GFR 26, Glu 140, Mg 1.5, , Protein total 4.6, albumin 1.4, ,   Pertinent Medications Reviewed: reviewed  Pertinent Medications Comments: TPN adult central line custom, lactated ringers infusion, heparin, magnesium sulfate, pantoprazole, potassium chloride, thiamine injection.    Estimated/Assessed Needs    Weight Used For Calorie Calculations: 55.8 kg (123 lb 0.3 oz)  Energy Calorie Requirements (kcal): 5329-0993 kcal (30-35 kcal/kg)  Energy Need Method: [place]-St Jeor  Protein Requirements: 67-84 g/pro (1.2-1.5 g/kg)  Weight Used For Protein Calculations: 55.8 kg (123 lb 0.3 oz)    RDA Method (mL): 1674  CHO Requirement: 209-244      Nutrition Prescription Ordered    Current Diet Order: NPO    Evaluation of Received Nutrient/Fluid Intake    Parenteral Calories (kcal): 1215  Parenteral Protein (gm): 202  Lipid Calories (kcals): 500 kcals  GIR (Glucose Infusion Rate) (mg/kg/min): 1.88 mg/kg/min  I/O: +3,164.8 since 6/25  Energy Calories Required: meeting needs  Protein Required: meeting needs  Fluid Required:  (per MD)  Comments: LBM 6/29  Tolerance: tolerating  % Intake of Estimated Energy Needs: 75 - 100 %  % Meal Intake: 75 - 100 %    PES Statement  Increased nutrient needs related to Wound healing as evidenced by  (pressure injury stage 3)  Status: New    Nutrition Risk    Level of Risk/Frequency of Follow-up: moderate - high (1-2/wk)       Monitor and Evaluation    Monitor and Evaluation: Skin, Nutrition focused physical findings, Lipid profile, " Inflammatory profile, Glucose/endocrine profile, Gastrointestinal profile, Electrolyte and renal panel, Beliefs and attitudes, Weight       Nutrition Follow-Up    RD Follow-up?: Yes

## 2025-06-30 NOTE — ASSESSMENT & PLAN NOTE
Results for orders placed during the hospital encounter of 04/22/25    Echo    Interpretation Summary    Left Ventricle: The left ventricle is normal in size. Mildly increased wall thickness. There is normal systolic function with a visually estimated ejection fraction of 55 - 60%. Global longitudinal strain is -17.1%. Global longitudinal strain is normal. Grade I diastolic dysfunction.    Right Ventricle: The right ventricle is normal in size. Wall thickness is normal. Systolic function is normal.    IVC/SVC: Normal venous pressure at 3 mmHg.     Developing tachycardia starting 6/29 possibly related to increased fluid load vs pain/discomfort. Less likely PE as has been on heparin  - Echo technically flawed though with preserved ejection fraction

## 2025-06-30 NOTE — SUBJECTIVE & OBJECTIVE
Interval History: Cr not improving. He developed worsening ascites 2/2 hypoalbuminemia along with tachycardia. Echo ordered as JVP was elevated and to evaluate for right heart strain. Hospital medicine consulted to evaluate for potential of paracentesis. Reached out to nephrology to assess if albumin would be needed after para.     Oncology Treatment Plan:   OP GASTRIC/ ESOPHAGEAL fam-trastuzumab deruxtecan-nxki Q3W    Medications:  Continuous Infusions:   lactated ringers   Intravenous Continuous 100 mL/hr at 06/29/25 1824 Rate Change at 06/29/25 1824    TPN ADULT CENTRAL LINE CUSTOM   Intravenous Continuous 42 mL/hr at 06/29/25 2357 New Bag at 06/29/25 2357     Scheduled Meds:   heparin, porcine (PF)  5 mL Intravenous Once    magnesium sulfate 2 g IVPB  2 g Intravenous Once    mupirocin   Nasal BID    nicotine  1 patch Transdermal Daily    pantoprazole  40 mg Intravenous BID    potassium chloride  20 mEq Intravenous Once    thiamine (B-1) injection  100 mg Intravenous Daily     PRN Meds:  Current Facility-Administered Medications:     0.9%  NaCl infusion (for blood administration), , Intravenous, Q24H PRN    acetaminophen, 650 mg, Oral, Q4H PRN    dextrose 50%, 12.5 g, Intravenous, PRN    dextrose 50%, 12.5 g, Intravenous, PRN    dextrose 50%, 25 g, Intravenous, PRN    dextrose 50%, 25 g, Intravenous, PRN    glucagon (human recombinant), 1 mg, Intramuscular, PRN    glucagon (human recombinant), 1 mg, Intramuscular, PRN    glucose, 16 g, Oral, PRN    glucose, 24 g, Oral, PRN    insulin aspart U-100, 0-10 Units, Subcutaneous, Q4H PRN    naloxone, 0.02 mg, Intravenous, PRN    ondansetron, 4 mg, Intravenous, Q8H PRN    sodium chloride 0.9%, 10 mL, Intravenous, Q12H PRN     Review of Systems  Objective:     Vital Signs (Most Recent):  Temp: 98.5 °F (36.9 °C) (06/30/25 0830)  Pulse: 107 (06/30/25 0830)  Resp: 18 (06/30/25 0830)  BP: 131/69 (06/30/25 0830)  SpO2: (!) 91 % (06/30/25 0830) Vital Signs (24h Range):  Temp:   [97.4 °F (36.3 °C)-98.5 °F (36.9 °C)] 98.5 °F (36.9 °C)  Pulse:  [100-111] 107  Resp:  [16-20] 18  SpO2:  [90 %-100 %] 91 %  BP: ()/(65-76) 131/69     Weight: 55.8 kg (123 lb)  Body mass index is 19.26 kg/m².  Body surface area is 1.62 meters squared.      Intake/Output Summary (Last 24 hours) at 6/30/2025 1045  Last data filed at 6/30/2025 0546  Gross per 24 hour   Intake --   Output 400 ml   Net -400 ml        Physical Exam  Constitutional:       Appearance: Normal appearance.   Cardiovascular:      Rate and Rhythm: Regular rhythm. Tachycardia present.      Pulses: Normal pulses.      Heart sounds: Heart sounds are distant.      Comments: Elevated JVP to jaw  Pulmonary:      Effort: Pulmonary effort is normal. No respiratory distress.      Breath sounds: Normal breath sounds.   Abdominal:      General: There is distension.      Palpations: Mass: palpable liver edge.      Tenderness: There is no abdominal tenderness.   Musculoskeletal:      Right lower leg: No edema.      Left lower leg: No edema.   Skin:     General: Skin is warm and dry.      Capillary Refill: Capillary refill takes less than 2 seconds.   Neurological:      Mental Status: He is alert and oriented to person, place, and time. Mental status is at baseline.      Motor: Weakness (generalized) present.          Significant Labs:   All pertinent labs from the last 24 hours have been reviewed.    Diagnostic Results:  I have reviewed all pertinent imaging results/findings within the past 24 hours.

## 2025-06-30 NOTE — PROGRESS NOTES
"By to see Mr. Lugo, I have spoken with him before.  He has progressive GEJ adenoca with obstruction.  He was admitted with renal failure due to inability to take enough po to sustain himself.  His renal function is better but not great.      On exam he is cachectic, can't get himself up out of bed, has anasarca, ascites and pleural effusions.      Recs:  -not safe for oral diet at high aspiration risk, do not order swallow study or MBSS or oral contrast exams.  -His best palliative option is an attempt at endoscopic stenting, he has been approached by at least two services and says that's not what he wants ("because it's not going to do enough" and "it's permanent"). Those are both true statements, but it's all we can offer to help.   -Re: feeding PEG (by any method), this is also permanent.  It is not an option as he has ascites.  It may never be an option.  -Re: ascites, would tap it and start diuretics as tolerated by renal function  -Re: TPN this is ultimately a placeholder and I would not continue it longterm as it will worsen the ascites without a meaningful improvement in nutritional status, QOL or survival.    -He is quite weak, has issues without easy fixes, and I do not think he will make substantial improvement to discharge to home from this admission.  I spoke with him for quite a while about what his goals are.  He wants us to "drill a hole in the tumor and let him go home".  I do not think he has a great grasp of what's going on and tends to be quite circular.  Recommend regrouping with his primary GI medical oncologist as well as palliative care to discuss goals of care.  He is willing.  "

## 2025-06-30 NOTE — ASSESSMENT & PLAN NOTE
CT 6/25 on admission with moderate volume abdominopelvic ascites and mesenteric congestion that was not appreciated on exam. 6/30 with distension concerning for worsening ascites 2/2 hypoalbuminemia (1.5) causing discomfort. Concern that increasing pressure is altering renal perfusion.     - f/u hospital medicine regarding if pocket available for paracentesis  - f/u nephrology to assess if albumin necessary   - may complicate plans for PEG tube

## 2025-06-30 NOTE — ASSESSMENT & PLAN NOTE
Vitals:    06/28/25 1936 06/28/25 2300 06/29/25 0538 06/29/25 0820   BP: 113/75 105/68 98/65 105/71    06/29/25 1127 06/29/25 1553 06/29/25 1953 06/29/25 2335   BP: 121/76 98/67 111/71 113/65    06/30/25 0524 06/30/25 0830   BP: 115/68 131/69     Presented with BP  87/55 responding with 2 LR fluid resuscitation. Lactic acid wnl. Likely 2/2 to poor fluid tolerance 2/2 esophageal adenocarcinoma. Previous EGD in April 2025 with partial obstruction. States that placing an esophageal stent with restrictions of types of foods he would be able to eat is not inline with his goals. He is more amendable to PEG placement to provide extra support for fluids and nutrition. Do not suspect septic shock at this time as no findings on CT chest A/P. No fevers. Wbcs wnl. BC NGTD. Improving after several days of IVF resucitation    - GI unable to place PEG 2/2 partial obstruction of the esophagus.  - IR unable to advance NGT for procedure 2/2 partial obstruction.   - Will consult surgical oncology for consultation of PEG tube   - will attempt following resolution of acute renal failure and if BP wnl    - Surgical oncology consulted [facility]      - IVF maintenance fluids with IVF bolus prn when hypotensive

## 2025-06-30 NOTE — ASSESSMENT & PLAN NOTE
Results for orders placed during the hospital encounter of 04/22/25    Echo    Interpretation Summary    Left Ventricle: The left ventricle is normal in size. Mildly increased wall thickness. There is normal systolic function with a visually estimated ejection fraction of 55 - 60%. Global longitudinal strain is -17.1%. Global longitudinal strain is normal. Grade I diastolic dysfunction.    Right Ventricle: The right ventricle is normal in size. Wall thickness is normal. Systolic function is normal.    IVC/SVC: Normal venous pressure at 3 mmHg.     Developing tachycardia starting 6/29 possibly related to increased fluid load vs pain/discomfort. Less likely PE as has been on heparin  - f/u repeat echo as JVP elevated 6/30 with fluid resuscitation   - f/u troponin

## 2025-06-30 NOTE — PLAN OF CARE
- Blood sugar monitored ACHS and given insulin per sliding scale.  - PRN zofran given once for nausea.  - Waffle mattress applied.    Went over POC with pt at beginning of shift.  Questions were asked and answered.  AAO x 4. Afebrile.  Up with standby assist. Voiding without difficulty. No complaints of pain.  Pt remained free from injury with bed in low locked position, call light with in reach, bed alarm on, non-skid socks, and frequent rounding.  Pt instructed to call for assistance as needed. Denies needs at this time.

## 2025-06-30 NOTE — PROGRESS NOTES
"[facility] - Medical / Clinical  Nephrology  Progress Note      Patient Name: Jone Lugo   MRN: [id number]   Current Provider: Marie Garnica MD  Primary Care Provider: [place], Primary Doctor   Admission Date: 6/25/2025   Hospital Day: 5  Bed: 855/855 A  Principal Problem: Acute renal failure       SUBJECTIVE    Jone Lugo is a 69 y.o. male ,is admitted on 6/25/2025  with past medical history of   esophageal adenocarcinoma stage III, smoking    Admitted with abnormal vitals including hypotension from the chemotherapy center.     Last chemo was 3 weeks ago - since then not feeling well. Has low oral intake and not drinking enough. He has watery stools, passed 5 times yesterday. Took imodium. No stool today. Stool sample sent to RO C-diff.     This morning while  went for chemo he had low BP so he was sent for sepsis evaluation. In ED his BP was low (86) was given 2L IVF bolus and bladder scan showed 400mL. Sepsis workup done and was given vanco on 6/25. Blood Cx sent    Nephrology consulted for  ANTOINE 3 in setting of low PO intake, hypotension (lowest SBP 86), diarrhea. Also he received vancomycine on 6/26 and had urine retension of 400 mL with out feeling of passing urine.       OBJECTIVE     Vitals:  /66   Pulse 102   Temp 97.6 °F (36.4 °C) (Oral)   Resp 20   Ht 5' 7" (1.702 m)   Wt 55.8 kg (123 lb)   SpO2 (!) 93%   BMI 19.26 kg/m²    I/O last 3 completed shifts:  In: -   Out: 450 [Urine:350; Emesis/NG output:100]   Net IO Since Admission: 2,764.81 mL [06/30/25 1817]    Physical Examination:  HEENT: NC.   Neck: atrumatic  Heart: audible heart sounds, no edema  Lungs: CTAB, no w/r/r    MEDICATIONS & LABS       lactated ringers   Intravenous Continuous 100 mL/hr at 06/30/25 1720 Rate Change at 06/30/25 1720    TPN ADULT CENTRAL LINE CUSTOM   Intravenous Continuous 42 mL/hr at 06/29/25 2357 New Bag at 06/29/25 2357    TPN ADULT CENTRAL LINE CUSTOM   Intravenous Continuous          albumin human " "25%  30 g Intravenous Once    lactated ringers  1,000 mL Intravenous Once    mupirocin   Nasal BID    nicotine  1 patch Transdermal Daily    pantoprazole  40 mg Intravenous BID    thiamine (B-1) injection  100 mg Intravenous Daily     Current Outpatient Medications   Medication Instructions    amlodipine-benazepril 10-20mg (LOTREL) 10-20 mg per capsule 1 capsule, Daily    azelastine (ASTELIN) 137 mcg (0.1 %) nasal spray 2 sprays    dexAMETHasone (DECADRON) 8 mg, Oral, Daily, On days 2-4 of each chemotherapy cycle.    etodolac (LODINE) 400 MG tablet 1 tablet, 2 times daily    fluticasone propionate (FLONASE) 50 mcg/actuation nasal spray 2 sprays    gabapentin (NEURONTIN) 300 mg    LIDOcaine-prilocaine (EMLA) cream Topical (Top), As needed (PRN)    meclizine (ANTIVERT) 25 mg, Oral, 3 times daily PRN    multivitamin with minerals tablet 1 tablet, Daily    OLANZapine (ZYPREXA) 5 MG tablet Take 1 tab at nighttime on nights 1 thru 3 of each chemotherapy cycle.    omeprazole (PRILOSEC) 40 MG capsule 1 capsule, Every morning    ondansetron (ZOFRAN) 8 mg, Oral, Every 8 hours PRN    pravastatin (PRAVACHOL) 40 mg    TURMERIC ORAL Take by mouth.       Relevant Data:  Trend: No results found for: "CYSTATINCSER"  Trend:   Lab Results   Component Value Date    BUN 70 (H) 06/30/2025    BUN 69 (H) 06/30/2025    BUN 63 (H) 06/30/2025     Trend:     POC HCO3   Date Value Ref Range Status   06/25/2025 18.6 (L) 24.0 - 28.0 mmol/l Final     Comment:     Value below reference range     Trend:   Vitals:    06/30/25 0524 06/30/25 0830 06/30/25 1320 06/30/25 1534   BP: 115/68 131/69 112/71 113/66   BP Location: Left arm Left arm     Patient Position: Lying Lying     Pulse: 103 107 99 102   Resp: 18 18 19 20   Temp: 97.8 °F (36.6 °C) 98.5 °F (36.9 °C) 97.2 °F (36.2 °C) 97.6 °F (36.4 °C)   TempSrc:  Axillary Axillary Oral   SpO2: (!) 90% (!) 91% 95% (!) 93%   Weight:  55.8 kg (123 lb)     Height:  5' 7" (1.702 m)         I/O last 3 completed " "shifts:  In: -   Out: 450 [Urine:350; Emesis/NG output:100]   Net IO Since Admission: 2,764.81 mL [06/30/25 1817]  Trend: No results found for: "PHOSPHORUS"  Trend:   Lab Results   Component Value Date    HGB 7.3 (L) 06/30/2025    HGB 8.4 (L) 06/29/2025    HGB 7.8 (L) 06/28/2025         Trend:   Calcium   Date Value Ref Range Status   06/30/2025 9.2 8.7 - 10.5 mg/dL Final   06/30/2025 9.3 8.7 - 10.5 mg/dL Final   06/30/2025 9.4 8.7 - 10.5 mg/dL Final     Albumin   Date Value Ref Range Status   06/30/2025 1.4 (L) 3.5 - 5.2 g/dL Final   06/30/2025 1.4 (L) 3.5 - 5.2 g/dL Final       MEDICAL HISTORY    Past Medical History:  Past Medical History:   Diagnosis Date    HTN (hypertension)        Past Surgical History:  Past Surgical History:   Procedure Laterality Date    ESOPHAGOGASTRODUODENOSCOPY N/A 12/19/2024    Procedure: EGD (ESOPHAGOGASTRODUODENOSCOPY);  Surgeon: Pete Buenrostro MD;  Location: [facility] (4TH [facility]);  Service: Endoscopy;  Laterality: N/A;  Medically Urgent      12/16 ref by Sahil Singer MD, [facility]  12/18 - pre-call complete; MB    ESOPHAGOGASTRODUODENOSCOPY N/A 4/11/2025    Procedure: EGD (ESOPHAGOGASTRODUODENOSCOPY);  Surgeon: Paxton Jurado MD;  Location: [facility] (2ND FLR);  Service: Endoscopy;  Laterality: N/A;  ref by  Sahil Singer MD, 2nd floor due to availability- portal -ml    INSERTION OF TUNNELED CENTRAL VENOUS CATHETER (CVC) WITH SUBCUTANEOUS PORT Right 1/27/2025    Procedure: INSERTION, SINGLE LUMEN CATHETER WITH PORT, WITH FLUOROSCOPIC GUIDANCE, left poss right;  Surgeon: Chadd Velez MD;  Location: [facility] OR 2[facility];  Service: General;  Laterality: Right;    LAPAROSCOPIC REPAIR OF INGUINAL HERNIA Right        Family History:   No family history on file.   Review of patient's allergies indicates:  No Known Allergies  Social History[1]         ASSESSMENT AND PLAN:    Jone Lugo is a 69 y.o. male ,is admitted on 6/25/2025  with past medical " history of    esophageal adenocarcinoma stage III, smoking.    Admitted with hypotension from chemotherapy unit.    Nephrology  is consulted for ANTOINE 3 in setting of low PO intake, hypotension (lowest SBP 86), diarrhea. Also he received vancomycine on 6/26 and had urine retension of 400 mL with out feeling of passing urine. Sepsis work up sent. He received 2L of IVF bolus      Impression:  ANTOINE KDIGO stage 3  Baseline Creatinine: 1.1-1.3  Creatinine at time of consult: 8  Etiology of ANTOINE: Likely pre-renal from low PO intake, diarrhea leading to hypotension. Secondary factor could be Vancomycin    6/26: Cr improved to 4.6 from 8 with IV fluid replacement.  6/30: Cr improving      Recommendations :   - Continue IV fluids at rate of 100cc/hr  - Encourage oral hydration    - No urgent indications for dialysis at this time - Consent for dialysis taken    Monitor serum chemistries daily, strict intake and output Qshift , daily weights if able.  Renal protective measures: Please adjust medications for reduced clearance  Avoid nephrotoxic medications (NSAID, IV contrast)  Avoid ACEi and ARBs in the setting of ANTOINE  Maintain MAP > 65    Transfuse for Hb <7    Thank you for allowing us to participate in the care of this patient.  Plan discussed with attending. Please call with any questions or concerns.     Jay Juarez MD.  Clinical Nephrology Fellow, PGY-4  [facility], [address]          [1]   Social History  Socioeconomic History    Marital status: Single   Tobacco Use    Smoking status: Every Day     Current packs/day: 1.00     Types: Cigarettes    Smokeless tobacco: Never   Substance and Sexual Activity    Alcohol use: Yes     Alcohol/week: 1.0 standard drink of alcohol     Types: 1 Glasses of wine per week     Comment: rarely    Drug use: Never     Social Drivers of Health     Financial Resource Strain: Patient Declined (6/25/2025)    Overall Financial Resource Strain (CARDIA)     Difficulty of Paying  Living Expenses: Patient declined   Food Insecurity: Patient Declined (6/25/2025)    Hunger Vital Sign     Worried About Running Out of Food in the Last Year: Patient declined     Ran Out of Food in the Last Year: Patient declined   Transportation Needs: Patient Declined (6/25/2025)    PRAPARE - Transportation     Lack of Transportation (Medical): Patient declined     Lack of Transportation (Non-Medical): Patient declined   Physical Activity: Unknown (4/7/2025)    Received from [facility]    Exercise Vital Sign     Days of Exercise per Week: Patient declined   Recent Concern: Physical Activity - Insufficiently Active (3/17/2025)    Exercise Vital Sign     Days of Exercise per Week: 2 days     Minutes of Exercise per Session: 20 min   Stress: Patient Declined (6/25/2025)    [employer] - Occupational Stress Questionnaire     Feeling of Stress : Patient declined   Housing Stability: Patient Declined (6/25/2025)    Housing Stability Vital Sign     Unable to Pay for Housing in the Last Year: Patient declined     Number of Times Moved in the Last Year: 0     Homeless in the Last Year: Patient declined

## 2025-06-30 NOTE — ASSESSMENT & PLAN NOTE
CT 6/25 on admission with moderate volume abdominopelvic ascites and mesenteric congestion that was not appreciated on initial exam. 6/30 with distension concerning for worsening ascites 2/2 hypoalbuminemia (1.5) causing discomfort. Concern that increasing pressure is altering renal perfusion. Surgery unable to perform PEG w/ ascites. S/p drain (5L).     - f/u peritoneal fluid labs

## 2025-06-30 NOTE — ASSESSMENT & PLAN NOTE
69M with esophageal adenocarcinoma initially presenting with dysphagia (improved post-EUS dilation). Biopsy confirmed poorly differentiated adenocarcinoma, pMMR, HER2+, PD-L1 0. Received neoadjuvant carboplatin/paclitaxel + radiation (non-surgical). Imaging showed stable disease. Recurrent tumor confirmed on EGD (12/2024); started FOLFOX + trastuzumab, with stable imaging after 4 cycles. After 6 cycles, developed worsening dysphagia due to local tumor progression. Declined stent/PEG at last hospitalization. Switched to trastuzumab deruxtecan (TDxD). Prior admission presented for cycle 4 with significant dysphagia, poor PO intake, Cr 7.7, hypotension, weakness. Treatment held, sent to ED. Previous EGD in April 2025 with partial obstruction. States that placing an esophageal stent with restrictions of types of foods he would be able to eat is not inline with his goals. He is more amendable to PEG placement to provide extra support for fluids and nutrition.     - likely causing poor po intake > hypovolemic shock > acute renal failure  - Surgical oncology recommending strict NPO due to his report that he cannot tolerate anything by mouth due to the aspiration risk. We would not consider any intervention until his hypovolemia and acute renal failure are addressed.     - Started TPN on 6/26  - Maintenance IVF @100 cc/hr

## 2025-07-01 PROBLEM — E87.21 ACUTE METABOLIC ACIDOSIS: Status: ACTIVE | Noted: 2025-07-01

## 2025-07-01 NOTE — PT/OT/SLP PROGRESS
"Physical Therapy Treatment    Patient Name:  Jone Lugo   MRN:  [id number]    Recommendations:     Discharge Recommendations: Low Intensity Therapy  Discharge Equipment Recommendations: none  Barriers to discharge: Inaccessible home and Decreased caregiver support    Assessment:     Jone Lugo is a 69 y.o. male admitted with a medical diagnosis of ANTOINE (acute kidney injury).  He presents with the following impairments/functional limitations: weakness, impaired endurance, impaired self care skills, impaired functional mobility, gait instability, impaired balance, decreased coordination, decreased upper extremity function, decreased lower extremity function, decreased safety awareness, pain requiring min assistance and verbal cues for bed mob, sit < > stand transitions, OOB/BTB transfers, static standing, and gait to prevent falls due to weakness, fatigue.  Pt will cont to benefit from skilled PT intervention to address deficits and improve functional mobility. .    Rehab Prognosis: Good; patient would benefit from acute skilled PT services to address these deficits and reach maximum level of function.    Recent Surgery: * No surgery found *      Plan:     During this hospitalization, patient to be seen 2 x/week to address the identified rehab impairments via gait training, therapeutic activities and progress toward the following goals:    Plan of Care Expires:  07/24/25    Subjective     Chief Complaint: "I keep having bowel movements!"  Pain/Comfort:  Pain Rating 1: 0/10  Location - Side 1: Bilateral  Location - Orientation 1: generalized  Location 1:  (buttocks)  Pain Addressed 1: Reposition, Distraction, Cessation of Activity  Pain Rating Post-Intervention 1:  (no rating provided)      Objective:     2 attempts for tx session due to pt requesting for PTA to return later (12:00 pm)    Communicated with nurse (Taty) prior to session.  Patient found HOB elevated with central line, peripheral IV, oxygen " (waffle mattress overlay.  No family present) upon PT entry to room.     General Precautions: Standard, aspiration, fall  Orthopedic Precautions: N/A  Braces: N/A  Respiratory Status: Room air     Functional Mobility:  Bed Mobility:     Rolling Left:  minimum assistance  Rolling Right: minimum assistance  Scooting: contact guard assistance  Bridging: moderate assistance  Supine to Sit: min A for trunk elevation and LE's, exiting on the R side, HOB flat  Sit to Supine: min A for trunk and LE's, HOB flat  Transfers:     Sit to Stand:  from EOB with min A with no AD  Bed < > BSC: minimum assistance with  hand-held assist  using  Step Transfer  Gait: B HHA min A for balance 2-3 steps x2 trials during step transfers.  Vc's for upright posture  Balance: static sitting at the EOB with SBA; static standing with min A while pt performing perianal hygiene  Pt was assisted by nurse and PTA with change of pads and hygiene while in bed prior to getting OOB      AM-PAC 6 CLICK MOBILITY  Turning over in bed (including adjusting bedclothes, sheets and blankets)?: 3  Sitting down on and standing up from a chair with arms (e.g., wheelchair, bedside commode, etc.): 3  Moving from lying on back to sitting on the side of the bed?: 3  Moving to and from a bed to a chair (including a wheelchair)?: 3  Need to walk in hospital room?: 3  Climbing 3-5 steps with a railing?: 2  Basic Mobility Total Score: 17       Treatment & Education:  Patient provided with daily orientation and goals of this PT session. They were educated to call for assistance and to transfer with hospital staff only.  Also, pt was educated on the effects of prolonged immobility and the importance of performing OOB activity and exercises to promote healing and reduce recovery time    Patient left HOB elevated with all lines intact, call button in reach, bed alarm on, and nurse notified..    GOALS:   Multidisciplinary Problems       Physical Therapy Goals          Problem:  Physical Therapy    Goal Priority Disciplines Outcome Interventions   Physical Therapy Goal     PT, PT/OT Progressing    Description: Goals to be met by: 25     Patient will increase functional independence with mobility by performin. Sit to stand transfer with Modified Couch  3. Gait  x 250 feet with Supervision using AD if needed.   4. Ascend/descend 8 stair with left Handrails Stand-by Assistance .                          DME Justifications:  No DME recommended requiring DME justifications    Time Tracking:     PT Received On: 25  PT Start Time: 1239     PT Stop Time: 1311  PT Total Time (min): 32 min     Billable Minutes: Therapeutic Activity 32    Treatment Type: Treatment  PT/PTA: PTA     Number of PTA visits since last PT visit: 2025

## 2025-07-01 NOTE — HPI
"Per onc H&P   "70 yo M esophageal adenocarcinoma stage III, smoking presenting with poor PO intake.      In the ED initially afebrile 87/55 improving to 146 systolic after 2 LR resuscitation on RA. Wbcs 13.51, Hb 8.8 ( BL 11.2), Cr 8.0 ( BL 1.5-1.8), bicarb 13.  (123 BL) . Procal 1.63. Lactate 1.8. A CT chest A/P significant for moderate volume abdominopelvic ascites and mesenteric congestion. UA not collected. He was treated with vanc/cefepime and fluid resucitated with 2L of LR.      6/25 oncology appointment, pt reported worsening dysphagia with poor PO intake with weakness.   In the ED he notes poor food/water intake he attributes to stomach pain after swallowing food. He also notes loose watery stools that has not improved with immodium. Pt reports symptoms started after he most recent chemotherapy. Associated with lightheadedness worse with standing, fatigue. Has had decreased urine output. Denies chest pain, shortness of breath, melena, hematochezia. Otherwise noted an episode severe dysphagia recently with swallowing water and pills."     Palliative consulted for GOC. Patient being offered palliative stent which would restrict him to liquids for the rest of his life. PEG not possible for now with ascites. Further cancer associated treatment dependent on patient's nutrition and functional status    "

## 2025-07-01 NOTE — ASSESSMENT & PLAN NOTE
ANTOINE is likely due to pre-renal azotemia due to intravascular volume depletion 2/2 poor fluid intake, diarrhea, BP medications. Baseline creatinine is 1.5-1.8. Most recent creatinine and eGFR are listed below. No signs of fluid overload. With metabolic acidosis, bicarb 13 in ED. EUGENIA negative for obstruction.    Recent Labs     06/30/25  1929 07/01/25  0352 07/01/25  0823   CREATININE 2.3*  2.4* 2.0* 1.9*   EGFRNORACEVR 30*  28* 35* 38*     Urine creatinine 142  Urine protein 78  Urine protein/ cr ratio 0.56  Urine urea nitrogen 319  Urine potassium 34     Plan  - Avoid nephrotoxins and renally dose meds for GFR listed above  - Monitor urine output, serial BMP, and adjust therapy as needed  - Nephrology recommends continuing IV fluids at 100cc/hr, replace K with potassium chloride 10 mEq  - holding home gabapentin and amlodipine-benzepril  - IVF resucitation  - f/u nephrology

## 2025-07-01 NOTE — NURSING
Pt involved in plan of care and communicating needs throughout shift. Pt remained bedfast throughout shift. Q2 turns done. No c/o pain. Tolerating clear liquid diet, voiding without difficulty. Pt had 3 BM this shift. Incontinence care done. Electrolytes replaced PRN as per protocol. ACHS, PRN Insulin given per sliding scale. LR running continuously @50mL/hr. All VSS; Afebrile, AAOx4. No acute events so far this shift. Pt remaining free from falls or injury throughout shift; bed locked and in lowest position; call light within reach. Pt instructed to call for assistance as needed. Bed alarm on. Q1H rounding done on pt.

## 2025-07-01 NOTE — PT/OT/SLP PROGRESS
Speech Language Pathology Treatment    Patient Name:  Jone Lugo   MRN:  [id number]  Admitting Diagnosis: ANTOINE (acute kidney injury)    Recommendations:                 General Recommendations:  Dysphagia therapy  Diet recommendations:   , Liquid Diet Level: Full liquids, Thin liquids - IDDSI Level 0   Aspiration Precautions: Small bites/sips and Standard aspiration precautions   General Precautions: Standard, aspiration, fall  Communication strategies:  none  Discharge recommendations:  Low Intensity Therapy   Barriers to Discharge:  None    Assessment:     Jone Lugo is a 69 y.o. male with an SLP diagnosis of Dysphagia.    Subjective     Awake/alert    Pain/Comfort:  Pain Rating 1: 0/10  Pain Rating Post-Intervention 1: 0/10    Respiratory Status: Room air    Objective:     Has the patient been evaluated by SLP for swallowing?   Yes  Keep patient NPO? No      Pt repositioned upright in bed for PO trials. Pt NPO from paracentesis yesterday but starting on a clear liquid diet per team. He tolerated thin liquids via straw x6 oz with timely swallow initiation, adequate bolus control and no overt s/s of airway compromise. Recommend continue full liquid diet recommendations. SLP educated pt on diet recommendations, swallow precautions, and SLP POC.     Goals:   Multidisciplinary Problems       SLP Goals          Problem: SLP    Goal Priority Disciplines Outcome   SLP Goal     SLP    Description: Speech Language Pathology Goals  Goals expected to be met by 7/3:  1. Pt will tolerate full liquid diet and thin liquids without s/s of aspiration.   2. Pt will participate in ongoing swallowing assessment to determine if able to tolerate more advanced textures.                                Plan:     Patient to be seen:  4 x/week   Plan of Care expires:  07/27/25  Plan of Care reviewed with:  patient   SLP Follow-Up:  Yes       Time Tracking:     SLP Treatment Date:   07/01/25  Speech Start Time:  0929  Speech  Stop Time:  0945     Speech Total Time (min):  16 min    Billable Minutes: Treatment Swallowing Dysfunction 8 and Self Care/Home Management Training 8    07/01/2025

## 2025-07-01 NOTE — SUBJECTIVE & OBJECTIVE
Interval History: NAEON. Afebrile. VSS. Patient states that he has decided to get an esophageal stent instead of a PEG tube. Pending AES evaluation. Intermittently requiring oxygen. Consulted nutritionist for TPN.    Oncology Treatment Plan:   OP GASTRIC/ ESOPHAGEAL fam-trastuzumab deruxtecan-nxki Q3W    Medications:  Continuous Infusions:   lactated ringers   Intravenous Continuous 50 mL/hr at 07/01/25 0951 Rate Change at 07/01/25 0951    TPN ADULT CENTRAL LINE CUSTOM   Intravenous Continuous 42 mL/hr at 06/30/25 2144 New Bag at 06/30/25 2144    TPN ADULT CENTRAL LINE CUSTOM   Intravenous Continuous         Scheduled Meds:   mupirocin   Nasal BID    nicotine  1 patch Transdermal Daily    pantoprazole  40 mg Intravenous BID    sodium phosphate 20.1 mmol in D5W 250 mL IVPB  20.1 mmol Intravenous Once     PRN Meds:  Current Facility-Administered Medications:     0.9%  NaCl infusion (for blood administration), , Intravenous, Q24H PRN    acetaminophen, 650 mg, Oral, Q4H PRN    dextrose 50%, 12.5 g, Intravenous, PRN    dextrose 50%, 25 g, Intravenous, PRN    glucagon (human recombinant), 1 mg, Intramuscular, PRN    glucose, 16 g, Oral, PRN    glucose, 24 g, Oral, PRN    insulin aspart U-100, 0-10 Units, Subcutaneous, Q4H PRN    naloxone, 0.02 mg, Intravenous, PRN    ondansetron, 4 mg, Intravenous, Q8H PRN    sodium chloride 0.9%, 10 mL, Intravenous, Q12H PRN     Review of Systems  Objective:     Vital Signs (Most Recent):  Temp: 97.4 °F (36.3 °C) (07/01/25 1330)  Pulse: 100 (07/01/25 1330)  Resp: 18 (07/01/25 1330)  BP: 110/66 (07/01/25 1330)  SpO2: (!) 90 % (07/01/25 1330) Vital Signs (24h Range):  Temp:  [97.4 °F (36.3 °C)-98.3 °F (36.8 °C)] 97.4 °F (36.3 °C)  Pulse:  [] 100  Resp:  [16-20] 18  SpO2:  [85 %-96 %] 90 %  BP: (107-144)/(58-72) 110/66     Weight: 55.8 kg (123 lb)  Body mass index is 19.26 kg/m².  Body surface area is 1.62 meters squared.      Intake/Output Summary (Last 24 hours) at 7/1/2025  1420  Last data filed at 7/1/2025 1330  Gross per 24 hour   Intake 6430.92 ml   Output 1055 ml   Net 5375.92 ml        Physical Exam     Significant Labs:   All pertinent labs from the last 24 hours have been reviewed.    Diagnostic Results:  I have reviewed and interpreted all pertinent imaging results/findings within the past 24 hours.

## 2025-07-01 NOTE — CARE UPDATE
[facility]  Nephrology        Patient Name: Jone Lugo   MRN: [id number]   Current Provider: Marie Garnica MD  Primary Care Provider: [place], Primary Doctor   Admission Date: 6/25/2025   Hospital Day: 6  Bed: 855/855 A  Principal Problem: ANTOINE (acute kidney injury)            Nephrology Chart Review      Intake/Output Summary (Last 24 hours) at 7/1/2025 1336  Last data filed at 7/1/2025 1330  Gross per 24 hour   Intake 6430.92 ml   Output 1055 ml   Net 5375.92 ml       Vitals:    07/01/25 0755 07/01/25 0843 07/01/25 1147 07/01/25 1330   BP: (!) 112/58  107/66 110/66   Pulse:  96 99 100   Resp: 16  17 18   Temp: 98.3 °F (36.8 °C)  97.9 °F (36.6 °C) 97.4 °F (36.3 °C)   TempSrc: Oral  Oral Oral   SpO2:  (!) 90% 96% (!) 90%   Weight:       Height:           Recent Labs   Lab 06/30/25  1929 07/01/25  0352 07/01/25  0823     136 135* 137   K 3.3*  3.3* 3.0* 3.3*     99 99 100   CO2 27  27 25 27   BUN 70*  66* 64* 65*   CREATININE 2.3*  2.4* 2.0* 1.9*   CALCIUM 9.3  9.3 9.0 9.2   PHOS 2.4*  2.4* 2.3* 2.2*       - Replace the potassium   - Keep I/O net even  - If the Cr improved further tomorrow then will possible sign off.     No other related issues identified. Please call Nephrology as needed; We will continue to follow.      Jay Juarez MD.  Clinical Nephrology Fellow, PGY-4  [facility], [address]

## 2025-07-01 NOTE — PLAN OF CARE
Side rails up x2; call bell in place; bed in lowest, locked position; skid proof socks on; no evidence of skin breakdown; care plan explained to patient; pt remains free of injury. Pt NPO, voids per urinal, turned and repositioned, pt initally non compliant with turning.  cc/hr,echo completed in am. Paracentesis at BS completed without incident,  LR 1 liter bolus given, albumin iv given. Mg and k riders completed. Pt with TPN infusing to port at 42 cc/hr. CBG monitored insulin given as needed. Frequent rounding pt encouraged to call as needed, VSS and afebrile.

## 2025-07-01 NOTE — CONSULTS
Palliative consult received. Patient has decided to pursue stent placement. Full consult note to follow

## 2025-07-01 NOTE — PROGRESS NOTES
[facility] - Oncology ([facility])  Hematology/Oncology  Progress Note    Patient Name: Jone Lugo  Admission Date: 6/25/2025  Hospital Length of Stay: 6 days  Code Status: Full Code     Subjective:     HPI:  68 yo M esophageal adenocarcinoma stage III, smoking presenting with poor PO intake.     In the ED initially afebrile 87/55 improving to 146 systolic after 2 LR resuscitation on RA. Wbcs 13.51, Hb 8.8 ( BL 11.2), Cr 8.0 ( BL 1.5-1.8), bicarb 13.  (123 BL) . Procal 1.63. Lactate 1.8. A CT chest A/P significant for moderate volume abdominopelvic ascites and mesenteric congestion. UA not collected. He was treated with vanc/cefepime and fluid resucitated with 2L of LR.     6/25 oncology appointment, pt reported worsening dysphagia with poor PO intake with weakness.   In the ED he notes poor food/water intake he attributes to stomach pain after swallowing food. He also notes loose watery stools that has not improved with immodium. Pt reports symptoms started after he most recent chemotherapy. Associated with lightheadedness worse with standing, fatigue. Has had decreased urine output. Denies chest pain, shortness of breath, melena, hematochezia. Otherwise noted an episode severe dysphagia recently with swallowing water and pills.     Interval History: NAEON. Afebrile. VSS. Patient states that he has decided to get an esophageal stent instead of a PEG tube. Pending AES evaluation. Intermittently requiring oxygen. Consulted nutritionist for TPN.    Oncology Treatment Plan:   OP GASTRIC/ ESOPHAGEAL fam-trastuzumab deruxtecan-nxki Q3W    Physical Exam  Constitutional:       General: He is not in acute distress.     Appearance: He is normal weight.   Eyes:      Pupils: Pupils are equal, round, and reactive to light.   Cardiovascular:      Rate and Rhythm: Normal rate and regular rhythm.   Abdominal:      General: There is no distension.      Palpations: Abdomen is soft.   Skin:     General: Skin is warm.       Findings: No erythema.   Neurological:      Mental Status: He is alert.        Medications:  Continuous Infusions:   lactated ringers   Intravenous Continuous 50 mL/hr at 07/01/25 0951 Rate Change at 07/01/25 0951    TPN ADULT CENTRAL LINE CUSTOM   Intravenous Continuous 42 mL/hr at 06/30/25 2144 New Bag at 06/30/25 2144    TPN ADULT CENTRAL LINE CUSTOM   Intravenous Continuous         Scheduled Meds:   mupirocin   Nasal BID    nicotine  1 patch Transdermal Daily    pantoprazole  40 mg Intravenous BID    sodium phosphate 20.1 mmol in D5W 250 mL IVPB  20.1 mmol Intravenous Once     PRN Meds:  Current Facility-Administered Medications:     0.9%  NaCl infusion (for blood administration), , Intravenous, Q24H PRN    acetaminophen, 650 mg, Oral, Q4H PRN    dextrose 50%, 12.5 g, Intravenous, PRN    dextrose 50%, 25 g, Intravenous, PRN    glucagon (human recombinant), 1 mg, Intramuscular, PRN    glucose, 16 g, Oral, PRN    glucose, 24 g, Oral, PRN    insulin aspart U-100, 0-10 Units, Subcutaneous, Q4H PRN    naloxone, 0.02 mg, Intravenous, PRN    ondansetron, 4 mg, Intravenous, Q8H PRN    sodium chloride 0.9%, 10 mL, Intravenous, Q12H PRN     Review of Systems  Objective:     Vital Signs (Most Recent):  Temp: 97.4 °F (36.3 °C) (07/01/25 1330)  Pulse: 100 (07/01/25 1330)  Resp: 18 (07/01/25 1330)  BP: 110/66 (07/01/25 1330)  SpO2: (!) 90 % (07/01/25 1330) Vital Signs (24h Range):  Temp:  [97.4 °F (36.3 °C)-98.3 °F (36.8 °C)] 97.4 °F (36.3 °C)  Pulse:  [] 100  Resp:  [16-20] 18  SpO2:  [85 %-96 %] 90 %  BP: (107-144)/(58-72) 110/66     Weight: 55.8 kg (123 lb)  Body mass index is 19.26 kg/m².  Body surface area is 1.62 meters squared.      Intake/Output Summary (Last 24 hours) at 7/1/2025 1420  Last data filed at 7/1/2025 1330  Gross per 24 hour   Intake 6430.92 ml   Output 1055 ml   Net 5375.92 ml        Physical Exam     Significant Labs:   All pertinent labs from the last 24 hours have been reviewed.    Diagnostic  Results:  I have reviewed and interpreted all pertinent imaging results/findings within the past 24 hours.  Assessment/Plan:     * ANTOINE (acute kidney injury)  ANTOINE is likely due to pre-renal azotemia due to intravascular volume depletion 2/2 poor fluid intake, diarrhea, BP medications. Baseline creatinine is 1.5-1.8. Most recent creatinine and eGFR are listed below. No signs of fluid overload. With metabolic acidosis, bicarb 13 in ED. EUGENIA negative for obstruction.    Recent Labs     06/30/25  1929 07/01/25  0352 07/01/25  0823   CREATININE 2.3*  2.4* 2.0* 1.9*   EGFRNORACEVR 30*  28* 35* 38*     Urine creatinine 142  Urine protein 78  Urine protein/ cr ratio 0.56  Urine urea nitrogen 319  Urine potassium 34     Plan  - Avoid nephrotoxins and renally dose meds for GFR listed above  - Monitor urine output, serial BMP, and adjust therapy as needed  - Nephrology recommends continuing IV fluids at 100cc/hr, replace K with potassium chloride 10 mEq  - holding home gabapentin and amlodipine-benzepril  - IVF resucitation  - f/u nephrology      Chronic heart failure with preserved ejection fraction  Results for orders placed during the hospital encounter of 04/22/25    Echo    Interpretation Summary    Left Ventricle: The left ventricle is normal in size. Mildly increased wall thickness. There is normal systolic function with a visually estimated ejection fraction of 55 - 60%. Global longitudinal strain is -17.1%. Global longitudinal strain is normal. Grade I diastolic dysfunction.    Right Ventricle: The right ventricle is normal in size. Wall thickness is normal. Systolic function is normal.    IVC/SVC: Normal venous pressure at 3 mmHg.     Developing tachycardia starting 6/29 possibly related to increased fluid load vs pain/discomfort. Less likely PE as has been on heparin  - Echo technically flawed though with preserved ejection fraction       Other ascites  CT 6/25 on admission with moderate volume abdominopelvic  ascites and mesenteric congestion that was not appreciated on initial exam. 6/30 with distension concerning for worsening ascites 2/2 hypoalbuminemia (1.5) causing discomfort. Concern that increasing pressure is altering renal perfusion. Surgery unable to perform PEG w/ ascites. S/p drain (5L).     - f/u peritoneal fluid labs      Hyperglycemia  2/2 TPN    Last A1c reviewed-   Lab Results   Component Value Date    HGBA1C 5.8 (H) 07/01/2024     Most recent fingerstick glucose reviewed-   Recent Labs   Lab 06/28/25  2036 06/29/25  0045 06/29/25  0426 06/29/25  0735   POCTGLUCOSE 201* 195* 217* 209*     Current correctional scale  Medium  Maintain anti-hyperglycemic dose as follows-   Antihyperglycemics (From admission, onward)      Start     Stop Route Frequency Ordered    06/29/25 0907  insulin aspart U-100 pen 0-10 Units         -- SubQ Every 4 hours PRN 06/29/25 0908              Acute hypoxemic respiratory failure  Patient with Hypoxic Respiratory failure which is Acute.  he is not on home oxygen. Supplemental oxygen was provided and noted-    Ddx: pulmonary edema from IVF resuscitation vs aspiration/micro aspiration 2/2 esophageal cancer. Noted history of grade I diastolic function on echo 4/2025. Oxygen requirements around the time of being cleared for clear liquid diet. He is asymptomatic.     CXR with evidence of atelectasis, unlikely pulmonary edema. JVP noted 6/30   - f/u echo   - incentive spirometry   - wean o2 >90%  - possibly related to undiagnosed COPD 2/2 smoking history, had plans for formal PFT but was not done    At risk for malnutrition  RD consulted  TPN ordered and possible PEG tube placement. Pt is NPO currently    Transaminitis  S/p kaleb. No findings supportive of liver/biliary pathology on imaging. GGT elevated as well. Likely shock liver. Will continue to monitor CMP.    - improving with IVF     Diarrhea  RESOLVED     Reports onset of loose watery diarrhea since previous chemotherapy  treatment. Possibly related to chemotherapy. Likely contributed to hypovolemic shock and acute renal failure.   Improving. Negative stool studies/c.diff.     Hyperphosphatemia  Patient's most recent phosphorus results are listed below.   Recent Labs     06/25/25  1100   PHOS 5.7*       Plan  - Patient's hyperphosphatemia is stable  - see acute renal failure     Weakness  2/2 poor po intake, hypovolemia.      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 Most recent hemoglobin and hematocrit are listed below.  Baseline 11.2. Likely 2/2 chemo. Less likely GIB. He has noted ongoing abdominal pain with associated belching. No hematochezia/melena. ED performed MONTRELL negative negative guaiac fecal occult blood test.     Recent Labs     06/26/25  0240 06/27/25  0305 06/28/25  0300   HGB 8.2* 8.2* 7.8*   HCT 23.2* 23.6* 23.2*       Plan  - Monitor serial CBC: Daily  - Transfuse PRBC if patient becomes hemodynamically unstable, symptomatic or H/H drops below 7/21.  - Patient has not received any PRBC transfusions to date  - Patient's anemia is currently stable  - holding anticoaugulation for now will re-evaluate cbc tomorrow   - f/u iron studies with low iron saturation, may benefit from iron infusions as outpatient       Hypovolemic shock  Vitals:    06/28/25 1936 06/28/25 2300 06/29/25 0538 06/29/25 0820   BP: 113/75 105/68 98/65 105/71    06/29/25 1127 06/29/25 1553 06/29/25 1953 06/29/25 2335   BP: 121/76 98/67 111/71 113/65    06/30/25 0524 06/30/25 0830   BP: 115/68 131/69     Presented with BP  87/55 responding with 2 LR fluid resuscitation. Lactic acid wnl. Likely 2/2 to poor fluid tolerance 2/2 esophageal adenocarcinoma. Previous EGD in April 2025 with partial obstruction. States that placing an esophageal stent with restrictions of types of foods he would be able to eat is  not inline with his goals. He is more amendable to PEG placement to provide extra support for fluids and nutrition. Do not suspect septic shock at this time as no findings on CT chest A/P. No fevers. Wbcs wnl. BC NGTD. Improving after several days of IVF resucitation    - GI unable to place PEG 2/2 partial obstruction of the esophagus.  - IR unable to advance NGT for procedure 2/2 partial obstruction.   - Will consult surgical oncology for consultation of PEG tube   - will attempt following resolution of acute renal failure and if BP wnl    - Surgical oncology consulted AES      - IVF maintenance fluids with IVF bolus prn when hypotensive      Esophageal dysphagia  See esophageal carcinoma    Obstruction of esophagus  See esophageal adenocarcinoma      Esophageal adenocarcinoma  69M with esophageal adenocarcinoma initially presenting with dysphagia (improved post-EUS dilation). Biopsy confirmed poorly differentiated adenocarcinoma, pMMR, HER2+, PD-L1 0. Received neoadjuvant carboplatin/paclitaxel + radiation (non-surgical). Imaging showed stable disease. Recurrent tumor confirmed on EGD (12/2024); started FOLFOX + trastuzumab, with stable imaging after 4 cycles. After 6 cycles, developed worsening dysphagia due to local tumor progression. Declined stent/PEG at last hospitalization. Switched to trastuzumab deruxtecan (TDxD). Prior admission presented for cycle 4 with significant dysphagia, poor PO intake, Cr 7.7, hypotension, weakness. Treatment held, sent to ED. Previous EGD in April 2025 with partial obstruction. States that placing an esophageal stent with restrictions of types of foods he would be able to eat is not inline with his goals. He is more amendable to PEG placement to provide extra support for fluids and nutrition.     - likely causing poor po intake > hypovolemic shock > acute renal failure  - Surgical oncology recommending strict NPO due to his report that he cannot tolerate anything by mouth due to  the aspiration risk. We would not consider any intervention until his hypovolemia and acute renal failure are addressed.     - Started TPN on 6/26  - Maintenance IVF @100 cc/hr                   Genesis Sarah DO  Hematology/Oncology  [facility] - Oncology ([facility])

## 2025-07-01 NOTE — PLAN OF CARE
Problem: Adult Inpatient Plan of Care  Goal: Plan of Care Review  Outcome: Progressing  Goal: Patient-Specific Goal (Individualized)  Outcome: Progressing  Goal: Absence of Hospital-Acquired Illness or Injury  Outcome: Progressing  Goal: Optimal Comfort and Wellbeing  Outcome: Progressing  Goal: Readiness for Transition of Care  Outcome: Progressing     Problem: Acute Kidney Injury/Impairment  Goal: Fluid and Electrolyte Balance  Outcome: Progressing  Goal: Improved Oral Intake  Outcome: Progressing  Goal: Effective Renal Function  Outcome: Progressing     Problem: Infection  Goal: Absence of Infection Signs and Symptoms  Outcome: Progressing     Pt uneventful. Slept on off during night. V/S stable

## 2025-07-02 ENCOUNTER — ANESTHESIA EVENT (OUTPATIENT)
Dept: ENDOSCOPY | Facility: HOSPITAL | Age: 70
End: 2025-07-02
Payer: MEDICARE

## 2025-07-02 ENCOUNTER — ANESTHESIA (OUTPATIENT)
Dept: ENDOSCOPY | Facility: HOSPITAL | Age: 70
End: 2025-07-02
Payer: MEDICARE

## 2025-07-02 PROBLEM — Z51.5 PALLIATIVE CARE ENCOUNTER: Status: ACTIVE | Noted: 2025-07-02

## 2025-07-02 PROCEDURE — 63600175 PHARM REV CODE 636 W HCPCS

## 2025-07-02 PROCEDURE — 63600175 PHARM REV CODE 636 W HCPCS: Performed by: NURSE ANESTHETIST, CERTIFIED REGISTERED

## 2025-07-02 PROCEDURE — 25000003 PHARM REV CODE 250: Performed by: NURSE ANESTHETIST, CERTIFIED REGISTERED

## 2025-07-02 RX ORDER — LIDOCAINE HYDROCHLORIDE 10 MG/ML
INJECTION, SOLUTION EPIDURAL; INFILTRATION; INTRACAUDAL; PERINEURAL
Status: DISCONTINUED | OUTPATIENT
Start: 2025-07-02 | End: 2025-07-02

## 2025-07-02 RX ORDER — PROPOFOL 10 MG/ML
VIAL (ML) INTRAVENOUS
Status: DISCONTINUED | OUTPATIENT
Start: 2025-07-02 | End: 2025-07-02

## 2025-07-02 RX ORDER — SODIUM CHLORIDE 9 MG/ML
INJECTION, SOLUTION INTRAVENOUS CONTINUOUS PRN
Status: DISCONTINUED | OUTPATIENT
Start: 2025-07-02 | End: 2025-07-02

## 2025-07-02 RX ORDER — ROCURONIUM BROMIDE 10 MG/ML
INJECTION, SOLUTION INTRAVENOUS
Status: DISCONTINUED | OUTPATIENT
Start: 2025-07-02 | End: 2025-07-02

## 2025-07-02 RX ADMIN — ROCURONIUM BROMIDE 50 MG: 10 INJECTION, SOLUTION INTRAVENOUS at 11:07

## 2025-07-02 RX ADMIN — SUGAMMADEX 200 MG: 100 INJECTION, SOLUTION INTRAVENOUS at 11:07

## 2025-07-02 RX ADMIN — LIDOCAINE HYDROCHLORIDE 5 ML: 10 INJECTION, SOLUTION EPIDURAL; INFILTRATION; INTRACAUDAL; PERINEURAL at 11:07

## 2025-07-02 RX ADMIN — POTASSIUM CHLORIDE: 200 INJECTION, SOLUTION INTRAVENOUS at 11:07

## 2025-07-02 RX ADMIN — PROPOFOL 100 MG: 10 INJECTION, EMULSION INTRAVENOUS at 11:07

## 2025-07-02 RX ADMIN — SODIUM CHLORIDE: 0.9 INJECTION, SOLUTION INTRAVENOUS at 11:07

## 2025-07-02 NOTE — PT/OT/SLP PROGRESS
"Occupational Therapy   Treatment    Name: Jone Lugo  MRN: [id number]  Admitting Diagnosis:  ANTOINE (acute kidney injury)  * Day of Surgery *    Recommendations:     Discharge Recommendations: Low Intensity Therapy  Discharge Equipment Recommendations:  none  Barriers to discharge:  None    Assessment:     Jone Lugo is a 69 y.o. male with a medical diagnosis of ANTOINE (acute kidney injury).  He presents with deconditioning and c/o nausea. Performance deficits affecting function are weakness, impaired endurance, impaired self care skills, impaired functional mobility, gait instability, decreased lower extremity function, impaired cardiopulmonary response to activity, decreased safety awareness, impaired balance, decreased upper extremity function.     Rehab Prognosis:  Good; patient would benefit from acute skilled OT services to address these deficits and reach maximum level of function.       Plan:     Patient to be seen 3 x/week to address the above listed problems via self-care/home management, therapeutic activities, therapeutic exercises  Plan of Care Expires: 07/10/25  Plan of Care Reviewed with: patient    Subjective     Chief Complaint: "I'm not ready to use it. They want me to drink. I can't use the stent yet."  Patient/Family Comments/goals: pt agreeable to therapy and wanting to sit up a little  Pain/Comfort:  Pain Rating 1: 0/10  Pain Rating Post-Intervention 1: 0/10    Objective:     Communicated with: nurse teresa and OTR prior to session.  Patient found HOB elevated with  (waffle mattress overlay; no active lines) upon OT entry to room.  A client care conference was completed by the OTR and the BERMEO prior to treatment by the BERMEO to discuss the patient's POC and current status.   Nicole LINARES/KELLE readily available to answer questions about the patient's intervention at the time of the provision of services.      General Precautions: Standard, aspiration, fall    Orthopedic " Precautions:N/A  Braces: N/A  Respiratory Status: Room air     Occupational Performance:     Bed Mobility:    Patient completed Scooting/Bridging with contact guard assistance  Patient completed Supine to Sit with moderate assistance  Patient completed Sit to Supine with minimum assistance     Functional Mobility/Transfers:  Functional Mobility: deferred 2/2 onset of nausea and c/o weakness. Patient tolerated x1 min EOB with CGA    Activities of Daily Living:  Deferred; poor activity tolerance at this time post procedure/onset of nausea      AMPAC 6 Click ADL: 21    Treatment & Education:  Discussed OT POC. Positioning techniques provided to optimize comfort and maintain skin/joint integrity. Addressed all patient questions/concerns within BERMEO scope of practice.     Patient left HOB elevated with all lines intact and call button in reach, and nurse notified    GOALS:   Multidisciplinary Problems       Occupational Therapy Goals          Problem: Occupational Therapy    Goal Priority Disciplines Outcome Interventions   Occupational Therapy Goal     OT, PT/OT Progressing    Description: Goals to be met by: 07-10-25     Patient will increase functional independence with ADLs by performing:    LE Dressing with Modified [place].  Grooming while standing at sink with Modified [place].  Toileting from toilet with Modified [place] for hygiene and clothing management.   Step transfer with Modified [place]  Toilet transfer to toilet with Modified [place].                         DME Justifications:  No DME recommended requiring DME justifications    Time Tracking:     OT Date of Treatment: 07/02/25  OT Start Time: 1553  OT Stop Time: 1607  OT Total Time (min): 14 min    Billable Minutes:Therapeutic Activity 14    OT/JOSE ANGEL: JOSE ANGEL     Number of JOSE ANGEL visits since last OT visit: 1 7/2/2025

## 2025-07-02 NOTE — ANESTHESIA PREPROCEDURE EVALUATION
07/02/2025  Pre-operative evaluation for Procedure(s) (LRB):  EGD (ESOPHAGOGASTRODUODENOSCOPY) (N/A)    Jone Lugo is a 69 y.o. male with esophageal cancer complicated by esophageal obstruction here for EGD and possible stent.     Not on home O2 but requiring supplemental oxygen PRN    Problem List[1]    Review of patient's allergies indicates:  No Known Allergies    Medications Ordered Prior to Encounter[2]    Past Surgical History:   Procedure Laterality Date    ESOPHAGOGASTRODUODENOSCOPY N/A 12/19/2024    Procedure: EGD (ESOPHAGOGASTRODUODENOSCOPY);  Surgeon: Pete Buenrostro MD;  Location: [facility] (4TH FLR);  Service: Endoscopy;  Laterality: N/A;  Medically Urgent      12/16 ref by Sahil Singer MD, [place]  12/18 - pre-call complete; MB    ESOPHAGOGASTRODUODENOSCOPY N/A 4/11/2025    Procedure: EGD (ESOPHAGOGASTRODUODENOSCOPY);  Surgeon: Paxton Jurado MD;  Location: [facility] (2ND FLR);  Service: Endoscopy;  Laterality: N/A;  ref by  Sahil Singer MD, 2nd floor due to availability- portal -ml    INSERTION OF TUNNELED CENTRAL VENOUS CATHETER (CVC) WITH SUBCUTANEOUS PORT Right 1/27/2025    Procedure: INSERTION, SINGLE LUMEN CATHETER WITH PORT, WITH FLUOROSCOPIC GUIDANCE, left poss right;  Surgeon: Chadd Velez MD;  Location: [facility] OR 2ND FLR;  Service: General;  Laterality: Right;    LAPAROSCOPIC REPAIR OF INGUINAL HERNIA Right        Social History[3]      CBC:   Recent Labs     07/01/25  0352 07/01/25  0823 07/02/25  0406   WBC 8.13  --  12.47   RBC 2.19*  --  2.69*   HGB 6.7* 7.2* 8.2*   HCT 20.1*  --  24.1*     --  292   MCV 92  --  90   MCH 30.6  --  30.5   MCHC 33.3  --  34.0       CMP:   Recent Labs     07/01/25 2027 07/02/25  0406 07/02/25  0900   * 137 136  139   K 3.1* 3.0* 2.9*  2.9*   CL 97 100 100  101   CO2 26 26 25  26    BUN 59* 64* 61*  62*   CREATININE 1.7* 1.6* 1.6*  1.5*   * 151* 151*  150*   MG 1.5*  --  1.7   PHOS 3.2 2.9 2.8  2.8   CALCIUM 8.8 8.9 8.7  8.8   ALBUMIN 1.7* 1.7* 1.6*  1.6*   PROT 4.4*  --  4.2*   ALKPHOS 359*  --  361*   *  --  146*   *  --  133*   BILITOT 0.3  --  0.3             Pre-op Assessment    I have reviewed the Patient Summary Reports.     I have reviewed the Nursing Notes. I have reviewed the NPO Status.      Review of Systems  Anesthesia Hx:  No problems with previous Anesthesia                Hematology/Oncology:                      Current/Recent Cancer.                Cardiovascular:     Hypertension                                          Pulmonary:         hypoxia               Renal/:  Chronic Renal Disease                Hepatic/GI:        Esophageal CA             Psych:  Psychiatric History                Physical Exam    Airway:  Mallampati: II   Mouth Opening: Normal  Tongue: Normal    Chest/Lungs:  Normal Respiratory Rate    Heart:  Rhythm: Regular Rhythm      Anesthesia Plan  Type of Anesthesia, risks & benefits discussed:    Anesthesia Type: Gen ETT  Intra-op Monitoring Plan: Standard ASA Monitors  Induction:  IV  Informed Consent: Informed consent signed with the Patient and all parties understand the risks and agree with anesthesia plan.  All questions answered.   ASA Score: 4    Ready For Surgery From Anesthesia Perspective.     .             [1]  Patient Active Problem List  Diagnosis    Esophageal adenocarcinoma    H/O traumatic brain injury    Tobacco abuse    Left renal mass    HTN (hypertension)    Obstruction of esophagus    Tobacco dependency    Esophageal dysphagia    ANTOINE (acute kidney injury)    Hypovolemic shock    Anemia due to bone marrow failure    Weakness    Hyperphosphatemia    Diarrhea    Acute prerenal azotemia    Transaminitis    Immunocompromised state    Hypercoagulable state    Hypokalemia    Hypomagnesemia     Neoplastic (malignant) related fatigue    At risk for malnutrition    Acute hypoxemic respiratory failure    Hyperglycemia    Other ascites    Chronic heart failure with preserved ejection fraction    Acute metabolic acidosis   [2]  No current facility-administered medications on file prior to encounter.     Current Outpatient Medications on File Prior to Encounter   Medication Sig Dispense Refill    amlodipine-benazepril 10-20mg (LOTREL) 10-20 mg per capsule Take 1 capsule by mouth once daily.      azelastine (ASTELIN) 137 mcg (0.1 %) nasal spray 2 sprays by Nasal route.      dexAMETHasone (DECADRON) 4 MG Tab Take 2 tablets (8 mg total) by mouth once daily. On days 2-4 of each chemotherapy cycle. 30 tablet 11    etodolac (LODINE) 400 MG tablet Take 1 tablet by mouth 2 (two) times daily.      fluticasone propionate (FLONASE) 50 mcg/actuation nasal spray 2 sprays by Each Nostril route.      gabapentin (NEURONTIN) 300 MG capsule Take 300 mg by mouth.      LIDOcaine-prilocaine (EMLA) cream Apply topically as needed. 30 g 1    meclizine (ANTIVERT) 25 mg tablet Take 1 tablet (25 mg total) by mouth 3 (three) times daily as needed for Dizziness. 30 tablet 0    multivitamin with minerals tablet Take 1 tablet by mouth once daily. One A Day Multivitamin      OLANZapine (ZYPREXA) 5 MG tablet Take 1 tab at nighttime on nights 1 thru 3 of each chemotherapy cycle. 90 tablet 2    omeprazole (PRILOSEC) 40 MG capsule Take 1 capsule by mouth every morning.      ondansetron (ZOFRAN) 8 MG tablet Take 1 tablet (8 mg total) by mouth every 8 (eight) hours as needed. 60 tablet 2    pravastatin (PRAVACHOL) 40 MG tablet Take 40 mg by mouth.      TURMERIC ORAL Take by mouth.     [3]  Social History  Socioeconomic History    Marital status: Single   Tobacco Use    Smoking status: Every Day     Current packs/day: 1.00     Types: Cigarettes    Smokeless tobacco: Never   Substance and Sexual Activity    Alcohol use:  Yes     Alcohol/week: 1.0 standard drink of alcohol     Types: 1 Glasses of wine per week     Comment: rarely    Drug use: Never     Social Drivers of Health     Financial Resource Strain: Patient Declined (6/25/2025)    Overall Financial Resource Strain (CARDIA)     Difficulty of Paying Living Expenses: Patient declined   Food Insecurity: Patient Declined (6/25/2025)    Hunger Vital Sign     Worried About Running Out of Food in the Last Year: Patient declined     Ran Out of Food in the Last Year: Patient declined   Transportation Needs: Patient Declined (6/25/2025)    PRAPARE - Transportation     Lack of Transportation (Medical): Patient declined     Lack of Transportation (Non-Medical): Patient declined   Physical Activity: Unknown (4/7/2025)    Received from [facility]    Exercise Vital Sign     Days of Exercise per Week: Patient declined   Recent Concern: Physical Activity - Insufficiently Active (3/17/2025)    Exercise Vital Sign     Days of Exercise per Week: 2 days     Minutes of Exercise per Session: 20 min   Stress: Patient Declined (6/25/2025)    [employer] - Occupational Stress Questionnaire     Feeling of Stress : Patient declined   Housing Stability: Patient Declined (6/25/2025)    Housing Stability Vital Sign     Unable to Pay for Housing in the Last Year: Patient declined     Number of Times Moved in the Last Year: 0     Homeless in the Last Year: Patient declined

## 2025-07-02 NOTE — PROVATION PATIENT INSTRUCTIONS
Discharge Summary/Instructions after an Endoscopic Procedure  Patient Name: Jone Lugo  Patient MRN: [id number]  Patient YOB: 1955 Wednesday, July 2, 2025  Derick Juares MD  Dear patient,  As a result of recent federal legislation (The Federal Cures Act), you may   receive lab or pathology results from your procedure in your [facility]   account before your physician is able to contact you. Your physician or   their representative will relay the results to you with their   recommendations at their soonest availability.  Thank you,  RESTRICTIONS:  During your procedure today, you received medications for sedation.  These   medications may affect your judgment, balance and coordination.  Therefore,   for 24 hours, you have the following restrictions:   - DO NOT drive a car, operate machinery, make legal/financial decisions,   sign important papers or drink alcohol.    ACTIVITY:  Today: no heavy lifting, straining or running due to procedural   sedation/anesthesia.  The following day: return to full activity including work.  DIET:  Eat and drink normally unless instructed otherwise.     TREATMENT FOR COMMON SIDE EFFECTS:  - Mild abdominal pain, nausea, belching, bloating or excessive gas:  rest,   eat lightly and use a heating pad.  - Sore Throat: treat with throat lozenges and/or gargle with warm salt   water.  - Because air was used during the procedure, expelling large amounts of air   from your rectum or belching is normal.  - If a bowel prep was taken, you may not have a bowel movement for 1-3 days.    This is normal.  SYMPTOMS TO WATCH FOR AND REPORT TO YOUR PHYSICIAN:  1. Abdominal pain or bloating, other than gas cramps.  2. Chest pain.  3. Back pain.  4. Signs of infection such as: chills or fever occurring within 24 hours   after the procedure.  5. Rectal bleeding, which would show as bright red, maroon, or black stools.   (A tablespoon of blood from the rectum is not serious, especially if    hemorrhoids are present.)  6. Vomiting.  7. Weakness or dizziness.  GO DIRECTLY TO THE NEAREST EMERGENCY ROOM IF YOU HAVE ANY OF THE FOLLOWING:      Difficulty breathing              Chills and/or fever over 101 F   Persistent vomiting and/or vomiting blood   Severe abdominal pain   Severe chest pain   Black, tarry stools   Bleeding- more than one tablespoon   Any other symptom or condition that you feel may need urgent attention  Your doctor recommends these additional instructions:  If any biopsies were taken, your doctors clinic will contact you in 1 to 2   weeks with any results.  - [facility].   - Full liquid diet for 1 day, then advance as tolerated to pureed diet   indefinitely.   - Continue present medications.  For questions, problems or results please call your physician - Derick Juares MD at Work:  [phone].  [facility] [place] [facility], EMERGENCY ROOM PHONE NUMBER: [phone]  IF A COMPLICATION OR EMERGENCY SITUATION ARISES AND YOU ARE UNABLE TO REACH   YOUR PHYSICIAN - GO DIRECTLY TO THE EMERGENCY ROOM.  Derick Juares MD  7/2/2025 12:08:47 PM  This report has been verified and signed electronically.  Dear patient,  As a result of recent federal legislation (The Federal Cures Act), you may   receive lab or pathology results from your procedure in your [facility]   account before your physician is able to contact you. Your physician or   their representative will relay the results to you with their   recommendations at their soonest availability.  Thank you,  PROVATION

## 2025-07-02 NOTE — PROGRESS NOTES
Anupam Alcantara - Oncology ([facility])  Hematology/Oncology  Progress Note    Patient Name: Jone Lugo  Admission Date: 6/25/2025  Hospital Length of Stay: 7 days  Code Status: Full Code     Subjective:     HPI:  70 yo M esophageal adenocarcinoma stage III, smoking presenting with poor PO intake.     In the ED initially afebrile 87/55 improving to 146 systolic after 2 LR resuscitation on RA. Wbcs 13.51, Hb 8.8 ( BL 11.2), Cr 8.0 ( BL 1.5-1.8), bicarb 13.  (123 BL) . Procal 1.63. Lactate 1.8. A CT chest A/P significant for moderate volume abdominopelvic ascites and mesenteric congestion. UA not collected. He was treated with vanc/cefepime and fluid resucitated with 2L of LR.     6/25 oncology appointment, pt reported worsening dysphagia with poor PO intake with weakness.   In the ED he notes poor food/water intake he attributes to stomach pain after swallowing food. He also notes loose watery stools that has not improved with immodium. Pt reports symptoms started after he most recent chemotherapy. Associated with lightheadedness worse with standing, fatigue. Has had decreased urine output. Denies chest pain, shortness of breath, melena, hematochezia. Otherwise noted an episode severe dysphagia recently with swallowing water and pills.     Interval History: NAEON. Afebrile. VSS. Patient got an esophageal stent placed today. Will evaluate toleration of full liquid diet.    Oncology Treatment Plan:   OP GASTRIC/ ESOPHAGEAL fam-trastuzumab deruxtecan-nxki Q3W    Medications:  Continuous Infusions:  Scheduled Meds:   nicotine  1 patch Transdermal Daily    pantoprazole  40 mg Intravenous BID    potassium bicarbonate  40 mEq Oral Once    potassium chloride  10 mEq Intravenous Q1H     PRN Meds:  Current Facility-Administered Medications:     0.9%  NaCl infusion (for blood administration), , Intravenous, Q24H PRN    acetaminophen, 650 mg, Oral, Q4H PRN    dextrose 50%, 12.5 g, Intravenous, PRN    dextrose 50%, 25 g,  Intravenous, PRN    glucagon (human recombinant), 1 mg, Intramuscular, PRN    glucose, 16 g, Oral, PRN    glucose, 24 g, Oral, PRN    insulin aspart U-100, 0-10 Units, Subcutaneous, Q4H PRN    naloxone, 0.02 mg, Intravenous, PRN    prochlorperazine, 5 mg, Intravenous, Q6H PRN    sodium chloride 0.9%, 10 mL, Intravenous, Q12H PRN     Review of Systems  Objective:     Vital Signs (Most Recent):  Temp: 98.6 °F (37 °C) (07/02/25 1208)  Pulse: 96 (07/02/25 1552)  Resp: 18 (07/02/25 1552)  BP: 115/77 (07/02/25 1552)  SpO2: (!) 94 % (07/02/25 1552) Vital Signs (24h Range):  Temp:  [97.3 °F (36.3 °C)-98.7 °F (37.1 °C)] 98.6 °F (37 °C)  Pulse:  [] 96  Resp:  [15-22] 18  SpO2:  [90 %-95 %] 94 %  BP: (115-156)/(57-77) 115/77     Weight: 55.8 kg (123 lb)  Body mass index is 19.26 kg/m².  Body surface area is 1.62 meters squared.      Intake/Output Summary (Last 24 hours) at 7/2/2025 1602  Last data filed at 7/2/2025 1324  Gross per 24 hour   Intake 240 ml   Output 1275 ml   Net -1035 ml        Physical Exam  HENT:      Head: Normocephalic.   Eyes:      Pupils: Pupils are equal, round, and reactive to light.   Cardiovascular:      Pulses: Normal pulses.   Abdominal:      Palpations: Abdomen is soft.      Tenderness: There is no abdominal tenderness.   Neurological:      Mental Status: He is alert.          Significant Labs:   All pertinent labs from the last 24 hours have been reviewed.    Diagnostic Results:  I have reviewed and interpreted all pertinent imaging results/findings within the past 24 hours.  Assessment/Plan:     * ANTOINE (acute kidney injury)  ANTOINE is likely due to pre-renal azotemia due to intravascular volume depletion 2/2 poor fluid intake, diarrhea, BP medications. Baseline creatinine is 1.5-1.8. Most recent creatinine and eGFR are listed below. No signs of fluid overload. With metabolic acidosis, bicarb 13 in ED. EUGENIA negative for obstruction.    Recent Labs     06/30/25  1929 07/01/25  0352 07/01/25  0823    CREATININE 2.3*  2.4* 2.0* 1.9*   EGFRNORACEVR 30*  28* 35* 38*     Urine creatinine 142  Urine protein 78  Urine protein/ cr ratio 0.56  Urine urea nitrogen 319  Urine potassium 34     Plan  - Avoid nephrotoxins and renally dose meds for GFR listed above  - Monitor urine output, serial BMP, and adjust therapy as needed  - Nephrology recommends continuing IV fluids at 100cc/hr, replace K with potassium chloride 10 mEq  - holding home gabapentin and amlodipine-benzepril  - IVF resucitation  - f/u nephrology      Chronic heart failure with preserved ejection fraction  Results for orders placed during the hospital encounter of 04/22/25    Echo    Interpretation Summary    Left Ventricle: The left ventricle is normal in size. Mildly increased wall thickness. There is normal systolic function with a visually estimated ejection fraction of 55 - 60%. Global longitudinal strain is -17.1%. Global longitudinal strain is normal. Grade I diastolic dysfunction.    Right Ventricle: The right ventricle is normal in size. Wall thickness is normal. Systolic function is normal.    IVC/SVC: Normal venous pressure at 3 mmHg.     Developing tachycardia starting 6/29 possibly related to increased fluid load vs pain/discomfort. Less likely PE as has been on heparin  - Echo technically flawed though with preserved ejection fraction       Other ascites  CT 6/25 on admission with moderate volume abdominopelvic ascites and mesenteric congestion that was not appreciated on initial exam. 6/30 with distension concerning for worsening ascites 2/2 hypoalbuminemia (1.5) causing discomfort. Concern that increasing pressure is altering renal perfusion. Surgery unable to perform PEG w/ ascites. S/p drain (5L).     - f/u peritoneal fluid labs      Hyperglycemia  2/2 TPN    Last A1c reviewed-   Lab Results   Component Value Date    HGBA1C 5.8 (H) 07/01/2024     Most recent fingerstick glucose reviewed-   Recent Labs   Lab 06/28/25 2036  06/29/25  0045 06/29/25  0426 06/29/25  0735   POCTGLUCOSE 201* 195* 217* 209*     Current correctional scale  Medium  Maintain anti-hyperglycemic dose as follows-   Antihyperglycemics (From admission, onward)      Start     Stop Route Frequency Ordered    06/29/25 0907  insulin aspart U-100 pen 0-10 Units         -- SubQ Every 4 hours PRN 06/29/25 0908              Acute hypoxemic respiratory failure  Patient with Hypoxic Respiratory failure which is Acute.  he is not on home oxygen. Supplemental oxygen was provided and noted-    Ddx: pulmonary edema from IVF resuscitation vs aspiration/micro aspiration 2/2 esophageal cancer. Noted history of grade I diastolic function on echo 4/2025. Oxygen requirements around the time of being cleared for clear liquid diet. He is asymptomatic.     CXR with evidence of atelectasis, unlikely pulmonary edema. JVP noted 6/30   - f/u echo   - incentive spirometry   - wean o2 >90%  - possibly related to undiagnosed COPD 2/2 smoking history, had plans for formal PFT but was not done    At risk for malnutrition  RD consulted  TPN ordered and possible PEG tube placement. Pt is NPO currently    Transaminitis  S/p kaleb. No findings supportive of liver/biliary pathology on imaging. GGT elevated as well. Likely shock liver. Will continue to monitor CMP.    - improving with IVF     Diarrhea  RESOLVED     Reports onset of loose watery diarrhea since previous chemotherapy treatment. Possibly related to chemotherapy. Likely contributed to hypovolemic shock and acute renal failure.   Improving. Negative stool studies/c.diff.     Hyperphosphatemia  Patient's most recent phosphorus results are listed below.   Recent Labs     06/25/25  1100   PHOS 5.7*       Plan  - Patient's hyperphosphatemia is stable  - see acute renal failure     Weakness  2/2 poor po intake, hypovolemia.      PT/OT  - No DME recommended requiring DME justifications     Anemia due to bone marrow failure  Anemia is likely due to  chronic disease due to Malignancy and drug toxicity from chemotherapy. Most recent hemoglobin and hematocrit are listed below.  Baseline 11.2. Likely 2/2 chemo. Less likely GIB. He has noted ongoing abdominal pain with associated belching. No hematochezia/melena. ED performed MONTRELL negative negative guaiac fecal occult blood test.     Recent Labs     06/26/25  0240 06/27/25  0305 06/28/25  0300   HGB 8.2* 8.2* 7.8*   HCT 23.2* 23.6* 23.2*       Plan  - Monitor serial CBC: Daily  - Transfuse PRBC if patient becomes hemodynamically unstable, symptomatic or H/H drops below 7/21.  - Patient has not received any PRBC transfusions to date  - Patient's anemia is currently stable  - holding anticoaugulation for now will re-evaluate cbc tomorrow   - f/u iron studies with low iron saturation, may benefit from iron infusions as outpatient       Hypovolemic shock  Vitals:    06/28/25 1936 06/28/25 2300 06/29/25 0538 06/29/25 0820   BP: 113/75 105/68 98/65 105/71    06/29/25 1127 06/29/25 1553 06/29/25 1953 06/29/25 2335   BP: 121/76 98/67 111/71 113/65    06/30/25 0524 06/30/25 0830   BP: 115/68 131/69     Presented with BP  87/55 responding with 2 LR fluid resuscitation. Lactic acid wnl. Likely 2/2 to poor fluid tolerance 2/2 esophageal adenocarcinoma. Previous EGD in April 2025 with partial obstruction. States that placing an esophageal stent with restrictions of types of foods he would be able to eat is not inline with his goals. He is more amendable to PEG placement to provide extra support for fluids and nutrition. Do not suspect septic shock at this time as no findings on CT chest A/P. No fevers. Wbcs wnl. BC NGTD. Improving after several days of IVF resucitation    - GI unable to place PEG 2/2 partial obstruction of the esophagus.  - IR unable to advance NGT for procedure 2/2 partial obstruction.   - Will consult surgical oncology for consultation of PEG tube   - will attempt following resolution of acute renal failure and  if BP wnl    - Surgical oncology consulted [facility]      - IVF maintenance fluids with IVF bolus prn when hypotensive      Esophageal dysphagia  See esophageal carcinoma    Obstruction of esophagus  See esophageal adenocarcinoma      Esophageal adenocarcinoma  69M with esophageal adenocarcinoma initially presenting with dysphagia (improved post-EUS dilation). Biopsy confirmed poorly differentiated adenocarcinoma, pMMR, HER2+, PD-L1 0. Received neoadjuvant carboplatin/paclitaxel + radiation (non-surgical). Imaging showed stable disease. Recurrent tumor confirmed on EGD (12/2024); started FOLFOX + trastuzumab, with stable imaging after 4 cycles. After 6 cycles, developed worsening dysphagia due to local tumor progression. Declined stent/PEG at last hospitalization. Switched to trastuzumab deruxtecan (TDxD). Prior admission presented for cycle 4 with significant dysphagia, poor PO intake, Cr 7.7, hypotension, weakness. Treatment held, sent to ED. Previous EGD in April 2025 with partial obstruction. States that placing an esophageal stent with restrictions of types of foods he would be able to eat is not inline with his goals. He is more amendable to PEG placement to provide extra support for fluids and nutrition.     - likely causing poor po intake > hypovolemic shock > acute renal failure  - Surgical oncology recommending strict NPO due to his report that he cannot tolerate anything by mouth due to the aspiration risk. We would not consider any intervention until his hypovolemia and acute renal failure are addressed.     - Started TPN on 6/26  - Maintenance IVF @100 cc/hr    Esophageal stent placed by [facility] on 7/2                   Genesis Sarah DO  Hematology/Oncology  Anupam Alcantara - Oncology ([facility])

## 2025-07-02 NOTE — SUBJECTIVE & OBJECTIVE
Past Medical History:   Diagnosis Date    HTN (hypertension)        Past Surgical History:   Procedure Laterality Date    ESOPHAGOGASTRODUODENOSCOPY N/A 12/19/2024    Procedure: EGD (ESOPHAGOGASTRODUODENOSCOPY);  Surgeon: Pete Buenrostro MD;  Location: [facility] ENDO (4TH FLR);  Service: Endoscopy;  Laterality: N/A;  Medically Urgent      12/16 ref by Sahil Singer MD, portal-st  12/18 - pre-call complete; MB    ESOPHAGOGASTRODUODENOSCOPY N/A 4/11/2025    Procedure: EGD (ESOPHAGOGASTRODUODENOSCOPY);  Surgeon: Paxton Jurado MD;  Location: [facility] (2ND FLR);  Service: Endoscopy;  Laterality: N/A;  ref by  Sahil Singer MD, 2nd floor due to availability- portal -ml    INSERTION OF TUNNELED CENTRAL VENOUS CATHETER (CVC) WITH SUBCUTANEOUS PORT Right 1/27/2025    Procedure: INSERTION, SINGLE LUMEN CATHETER WITH PORT, WITH FLUOROSCOPIC GUIDANCE, left poss right;  Surgeon: Chadd Velez MD;  Location: [facility] OR 2ND FLR;  Service: General;  Laterality: Right;    LAPAROSCOPIC REPAIR OF INGUINAL HERNIA Right        Review of patient's allergies indicates:  No Known Allergies    Medications:  Continuous Infusions:   lactated ringers   Intravenous Continuous 50 mL/hr at 07/02/25 1320 New Bag at 07/02/25 1320    TPN ADULT CENTRAL LINE CUSTOM   Intravenous Continuous 42 mL/hr at 07/01/25 2201 New Bag at 07/01/25 2201    TPN ADULT CENTRAL LINE CUSTOM   Intravenous Continuous         Scheduled Meds:   nicotine  1 patch Transdermal Daily    pantoprazole  40 mg Intravenous BID     PRN Meds:  Current Facility-Administered Medications:     0.9%  NaCl infusion (for blood administration), , Intravenous, Q24H PRN    acetaminophen, 650 mg, Oral, Q4H PRN    dextrose 50%, 12.5 g, Intravenous, PRN    dextrose 50%, 25 g, Intravenous, PRN    glucagon (human recombinant), 1 mg, Intramuscular, PRN    glucose, 16 g, Oral, PRN    glucose, 24 g, Oral, PRN    insulin aspart U-100, 0-10 Units, Subcutaneous, Q4H PRN     naloxone, 0.02 mg, Intravenous, PRN    ondansetron, 4 mg, Intravenous, Q8H PRN    sodium chloride 0.9%, 10 mL, Intravenous, Q12H PRN    Family History    None       Tobacco Use    Smoking status: Every Day     Current packs/day: 1.00     Types: Cigarettes    Smokeless tobacco: Never   Substance and Sexual Activity    Alcohol use: Yes     Alcohol/week: 1.0 standard drink of alcohol     Types: 1 Glasses of wine per week     Comment: rarely    Drug use: Never    Sexual activity: Not on file       Review of Systems   HENT:  Positive for trouble swallowing.      Objective:     Vital Signs (Most Recent):  Temp: 98.6 °F (37 °C) (07/02/25 1208)  Pulse: 90 (07/02/25 1230)  Resp: 20 (07/02/25 1230)  BP: 133/63 (07/02/25 1230)  SpO2: (!) 94 % (07/02/25 1230) Vital Signs (24h Range):  Temp:  [97.3 °F (36.3 °C)-98.9 °F (37.2 °C)] 98.6 °F (37 °C)  Pulse:  [] 90  Resp:  [15-22] 20  SpO2:  [90 %-95 %] 94 %  BP: (115-156)/(57-76) 133/63     Weight: 55.8 kg (123 lb)  Body mass index is 19.26 kg/m².       Physical Exam  Vitals and nursing note reviewed.   Constitutional:       General: He is not in acute distress.  Cardiovascular:      Rate and Rhythm: Normal rate.   Pulmonary:      Effort: No respiratory distress.   Neurological:      Mental Status: He is alert and oriented to person, place, and time.   Psychiatric:         Mood and Affect: Mood normal.         Thought Content: Thought content normal.            Review of Symptoms      Symptom Assessment (ESAS 0-10 Scale)  Pain:  0  Dyspnea:  0  Anxiety:  0  Nausea:  0  Depression:  0  Anorexia:  0  Fatigue:  0  Insomnia:  0  Restlessness:  0  Agitation:  0     CAM / Delirium:  Negative  Constipation:  Negative  Diarrhea:  Negative      Bowel Management Plan (BMP):  No      Modified Justyna Scale:  0    ECOG Performance Status stGstrstastdstest:st st1st Living Arrangements:  Lives alone    Psychosocial/Cultural:   See Palliative Psychosocial Note: No  Brother on [facility] coming to stay with  patient   **Primary  to Follow**  Palliative Care  Consult: No    Spiritual:  F - Allison and Belief:  Yes  A - Address in Care:  Yes        Advance Care Planning   Advance Directives:   Living Will: No    LaPOST: No    Do Not Resuscitate Status: No    Medical Power of : No      Decision Making:  Patient answered questions  Goals of Care: The patient endorses that what is most important right now is to focus on life-prolongation     Accordingly, we have decided that the best plan to meet the patient's goals includes continuing with treatment         Significant Labs: All pertinent labs within the past 24 hours have been reviewed.  CBC:   Recent Labs   Lab 07/02/25  0406   WBC 12.47   HGB 8.2*   HCT 24.1*   MCV 90        BMP:  Recent Labs   Lab 07/02/25  0900   *  150*     139   K 2.9*  2.9*     101   CO2 25  26   BUN 61*  62*   CREATININE 1.6*  1.5*   CALCIUM 8.7  8.8   MG 1.7     LFT:  Lab Results   Component Value Date     (H) 07/02/2025     (H) 06/25/2025    ALKPHOS 361 (H) 07/02/2025    BILITOT 0.3 07/02/2025     Albumin:   Albumin   Date Value Ref Range Status   07/02/2025 1.6 (L) 3.5 - 5.2 g/dL Final   07/02/2025 1.6 (L) 3.5 - 5.2 g/dL Final   03/21/2025 3.8 3.5 - 5.2 g/dL Final     Protein:   Protein Total   Date Value Ref Range Status   07/02/2025 4.2 (L) 6.0 - 8.4 gm/dL Final     Total Protein   Date Value Ref Range Status   03/21/2025 7.1 6.0 - 8.4 g/dL Final     Lactic acid:   Lab Results   Component Value Date    LACTATE 1.5 06/25/2025       Significant Imaging: I have reviewed all pertinent imaging results/findings within the past 24 hours.    Chest x-ray 6/28/25  Impression:     Findings that may relate to component of volume loss and atelectasis associated with the right lung potentially right middle lobe, as discussed above as well as mild pleural effusion on the right, seen superimposed on a general pattern of diminished  depth of inspiration, clinical and historical correlation and follow-up is recommended.

## 2025-07-02 NOTE — TRANSFER OF CARE
"Anesthesia Transfer of Care Note    Patient: Jone Lugo    Procedure(s) Performed: Procedure(s) (LRB):  EGD (ESOPHAGOGASTRODUODENOSCOPY) (N/A)    Patient location: PACU    Anesthesia Type: general    Transport from OR: Transported from OR on room air with adequate spontaneous ventilation    Post pain: adequate analgesia    Post assessment: no apparent anesthetic complications    Post vital signs: stable    Level of consciousness: awake    Nausea/Vomiting: no nausea/vomiting    Complications: none    Transfer of care protocol was followed    Last vitals: Visit Vitals  BP (!) 150/76   Pulse 90   Temp 36.6 °C (97.9 °F)   Resp 18   Ht 5' 7" (1.702 m)   Wt 55.8 kg (123 lb)   SpO2 95%   BMI 19.26 kg/m²     "

## 2025-07-02 NOTE — PROGRESS NOTES
Hospital Medicine Resident  History and Physical  [facility]  07/02/2025  7:14 AM      Patient Name: Jone Lugo   MRN: [id number]   Current Provider: Marie Garnica MD  Primary Care Provider: [place], Primary Doctor   Admission Date: 6/25/2025   Hospital Day: 7  Bed: 665/855 A  Principal Problem: Acute renal failure       SUBJECTIVE:     HPI:  Jone Lugo is a 69 y.o. male with a relevant medical history of esophageal adenocarcinoma stage III, smoking  who was admitted on 06/25/2025 with abnormal vitals including hypotension from the chemotherapy center. His last chemo was 3 weeks ago - since then not feeling well. Has low oral intake and not drinking enough. He has watery stools, passed 5 times yesterday. Took imodium. No stool today. Stool sample sent to RO C-diff.     Nephrology consulted for ANTOINE 3 in setting of low PO intake, hypotension (lowest SBP 86), diarrhea. Also he received vancomycin on 6/26 and had urine retension of 400 mL with out feeling of passing urine.     Interval History:  Patient reports no acute events overnight. Cr today is 1.6 (down from 1.7 yesterday).    Review of Systems   Constitutional:  Negative for fever.   Gastrointestinal:  Negative for vomiting.   Neurological:  Negative for dizziness.       All other pertinent ROS listed in the H&P.       HISTORY:     Past Medical History:   Diagnosis Date    HTN (hypertension)        Past Surgical History:   Procedure Laterality Date    ESOPHAGOGASTRODUODENOSCOPY N/A 12/19/2024    Procedure: EGD (ESOPHAGOGASTRODUODENOSCOPY);  Surgeon: Ptee Buenrostro MD;  Location: [facility] (4TH FLR);  Service: Endoscopy;  Laterality: N/A;  Medically Urgent      12/16 ref by Sahil Singer MD, [facility]  12/18 - pre-call complete; MB    ESOPHAGOGASTRODUODENOSCOPY N/A 4/11/2025    Procedure: EGD (ESOPHAGOGASTRODUODENOSCOPY);  Surgeon: Paxton Jurado MD;  Location: [facility] (2ND FLR);  Service: Endoscopy;  Laterality: N/A;  ref by   Sahil Singer MD, 2nd floor due to availability- portal -ml    INSERTION OF TUNNELED CENTRAL VENOUS CATHETER (CVC) WITH SUBCUTANEOUS PORT Right 1/27/2025    Procedure: INSERTION, SINGLE LUMEN CATHETER WITH PORT, WITH FLUOROSCOPIC GUIDANCE, left poss right;  Surgeon: Chadd Velez MD;  Location: [facility] OR [address] [place] [address];  Service: General;  Laterality: Right;    LAPAROSCOPIC REPAIR OF INGUINAL HERNIA Right        No family history on file.    Social History     Socioeconomic History    Marital status: Single   Tobacco Use    Smoking status: Every Day     Current packs/day: 1.00     Types: Cigarettes    Smokeless tobacco: Never   Substance and Sexual Activity    Alcohol use: Yes     Alcohol/week: 1.0 standard drink of alcohol     Types: 1 Glasses of wine per week     Comment: rarely    Drug use: Never     Social Drivers of Health     Financial Resource Strain: Patient Declined (6/25/2025)    Overall Financial Resource Strain (CARDIA)     Difficulty of Paying Living Expenses: Patient declined   Food Insecurity: Patient Declined (6/25/2025)    Hunger Vital Sign     Worried About Running Out of Food in the Last Year: Patient declined     Ran Out of Food in the Last Year: Patient declined   Transportation Needs: Patient Declined (6/25/2025)    PRAPARE - Transportation     Lack of Transportation (Medical): Patient declined     Lack of Transportation (Non-Medical): Patient declined   Physical Activity: Unknown (4/7/2025)    Received from [facility]    Exercise Vital Sign     Days of Exercise per Week: Patient declined   Recent Concern: Physical Activity - Insufficiently Active (3/17/2025)    Exercise Vital Sign     Days of Exercise per Week: 2 days     Minutes of Exercise per Session: 20 min   Stress: Patient Declined (6/25/2025)    Stateless [place] [employer] - Occupational Stress Questionnaire     Feeling of Stress : Patient declined   Housing Stability: Patient Declined (6/25/2025)    Housing  Stability Vital Sign     Unable to Pay for Housing in the Last Year: Patient declined     Number of Times Moved in the Last Year: 0     Homeless in the Last Year: Patient declined           MEDICATIONS & ALLERGIES:     Scheduled Meds:   mupirocin   Nasal BID    nicotine  1 patch Transdermal Daily    pantoprazole  40 mg Intravenous BID     Continuous Infusions:   lactated ringers   Intravenous Continuous 50 mL/hr at 07/01/25 0951 Rate Change at 07/01/25 0951    TPN ADULT CENTRAL LINE CUSTOM   Intravenous Continuous 42 mL/hr at 07/01/25 2201 New Bag at 07/01/25 2201     PRN Meds:.  Current Facility-Administered Medications:     0.9%  NaCl infusion (for blood administration), , Intravenous, Q24H PRN    acetaminophen, 650 mg, Oral, Q4H PRN    dextrose 50%, 12.5 g, Intravenous, PRN    dextrose 50%, 25 g, Intravenous, PRN    glucagon (human recombinant), 1 mg, Intramuscular, PRN    glucose, 16 g, Oral, PRN    glucose, 24 g, Oral, PRN    insulin aspart U-100, 0-10 Units, Subcutaneous, Q4H PRN    naloxone, 0.02 mg, Intravenous, PRN    ondansetron, 4 mg, Intravenous, Q8H PRN    sodium chloride 0.9%, 10 mL, Intravenous, Q12H PRN       Review of patient's allergies indicates:  No Known Allergies    OBJECTIVE:     Vital Signs Recent:  Temp: 97.5 °F (36.4 °C) (07/02/25 0402)  Pulse: 101 (07/02/25 0402)  Resp: 18 (07/02/25 0402)  BP: 115/70 (07/02/25 0402)  SpO2: (!) 90 % (07/02/25 0402)  Oxygen Documentation:    Flow (L/min) (Oxygen Therapy): 0.5           Device (Oxygen Therapy): room air  $ Is the patient on Low Flow Oxygen?: Yes            I & O (Last 24H):  Intake/Output Summary (Last 24 hours) at 7/2/2025 0714  Last data filed at 7/2/2025 0311  Gross per 24 hour   Intake 996 ml   Output 1225 ml   Net -229 ml      Vital Signs Range (Last 24H):  Temp:  [97.4 °F (36.3 °C)-98.9 °F (37.2 °C)]   Pulse:  []   Resp:  [16-22]   BP: (107-123)/(57-72)   SpO2:  [90 %-96 %]          Weight:  Body mass index is 19.26 kg/m².  Wt  "Readings from Last 3 Encounters:   06/30/25 55.8 kg (123 lb)   06/04/25 58.1 kg (128 lb 1.4 oz)   06/04/25 58.1 kg (128 lb 1.4 oz)          Physical Exam  Constitutional:       General: He is awake.   Pulmonary:      Effort: Pulmonary effort is normal.   Neurological:      Mental Status: He is alert.         Lab Value    RENAL FUNCTION:   BUN (mg/dL)   Date Value   07/02/2025 64 (H)   07/01/2025 59 (H)   07/01/2025 63 (H)     Creatinine (mg/dL)   Date Value   07/02/2025 1.6 (H)   07/01/2025 1.7 (H)   07/01/2025 1.8 (H)     Albumin (g/dL)   Date Value   07/02/2025 1.7 (L)   07/01/2025 1.7 (L)   07/01/2025 1.7 (L)   03/21/2025 3.8   03/11/2025 3.4 (L)   02/26/2025 3.3 (L)     No results found for: "EGFRNONAA"      ELECTROLYTES:  Sodium (mmol/L)   Date Value   07/02/2025 137   07/01/2025 134 (L)   07/01/2025 137   03/21/2025 138   03/11/2025 140   02/26/2025 138     Potassium (mmol/L)   Date Value   07/02/2025 3.0 (L)   07/01/2025 3.1 (L)   07/01/2025 3.2 (L)   03/21/2025 4.3   03/11/2025 3.8   02/26/2025 3.6     Chloride (mmol/L)   Date Value   07/02/2025 100   07/01/2025 97   07/01/2025 101   03/21/2025 106   03/11/2025 109   02/26/2025 108     CO2 (mmol/L)   Date Value   07/02/2025 26   07/01/2025 26   07/01/2025 27   03/21/2025 21 (L)   03/11/2025 22 (L)   02/26/2025 23     Calcium (mg/dL)   Date Value   07/02/2025 8.9   07/01/2025 8.8   07/01/2025 8.8   03/21/2025 9.9   03/11/2025 9.0   02/26/2025 9.2     Magnesium  (mg/dL)   Date Value   07/01/2025 1.5 (L)   07/01/2025 1.6   06/30/2025 2.2     Phosphorus Level (mg/dL)   Date Value   07/02/2025 2.9   07/01/2025 3.2   07/01/2025 3.6         LIVER FUNCTION:   Alkaline Phosphatase (U/L)   Date Value   03/21/2025 115   03/11/2025 107   02/26/2025 109     ALP (unit/L)   Date Value   07/01/2025 359 (H)   07/01/2025 333 (H)   06/30/2025 315 (H)     ALT   Date Value   07/01/2025 136 unit/L (H)   07/01/2025 94 unit/L (H)   06/30/2025 95 unit/L (H)   03/21/2025 23 U/L "   03/11/2025 25 U/L   02/26/2025 20 U/L     AST   Date Value   07/01/2025 149 unit/L (H)   07/01/2025 84 unit/L (H)   06/30/2025 85 unit/L (H)   03/21/2025 25 U/L   03/11/2025 28 U/L   02/26/2025 24 U/L     Protein Total (gm/dL)   Date Value   07/01/2025 4.4 (L)   07/01/2025 4.3 (L)   06/30/2025 4.5 (L)     Total Protein (g/dL)   Date Value   03/21/2025 7.1   03/11/2025 6.3   02/26/2025 6.1     Total Bilirubin (mg/dL)   Date Value   03/21/2025 0.6   03/11/2025 0.6   02/26/2025 0.6     Bilirubin Total (mg/dL)   Date Value   07/01/2025 0.3   07/01/2025 0.4   06/30/2025 0.4     INR (no units)   Date Value   06/25/2025 1.1   04/12/2025 1.1         OTHER:   WBC (K/uL)   Date Value   07/02/2025 12.47   07/01/2025 8.13   06/30/2025 10.01     Hemoglobin (g/dL)   Date Value   03/21/2025 12.8 (L)   03/11/2025 11.8 (L)   02/26/2025 11.1 (L)     HGB (gm/dL)   Date Value   07/02/2025 8.2 (L)   07/01/2025 7.2 (L)   07/01/2025 6.7 (L)     Platelet Count (K/uL)   Date Value   07/02/2025 292   07/01/2025 299   06/30/2025 344     Platelets (K/uL)   Date Value   03/21/2025 195   03/11/2025 139 (L)   02/26/2025 157                ASSESSMENT -- PLAN:   Jone Lugo is a 69 y.o. male with a relevant medical history of esophageal adenocarcinoma III and smoking who is being treated for acute renal failure.      Impression:  ANTOINE KDIGO stage 3   Baseline Creatinine:  1.1-1.3  Creatinine at time of consult: 8.0  Etiology of ANTOINE:  likely due to volume depletion by low PO intake, diarrhea leading to hypotension; secondary factor could be vancomycin        Recommendations :      - Due to decreased urine output, plan to continue IV fluids to keep intake and output net even.    - Patient's Cr shows continued improvement trending down at 1.6 today from 1.7. Continue to monitor Cr at this time.     Monitor serum chemistries daily, strict intake and output Qshift , daily weights if able.  Renal protective measures: Please adjust medications  for reduced clearance  Avoid nephrotoxic medications (NSAID, IV contrast)  Avoid ACEi and ARBs in the setting of ANTOINE  Maintain MAP > 65  Transfuse for Hb <7     Thank you for allowing us to participate in the care of this patient.  Plan discussed with attending and/or fellow. Please call with any questions or concerns.     Austen Gill, DO  Resident Physician, PGY-1  [facility] [address]

## 2025-07-02 NOTE — TREATMENT PLAN
AES Treatment Plan    Jone Lugo is a 69 y.o. male admitted to hospital 6/25/2025 (Hospital Day: 8) due to ANTOINE (acute kidney injury).     Interval History  Previously seen in admission for discussion regarding esophageal stent placement. At that time patient declined stent due to restriction of liquids only after placement and wanted to pursue PEG/G-tube options. Patient has ascites and not a candidate for PEG/G-tube.     Contacted by primary team as patient now wanting to pursue stent after having more time to think about it.  Seen by SLP yesterday and still cleared for full liquid det.     Objective  Temp:  [97.4 °F (36.3 °C)-98.9 °F (37.2 °C)] 97.9 °F (36.6 °C) (07/02 0740)  Pulse:  [] 95 (07/02 0740)  BP: (107-129)/(57-72) 129/70 (07/02 0740)  Resp:  [17-22] 18 (07/02 0402)  SpO2:  [90 %-96 %] 91 % (07/02 0740)    General: ill-appearing, fatigued but oriented   Abdomen: Soft. Non-tender.     Laboratory  Recent Labs   Lab 07/01/25  0352 07/01/25  0823 07/02/25  0406   HGB 6.7* 7.2* 8.2*     Assessment and Plan  69 y.o. male with past medical history of esophageal adenocarcinoma s/p chemo and radiation in 2023 now with recurrence on chemotherapy who presents with inability tolerate p.o. intake and ANTOINE from dehydration.  AES consulted for discussion of esophageal stent placement and consideration of PEG tube. Not candidate for PEG due to ascites and initially did not want esophageal stent but is now agreeable and would like to pursue.      Problems:  Intolerance of p.o. intake  Nausea, vomiting  Esophageal adenocarcinoma      Plan:  -EGD with fluoro today for placement of esophageal stent   -Please keep NPO       Thank you for involving us in the care of Jone Lugo. Please call with any additional questions, concerns or changes in the patient's clinical status.    Karina Prince MD  Gastroenterology Fellow, PGY-V

## 2025-07-02 NOTE — CONSULTS
"  [facility] - Surgery ([facility])  Adult Nutrition  Consult Note    SUMMARY     Recommendations    1. Continuous TF recommendation: Isosource 1.5 @ 50 mL/hr to provide 1200 mL total volume, 1800 kcal, 211 g CHO, 82 g protein, 18 g fiber, 917 mL free water    - please continue to document TF rate via flowsheets        2. Recommend Ramirez BID  3. RD to monitor weight, labs, intake, tolerance    Goals:   1. Provide nutrition within 48 hrs      2. Maintain weight during admission    Nutrition Goal Status: new  Communication of RD Recs:  (POC)    Nutrition Discharge Planning     Nutrition Discharge Planning: Too early to determine, pending clinical course      Reason for Assessment    Reason For Assessment: consult  Diagnosis: renal disease  General Information Comments: Pt admitted for acute renal failure. PMHx HTN, dysphagia, esophageal adenocarcinoma stage III, smoking presenting with poor PO intake.   Per MD note "er patient his dysphagia has been worsening and the past three days has not been able to tolerate solids or liquids by mouth. He was sent to the ED and found to be in acute renal failure likely due to dehydration from poor PO intake." RD consulted for TPN recs on 6/30 and now consulted for TF recs. Per gastroenterology MD "Patient has ascites and not a candidate for PEG/G-tube" on 7/2. Stent placement 7/2. Pressure injury noted 6/29.    Nutrition/Diet History    Spiritual, Cultural Beliefs, Judaism Practices, Values that Affect Care: no  Factors Affecting Nutritional Intake: NPO  Nutrition-related SDOH: Unable to assess at this time    Anthropometrics    Height: 5' 7" (170.2 cm)  Height (inches): 67 in  Height Method: Stated  Weight: 55.8 kg (123 lb)  Weight (lb): 123 lb  Weight Method: Standard Scale  Ideal Body Weight (IBW), Male: 148 lb  % Ideal Body Weight, Male (lb): 83.11 %  BMI (Calculated): 19.3  BMI Grade: less than 23 (older than 65 years) - underweight    Lab/Procedures/Meds    Pertinent Labs " Reviewed: reviewed  Pertinent Labs Comments: K 2.9, BUN 61, Cr 1.6, GFR 46,  Glu 151, , Protein total 4.2, albumin 1.6, ,   Pertinent Medications Reviewed: reviewed  Pertinent Medications Comments: TPN central line, lactated ringers infusion, potassium chloride, pantoprazole, magnesium sulfate    Estimated/Assessed Needs    Weight Used For Calorie Calculations: 55.8 kg (123 lb 0.3 oz)  Energy Calorie Requirements (kcal): 8353-5968 kcal (30-35 kcal/kg)  Energy Need Method: Kcal/kg  Protein Requirements: 67-84 g/pro (1.2-1.5 g/kg)  Weight Used For Protein Calculations: 55.8 kg (123 lb 0.3 oz)    RDA Method (mL): 1674  CHO Requirement: 209-244      Nutrition Prescription Ordered    Current Diet Order: NPO    Evaluation of Received Nutrient/Fluid Intake    Parenteral Calories (kcal): 1215  Parenteral Protein (gm): 202  Lipid Calories (kcals): 500 kcals  GIR (Glucose Infusion Rate) (mg/kg/min): 1.88 mg/kg/min  I/O: +7345.7 since 6/25  Energy Calories Required: meeting needs  Protein Required: meeting needs  Fluid Required:  (per MD)  Comments: LBM 7/1  Tolerance: tolerating  % Intake of Estimated Energy Needs: 75 - 100 %  % Meal Intake: 75 - 100 %    PES Statement  Increased nutrient needs related to Wound healing as evidenced by  (pressure injury stage 3)  Status: Continues    Nutrition Risk    Level of Risk/Frequency of Follow-up: high (2/wk)       Monitor and Evaluation    Monitor and Evaluation: Skin, Nutrition focused physical findings, Lipid profile, Inflammatory profile, Glucose/endocrine profile, Gastrointestinal profile, Electrolyte and renal panel, Beliefs and attitudes, Weight       Nutrition Follow-Up    RD Follow-up?: Yes

## 2025-07-02 NOTE — ASSESSMENT & PLAN NOTE
69M with esophageal adenocarcinoma initially presenting with dysphagia (improved post-EUS dilation). Biopsy confirmed poorly differentiated adenocarcinoma, pMMR, HER2+, PD-L1 0. Received neoadjuvant carboplatin/paclitaxel + radiation (non-surgical). Imaging showed stable disease. Recurrent tumor confirmed on EGD (12/2024); started FOLFOX + trastuzumab, with stable imaging after 4 cycles. After 6 cycles, developed worsening dysphagia due to local tumor progression. Declined stent/PEG at last hospitalization. Switched to trastuzumab deruxtecan (TDxD). Prior admission presented for cycle 4 with significant dysphagia, poor PO intake, Cr 7.7, hypotension, weakness. Treatment held, sent to ED. Previous EGD in April 2025 with partial obstruction. States that placing an esophageal stent with restrictions of types of foods he would be able to eat is not inline with his goals. He is more amendable to PEG placement to provide extra support for fluids and nutrition.     - likely causing poor po intake > hypovolemic shock > acute renal failure  - Surgical oncology recommending strict NPO due to his report that he cannot tolerate anything by mouth due to the aspiration risk. We would not consider any intervention until his hypovolemia and acute renal failure are addressed.     - Started TPN on 6/26  - Maintenance IVF @100 cc/hr    Esophageal stent placed by AES on 7/2

## 2025-07-02 NOTE — CONSULTS
Attempted to see pt for WC consult, pt off the floor for procedure.  Will attempt to see at a later time.

## 2025-07-02 NOTE — PT/OT/SLP PROGRESS
Speech Language Pathology      Jone Lugo  MRN: [id number]    Patient not seen today secondary to NPO for EGD w/ fluoro for esophageal stent placement this afternoon . SLP will follow-up next service date as appropriate/when cleared to resume PO intake.

## 2025-07-02 NOTE — SUBJECTIVE & OBJECTIVE
Interval History: NAEON. Afebrile. VSS. Patient got an esophageal stent placed today. Will evaluate toleration of full liquid diet.    Oncology Treatment Plan:   OP GASTRIC/ ESOPHAGEAL fam-trastuzumab deruxtecan-nxki Q3W    Medications:  Continuous Infusions:  Scheduled Meds:   nicotine  1 patch Transdermal Daily    pantoprazole  40 mg Intravenous BID    potassium bicarbonate  40 mEq Oral Once    potassium chloride  10 mEq Intravenous Q1H     PRN Meds:  Current Facility-Administered Medications:     0.9%  NaCl infusion (for blood administration), , Intravenous, Q24H PRN    acetaminophen, 650 mg, Oral, Q4H PRN    dextrose 50%, 12.5 g, Intravenous, PRN    dextrose 50%, 25 g, Intravenous, PRN    glucagon (human recombinant), 1 mg, Intramuscular, PRN    glucose, 16 g, Oral, PRN    glucose, 24 g, Oral, PRN    insulin aspart U-100, 0-10 Units, Subcutaneous, Q4H PRN    naloxone, 0.02 mg, Intravenous, PRN    prochlorperazine, 5 mg, Intravenous, Q6H PRN    sodium chloride 0.9%, 10 mL, Intravenous, Q12H PRN     Review of Systems  Objective:     Vital Signs (Most Recent):  Temp: 98.6 °F (37 °C) (07/02/25 1208)  Pulse: 96 (07/02/25 1552)  Resp: 18 (07/02/25 1552)  BP: 115/77 (07/02/25 1552)  SpO2: (!) 94 % (07/02/25 1552) Vital Signs (24h Range):  Temp:  [97.3 °F (36.3 °C)-98.7 °F (37.1 °C)] 98.6 °F (37 °C)  Pulse:  [] 96  Resp:  [15-22] 18  SpO2:  [90 %-95 %] 94 %  BP: (115-156)/(57-77) 115/77     Weight: 55.8 kg (123 lb)  Body mass index is 19.26 kg/m².  Body surface area is 1.62 meters squared.      Intake/Output Summary (Last 24 hours) at 7/2/2025 1602  Last data filed at 7/2/2025 1324  Gross per 24 hour   Intake 240 ml   Output 1275 ml   Net -1035 ml        Physical Exam  HENT:      Head: Normocephalic.   Eyes:      Pupils: Pupils are equal, round, and reactive to light.   Cardiovascular:      Pulses: Normal pulses.   Abdominal:      Palpations: Abdomen is soft.      Tenderness: There is no abdominal tenderness.    Neurological:      Mental Status: He is alert.          Significant Labs:   All pertinent labs from the last 24 hours have been reviewed.    Diagnostic Results:  I have reviewed and interpreted all pertinent imaging results/findings within the past 24 hours.

## 2025-07-02 NOTE — H&P
Short Stay Endoscopy History and Physical    PCP - No, Primary Doctor     Procedure - EGD with fluoro/esophogeal stent placement   ASA - per anesthesia  Mallampati - per anesthesia  History of Anesthesia problems - no  Family history Anesthesia problems -  no   Plan of anesthesia - General    HPI:  This is a 69 y.o. male here for evaluation of :     dysphagia 2/2 esophageal adenocarcinoma       ROS:  Constitutional: No fevers, chills, No weight loss  CV: No chest pain  Pulm: No cough, No shortness of breath  Ophtho: No vision changes  GI: see HPI  Derm: No rash    Medical History:  has a past medical history of HTN (hypertension).    Surgical History:  has a past surgical history that includes Laparoscopic repair of inguinal hernia (Right); Esophagogastroduodenoscopy (N/A, 12/19/2024); Insertion of tunneled central venous catheter (CVC) with subcutaneous port (Right, 1/27/2025); and Esophagogastroduodenoscopy (N/A, 4/11/2025).    Family History: family history is not on file.. Otherwise no colon cancer, inflammatory bowel disease, or GI malignancies.    Social History:  reports that he has been smoking cigarettes. He has never used smokeless tobacco. He reports current alcohol use of about 1.0 standard drink of alcohol per week. He reports that he does not use drugs.    Review of patient's allergies indicates:  No Known Allergies    Medications:   Prescriptions Prior to Admission[1]    Physical Exam:    Vital Signs:   Vitals:    07/02/25 0740   BP: 129/70   Pulse: 95   Resp:    Temp: 97.9 °F (36.6 °C)       General Appearance: ill-appearing, fatigued but oriented   Abdomen: Soft, non tender, non distended   Extremities: No edema  Skin: No rash    Labs:  Lab Results   Component Value Date    WBC 12.47 07/02/2025    HGB 8.2 (L) 07/02/2025    HCT 24.1 (L) 07/02/2025     07/02/2025    CHOL 132 07/01/2024    TRIG 226 (H) 06/27/2025    HDL 34 (L) 07/01/2024     (H) 07/02/2025     (H) 07/02/2025      07/02/2025     07/02/2025    K 2.9 (L) 07/02/2025    K 2.9 (L) 07/02/2025     07/02/2025     07/02/2025    CREATININE 1.5 (H) 07/02/2025    CREATININE 1.6 (H) 07/02/2025    BUN 62 (H) 07/02/2025    BUN 61 (H) 07/02/2025    CO2 26 07/02/2025    CO2 25 07/02/2025    TSH 1.31 07/01/2024    INR 1.1 06/25/2025    HGBA1C 5.8 (H) 07/01/2024       I have explained the risks and benefits of endoscopy procedures to the patient including but not limited to bleeding, perforation, infection, and death.  The patient was asked if they understand and allowed to ask any further questions to their satisfaction.      Karina Prince MD         [1]   Medications Prior to Admission   Medication Sig Dispense Refill Last Dose/Taking    amlodipine-benazepril 10-20mg (LOTREL) 10-20 mg per capsule Take 1 capsule by mouth once daily.       azelastine (ASTELIN) 137 mcg (0.1 %) nasal spray 2 sprays by Nasal route.       dexAMETHasone (DECADRON) 4 MG Tab Take 2 tablets (8 mg total) by mouth once daily. On days 2-4 of each chemotherapy cycle. 30 tablet 11     etodolac (LODINE) 400 MG tablet Take 1 tablet by mouth 2 (two) times daily.       fluticasone propionate (FLONASE) 50 mcg/actuation nasal spray 2 sprays by Each Nostril route.       gabapentin (NEURONTIN) 300 MG capsule Take 300 mg by mouth.       LIDOcaine-prilocaine (EMLA) cream Apply topically as needed. 30 g 1     meclizine (ANTIVERT) 25 mg tablet Take 1 tablet (25 mg total) by mouth 3 (three) times daily as needed for Dizziness. 30 tablet 0     multivitamin with minerals tablet Take 1 tablet by mouth once daily. One A Day Multivitamin       OLANZapine (ZYPREXA) 5 MG tablet Take 1 tab at nighttime on nights 1 thru 3 of each chemotherapy cycle. 90 tablet 2     omeprazole (PRILOSEC) 40 MG capsule Take 1 capsule by mouth every morning.       ondansetron (ZOFRAN) 8 MG tablet Take 1 tablet (8 mg total) by mouth every 8 (eight) hours as needed. 60 tablet 2      pravastatin (PRAVACHOL) 40 MG tablet Take 40 mg by mouth.       TURMERIC ORAL Take by mouth.

## 2025-07-02 NOTE — PROGRESS NOTES
Patient off floor today per rounding Sw'er. Patient to have an EGD and esophageal stent placed this am.  Nutrition following for discharge planning recommendation.  Discharge pending recommendation from medical oncology team.     YANELIS Hopson, NEISHA  [facility] - Oncology Dept  Email:[email or web address]  Office ph# [phone]

## 2025-07-02 NOTE — ASSESSMENT & PLAN NOTE
69 year old male with recurrent esophageal cancer with obstruction currently on TPN. Palliative consulted to discuss GOC and esophageal stent     Code status: Full, not addressed     GOC/ACP  -Met with patient at bedside. Introduced palliative medicine. Discussed options moving forward. We discussed palliative stent and what life would look like. We also discussed hospice with or without stent. Patient would like to pursue palliative stent. He does not feel like he is ready for hospice. He is hoping that he will get strong enough to undergo further cancer directed therapies.He understands that he may not be strong enough in the future. He also understands that further cancer directed therapies are not going to be curative this time around. He is open to continued discussions in palliative clinic.     -Goals are life prolonging. Patient is agreeable to stent placement. Not wanting hospice at this time     Discussed with Dr. Garnica

## 2025-07-02 NOTE — TREATMENT PLAN
AES Treatment Plan    S/p EGD with esophageal stent placement     Impression:            - Partially obstructing, malignant esophageal                          tumor was found in the lower third of the                          esophagus. Prosthesis placed.                          - Normal stomach.                          - No specimens collected.   Recommendation:        - [facility] patient to [facility] for [facility] care.                          - Full liquid diet for 1 day, then advance as                          tolerated to pureed diet indefinitely.                          - Continue present medications.     Thank you for involving us in the care of Jone Lugo. Please call with any additional questions, concerns or changes in the patient's clinical status.    Karina Prince MD  Gastroenterology Fellow, PGY IV

## 2025-07-02 NOTE — PLAN OF CARE
Pt AAOx4. VSS, afebrile. Accucheck orders maintained. Voids w bedside urinal. No BM O/N. TPN continued @ 42 ml/hr. LR continued @ 50 ml/hr. Reviewed plan of care w/ pt. Remained free from falls/traumas/injuries. Questions answered and encouraged. Call light at bedside, non-skid socks on. Bed in low position, wheels locked. Standard precautions maintained.

## 2025-07-02 NOTE — NURSING TRANSFER
Nursing Transfer Note      7/2/2025   12:17 PM    Nurse giving handoff:Ramón MINER Rn  Nurse receiving handoff: Taty Blanco    Reason patient is being transferred: postop    Transfer To: 855    Transfer via stretcher    Transfer with n/a    Transported by pacu pct    Transfer Vital Signs:  Blood Pressure:see flowsheet  Heart Rate:  O2:  Temperature:  Respirations:    Telemetry: n/a  Order for Tele Monitor? No    Additional Lines: n/a    Medicines sent: n/a    Any special needs or follow-up needed:     Patient belongings transferred with patient: No    Chart send with patient: Yes    Notified: n/a    Patient reassessed at: 07/2/25  1  Upon arrival to floor: bed in lowest position

## 2025-07-02 NOTE — ANESTHESIA PROCEDURE NOTES
Intubation    Date/Time: 7/2/2025 11:26 AM    Performed by: Bob Price CRNA  Authorized by: Federico Gonzalez MD    Intubation:     Induction:  Intravenous    Intubated:  Postinduction    Mask Ventilation:  Easy mask    Attempts:  1    Attempted By:  CRNA    Method of Intubation:  Video laryngoscopy    Blade:  Mcknight 3    Laryngeal View Grade: Grade I - full view of cords      Difficult Airway Encountered?: No      Complications:  None    Airway Device:  Oral endotracheal tube    Airway Device Size:  7.5    Style/Cuff Inflation:  Cuffed (inflated to minimal occlusive pressure)    Tube secured:  23    Secured at:  The lips    Placement Verified By:  Auscultation and Capnometry    Complicating Factors:  None    Findings Post-Intubation:  Bilateral breath sounds, positive ETCO2 and atraumatic / condition of teeth unchanged

## 2025-07-02 NOTE — PLAN OF CARE
Recommendations    1. Continuous TF recommendation: Isosource 1.5 @ 50 mL/hr to provide 1200 mL total volume, 1800 kcal, 211 g CHO, 82 g protein, 18 g fiber, 917 mL free water    - please continue to document TF rate via flowsheets        2. Recommend Ramirez BID  3. RD to monitor weight, labs, intake, tolerance    Goals:   1. Provide nutrition within 48 hrs      2. Maintain weight during admission    Nutrition Goal Status: new  Communication of RD Recs:  (POC)    Nutrition Discharge Planning     Nutrition Discharge Planning: Too early to determine, pending clinical course

## 2025-07-02 NOTE — CONSULTS
"[facility] - Oncology ([facility])  Palliative Medicine  Consult Note    Patient Name: Jone Lugo  MRN: [id number]  Admission Date: 6/25/2025  Hospital Length of Stay: 7 days  Code Status: Full Code   Attending Provider: Marie Garnica MD  Consulting Provider: Aurora Barbosa MD  Primary Care Physician: [place], Primary Doctor  Principal Problem:ANTOINE (acute kidney injury)    Patient information was obtained from patient and primary team.      Consults  Assessment/Plan:     Palliative Care  Palliative care encounter  69 year old male with recurrent esophageal cancer with obstruction currently on TPN. Palliative consulted to discuss GOC and esophageal stent     Code status: Full, not addressed     GOC/ACP  -Met with patient at bedside. Introduced palliative medicine. Discussed options moving forward. We discussed palliative stent and what life would look like. We also discussed hospice with or without stent. Patient would like to pursue palliative stent. He does not feel like he is ready for hospice. He is hoping that he will get strong enough to undergo further cancer directed therapies.He understands that he may not be strong enough in the future. He also understands that further cancer directed therapies are not going to be curative this time around. He is open to continued discussions in palliative clinic.     -Goals are life prolonging. Patient is agreeable to stent placement. Not wanting hospice at this time     Discussed with Dr. Garnica          Thank you for your consult. I will follow-up with patient. Please contact us if you have any additional questions.    Subjective:     HPI:   Per onc H&P   "68 yo M esophageal adenocarcinoma stage III, smoking presenting with poor PO intake.      In the ED initially afebrile 87/55 improving to 146 systolic after 2 LR resuscitation on RA. Wbcs 13.51, Hb 8.8 ( BL 11.2), Cr 8.0 ( BL 1.5-1.8), bicarb 13.  (123 BL) . Procal 1.63. Lactate 1.8. A CT chest A/P " "significant for moderate volume abdominopelvic ascites and mesenteric congestion. UA not collected. He was treated with vanc/cefepime and fluid resucitated with 2L of LR.      6/25 oncology appointment, pt reported worsening dysphagia with poor PO intake with weakness.   In the ED he notes poor food/water intake he attributes to stomach pain after swallowing food. He also notes loose watery stools that has not improved with immodium. Pt reports symptoms started after he most recent chemotherapy. Associated with lightheadedness worse with standing, fatigue. Has had decreased urine output. Denies chest pain, shortness of breath, melena, hematochezia. Otherwise noted an episode severe dysphagia recently with swallowing water and pills."     Palliative consulted for GOC. Patient being offered palliative stent which would restrict him to liquids for the rest of his life. PEG not possible for now with ascites. Further cancer associated treatment dependent on patient's nutrition and functional status      Hospital Course:  No notes on file        Past Medical History:   Diagnosis Date    HTN (hypertension)        Past Surgical History:   Procedure Laterality Date    ESOPHAGOGASTRODUODENOSCOPY N/A 12/19/2024    Procedure: EGD (ESOPHAGOGASTRODUODENOSCOPY);  Surgeon: Pete Buenrostro MD;  Location: [facility] (4TH FLR);  Service: Endoscopy;  Laterality: N/A;  Medically Urgent      12/16 ref by Sahil Singer MD, [place]-  12/18 - pre-call complete; MB    ESOPHAGOGASTRODUODENOSCOPY N/A 4/11/2025    Procedure: EGD (ESOPHAGOGASTRODUODENOSCOPY);  Surgeon: Paxton Jurado MD;  Location: [facility] (2ND FLR);  Service: Endoscopy;  Laterality: N/A;  ref by  Sahil Singer MD, 2nd floor due to availability- portal -    INSERTION OF TUNNELED CENTRAL VENOUS CATHETER (CVC) WITH SUBCUTANEOUS PORT Right 1/27/2025    Procedure: INSERTION, SINGLE LUMEN CATHETER WITH PORT, WITH FLUOROSCOPIC GUIDANCE, left poss right;  " Surgeon: Chadd Velez MD;  Location: [facility] OR [address] [place] [address];  Service: General;  Laterality: Right;    LAPAROSCOPIC REPAIR OF INGUINAL HERNIA Right        Review of patient's allergies indicates:  No Known Allergies    Medications:  Continuous Infusions:   lactated ringers   Intravenous Continuous 50 mL/hr at 07/02/25 1320 New Bag at 07/02/25 1320    TPN ADULT CENTRAL LINE CUSTOM   Intravenous Continuous 42 mL/hr at 07/01/25 2201 New Bag at 07/01/25 2201    TPN ADULT CENTRAL LINE CUSTOM   Intravenous Continuous         Scheduled Meds:   nicotine  1 patch Transdermal Daily    pantoprazole  40 mg Intravenous BID     PRN Meds:  Current Facility-Administered Medications:     0.9%  NaCl infusion (for blood administration), , Intravenous, Q24H PRN    acetaminophen, 650 mg, Oral, Q4H PRN    dextrose 50%, 12.5 g, Intravenous, PRN    dextrose 50%, 25 g, Intravenous, PRN    glucagon (human recombinant), 1 mg, Intramuscular, PRN    glucose, 16 g, Oral, PRN    glucose, 24 g, Oral, PRN    insulin aspart U-100, 0-10 Units, Subcutaneous, Q4H PRN    naloxone, 0.02 mg, Intravenous, PRN    ondansetron, 4 mg, Intravenous, Q8H PRN    sodium chloride 0.9%, 10 mL, Intravenous, Q12H PRN    Family History    None       Tobacco Use    Smoking status: Every Day     Current packs/day: 1.00     Types: Cigarettes    Smokeless tobacco: Never   Substance and Sexual Activity    Alcohol use: Yes     Alcohol/week: 1.0 standard drink of alcohol     Types: 1 Glasses of wine per week     Comment: rarely    Drug use: Never    Sexual activity: Not on file       Review of Systems   HENT:  Positive for trouble swallowing.      Objective:     Vital Signs (Most Recent):  Temp: 98.6 °F (37 °C) (07/02/25 1208)  Pulse: 90 (07/02/25 1230)  Resp: 20 (07/02/25 1230)  BP: 133/63 (07/02/25 1230)  SpO2: (!) 94 % (07/02/25 1230) Vital Signs (24h Range):  Temp:  [97.3 °F (36.3 °C)-98.9 °F (37.2 °C)] 98.6 °F (37 °C)  Pulse:  [] 90  Resp:  [15-22] 20  SpO2:   [90 %-95 %] 94 %  BP: (115-156)/(57-76) 133/63     Weight: 55.8 kg (123 lb)  Body mass index is 19.26 kg/m².       Physical Exam  Vitals and nursing note reviewed.   Constitutional:       General: He is not in acute distress.  Cardiovascular:      Rate and Rhythm: Normal rate.   Pulmonary:      Effort: No respiratory distress.   Neurological:      Mental Status: He is alert and oriented to person, place, and time.   Psychiatric:         Mood and Affect: Mood normal.         Thought Content: Thought content normal.            Review of Symptoms      Symptom Assessment (ESAS 0-10 Scale)  Pain:  0  Dyspnea:  0  Anxiety:  0  Nausea:  0  Depression:  0  Anorexia:  0  Fatigue:  0  Insomnia:  0  Restlessness:  0  Agitation:  0     CAM / Delirium:  Negative  Constipation:  Negative  Diarrhea:  Negative      Bowel Management Plan (BMP):  No      Modified Justyna Scale:  0    ECOG Performance Status stGstrstastdstest:st st1st Living Arrangements:  Lives alone    Psychosocial/Cultural:   See Palliative Psychosocial Note: No  Brother on [facility] coming to stay with patient   **Primary  to Follow**  Palliative Care  Consult: No    Spiritual:  F - Allison and Belief:  Yes  A - Address in Care:  Yes        Advance Care Planning  Advance Directives:   Living Will: No    LaPOST: No    Do Not Resuscitate Status: No    Medical Power of : No      Decision Making:  Patient answered questions  Goals of Care: The patient endorses that what is most important right now is to focus on life-prolongation     Accordingly, we have decided that the best plan to meet the patient's goals includes continuing with treatment         Significant Labs: All pertinent labs within the past 24 hours have been reviewed.  CBC:   Recent Labs   Lab 07/02/25  0406   WBC 12.47   HGB 8.2*   HCT 24.1*   MCV 90        BMP:  Recent Labs   Lab 07/02/25  0900   *  150*     139   K 2.9*  2.9*     101   CO2 25  26   BUN 61*   62*   CREATININE 1.6*  1.5*   CALCIUM 8.7  8.8   MG 1.7     LFT:  Lab Results   Component Value Date     (H) 07/02/2025     (H) 06/25/2025    ALKPHOS 361 (H) 07/02/2025    BILITOT 0.3 07/02/2025     Albumin:   Albumin   Date Value Ref Range Status   07/02/2025 1.6 (L) 3.5 - 5.2 g/dL Final   07/02/2025 1.6 (L) 3.5 - 5.2 g/dL Final   03/21/2025 3.8 3.5 - 5.2 g/dL Final     Protein:   Protein Total   Date Value Ref Range Status   07/02/2025 4.2 (L) 6.0 - 8.4 gm/dL Final     Total Protein   Date Value Ref Range Status   03/21/2025 7.1 6.0 - 8.4 g/dL Final     Lactic acid:   Lab Results   Component Value Date    LACTATE 1.5 06/25/2025       Significant Imaging: I have reviewed all pertinent imaging results/findings within the past 24 hours.    Chest x-ray 6/28/25  Impression:     Findings that may relate to component of volume loss and atelectasis associated with the right lung potentially right middle lobe, as discussed above as well as mild pleural effusion on the right, seen superimposed on a general pattern of diminished depth of inspiration, clinical and historical correlation and follow-up is recommended.         I spent a total of 80 minutes on the day of the visit. This includes face to face time in discussion of goals of care, symptom assessment, coordination of care and emotional support.  This also includes non-face to face time preparing to see the patient (eg, review of tests/imaging), obtaining and/or reviewing separately obtained history, documenting clinical information in the electronic or other health record, independently interpreting results and communicating results to the patient/family/caregiver, or care coordinator.    Aurora Barbosa MD  Palliative Medicine  [facility] Oncology ([facility])

## 2025-07-03 PROBLEM — D72.829 LEUKOCYTOSIS: Status: ACTIVE | Noted: 2025-07-03

## 2025-07-03 NOTE — PLAN OF CARE
Pt involved in plan of care and communicating needs throughout shift. Assist X1. Using urinal, voiding without difficulty. Poor diet. Pt complained of throat pain and nausea. IV Morphine 1mg and IV Compazine given - no relief obtained. Pt remaining free from falls or injury throughout shift. Bed locked and in lowest position, personal belongings and call light within reach, bed alarm set. non skid socks on when OOB. Pt instructed to call for assistance as needed. Frequent rounding done on pt.

## 2025-07-03 NOTE — SUBJECTIVE & OBJECTIVE
Interval History: NAEON. Afebrile. VSS. Esophageal stent successfully completed. Patient states that he tolerated liquids, but does not want to try further due to throat soreness and lack of appetite. He reports trouble resting and requests something to help him sleep tonight. Patient's WBC increased from 12 to 22.65. Slightly tachycardic and was tachypnec at 24, started septic workup. Lactic acid increased from 1.5 to 3, gave 1L NS. The patient's abdomen was distended, similar to when he got a paracentesis on 6/30. The paracentesis was repeated today with a total of 4.2 L taken off.     Oncology Treatment Plan:   OP GASTRIC/ ESOPHAGEAL fam-trastuzumab deruxtecan-nxki Q3W    Medications:  Continuous Infusions:   lactated ringers   Intravenous Continuous 75 mL/hr at 07/03/25 0428 New Bag at 07/03/25 0428     Scheduled Meds:   ([facility]s Solution) 1:1:1:1 diphenhydrAMINE, aluminum-magnesium hydroxide-simethicone (Mylanta), LIDOcaine viscous, nystatin oral solution  10 mL Oral QID    heparin (porcine)  5,000 Units Subcutaneous Q8H    nicotine  1 patch Transdermal Daily    pantoprazole  40 mg Oral BID AC     PRN Meds:  Current Facility-Administered Medications:     acetaminophen, 650 mg, Oral, Q4H PRN    dextrose 50%, 12.5 g, Intravenous, PRN    dextrose 50%, 25 g, Intravenous, PRN    glucagon (human recombinant), 1 mg, Intramuscular, PRN    glucose, 16 g, Oral, PRN    glucose, 24 g, Oral, PRN    insulin aspart U-100, 0-10 Units, Subcutaneous, Q4H PRN    naloxone, 0.02 mg, Intravenous, PRN    prochlorperazine, 5 mg, Intravenous, Q6H PRN    sodium chloride 0.9%, 10 mL, Intravenous, Q12H PRN     Review of Systems  Objective:     Vital Signs (Most Recent):  Temp: 97.7 °F (36.5 °C) (07/03/25 0933)  Pulse: 110 (07/03/25 0933)  Resp: 18 (07/03/25 0933)  BP: (!) 143/80 (07/03/25 0933)  SpO2: (!) 93 % (07/03/25 0933) Vital Signs (24h Range):  Temp:  [97.3 °F (36.3 °C)-98.6 °F (37 °C)] 97.7 °F (36.5 °C)  Pulse:  [] 110  Resp:   [15-24] 18  SpO2:  [93 %-96 %] 93 %  BP: (110-156)/(60-81) 143/80     Weight: 55.8 kg (123 lb)  Body mass index is 19.26 kg/m².  Body surface area is 1.62 meters squared.      Intake/Output Summary (Last 24 hours) at 7/3/2025 0959  Last data filed at 7/3/2025 0933  Gross per 24 hour   Intake 4293.94 ml   Output 500 ml   Net 3793.94 ml        Physical Exam  HENT:      Head: Normocephalic.   Eyes:      Pupils: Pupils are equal, round, and reactive to light.   Cardiovascular:      Pulses: Normal pulses.   Abdominal:      General: There is distension (moderate).      Palpations: Abdomen is soft.      Tenderness: There is no abdominal tenderness.   Musculoskeletal:      Cervical back: Normal range of motion.   Neurological:      Mental Status: He is alert.          Significant Labs:   All pertinent labs from the last 24 hours have been reviewed.    Diagnostic Results:  I have reviewed and interpreted all pertinent imaging results/findings within the past 24 hours.

## 2025-07-03 NOTE — ASSESSMENT & PLAN NOTE
EGD 7/2/25- A 4cm, fungating mass with no bleeding and no stigmata of recent        bleeding was found in the lower third of the esophagus. The mass was        partially obstructing. This was stented with a 23 mm x 10.5 cm        WallFlex covered stent under fluoroscopic guidance.      -NAEON. Tolerated drinking water/sprite. Has not attempted full liquid diet. WBC 22.65. H/H stable. Afebrile  -Full liquid diet for 24 hrs post stent- if tolerating can advance as tolerated to no more than pureed diet indefinitely  -Recommend consult dietician to discuss stent safe foods.   -F/u with oncology/palliative medicine  -Rest of patient's care per primary  -AES will sign off at this time; please reach out with any further questions/concerns

## 2025-07-03 NOTE — SUBJECTIVE & OBJECTIVE
Subjective:   HPI:  Jone Lugo is a 69 y.o. male with past medical history of esophageal adenocarcinoma s/p chemo and radiation in 2023 now with recurrence on chemotherapy who presents with inability tolerate p.o. intake and ANTOINE from dehydration.  AES consulted for discussion of esophageal stent placement and consideration of PEG tube.  Patient denies having any abdominal pain but reports that with any p.o. intake he is having retching and not able to keep it down.  Denies any fevers, chills, diarrhea, constipation.     Speech therapy evaluated the patient and recommended full liquids, thin liquids and to take 1 bite/sip at a time.  He remains an aspiration risk however.    EGD 7/2/25- A 4cm, fungating mass with no bleeding and no stigmata of recent        bleeding was found in the lower third of the esophagus. The mass was        partially obstructing. This was stented with a 23 mm x 10.5 cm        WallFlex covered stent under fluoroscopic guidance.      Interval History: NAEON. Tolerated drinking water/sprite. Has not attempted full liquid diet. WBC 22.65. H/H stable. Afebrile    Review of Systems   Constitutional:  Negative for chills and fever.   Gastrointestinal:  Negative for abdominal distention, abdominal pain, anal bleeding, blood in stool, constipation, diarrhea, nausea, rectal pain and vomiting.   Psychiatric/Behavioral:  Negative for confusion.      Objective:     Vital Signs (Most Recent):  Temp: 97.9 °F (36.6 °C) (07/03/25 1124)  Pulse: 102 (07/03/25 1124)  Resp: 18 (07/03/25 1124)  BP: 138/67 (07/03/25 1124)  SpO2: (!) 91 % (07/03/25 1124) Vital Signs (24h Range):  Temp:  [97.3 °F (36.3 °C)-97.9 °F (36.6 °C)] 97.9 °F (36.6 °C)  Pulse:  [] 102  Resp:  [16-24] 18  SpO2:  [91 %-96 %] 91 %  BP: (110-143)/(67-81) 138/67     Weight: 55.8 kg (123 lb) (06/30/25 0830)  Body mass index is 19.26 kg/m².      Intake/Output Summary (Last 24 hours) at 7/3/2025 1341  Last data filed at 7/3/2025  "1056  Gross per 24 hour   Intake 4293.94 ml   Output 350 ml   Net 3943.94 ml       Lines/Drains/Airways       Central Venous Catheter Line  Duration             Port A Cath Single Lumen 01/27/25 1000 157 days              Peripheral Intravenous Line  Duration             Peripheral IV Single Lumen 07/02/25 1214 20 G 1 3/4 in Left Wrist 1 day                     Physical Exam  Constitutional:       General: He is not in acute distress.     Appearance: Normal appearance. He is ill-appearing.   Eyes:      General: No scleral icterus.  Abdominal:      General: Abdomen is flat. Bowel sounds are normal. There is no distension.      Palpations: Abdomen is soft. There is no mass.      Tenderness: There is no abdominal tenderness. There is no guarding or rebound.      Hernia: No hernia is present.   Skin:     General: Skin is warm and dry.      Coloration: Skin is not jaundiced or pale.   Neurological:      Mental Status: He is alert and oriented to person, place, and time. Mental status is at baseline.          Significant Labs:  CBC:   Recent Labs   Lab 07/02/25  0406 07/03/25  0333 07/03/25  1314   WBC 12.47 22.65* 20.81*   HGB 8.2* 9.1* 8.1*   HCT 24.1* 27.9* 24.5*    413 371     CMP:   Recent Labs   Lab 07/03/25  1017   *  155*   CALCIUM 9.1  9.0   ALBUMIN 1.7*  1.7*   PROT 4.7*   *  133*   K 4.0  4.1   CO2 22*  22*   CL 99  99   BUN 65*  65*   CREATININE 1.7*  1.7*   ALKPHOS 351*   *   AST 80*   BILITOT 0.4     Lipase: No results for input(s): "LIPASE" in the last 48 hours.  Liver Function Test:   Recent Labs   Lab 07/02/25  0900 07/02/25  1954 07/03/25  0333 07/03/25  1017   * 145*  --  121*   * 112*  --  80*   ALKPHOS 361* 377*  --  351*   BILITOT 0.3 0.4  --  0.4   PROT 4.2* 4.7*  --  4.7*   ALBUMIN 1.6*  1.6* 1.7* 1.9* 1.7*  1.7*         Significant Imaging:  Imaging results within the past 24 hours have been reviewed.  "

## 2025-07-03 NOTE — CONSULTS
Anupam Alcantara - Oncology ([facility])    Wound Care     Patient Name:  Jone Lugo  MRN:  [id number]  Date: 7/3/2025  Diagnosis: ANTOINE (acute kidney injury)     History:  Past Medical History:   Diagnosis Date    HTN (hypertension)      Social History[1]  Precautions:  Allergies as of 06/25/2025    (No Known Allergies)       [facility] Assessment Details / Treatment:    Patient seen for wound care: New Consult   Chart reviewed for this encounter.   Labs:   WBC (K/uL)   Date Value   07/03/2025 22.65 (H)   07/02/2025 12.47     Glucose (mg/dL)   Date Value   07/03/2025 156 (H)   07/03/2025 155 (H)   03/21/2025 92   03/11/2025 122 (H)     Albumin (g/dL)   Date Value   07/03/2025 1.7 (L)   07/03/2025 1.7 (L)   03/21/2025 3.8   03/11/2025 3.4 (L)     Papo Score: 17  Nutrition sub-score: 2  Chart review reveals pt is bowel and bladder incontinent.   Waffle in use and properly inflated.     Narrative:  Pt seen for WC consultation and agreed to assessment  Chart reviewed for this encounter.   See Flow Sheet for additional documentation and media.    Upon initial entry to the room, patient declined assessment, stating, My butt got enough of a workout yesterday. RN was notified and re-entered to reinforce the importance of completing the skin assessment. After discussion, patient agreed. With assistance, patient was able to turn in bed for evaluation. Open wound noted to coccyx with yellow wound base; area cleansed with purple bath wipes. Patient voiced discomfort during cleansing. Triad applied to wound bed for protection.   Wound may be associated with prolonged moisture exposure from incontinence and limited repositioning.  Left third toe noted with purple discoloration to the distal and plantar surfaces; skin intact, dry, and without open areas. Patient reported having cut his own great toenail, resulting in a painful area. Small scab noted on the medial aspect of the left great toe--wound intact. Bilateral heels assessed and  found to be clean, intact, with blanchable erythema. No signs of induration, bogginess, or open wounds noted. Patient tolerated the remainder of assessment without issue.    DO notified about toe discoloration.   Consult placed to [facility], Selin Cottrell.    RECOMMENDATIONS:  Bedside nurse assess for acute changes (purulence, increased redness/swelling, increased drainage, malodor, increased pain, pallor, necrosis) please contact physician on any acute changes.    Nutrition consult   [facility] consult  Ensure pt is clean and dry  Apply Triad BID / PRN soilage per instructions  Ensure q2h turns / use wedge if necessary to ensure offloading   Ensure waffle is inflated per instructions.     Discussed POC with patient and primary nurse.   See EMR for orders & patient education.    Bedside nursing to continue care, dressing changes, & continue monitoring.  Bedside nursing to maintain pressure injury prevention interventions, (PIP).     Recommendations made to primary team for above plan.    Thank you for the consult. Wound Care will continue to follow.     07/03/25 1030   WOCN Assessment   WOCN Total Time (mins) 30   Visit Date 07/03/25   Visit Time 1030   Consult Type New   WOCN Speciality Wound   Wound pressure;At risk for pressure Injury   Intervention chart review;assessed;applied;coordination of care;orders   Teaching complication        Wound 06/29/25 0800 Pressure Injury Coccyx   Date First Assessed/Time First Assessed: 06/29/25 0800   Present on Original Admission: No  Primary Wound Type: Pressure Injury  Location: Coccyx   Wound Image    Pressure Injury Stage U   Dressing Appearance Open to air   Drainage Amount Scant   Drainage Characteristics/Odor Serosanguineous;Yellow;No odor   Appearance Yellow;Necrotic;Moist   Tissue loss description Not applicable   Yellow (%), Wound Tissue Color 100 %   Periwound Area Intact;Redness   Wound Length (cm) 1.4 cm   Wound Width (cm) 0.5 cm   Wound Depth (cm)  0.3 cm   Wound Volume (cm^3) 0.11 cm^3   Wound Surface Area (cm^2) 0.55 cm^2   Supply Surface Area (L x W) 0.7 sq cm   Care Cleansed with:;Sterile normal saline   Dressing Applied;Other (comment)  (Triad)   Off Loading Other (see comments)  (waffle)   Dressing Change Due 07/03/25       Right heel    Left heel     Right dorsal     Right plantar    Left dorsal     Left plantar             [1]   Social History  Socioeconomic History    Marital status: Single   Tobacco Use    Smoking status: Every Day     Current packs/day: 1.00     Types: Cigarettes    Smokeless tobacco: Never   Substance and Sexual Activity    Alcohol use: Yes     Alcohol/week: 1.0 standard drink of alcohol     Types: 1 Glasses of wine per week     Comment: rarely    Drug use: Never     Social Drivers of Health     Financial Resource Strain: Patient Declined (6/25/2025)    Overall Financial Resource Strain (CARDIA)     Difficulty of Paying Living Expenses: Patient declined   Food Insecurity: Patient Declined (6/25/2025)    Hunger Vital Sign     Worried About Running Out of Food in the Last Year: Patient declined     Ran Out of Food in the Last Year: Patient declined   Transportation Needs: Patient Declined (6/25/2025)    PRAPARE - Transportation     Lack of Transportation (Medical): Patient declined     Lack of Transportation (Non-Medical): Patient declined   Physical Activity: Unknown (4/7/2025)    Received from [facility]    Exercise Vital Sign     Days of Exercise per Week: Patient declined   Recent Concern: Physical Activity - Insufficiently Active (3/17/2025)    Exercise Vital Sign     Days of Exercise per Week: 2 days     Minutes of Exercise per Session: 20 min   Stress: Patient Declined (6/25/2025)    [employer] [place] [employer] - Occupational Stress Questionnaire     Feeling of Stress : Patient declined   Housing Stability: Patient Declined (6/25/2025)    Housing Stability Vital Sign     Unable to Pay for Housing in the Last  Year: Patient declined     Number of Times Moved in the Last Year: 0     Homeless in the Last Year: Patient declined

## 2025-07-03 NOTE — PROGRESS NOTES
Hospital Medicine Resident  History and Physical  [facility]  07/03/2025  6:50 AM      Patient Name: Jone Lugo   MRN: [id number]   Current Provider: Marie Garnica MD  Primary Care Provider: [place], Primary Doctor   Admission Date: 6/25/2025   Hospital Day: 8  Bed: 876/855 A  Principal Problem: ANTOINE (acute kidney injury)       SUBJECTIVE:     HPI:  Jone Lugo is a 69 y.o. male with a relevant medical history of esophageal adenocarcinoma stage III, smoking  who was admitted on 06/25/2025 with abnormal vitals including hypotension from the chemotherapy center. His last chemo was 3 weeks ago - since then not feeling well. Has low oral intake and not drinking enough. He has watery stools, passed 5 times yesterday. Took imodium. No stool today. Stool sample sent to RO C-diff.      Nephrology consulted for ANTOINE 3 in setting of low PO intake, hypotension (lowest SBP 86), diarrhea. Also he received vancomycin on 6/26 and had urine retension of 400 mL with out feeling of passing urine.      Interval History:  Patient reports no acute events overnight. Patient is alert and responsive to questions. Patient was started yesterday on full liquid diet after TPN was discontinued following esophageal stent placement. Patient reports drinking water and 7-Up this morning and got up to use the bathroom.    Review of Systems   Constitutional:  Negative for fever.   Gastrointestinal:  Negative for vomiting.   Neurological:  Negative for dizziness.     All other pertinent ROS listed in the H&P.       HISTORY:     Past Medical History:   Diagnosis Date    HTN (hypertension)        Past Surgical History:   Procedure Laterality Date    ESOPHAGOGASTRODUODENOSCOPY N/A 12/19/2024    Procedure: EGD (ESOPHAGOGASTRODUODENOSCOPY);  Surgeon: Pete Buenrostro MD;  Location: [address]);  Service: Endoscopy;  Laterality: N/A;  Medically Urgent      12/16 ref by Sahil Singer MD, [place]-  12/18 - pre-call  complete; MB    ESOPHAGOGASTRODUODENOSCOPY N/A 4/11/2025    Procedure: EGD (ESOPHAGOGASTRODUODENOSCOPY);  Surgeon: Paxton Jurado MD;  Location: [facility] ENDO (2ND FLR);  Service: Endoscopy;  Laterality: N/A;  ref by  Sahil Singer MD, 2nd floor due to availability- portal -ml    ESOPHAGOGASTRODUODENOSCOPY N/A 7/2/2025    Procedure: EGD (ESOPHAGOGASTRODUODENOSCOPY);  Surgeon: Derick Juares MD;  Location: [facility] ENDO (2ND FLR);  Service: Endoscopy;  Laterality: N/A;    INSERTION OF TUNNELED CENTRAL VENOUS CATHETER (CVC) WITH SUBCUTANEOUS PORT Right 1/27/2025    Procedure: INSERTION, SINGLE LUMEN CATHETER WITH PORT, WITH FLUOROSCOPIC GUIDANCE, left poss right;  Surgeon: Chadd Velez MD;  Location: [facility] OR [address] [place] [address];  Service: General;  Laterality: Right;    LAPAROSCOPIC REPAIR OF INGUINAL HERNIA Right        No family history on file.    Social History     Socioeconomic History    Marital status: Single   Tobacco Use    Smoking status: Every Day     Current packs/day: 1.00     Types: Cigarettes    Smokeless tobacco: Never   Substance and Sexual Activity    Alcohol use: Yes     Alcohol/week: 1.0 standard drink of alcohol     Types: 1 Glasses of wine per week     Comment: rarely    Drug use: Never     Social Drivers of Health     Financial Resource Strain: Patient Declined (6/25/2025)    Overall Financial Resource Strain (CARDIA)     Difficulty of Paying Living Expenses: Patient declined   Food Insecurity: Patient Declined (6/25/2025)    Hunger Vital Sign     Worried About Running Out of Food in the Last Year: Patient declined     Ran Out of Food in the Last Year: Patient declined   Transportation Needs: Patient Declined (6/25/2025)    PRAPARE - Transportation     Lack of Transportation (Medical): Patient declined     Lack of Transportation (Non-Medical): Patient declined   Physical Activity: Unknown (4/7/2025)    Received from [facility]    Exercise Vital Sign     Days of Exercise per Week: Patient  declined   Recent Concern: Physical Activity - Insufficiently Active (3/17/2025)    Exercise Vital Sign     Days of Exercise per Week: 2 days     Minutes of Exercise per Session: 20 min   Stress: Patient Declined (6/25/2025)    [employer] [place] [employer] - Occupational Stress Questionnaire     Feeling of Stress : Patient declined   Housing Stability: Patient Declined (6/25/2025)    Housing Stability Vital Sign     Unable to Pay for Housing in the Last Year: Patient declined     Number of Times Moved in the Last Year: 0     Homeless in the Last Year: Patient declined           MEDICATIONS & ALLERGIES:     Medications Ordered Prior to Encounter[1]      Review of patient's allergies indicates:  No Known Allergies    OBJECTIVE:     Vital Signs Recent:  Temp: 97.3 °F (36.3 °C) (07/03/25 0320)  Pulse: 100 (07/03/25 0320)  Resp: (!) 24 (07/03/25 0320)  BP: 124/81 (07/03/25 0320)  SpO2: 95 % (07/03/25 0320)  Oxygen Documentation:    Flow (L/min) (Oxygen Therapy): 0.5           Device (Oxygen Therapy): room air  $ Is the patient on Low Flow Oxygen?: Yes            I & O (Last 24H):  Intake/Output Summary (Last 24 hours) at 7/3/2025 0650  Last data filed at 7/3/2025 0445  Gross per 24 hour   Intake 4293.94 ml   Output 600 ml   Net 3693.94 ml      Vital Signs Range (Last 24H):  Temp:  [97.3 °F (36.3 °C)-98.6 °F (37 °C)]   Pulse:  []   Resp:  [15-24]   BP: (110-156)/(60-81)   SpO2:  [91 %-96 %]          Weight:  Body mass index is 19.26 kg/m².  Wt Readings from Last 3 Encounters:   06/30/25 55.8 kg (123 lb)   06/04/25 58.1 kg (128 lb 1.4 oz)   06/04/25 58.1 kg (128 lb 1.4 oz)          Physical Exam  Constitutional:       General: He is awake.   Pulmonary:      Effort: Pulmonary effort is normal.   Neurological:      Mental Status: He is alert.         Lab Value    RENAL FUNCTION:   BUN (mg/dL)   Date Value   07/03/2025 65 (H)   07/02/2025 62 (H)   07/02/2025 62 (H)   07/02/2025 61 (H)     Creatinine (mg/dL)  "  Date Value   07/03/2025 1.7 (H)   07/02/2025 1.7 (H)   07/02/2025 1.5 (H)   07/02/2025 1.6 (H)     Albumin (g/dL)   Date Value   07/03/2025 1.9 (L)   07/02/2025 1.7 (L)   07/02/2025 1.6 (L)   07/02/2025 1.6 (L)   03/21/2025 3.8   03/11/2025 3.4 (L)   02/26/2025 3.3 (L)     No results found for: "EGFRNONAA"      ELECTROLYTES:  Sodium (mmol/L)   Date Value   07/03/2025 134 (L)   07/02/2025 137   07/02/2025 139   07/02/2025 136   03/21/2025 138   03/11/2025 140   02/26/2025 138     Potassium (mmol/L)   Date Value   07/03/2025 4.0   07/02/2025 4.0   07/02/2025 2.9 (L)   07/02/2025 2.9 (L)   03/21/2025 4.3   03/11/2025 3.8   02/26/2025 3.6     Chloride (mmol/L)   Date Value   07/03/2025 98   07/02/2025 100   07/02/2025 101   07/02/2025 100   03/21/2025 106   03/11/2025 109   02/26/2025 108     CO2 (mmol/L)   Date Value   07/03/2025 21 (L)   07/02/2025 26   07/02/2025 26   07/02/2025 25   03/21/2025 21 (L)   03/11/2025 22 (L)   02/26/2025 23     Calcium (mg/dL)   Date Value   07/03/2025 9.6   07/02/2025 9.1   07/02/2025 8.8   07/02/2025 8.7   03/21/2025 9.9   03/11/2025 9.0   02/26/2025 9.2     Magnesium  (mg/dL)   Date Value   07/02/2025 1.5 (L)   07/02/2025 1.7   07/01/2025 1.5 (L)     Phosphorus Level (mg/dL)   Date Value   07/03/2025 3.0   07/02/2025 2.9   07/02/2025 2.8   07/02/2025 2.8         LIVER FUNCTION:   Alkaline Phosphatase (U/L)   Date Value   03/21/2025 115   03/11/2025 107   02/26/2025 109     ALP (unit/L)   Date Value   07/02/2025 377 (H)   07/02/2025 361 (H)   07/01/2025 359 (H)     ALT   Date Value   07/02/2025 145 unit/L (H)   07/02/2025 146 unit/L (H)   07/01/2025 136 unit/L (H)   03/21/2025 23 U/L   03/11/2025 25 U/L   02/26/2025 20 U/L     AST   Date Value   07/02/2025 112 unit/L (H)   07/02/2025 133 unit/L (H)   07/01/2025 149 unit/L (H)   03/21/2025 25 U/L   03/11/2025 28 U/L   02/26/2025 24 U/L     Protein Total (gm/dL)   Date Value   07/02/2025 4.7 (L)   07/02/2025 4.2 (L)   07/01/2025 4.4 " (L)     Total Protein (g/dL)   Date Value   03/21/2025 7.1   03/11/2025 6.3   02/26/2025 6.1     Total Bilirubin (mg/dL)   Date Value   03/21/2025 0.6   03/11/2025 0.6   02/26/2025 0.6     Bilirubin Total (mg/dL)   Date Value   07/02/2025 0.4   07/02/2025 0.3   07/01/2025 0.3     INR (no units)   Date Value   06/25/2025 1.1   04/12/2025 1.1         OTHER:   WBC (K/uL)   Date Value   07/03/2025 22.65 (H)   07/02/2025 12.47   07/01/2025 8.13     Hemoglobin (g/dL)   Date Value   03/21/2025 12.8 (L)   03/11/2025 11.8 (L)   02/26/2025 11.1 (L)     HGB (gm/dL)   Date Value   07/03/2025 9.1 (L)   07/02/2025 8.2 (L)   07/01/2025 7.2 (L)     Platelet Count (K/uL)   Date Value   07/03/2025 413   07/02/2025 292   07/01/2025 299     Platelets (K/uL)   Date Value   03/21/2025 195   03/11/2025 139 (L)   02/26/2025 157            ASSESSMENT -- PLAN:   Jone Lugo is a 69 y.o. male with a relevant medical history of esophageal adenocarcinoma III and smoking who is being treated for acute renal failure.     Impression:    ANTOINE KDIGO stage 3   Baseline Creatinine: 1.1-1.3  Creatinine at time of consult: 8.0  Etiology of ANTOINE:  likely due to volume depletion by low PO intake, diarrhea leading to hypotension; secondary factor could be vancomycin     Recommendations :     - Plan to continue IV fluids to keep intake and output net even.  - Cr at 1.7 today (stable from yesterday). Plan to continue monitoring Cr for downward trend towards baseline.  - Monitor serum chemistries daily, strict intake and output Qshift, daily weights if able.  - Renal protective measures: Please adjust medications for reduced clearance.  - Avoid nephrotoxic medications (NSAID, IV contrast)  - Avoid ACEi and ARBs in the setting of ANTOINE  - Maintain MAP > 65  - Transfuse for Hb <7     Thank you for allowing us to participate in the care of this patient.  Plan discussed with attending and/or fellow. Please call with any questions or concerns.     Austen  DO Bridget  [facility],  PGY-1          [1]   No current facility-administered medications on file prior to encounter.     Current Outpatient Medications on File Prior to Encounter   Medication Sig Dispense Refill    amlodipine-benazepril 10-20mg (LOTREL) 10-20 mg per capsule Take 1 capsule by mouth once daily.      azelastine (ASTELIN) 137 mcg (0.1 %) nasal spray 2 sprays by Nasal route.      dexAMETHasone (DECADRON) 4 MG Tab Take 2 tablets (8 mg total) by mouth once daily. On days 2-4 of each chemotherapy cycle. 30 tablet 11    etodolac (LODINE) 400 MG tablet Take 1 tablet by mouth 2 (two) times daily.      fluticasone propionate (FLONASE) 50 mcg/actuation nasal spray 2 sprays by Each Nostril route.      gabapentin (NEURONTIN) 300 MG capsule Take 300 mg by mouth.      LIDOcaine-prilocaine (EMLA) cream Apply topically as needed. 30 g 1    meclizine (ANTIVERT) 25 mg tablet Take 1 tablet (25 mg total) by mouth 3 (three) times daily as needed for Dizziness. 30 tablet 0    multivitamin with minerals tablet Take 1 tablet by mouth once daily. One A Day Multivitamin      OLANZapine (ZYPREXA) 5 MG tablet Take 1 tab at nighttime on nights 1 thru 3 of each chemotherapy cycle. 90 tablet 2    omeprazole (PRILOSEC) 40 MG capsule Take 1 capsule by mouth every morning.      ondansetron (ZOFRAN) 8 MG tablet Take 1 tablet (8 mg total) by mouth every 8 (eight) hours as needed. 60 tablet 2    pravastatin (PRAVACHOL) 40 MG tablet Take 40 mg by mouth.      TURMERIC ORAL Take by mouth.

## 2025-07-03 NOTE — PROCEDURES
"Jone Lugo is a 69 y.o. male patient.    Temp: 97.5 °F (36.4 °C) (25)  Pulse: 101 (25)  Resp: 18 (25)  BP: (!) 140/62 (25)  SpO2: 96 % (25)  Weight: 55.8 kg (123 lb) (25)  Height: 5' 7" (170.2 cm) (25)       Paracentesis    Date/Time: 7/3/2025 4:42 PM  Location procedure was performed: [facility] HEMATOLOGY/ONCOLOGY    Performed by: Karina Middleton MD  Authorized by: Karina Middleton MD  Assisting provider: Sarah Chapin MD  Pre-operative diagnosis: ascites  Post-operative diagnosis: same  Consent Done: Yes  Consent: Written consent obtained  Consent given by: patient  Patient understanding: patient states understanding of the procedure being performed  Patient consent: the patient's understanding of the procedure matches consent given  Procedure consent: procedure consent matches procedure scheduled  Relevant documents: relevant documents present and verified  Test results: test results available and properly labeled  Site marked: the operative site was marked  Imaging studies: imaging studies not available  Patient identity confirmed: , name and verbally with patient  Time out: Immediately prior to procedure a "time out" was called to verify the correct patient, procedure, equipment, support staff and site/side marked as required.  Initial or subsequent exam: subsequent  Procedure purpose: diagnostic and therapeutic  Indications: abdominal discomfort secondary to ascites and suspected peritonitis  Anesthesia: local infiltration    Anesthesia:  Local Anesthetic: lidocaine 1% without epinephrine  Anesthetic total: 10 mL    Patient sedated: no  Preparation: Patient was prepped and draped in the usual sterile fashion.  Description of findings: dark yellow ascitic fluid   Needle gauge: 20  Ultrasound guidance: yes  Puncture site: left lower quadrant  Fluid removed: 4200(ml)  Fluid appearance: cloudy  Complications: No  Estimated " blood loss (mL): 5  Specimens: Yes (diagnostic paracentesis vials sent to lab manually)  Implants: No  Patient tolerance: Patient tolerated the procedure well with no immediate complications  Comments: Removed 4.2L of cloudy yellow ascitic fluid          7/3/2025

## 2025-07-03 NOTE — PLAN OF CARE
Plan of care assumed from [id number]     No acute event this shift. Plan of care reviewed with patient.     - A&O x 4  - SpO2 maintains in room air  - Remained afebrile   - Patient with right port dressing C/D/I  - Ambulated up to beside chair with two person assist.  - NS 1 liter bolus administered.  - Paracentesis done  - Urine sample collected   - Blood culture sample collected.  - Patient with full liquid diet with very poor appetite.   - Complained of nausea PRN compazine administered, moderate relief obtained.  - Be alarm on   - One loose/soft Bowel movement this shift,stool sample to be collected.  - Lab collected as per order.    Frequent patient round done. Bed in low and locked position. Call light and personal items within a reach. Remaining free from falls and injury throughout the shift.wound care done as per order.Plan of care ongoing.

## 2025-07-03 NOTE — ANESTHESIA POSTPROCEDURE EVALUATION
Anesthesia Post Evaluation    Patient: oJne Lugo    Procedure(s) Performed: Procedure(s) (LRB):  EGD (ESOPHAGOGASTRODUODENOSCOPY) (N/A)    Final Anesthesia Type: general      Patient location during evaluation: PACU  Patient participation: Yes- Able to Participate  Level of consciousness: awake and alert  Post-procedure vital signs: reviewed and stable  Pain management: adequate  Airway patency: patent    PONV status at discharge: No PONV  Anesthetic complications: no      Cardiovascular status: blood pressure returned to baseline  Respiratory status: unassisted  Follow-up not needed.                Event Time   Out of Recovery 12:44:00         Pain/Joselo Score: Pain Rating Prior to Med Admin: 7 (7/2/2025  9:07 PM)  Pain Rating Post Med Admin: 7 (7/2/2025  9:07 PM)  Joselo Score: 10 (7/2/2025 12:30 PM)

## 2025-07-03 NOTE — ASSESSMENT & PLAN NOTE
Reports onset of loose watery diarrhea since previous chemotherapy treatment. Possibly related to chemotherapy. Likely contributed to hypovolemic shock and acute renal failure.   Improving. Negative stool studies/c.diff.     Reported 2 more episodes of diarrhea, repeating stool studies.

## 2025-07-03 NOTE — PROGRESS NOTES
Anupam Alcantara - Oncology ([facility])  Hematology/Oncology  Progress Note    Patient Name: Jone Lugo  Admission Date: 6/25/2025  Hospital Length of Stay: 8 days  Code Status: Full Code     Subjective:     HPI:  70 yo M esophageal adenocarcinoma stage III, smoking presenting with poor PO intake.     In the ED initially afebrile 87/55 improving to 146 systolic after 2 LR resuscitation on RA. Wbcs 13.51, Hb 8.8 ( BL 11.2), Cr 8.0 ( BL 1.5-1.8), bicarb 13.  (123 BL) . Procal 1.63. Lactate 1.8. A CT chest A/P significant for moderate volume abdominopelvic ascites and mesenteric congestion. UA not collected. He was treated with vanc/cefepime and fluid resucitated with 2L of LR.     6/25 oncology appointment, pt reported worsening dysphagia with poor PO intake with weakness.   In the ED he notes poor food/water intake he attributes to stomach pain after swallowing food. He also notes loose watery stools that has not improved with immodium. Pt reports symptoms started after he most recent chemotherapy. Associated with lightheadedness worse with standing, fatigue. Has had decreased urine output. Denies chest pain, shortness of breath, melena, hematochezia. Otherwise noted an episode severe dysphagia recently with swallowing water and pills.     Interval History: NAEON. Afebrile. VSS. Esophageal stent successfully completed. Patient states that he tolerated liquids, but does not want to try further due to throat soreness and lack of appetite. He reports trouble resting and requests something to help him sleep tonight. Patient's WBC increased from 12 to 22.65. Slightly tachycardic and was tachypnec at 24, started septic workup. Lactic acid increased from 1.5 to 3, gave 1L NS. The patient's abdomen was distended, similar to when he got a paracentesis on 6/30. The paracentesis was repeated today with a total of 4.2 L taken off.     Oncology Treatment Plan:   OP GASTRIC/ ESOPHAGEAL fam-trastuzumab deruxtecan-nxki  Q3W    Medications:  Continuous Infusions:   lactated ringers   Intravenous Continuous 75 mL/hr at 07/03/25 0428 New Bag at 07/03/25 0428     Scheduled Meds:   ([facility]s Solution) 1:1:1:1 diphenhydrAMINE, aluminum-magnesium hydroxide-simethicone (Mylanta), LIDOcaine viscous, nystatin oral solution  10 mL Oral QID    heparin (porcine)  5,000 Units Subcutaneous Q8H    nicotine  1 patch Transdermal Daily    pantoprazole  40 mg Oral BID AC     PRN Meds:  Current Facility-Administered Medications:     acetaminophen, 650 mg, Oral, Q4H PRN    dextrose 50%, 12.5 g, Intravenous, PRN    dextrose 50%, 25 g, Intravenous, PRN    glucagon (human recombinant), 1 mg, Intramuscular, PRN    glucose, 16 g, Oral, PRN    glucose, 24 g, Oral, PRN    insulin aspart U-100, 0-10 Units, Subcutaneous, Q4H PRN    naloxone, 0.02 mg, Intravenous, PRN    prochlorperazine, 5 mg, Intravenous, Q6H PRN    sodium chloride 0.9%, 10 mL, Intravenous, Q12H PRN     Review of Systems  Objective:     Vital Signs (Most Recent):  Temp: 97.7 °F (36.5 °C) (07/03/25 0933)  Pulse: 110 (07/03/25 0933)  Resp: 18 (07/03/25 0933)  BP: (!) 143/80 (07/03/25 0933)  SpO2: (!) 93 % (07/03/25 0933) Vital Signs (24h Range):  Temp:  [97.3 °F (36.3 °C)-98.6 °F (37 °C)] 97.7 °F (36.5 °C)  Pulse:  [] 110  Resp:  [15-24] 18  SpO2:  [93 %-96 %] 93 %  BP: (110-156)/(60-81) 143/80     Weight: 55.8 kg (123 lb)  Body mass index is 19.26 kg/m².  Body surface area is 1.62 meters squared.      Intake/Output Summary (Last 24 hours) at 7/3/2025 0959  Last data filed at 7/3/2025 0933  Gross per 24 hour   Intake 4293.94 ml   Output 500 ml   Net 3793.94 ml        Physical Exam  HENT:      Head: Normocephalic.   Eyes:      Pupils: Pupils are equal, round, and reactive to light.   Cardiovascular:      Pulses: Normal pulses.   Abdominal:      General: There is distension (moderate).      Palpations: Abdomen is soft.      Tenderness: There is no abdominal tenderness.   Musculoskeletal:       Cervical back: Normal range of motion.   Neurological:      Mental Status: He is alert.          Significant Labs:   All pertinent labs from the last 24 hours have been reviewed.    Diagnostic Results:  I have reviewed and interpreted all pertinent imaging results/findings within the past 24 hours.  Assessment/Plan:     * ANTOINE (acute kidney injury)  ANTOINE is likely due to pre-renal azotemia due to intravascular volume depletion 2/2 poor fluid intake, diarrhea, BP medications. Baseline creatinine is 1.5-1.8. Most recent creatinine and eGFR are listed below. No signs of fluid overload. With metabolic acidosis, bicarb 13 in ED. EUGENIA negative for obstruction.    Recent Labs     06/30/25  1929 07/01/25  0352 07/01/25  0823   CREATININE 2.3*  2.4* 2.0* 1.9*   EGFRNORACEVR 30*  28* 35* 38*     Urine creatinine 142  Urine protein 78  Urine protein/ cr ratio 0.56  Urine urea nitrogen 319  Urine potassium 34     Plan  - Avoid nephrotoxins and renally dose meds for GFR listed above  - Monitor urine output, serial BMP, and adjust therapy as needed  - Nephrology recommends continuing IV fluids at 100cc/hr, replace K with potassium chloride 10 mEq  - holding home gabapentin and amlodipine-benzepril  - IVF resucitation  - f/u nephrology      Chronic heart failure with preserved ejection fraction  Results for orders placed during the hospital encounter of 04/22/25    Echo    Interpretation Summary    Left Ventricle: The left ventricle is normal in size. Mildly increased wall thickness. There is normal systolic function with a visually estimated ejection fraction of 55 - 60%. Global longitudinal strain is -17.1%. Global longitudinal strain is normal. Grade I diastolic dysfunction.    Right Ventricle: The right ventricle is normal in size. Wall thickness is normal. Systolic function is normal.    IVC/SVC: Normal venous pressure at 3 mmHg.     Developing tachycardia starting 6/29 possibly related to increased fluid load vs  pain/discomfort. Less likely PE as has been on heparin  - Echo technically flawed though with preserved ejection fraction       Other ascites  CT 6/25 on admission with moderate volume abdominopelvic ascites and mesenteric congestion that was not appreciated on initial exam. 6/30 with distension concerning for worsening ascites 2/2 hypoalbuminemia (1.5) causing discomfort. Concern that increasing pressure is altering renal perfusion. Surgery unable to perform PEG w/ ascites. S/p drain (5L).     - Peritoneal labs clear    Repeat ascites on 7/3 due to increasing abdominal distention. 4.2 L removed      Hyperglycemia  2/2 TPN    Last A1c reviewed-   Lab Results   Component Value Date    HGBA1C 5.8 (H) 07/01/2024     Most recent fingerstick glucose reviewed-   Recent Labs   Lab 06/28/25  2036 06/29/25  0045 06/29/25  0426 06/29/25  0735   POCTGLUCOSE 201* 195* 217* 209*     Current correctional scale  Medium  Maintain anti-hyperglycemic dose as follows-   Antihyperglycemics (From admission, onward)      Start     Stop Route Frequency Ordered    06/29/25 0907  insulin aspart U-100 pen 0-10 Units         -- SubQ Every 4 hours PRN 06/29/25 0908              Acute hypoxemic respiratory failure  Patient with Hypoxic Respiratory failure which is Acute.  he is not on home oxygen. Supplemental oxygen was provided and noted-    Ddx: pulmonary edema from IVF resuscitation vs aspiration/micro aspiration 2/2 esophageal cancer. Noted history of grade I diastolic function on echo 4/2025. Oxygen requirements around the time of being cleared for clear liquid diet. He is asymptomatic.     CXR with evidence of atelectasis, unlikely pulmonary edema. JVP noted 6/30   - f/u echo   - incentive spirometry   - wean o2 >90%  - possibly related to undiagnosed COPD 2/2 smoking history, had plans for formal PFT but was not done    At risk for malnutrition  RD consulted  TPN ordered and possible PEG tube placement. Pt is NPO  currently    Transaminitis  S/p kaleb. No findings supportive of liver/biliary pathology on imaging. GGT elevated as well. Likely shock liver. Will continue to monitor CMP.    - improving with IVF     Diarrhea      Reports onset of loose watery diarrhea since previous chemotherapy treatment. Possibly related to chemotherapy. Likely contributed to hypovolemic shock and acute renal failure.   Improving. Negative stool studies/c.diff.     Reported 2 more episodes of diarrhea, repeating stool studies.    Hyperphosphatemia  Patient's most recent phosphorus results are listed below.   Recent Labs     06/25/25  1100   PHOS 5.7*       Plan  - Patient's hyperphosphatemia is stable  - see acute renal failure     Weakness  2/2 poor po intake, hypovolemia.      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 Most recent hemoglobin and hematocrit are listed below.  Baseline 11.2. Likely 2/2 chemo. Less likely GIB. He has noted ongoing abdominal pain with associated belching. No hematochezia/melena. ED performed MONTRELL negative negative guaiac fecal occult blood test.     Recent Labs     06/26/25  0240 06/27/25  0305 06/28/25  0300   HGB 8.2* 8.2* 7.8*   HCT 23.2* 23.6* 23.2*       Plan  - Monitor serial CBC: Daily  - Transfuse PRBC if patient becomes hemodynamically unstable, symptomatic or H/H drops below 7/21.  - Patient has not received any PRBC transfusions to date  - Patient's anemia is currently stable  - holding anticoaugulation for now will re-evaluate cbc tomorrow   - f/u iron studies with low iron saturation, may benefit from iron infusions as outpatient       Hypovolemic shock  Vitals:    06/28/25 1936 06/28/25 2300 06/29/25 0538 06/29/25 0820   BP: 113/75 105/68 98/65 105/71    06/29/25 1127 06/29/25 1553 06/29/25 1953 06/29/25 2335   BP: 121/76 98/67 111/71 113/65    06/30/25  0524 06/30/25 0830   BP: 115/68 131/69     Presented with BP  87/55 responding with 2 LR fluid resuscitation. Lactic acid wnl. Likely 2/2 to poor fluid tolerance 2/2 esophageal adenocarcinoma. Previous EGD in April 2025 with partial obstruction. States that placing an esophageal stent with restrictions of types of foods he would be able to eat is not inline with his goals. He is more amendable to PEG placement to provide extra support for fluids and nutrition. Do not suspect septic shock at this time as no findings on CT chest A/P. No fevers. Wbcs wnl. BC NGTD. Improving after several days of IVF resucitation    - GI unable to place PEG 2/2 partial obstruction of the esophagus.  - IR unable to advance NGT for procedure 2/2 partial obstruction.   - Will consult surgical oncology for consultation of PEG tube   - will attempt following resolution of acute renal failure and if BP wnl    - Surgical oncology consulted [facility]      - IVF maintenance fluids with IVF bolus prn when hypotensive      Esophageal dysphagia  See esophageal carcinoma    Obstruction of esophagus  See esophageal adenocarcinoma      Esophageal adenocarcinoma  69M with esophageal adenocarcinoma initially presenting with dysphagia (improved post-EUS dilation). Biopsy confirmed poorly differentiated adenocarcinoma, pMMR, HER2+, PD-L1 0. Received neoadjuvant carboplatin/paclitaxel + radiation (non-surgical). Imaging showed stable disease. Recurrent tumor confirmed on EGD (12/2024); started FOLFOX + trastuzumab, with stable imaging after 4 cycles. After 6 cycles, developed worsening dysphagia due to local tumor progression. Declined stent/PEG at last hospitalization. Switched to trastuzumab deruxtecan (TDxD). Prior admission presented for cycle 4 with significant dysphagia, poor PO intake, Cr 7.7, hypotension, weakness. Treatment held, sent to ED. Previous EGD in April 2025 with partial obstruction. States that placing an esophageal stent with restrictions of  types of foods he would be able to eat is not inline with his goals. He is more amendable to PEG placement to provide extra support for fluids and nutrition.     - likely causing poor po intake > hypovolemic shock > acute renal failure  - Surgical oncology recommending strict NPO due to his report that he cannot tolerate anything by mouth due to the aspiration risk. We would not consider any intervention until his hypovolemia and acute renal failure are addressed.     - Started TPN on 6/26  - Maintenance IVF @100 cc/hr    Esophageal stent placed by [facility] on 7/2                   Genesis Sarah DO  Hematology/Oncology  Anupam Alcantara - Oncology ([facility])

## 2025-07-03 NOTE — PT/OT/SLP PROGRESS
"Speech Language Pathology Treatment    Patient Name:  Jone Lugo   MRN:  [id number]  Admitting Diagnosis: ANTOINE (acute kidney injury)    Recommendations:                 General Recommendations:  Dysphagia therapy  Diet recommendations:   , Liquid Diet Level: Full liquids, Thin liquids - IDDSI Level 0   Aspiration Precautions: Small bites/sips and Standard aspiration precautions   General Precautions: Standard, fall  Communication strategies:  none  Discharge recommendations:  Low Intensity Therapy   Barriers to Discharge:  None    Assessment:     Jone Lugo is a 69 y.o. male with an SLP diagnosis of Dysphagia. Pt remains in a full liquid diet s/p esophageal stent placement 7/2. SLP will continue to follow to monitor tolerance of PO intake.     Subjective     Pt resting in bed upon entry to room, easily roused. No family present.     "I'm not hungry"    Pain/Comfort:  Pain Rating 1: 0/10  Pain Rating Post-Intervention 1: 0/10    Respiratory Status: Room air    Objective:     Has the patient been evaluated by SLP for swallowing?   Yes  Keep patient NPO? No      Pt repositioned upright in bed for PO trials. Pt s/p esophageal stent placement yesterday, cleared for full liquids diet. Pt had been given nausea medication this AM and [facility]'s mouth solution prior to session. No pain reported. HOB elevated and worked on intake of thin liquids. Pt refusing puree trials from full liquid tray. He tolerated thin liquids via tsp x 6 and straw x 1 oz with timely swallow initiation, adequate bolus control and no overt s/s of airway compromise. Recommend continue full liquid diet recommendations. SLP educated pt on ongoing diet recommendations, aspiration precautions, and SLP POC. Pt verbalized understanding and in agreement.     Goals:   Multidisciplinary Problems       SLP Goals          Problem: SLP    Goal Priority Disciplines Outcome   SLP Goal     SLP    Description: Speech Language Pathology Goals  Goals expected " to be met by 7/3:  1. Pt will tolerate full liquid diet and thin liquids without s/s of aspiration.   2. Pt will participate in ongoing swallowing assessment to determine if able to tolerate more advanced textures.                              Plan:     Patient to be seen:  3 x/week   Plan of Care expires:  07/27/25  Plan of Care reviewed with:  patient   SLP Follow-Up:  Yes       Time Tracking:     SLP Treatment Date:   07/03/25  Speech Start Time:  1003  Speech Stop Time:  1016     Speech Total Time (min):  13 min    Billable Minutes: Treatment Swallowing Dysfunction 5 and Self Care/Home Management Training 8    07/03/2025

## 2025-07-03 NOTE — ASSESSMENT & PLAN NOTE
CT 6/25 on admission with moderate volume abdominopelvic ascites and mesenteric congestion that was not appreciated on initial exam. 6/30 with distension concerning for worsening ascites 2/2 hypoalbuminemia (1.5) causing discomfort. Concern that increasing pressure is altering renal perfusion. Surgery unable to perform PEG w/ ascites. S/p drain (5L).     - Peritoneal labs clear    Repeat ascites on 7/3 due to increasing abdominal distention. 4.2 L removed

## 2025-07-03 NOTE — PROGRESS NOTES
Anupam Alcantara - Oncology ([facility])  Gastroenterology  Progress Note    Patient Name: Jone Lugo  MRN: [id number]  Admission Date: 6/25/2025  Hospital Length of Stay: 8 days  Code Status: Full Code   Attending Provider: Marie Garnica MD  Consulting Provider: Chetan Alan PA-C  Primary Care Physician: [place], Primary Doctor  Principal Problem: ANTOINE (acute kidney injury)        Subjective:   HPI:  Jone Lugo is a 69 y.o. male with past medical history of esophageal adenocarcinoma s/p chemo and radiation in 2023 now with recurrence on chemotherapy who presents with inability tolerate p.o. intake and ANTOINE from dehydration.  [facility] consulted for discussion of esophageal stent placement and consideration of PEG tube.  Patient denies having any abdominal pain but reports that with any p.o. intake he is having retching and not able to keep it down.  Denies any fevers, chills, diarrhea, constipation.     Speech therapy evaluated the patient and recommended full liquids, thin liquids and to take 1 bite/sip at a time.  He remains an aspiration risk however.    EGD 7/2/25- A 4cm, fungating mass with no bleeding and no stigmata of recent        bleeding was found in the lower third of the esophagus. The mass was        partially obstructing. This was stented with a 23 mm x 10.5 cm        WallFlex covered stent under fluoroscopic guidance.      Interval History: NAEON. Tolerated drinking water/sprite. Has not attempted full liquid diet. WBC 22.65. H/H stable. Afebrile    Review of Systems   Constitutional:  Negative for chills and fever.   Gastrointestinal:  Negative for abdominal distention, abdominal pain, anal bleeding, blood in stool, constipation, diarrhea, nausea, rectal pain and vomiting.   Psychiatric/Behavioral:  Negative for confusion.      Objective:     Vital Signs (Most Recent):  Temp: 97.9 °F (36.6 °C) (07/03/25 1124)  Pulse: 102 (07/03/25 1124)  Resp: 18 (07/03/25 1124)  BP: 138/67 (07/03/25 1124)  SpO2:  "(!) 91 % (07/03/25 1124) Vital Signs (24h Range):  Temp:  [97.3 °F (36.3 °C)-97.9 °F (36.6 °C)] 97.9 °F (36.6 °C)  Pulse:  [] 102  Resp:  [16-24] 18  SpO2:  [91 %-96 %] 91 %  BP: (110-143)/(67-81) 138/67     Weight: 55.8 kg (123 lb) (06/30/25 0830)  Body mass index is 19.26 kg/m².      Intake/Output Summary (Last 24 hours) at 7/3/2025 1344  Last data filed at 7/3/2025 1056  Gross per 24 hour   Intake 4293.94 ml   Output 350 ml   Net 3943.94 ml       Lines/Drains/Airways       Central Venous Catheter Line  Duration             Port A Cath Single Lumen 01/27/25 1000 157 days              Peripheral Intravenous Line  Duration             Peripheral IV Single Lumen 07/02/25 1214 20 G 1 3/4 in Left Wrist 1 day                     Physical Exam  Constitutional:       General: He is not in acute distress.     Appearance: Normal appearance. He is ill-appearing.   Eyes:      General: No scleral icterus.  Abdominal:      General: Abdomen is flat. Bowel sounds are normal. There is no distension.      Palpations: Abdomen is soft. There is no mass.      Tenderness: There is no abdominal tenderness. There is no guarding or rebound.      Hernia: No hernia is present.   Skin:     General: Skin is warm and dry.      Coloration: Skin is not jaundiced or pale.   Neurological:      Mental Status: He is alert and oriented to person, place, and time. Mental status is at baseline.          Significant Labs:  CBC:   Recent Labs   Lab 07/02/25  0406 07/03/25  0333 07/03/25  1314   WBC 12.47 22.65* 20.81*   HGB 8.2* 9.1* 8.1*   HCT 24.1* 27.9* 24.5*    413 371     CMP:   Recent Labs   Lab 07/03/25  1017   *  155*   CALCIUM 9.1  9.0   ALBUMIN 1.7*  1.7*   PROT 4.7*   *  133*   K 4.0  4.1   CO2 22*  22*   CL 99  99   BUN 65*  65*   CREATININE 1.7*  1.7*   ALKPHOS 351*   *   AST 80*   BILITOT 0.4     Lipase: No results for input(s): "LIPASE" in the last 48 hours.  Liver Function Test:   Recent Labs "   Lab 07/02/25  0900 07/02/25  1954 07/03/25  0333 07/03/25  1017   * 145*  --  121*   * 112*  --  80*   ALKPHOS 361* 377*  --  351*   BILITOT 0.3 0.4  --  0.4   PROT 4.2* 4.7*  --  4.7*   ALBUMIN 1.6*  1.6* 1.7* 1.9* 1.7*  1.7*         Significant Imaging:  Imaging results within the past 24 hours have been reviewed.  Assessment/Plan:     Oncology  Esophageal adenocarcinoma  EGD 7/2/25- A 4cm, fungating mass with no bleeding and no stigmata of recent        bleeding was found in the lower third of the esophagus. The mass was        partially obstructing. This was stented with a 23 mm x 10.5 cm        WallFlex covered stent under fluoroscopic guidance.      -NAEON. Tolerated drinking water/sprite. Has not attempted full liquid diet. WBC 22.65. H/H stable. Afebrile  -Full liquid diet for 24 hrs post stent- if tolerating can advance as tolerated to no more than pureed diet indefinitely  -Recommend consult dietician to discuss stent safe foods.   -F/u with oncology/palliative medicine  -Rest of patient's care per primary  -[facility] will sign off at this time; please reach out with any further questions/concerns         Thank you for your consult. I will sign off. Please contact us if you have any additional questions.    Chetan Alan PA-C  Gastroenterology  Anupam Alcantara - Oncology ([facility])

## 2025-07-03 NOTE — NURSING
Pt involved in plan of care and communicating needs throughout shift. PRN antiemetics given for nausea with moderate relief. Pt up with assist x1, however was in bed most shift d/t procedure done today. On a full liquid diet, voiding without difficulty. Electrolytes replaced PRN as per protocol. ACHS, PRN insulin given per sliding scale. LR running continuously @75mL/hr. All VSS; Afebrile, AAOx4, no acute events so far this shift. Pt remaining free from falls or injury throughout shift; bed locked and in lowest position; call light within reach. Pt instructed to call for assistance as needed. Bed alarm on. Q1H rounding done on pt.

## 2025-07-03 NOTE — PT/OT/SLP PROGRESS
"Physical Therapy Treatment    Patient Name:  Jone Lugo   MRN:  [id number]    Recommendations:     Discharge Recommendations: Low Intensity Therapy  Discharge Equipment Recommendations: none  Barriers to discharge: Inaccessible home and Decreased caregiver support    Assessment:     Jone Lugo is a 69 y.o. male admitted with a medical diagnosis of ANTOINE (acute kidney injury).  He presents with the following impairments/functional limitations: weakness, impaired endurance, impaired self care skills, impaired functional mobility, gait instability, impaired balance, decreased coordination, decreased upper extremity function, decreased lower extremity function, decreased safety awareness, pain, impaired coordination, edema requiring mod assistance and verbal cues for bed mob, scooting to/alongside the EOB, sit < > stand transitions, static standing, and gait to prevent falls due to weakness, fatigue, poor insight.   Pt will cont to benefit from skilled PT intervention to address deficits and improve functional mobility. .    Rehab Prognosis: Good; patient would benefit from acute skilled PT services to address these deficits and reach maximum level of function.    Recent Surgery: Procedure(s) (LRB):  EGD (ESOPHAGOGASTRODUODENOSCOPY) (N/A) 1 Day Post-Op    Plan:     During this hospitalization, patient to be seen 2 x/week to address the identified rehab impairments via gait training, therapeutic activities and progress toward the following goals:    Plan of Care Expires:  07/24/25    Subjective     Chief Complaint: pain, "I keep going to the bathroom"  Pain/Comfort:  Pain Rating 1:  (no rating provided)  Location - Side 1: Bilateral  Location - Orientation 1: generalized  Location 1: abdomen (while on sidelying due to edema)  Pain Addressed 1: Reposition, Distraction, Cessation of Activity  Pain Rating Post-Intervention 1:  (no rating provided)      Objective:     Communicated with nurse (Nelsy) prior to " "session.  Patient found HOB elevated with bed alarm, peripheral IV (waffle mattress overlay.  No family present) upon PT entry to room.     General Precautions: Standard, aspiration, fall  Orthopedic Precautions: N/A  Braces: N/A  Respiratory Status: Room air     Functional Mobility:  Bed Mobility:     Rolling Left:  min/CGA with  rail  Rolling Right: min/CGA with  rail  Scooting: anteriorly to the EOB with min A  Bridging: moderate assistance  Supine to Sit: mod A for trunk elevation and LE's, HOB flat  Sit to Supine: mod A for trunk and LE's, HOB flat  Transfers:     Sit to Stand:  from EOB with min A for hip elevation x2 trials with rolling walker  Gait: RW min/mod A for balance and mgt of AD 3-4 minimal steps laterally to the R.  Vc's for upright posture, placement of AD, directional guidance, step length, sequencing, and safety.  Pt leans on elbows on AD and refuses to stand upright stating "I'm having a BM" claiming lack of control and copious amount of feces, however, once back in bed and being assisted with hygiene, it was discovered it was minimal and contained in bedpad (held in placed by mesh underwear)  Balance: static sitting at the EOB with SBA for safety; static standing with RW CGA      AM-PAC 6 CLICK MOBILITY  Turning over in bed (including adjusting bedclothes, sheets and blankets)?: 3  Sitting down on and standing up from a chair with arms (e.g., wheelchair, bedside commode, etc.): 3  Moving from lying on back to sitting on the side of the bed?: 3  Moving to and from a bed to a chair (including a wheelchair)?: 3  Need to walk in hospital room?: 3  Climbing 3-5 steps with a railing?: 1  Basic Mobility Total Score: 16       Treatment & Education:  Patient provided with daily orientation and goals of this PT session. They were educated to call for assistance and to transfer with hospital staff only.  Also, pt was educated on the effects of prolonged immobility and the importance of performing OOB " activity and exercises to promote healing and reduce recovery time    Patient left HOB elevated with all lines intact, call button in reach, bed alarm on, and nurse notified..    GOALS:   Multidisciplinary Problems       Physical Therapy Goals          Problem: Physical Therapy    Goal Priority Disciplines Outcome Interventions   Physical Therapy Goal     PT, PT/OT Progressing    Description: Goals to be met by: 25     Patient will increase functional independence with mobility by performin. Sit to stand transfer with Modified [place]  3. Gait  x 250 feet with Supervision using AD if needed.   4. Ascend/descend 8 stair with left Handrails Stand-by Assistance .                          DME Justifications:  No DME recommended requiring DME justifications    Time Tracking:     PT Received On: 25  PT Start Time: 1406     PT Stop Time: 1438  PT Total Time (min): 32 min     Billable Minutes: Therapeutic Activity 32    Treatment Type: Treatment  PT/PTA: PTA     Number of PTA visits since last PT visit: 2     2025

## 2025-07-04 NOTE — PROGRESS NOTES
[facility] - Oncology ([facility])  Hematology/Oncology  Progress Note    Patient Name: Jone Lugo  Admission Date: 6/25/2025  Hospital Length of Stay: 9 days  Code Status: Full Code     Subjective:     HPI:  70 yo M esophageal adenocarcinoma stage III, smoking presenting with poor PO intake.     In the ED initially afebrile 87/55 improving to 146 systolic after 2 LR resuscitation on RA. Wbcs 13.51, Hb 8.8 ( BL 11.2), Cr 8.0 ( BL 1.5-1.8), bicarb 13.  (123 BL) . Procal 1.63. Lactate 1.8. A CT chest A/P significant for moderate volume abdominopelvic ascites and mesenteric congestion. UA not collected. He was treated with vanc/cefepime and fluid resucitated with 2L of LR.     6/25 oncology appointment, pt reported worsening dysphagia with poor PO intake with weakness.   In the ED he notes poor food/water intake he attributes to stomach pain after swallowing food. He also notes loose watery stools that has not improved with immodium. Pt reports symptoms started after he most recent chemotherapy. Associated with lightheadedness worse with standing, fatigue. Has had decreased urine output. Denies chest pain, shortness of breath, melena, hematochezia. Otherwise noted an episode severe dysphagia recently with swallowing water and pills.     Interval History: NAEON. Afebrile. VSS.  Wbc downtrending, perineal fluid hasn't grown anything in 6 hours. Blood cultures are negative. Patient is tolerating liquids PO and agrees to progress to soft diet even though he doesn't have an appetite. The patient states that his abdomen feels nearly as full as it did before his paracentesis yesterday. He states the pressure on his abdomen is decreasing his appetite and making his breathing more labored. Will evaluate need for third paracentesis tomorrow. Patient agrees to palliative care consult.    Oncology Treatment Plan:   OP GASTRIC/ ESOPHAGEAL fam-trastuzumab deruxtecan-nxki Q3W    Medications:  Continuous  Infusions:  Scheduled Meds:   ([facility]s Solution) 1:1:1:1 diphenhydrAMINE, aluminum-magnesium hydroxide-simethicone (Mylanta), LIDOcaine viscous, nystatin oral solution  10 mL Oral QID    heparin (porcine)  5,000 Units Subcutaneous Q8H    melatonin  3 mg Oral Nightly    nicotine  1 patch Transdermal Daily    pantoprazole  40 mg Oral BID AC     PRN Meds:  Current Facility-Administered Medications:     acetaminophen, 650 mg, Oral, Q4H PRN    dextrose 50%, 12.5 g, Intravenous, PRN    dextrose 50%, 25 g, Intravenous, PRN    glucagon (human recombinant), 1 mg, Intramuscular, PRN    glucose, 16 g, Oral, PRN    glucose, 24 g, Oral, PRN    insulin aspart U-100, 0-10 Units, Subcutaneous, Q4H PRN    naloxone, 0.02 mg, Intravenous, PRN    prochlorperazine, 5 mg, Intravenous, Q6H PRN    sodium chloride 0.9%, 10 mL, Intravenous, Q12H PRN     Review of Systems  Objective:     Vital Signs (Most Recent):  Temp: 98.2 °F (36.8 °C) (07/04/25 1129)  Pulse: (!) 112 (07/04/25 1129)  Resp: 20 (07/04/25 1129)  BP: 118/80 (07/04/25 1129)  SpO2: 95 % (07/04/25 1129) Vital Signs (24h Range):  Temp:  [97.4 °F (36.3 °C)-98.2 °F (36.8 °C)] 98.2 °F (36.8 °C)  Pulse:  [105-112] 112  Resp:  [16-20] 20  SpO2:  [93 %-95 %] 95 %  BP: (113-127)/(69-80) 118/80     Weight: 55.8 kg (123 lb)  Body mass index is 19.26 kg/m².  Body surface area is 1.62 meters squared.      Intake/Output Summary (Last 24 hours) at 7/4/2025 1604  Last data filed at 7/4/2025 0424  Gross per 24 hour   Intake 1200.26 ml   Output 468 ml   Net 732.26 ml        Physical Exam  HENT:      Head: Normocephalic.   Eyes:      Pupils: Pupils are equal, round, and reactive to light.   Cardiovascular:      Pulses: Normal pulses.   Pulmonary:      Effort: Pulmonary effort is normal. No respiratory distress.   Abdominal:      General: There is distension (moderate).      Palpations: Abdomen is soft.      Tenderness: There is no abdominal tenderness.   Musculoskeletal:      Cervical back: Normal  range of motion.   Neurological:      Mental Status: He is alert.          Significant Labs:   All pertinent labs from the last 24 hours have been reviewed.    Diagnostic Results:  I have reviewed and interpreted all pertinent imaging results/findings within the past 24 hours.  Assessment/Plan:     * ANTOINE (acute kidney injury)  ANTOINE is likely due to pre-renal azotemia due to intravascular volume depletion 2/2 poor fluid intake, diarrhea, BP medications. Baseline creatinine is 1.5-1.8. Most recent creatinine and eGFR are listed below. No signs of fluid overload. With metabolic acidosis, bicarb 13 in ED. EUGENIA negative for obstruction.    Recent Labs     06/30/25  1929 07/01/25  0352 07/01/25  0823   CREATININE 2.3*  2.4* 2.0* 1.9*   EGFRNORACEVR 30*  28* 35* 38*     Urine creatinine 142  Urine protein 78  Urine protein/ cr ratio 0.56  Urine urea nitrogen 319  Urine potassium 34     Plan  - Avoid nephrotoxins and renally dose meds for GFR listed above  - Monitor urine output, serial BMP, and adjust therapy as needed  - Nephrology recommends continuing IV fluids at 100cc/hr, replace K with potassium chloride 10 mEq  - holding home gabapentin and amlodipine-benzepril  - IVF resucitation  - f/u nephrology      Chronic heart failure with preserved ejection fraction  Results for orders placed during the hospital encounter of 04/22/25    Echo    Interpretation Summary    Left Ventricle: The left ventricle is normal in size. Mildly increased wall thickness. There is normal systolic function with a visually estimated ejection fraction of 55 - 60%. Global longitudinal strain is -17.1%. Global longitudinal strain is normal. Grade I diastolic dysfunction.    Right Ventricle: The right ventricle is normal in size. Wall thickness is normal. Systolic function is normal.    IVC/SVC: Normal venous pressure at 3 mmHg.     Developing tachycardia starting 6/29 possibly related to increased fluid load vs pain/discomfort. Less likely PE as  has been on heparin  - Echo technically flawed though with preserved ejection fraction       Other ascites  CT 6/25 on admission with moderate volume abdominopelvic ascites and mesenteric congestion that was not appreciated on initial exam. 6/30 with distension concerning for worsening ascites 2/2 hypoalbuminemia (1.5) causing discomfort. Concern that increasing pressure is altering renal perfusion. Surgery unable to perform PEG w/ ascites. S/p drain (5L).     - Peritoneal labs clear    Repeat ascites on 7/3 due to increasing abdominal distention. 4.2 L removed      Hyperglycemia  2/2 TPN    Last A1c reviewed-   Lab Results   Component Value Date    HGBA1C 5.8 (H) 07/01/2024     Most recent fingerstick glucose reviewed-   Recent Labs   Lab 06/28/25  2036 06/29/25  0045 06/29/25  0426 06/29/25  0735   POCTGLUCOSE 201* 195* 217* 209*     Current correctional scale  Medium  Maintain anti-hyperglycemic dose as follows-   Antihyperglycemics (From admission, onward)      Start     Stop Route Frequency Ordered    06/29/25 0907  insulin aspart U-100 pen 0-10 Units         -- SubQ Every 4 hours PRN 06/29/25 0908              Acute hypoxemic respiratory failure  Patient with Hypoxic Respiratory failure which is Acute.  he is not on home oxygen. Supplemental oxygen was provided and noted-    Ddx: pulmonary edema from IVF resuscitation vs aspiration/micro aspiration 2/2 esophageal cancer. Noted history of grade I diastolic function on echo 4/2025. Oxygen requirements around the time of being cleared for clear liquid diet. He is asymptomatic.     CXR with evidence of atelectasis, unlikely pulmonary edema. JVP noted 6/30   - f/u echo   - incentive spirometry   - wean o2 >90%  - possibly related to undiagnosed COPD 2/2 smoking history, had plans for formal PFT but was not done    At risk for malnutrition  RD consulted  TPN ordered and possible PEG tube placement. Pt is NPO currently    Transaminitis  S/p kaleb. No findings  supportive of liver/biliary pathology on imaging. GGT elevated as well. Likely shock liver. Will continue to monitor CMP.    - improving with IVF     Diarrhea      Reports onset of loose watery diarrhea since previous chemotherapy treatment. Possibly related to chemotherapy. Likely contributed to hypovolemic shock and acute renal failure.   Improving. Negative stool studies/c.diff.     Reported 2 more episodes of diarrhea, repeating stool studies.    Hyperphosphatemia  Patient's most recent phosphorus results are listed below.   Recent Labs     06/25/25  1100   PHOS 5.7*       Plan  - Patient's hyperphosphatemia is stable  - see acute renal failure     Weakness  2/2 poor po intake, hypovolemia.      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 Most recent hemoglobin and hematocrit are listed below.  Baseline 11.2. Likely 2/2 chemo. Less likely GIB. He has noted ongoing abdominal pain with associated belching. No hematochezia/melena. ED performed MONTRELL negative negative guaiac fecal occult blood test.     Recent Labs     06/26/25  0240 06/27/25  0305 06/28/25  0300   HGB 8.2* 8.2* 7.8*   HCT 23.2* 23.6* 23.2*       Plan  - Monitor serial CBC: Daily  - Transfuse PRBC if patient becomes hemodynamically unstable, symptomatic or H/H drops below 7/21.  - Patient has not received any PRBC transfusions to date  - Patient's anemia is currently stable  - holding anticoaugulation for now will re-evaluate cbc tomorrow   - f/u iron studies with low iron saturation, may benefit from iron infusions as outpatient       Hypovolemic shock  Vitals:    06/28/25 1936 06/28/25 2300 06/29/25 0538 06/29/25 0820   BP: 113/75 105/68 98/65 105/71    06/29/25 1127 06/29/25 1553 06/29/25 1953 06/29/25 2335   BP: 121/76 98/67 111/71 113/65    06/30/25 0524 06/30/25 0830   BP: 115/68 131/69     Presented  with BP  87/55 responding with 2 LR fluid resuscitation. Lactic acid wnl. Likely 2/2 to poor fluid tolerance 2/2 esophageal adenocarcinoma. Previous EGD in April 2025 with partial obstruction. States that placing an esophageal stent with restrictions of types of foods he would be able to eat is not inline with his goals. He is more amendable to PEG placement to provide extra support for fluids and nutrition. Do not suspect septic shock at this time as no findings on CT chest A/P. No fevers. Wbcs wnl. BC NGTD. Improving after several days of IVF resucitation    - GI unable to place PEG 2/2 partial obstruction of the esophagus.  - IR unable to advance NGT for procedure 2/2 partial obstruction.   - Will consult surgical oncology for consultation of PEG tube   - will attempt following resolution of acute renal failure and if BP wnl    - Surgical oncology consulted [facility]      - IVF maintenance fluids with IVF bolus prn when hypotensive      Esophageal dysphagia  See esophageal carcinoma    Obstruction of esophagus  See esophageal adenocarcinoma      Esophageal adenocarcinoma  69M with esophageal adenocarcinoma initially presenting with dysphagia (improved post-EUS dilation). Biopsy confirmed poorly differentiated adenocarcinoma, pMMR, HER2+, PD-L1 0. Received neoadjuvant carboplatin/paclitaxel + radiation (non-surgical). Imaging showed stable disease. Recurrent tumor confirmed on EGD (12/2024); started FOLFOX + trastuzumab, with stable imaging after 4 cycles. After 6 cycles, developed worsening dysphagia due to local tumor progression. Declined stent/PEG at last hospitalization. Switched to trastuzumab deruxtecan (TDxD). Prior admission presented for cycle 4 with significant dysphagia, poor PO intake, Cr 7.7, hypotension, weakness. Treatment held, sent to ED. Previous EGD in April 2025 with partial obstruction. States that placing an esophageal stent with restrictions of types of foods he would be able to eat is not inline  with his goals. He is more amendable to PEG placement to provide extra support for fluids and nutrition.     - likely causing poor po intake > hypovolemic shock > acute renal failure  - Surgical oncology recommending strict NPO due to his report that he cannot tolerate anything by mouth due to the aspiration risk. We would not consider any intervention until his hypovolemia and acute renal failure are addressed.     - Started TPN on 6/26  - Maintenance IVF @100 cc/hr    Esophageal stent placed by [facility] on 7/2                   Genesis Sarah DO  Hematology/Oncology  [facility] - Oncology ([facility])

## 2025-07-04 NOTE — SUBJECTIVE & OBJECTIVE
Interval History: NAEON. Afebrile. VSS.  Wbc downtrending, perineal fluid hasn't grown anything in 6 hours. Blood cultures are negative. Patient is tolerating liquids PO and agrees to progress to soft diet even though he doesn't have an appetite. The patient states that his abdomen feels nearly as full as it did before his paracentesis yesterday. He states the pressure on his abdomen is decreasing his appetite and making his breathing more labored. Will evaluate need for third paracentesis tomorrow. Patient agrees to palliative care consult.    Oncology Treatment Plan:   OP GASTRIC/ ESOPHAGEAL fam-trastuzumab deruxtecan-nxki Q3W    Medications:  Continuous Infusions:  Scheduled Meds:   ([facility]s Solution) 1:1:1:1 diphenhydrAMINE, aluminum-magnesium hydroxide-simethicone (Mylanta), LIDOcaine viscous, nystatin oral solution  10 mL Oral QID    heparin (porcine)  5,000 Units Subcutaneous Q8H    melatonin  3 mg Oral Nightly    nicotine  1 patch Transdermal Daily    pantoprazole  40 mg Oral BID AC     PRN Meds:  Current Facility-Administered Medications:     acetaminophen, 650 mg, Oral, Q4H PRN    dextrose 50%, 12.5 g, Intravenous, PRN    dextrose 50%, 25 g, Intravenous, PRN    glucagon (human recombinant), 1 mg, Intramuscular, PRN    glucose, 16 g, Oral, PRN    glucose, 24 g, Oral, PRN    insulin aspart U-100, 0-10 Units, Subcutaneous, Q4H PRN    naloxone, 0.02 mg, Intravenous, PRN    prochlorperazine, 5 mg, Intravenous, Q6H PRN    sodium chloride 0.9%, 10 mL, Intravenous, Q12H PRN     Review of Systems  Objective:     Vital Signs (Most Recent):  Temp: 98.2 °F (36.8 °C) (07/04/25 1129)  Pulse: (!) 112 (07/04/25 1129)  Resp: 20 (07/04/25 1129)  BP: 118/80 (07/04/25 1129)  SpO2: 95 % (07/04/25 1129) Vital Signs (24h Range):  Temp:  [97.4 °F (36.3 °C)-98.2 °F (36.8 °C)] 98.2 °F (36.8 °C)  Pulse:  [105-112] 112  Resp:  [16-20] 20  SpO2:  [93 %-95 %] 95 %  BP: (113-127)/(69-80) 118/80     Weight: 55.8 kg (123 lb)  Body mass  index is 19.26 kg/m².  Body surface area is 1.62 meters squared.      Intake/Output Summary (Last 24 hours) at 7/4/2025 1604  Last data filed at 7/4/2025 0424  Gross per 24 hour   Intake 1200.26 ml   Output 468 ml   Net 732.26 ml        Physical Exam  HENT:      Head: Normocephalic.   Eyes:      Pupils: Pupils are equal, round, and reactive to light.   Cardiovascular:      Pulses: Normal pulses.   Pulmonary:      Effort: Pulmonary effort is normal. No respiratory distress.   Abdominal:      General: There is distension (moderate).      Palpations: Abdomen is soft.      Tenderness: There is no abdominal tenderness.   Musculoskeletal:      Cervical back: Normal range of motion.   Neurological:      Mental Status: He is alert.          Significant Labs:   All pertinent labs from the last 24 hours have been reviewed.    Diagnostic Results:  I have reviewed and interpreted all pertinent imaging results/findings within the past 24 hours.

## 2025-07-04 NOTE — PLAN OF CARE
POC reviewed with patient. All questions and concerns addressed, patient verbalized understanding. No acute events noted during shift, plan of care progressing, care ongoing.

## 2025-07-05 PROBLEM — R18.0 MALIGNANT ASCITES: Status: ACTIVE | Noted: 2025-06-30

## 2025-07-05 NOTE — CARE UPDATE
"RAPID RESPONSE NURSE CHART REVIEW        Chart Reviewed: 07/05/2025, 11:18 AM    MRN: [id number]  Bed: 855/855 A    Dx: ANTOINE (acute kidney injury)    Jone Lugo has a past medical history of HTN (hypertension).    Last VS: /73   Pulse (!) 115   Temp 97.9 °F (36.6 °C) (Oral)   Resp (!) 22   Ht 5' 7" (1.702 m)   Wt 55.8 kg (123 lb)   SpO2 (!) 93%   BMI 19.26 kg/m²     24H Vital Sign Range:  Temp:  [96.7 °F (35.9 °C)-98.3 °F (36.8 °C)]   Pulse:  [100-115]   Resp:  [18-23]   BP: (108-144)/(64-80)   SpO2:  [91 %-95 %]     Level of Consciousness (AVPU): alert    Recent Labs     07/03/25  1314 07/04/25  0232 07/05/25  0314   WBC 20.81* 19.53* 21.60*   HGB 8.1* 7.8* 7.6*   HCT 24.5* 23.6* 23.5*    347 379       Recent Labs     07/04/25  1651 07/04/25  2121 07/05/25  0314    134* 136   K 3.8 3.6 3.6    100 101   CO2 24 24 24   BUN 56* 55* 50*   CREATININE 1.5* 1.5* 1.4    103 109   PHOS 3.6 3.5 3.2   MG 1.5* 1.5* 2.4      OXYGEN:  Flow (L/min) (Oxygen Therapy): 0.5      MEWS score: 4    Rounding completed with charge nurse Sahil for tachycardia reports pt is stable in no acute distress. No additional concerns verbalized at this time. Instructed to call [phone] for further concerns or assistance.    Miguel Angel Ballesteros RN        "

## 2025-07-05 NOTE — ASSESSMENT & PLAN NOTE
CT 6/25 on admission with moderate volume abdominopelvic ascites and mesenteric congestion that was not appreciated on initial exam. 6/30 with distension concerning for worsening ascites 2/2 hypoalbuminemia (1.5) causing discomfort. Concern that increasing pressure is altering renal perfusion. Surgery unable to perform PEG w/ ascites. S/p drain (5L).     - Peritoneal labs clear    Repeat ascites on 7/3 due to increasing abdominal distention. 4.2 L removed  7/5- Patient's abdomen is distended. Will attempt to medically manage with spironolactone 25 mg and furosemide 20 mg and reevaluate.

## 2025-07-05 NOTE — PLAN OF CARE
Pt with c/o severe abd pain. IV dilaudid given x1. Started on spironolactone and lasix PO. Consulting for possible para tomorrow. Pt voiding via urinal. Accuchecks d/c'd given Pt no longer on TPN. Pt voiding via urinal. Pt remains afebrile and VSS. WCTM.

## 2025-07-05 NOTE — PROGRESS NOTES
Anupam Alcantara - Oncology ([facility])  Hematology/Oncology  Progress Note    Patient Name: Jone Lugo  Admission Date: 6/25/2025  Hospital Length of Stay: 10 days  Code Status: Full Code     Subjective:     HPI:  70 yo M esophageal adenocarcinoma stage III, smoking presenting with poor PO intake.     In the ED initially afebrile 87/55 improving to 146 systolic after 2 LR resuscitation on RA. Wbcs 13.51, Hb 8.8 ( BL 11.2), Cr 8.0 ( BL 1.5-1.8), bicarb 13.  (123 BL) . Procal 1.63. Lactate 1.8. A CT chest A/P significant for moderate volume abdominopelvic ascites and mesenteric congestion. UA not collected. He was treated with vanc/cefepime and fluid resucitated with 2L of LR.     6/25 oncology appointment, pt reported worsening dysphagia with poor PO intake with weakness.   In the ED he notes poor food/water intake he attributes to stomach pain after swallowing food. He also notes loose watery stools that has not improved with immodium. Pt reports symptoms started after he most recent chemotherapy. Associated with lightheadedness worse with standing, fatigue. Has had decreased urine output. Denies chest pain, shortness of breath, melena, hematochezia. Otherwise noted an episode severe dysphagia recently with swallowing water and pills.     Interval History: NAEON. Afebrile. Mildly tachycardic. The patient reports that his abdomen is more distended, decreasing his appetite and making it difficult for him to breathe. Discussed medical management vs repeat paracentesis. Had an extended discussion about how a permanent drain would not be placed if chemotherapy is still pursued due to the high risk of infection.       Oncology Treatment Plan:   OP GASTRIC/ ESOPHAGEAL fam-trastuzumab deruxtecan-nxki Q3W    Medications:  Continuous Infusions:  Scheduled Meds:   ([facility]s Solution) 1:1:1:1 diphenhydrAMINE, aluminum-magnesium hydroxide-simethicone (Mylanta), LIDOcaine viscous, nystatin oral solution  10 mL Oral QID     furosemide  20 mg Oral Daily    heparin (porcine)  5,000 Units Subcutaneous Q8H    melatonin  3 mg Oral Nightly    nicotine  1 patch Transdermal Daily    pantoprazole  40 mg Oral BID AC    potassium bicarbonate  20 mEq Oral Once    spironolactone  25 mg Oral Daily     PRN Meds:  Current Facility-Administered Medications:     acetaminophen, 650 mg, Oral, Q4H PRN    dextrose 50%, 12.5 g, Intravenous, PRN    dextrose 50%, 25 g, Intravenous, PRN    glucagon (human recombinant), 1 mg, Intramuscular, PRN    glucose, 16 g, Oral, PRN    glucose, 24 g, Oral, PRN    insulin aspart U-100, 0-10 Units, Subcutaneous, Q4H PRN    naloxone, 0.02 mg, Intravenous, PRN    prochlorperazine, 5 mg, Intravenous, Q6H PRN    sodium chloride 0.9%, 10 mL, Intravenous, Q12H PRN     Review of Systems  Objective:     Vital Signs (Most Recent):  Temp: 97.9 °F (36.6 °C) (07/05/25 0741)  Pulse: (!) 115 (07/05/25 0741)  Resp: (!) 22 (07/05/25 0912)  BP: 108/73 (07/05/25 0741)  SpO2: (!) 93 % (07/05/25 0741) Vital Signs (24h Range):  Temp:  [96.7 °F (35.9 °C)-98.3 °F (36.8 °C)] 97.9 °F (36.6 °C)  Pulse:  [100-115] 115  Resp:  [18-23] 22  SpO2:  [91 %-95 %] 93 %  BP: (108-144)/(64-80) 108/73     Weight: 55.8 kg (123 lb)  Body mass index is 19.26 kg/m².  Body surface area is 1.62 meters squared.      Intake/Output Summary (Last 24 hours) at 7/5/2025 1043  Last data filed at 7/5/2025 0256  Gross per 24 hour   Intake 200 ml   Output 200 ml   Net 0 ml        Physical Exam   Physical Exam  Constitutional:       General: He is not in acute distress.     Appearance: He is normal weight.   Eyes:      Pupils: Pupils are equal, round, and reactive to light.   Cardiovascular:      Rate and Rhythm: Normal rate and regular rhythm.   Abdominal:      General: There is distension.      Palpations: Abdomen is soft.   Skin:     General: Skin is warm.      Findings: No erythema.   Neurological:      Mental Status: He is alert.         Significant Labs:   All pertinent  labs from the last 24 hours have been reviewed.    Diagnostic Results:  I have reviewed and interpreted all pertinent imaging results/findings within the past 24 hours.  Assessment/Plan:     * ANTOINE (acute kidney injury)  ANTOINE is likely due to pre-renal azotemia due to intravascular volume depletion 2/2 poor fluid intake, diarrhea, BP medications. Baseline creatinine is 1.5-1.8. Most recent creatinine and eGFR are listed below. No signs of fluid overload. With metabolic acidosis, bicarb 13 in ED. EUGENIA negative for obstruction.    Recent Labs     07/04/25  1651 07/04/25  2121 07/05/25  0314   CREATININE 1.5* 1.5* 1.4   EGFRNORACEVR 50* 50* 54*     Urine creatinine 142  Urine protein 78  Urine protein/ cr ratio 0.56  Urine urea nitrogen 319  Urine potassium 34     Plan  - Avoid nephrotoxins and renally dose meds for GFR listed above  - Monitor urine output, serial BMP, and adjust therapy as needed  - Nephrology recommends continuing IV fluids at 100cc/hr, replace K with potassium chloride 10 mEq  - holding home gabapentin and amlodipine-benzepril  - IVF resucitation  - f/u nephrology      Malignant ascites  CT 6/25 on admission with moderate volume abdominopelvic ascites and mesenteric congestion that was not appreciated on initial exam. 6/30 with distension concerning for worsening ascites 2/2 hypoalbuminemia (1.5) causing discomfort. Concern that increasing pressure is altering renal perfusion. Surgery unable to perform PEG w/ ascites. S/p drain (5L).     - Peritoneal labs clear    Repeat ascites on 7/3 due to increasing abdominal distention. 4.2 L removed  7/5- Patient's abdomen is distended. Will attempt to medically manage with spironolactone 25 mg and furosemide 20 mg and reevaluate.       Leukocytosis  WBC increased from 12 to 22 s/p esophageal stenting from [facility]. Septic workup ordered with no infective focus found. WBC downtrended to 19 before slightly increasing back up to 21 on 7/5. Will continue to closely  monitor      Chronic heart failure with preserved ejection fraction  Results for orders placed during the hospital encounter of 04/22/25    Echo    Interpretation Summary    Left Ventricle: The left ventricle is normal in size. Mildly increased wall thickness. There is normal systolic function with a visually estimated ejection fraction of 55 - 60%. Global longitudinal strain is -17.1%. Global longitudinal strain is normal. Grade I diastolic dysfunction.    Right Ventricle: The right ventricle is normal in size. Wall thickness is normal. Systolic function is normal.    IVC/SVC: Normal venous pressure at 3 mmHg.     Developing tachycardia starting 6/29 possibly related to increased fluid load vs pain/discomfort. Less likely PE as has been on heparin  - Echo technically flawed though with preserved ejection fraction       Hyperglycemia  2/2 TPN    Last A1c reviewed-   Lab Results   Component Value Date    HGBA1C 5.8 (H) 07/01/2024     Most recent fingerstick glucose reviewed-   Recent Labs   Lab 06/28/25  2036 06/29/25  0045 06/29/25  0426 06/29/25  0735   POCTGLUCOSE 201* 195* 217* 209*     Current correctional scale  Medium  Maintain anti-hyperglycemic dose as follows-   Antihyperglycemics (From admission, onward)      Start     Stop Route Frequency Ordered    06/29/25 0907  insulin aspart U-100 pen 0-10 Units         -- SubQ Every 4 hours PRN 06/29/25 0908              Acute hypoxemic respiratory failure  Patient with Hypoxic Respiratory failure which is Acute.  he is not on home oxygen. Supplemental oxygen was provided and noted-    Ddx: pulmonary edema from IVF resuscitation vs aspiration/micro aspiration 2/2 esophageal cancer. Noted history of grade I diastolic function on echo 4/2025. Oxygen requirements around the time of being cleared for clear liquid diet. He is asymptomatic.     CXR with evidence of atelectasis, unlikely pulmonary edema. JVP noted 6/30   - f/u echo   - incentive spirometry   - wean o2  >90%  - possibly related to undiagnosed COPD 2/2 smoking history, had plans for formal PFT but was not done    At risk for malnutrition  RD consulted  TPN ordered and possible PEG tube placement. Pt is NPO currently    Transaminitis  S/p kaleb. No findings supportive of liver/biliary pathology on imaging. GGT elevated as well. Likely shock liver. Will continue to monitor CMP.    - improving with IVF     Diarrhea      Reports onset of loose watery diarrhea since previous chemotherapy treatment. Possibly related to chemotherapy. Likely contributed to hypovolemic shock and acute renal failure.   Improving. Negative stool studies/c.diff.     Reported 2 more episodes of diarrhea, repeating stool studies.    Hyperphosphatemia  Patient's most recent phosphorus results are listed below.   Recent Labs     06/25/25  1100   PHOS 5.7*       Plan  - Patient's hyperphosphatemia is stable  - see acute renal failure     Weakness  2/2 poor po intake, hypovolemia.      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 Most recent hemoglobin and hematocrit are listed below.  Baseline 11.2. Likely 2/2 chemo. Less likely GIB. He has noted ongoing abdominal pain with associated belching. No hematochezia/melena. ED performed MONTRELL negative negative guaiac fecal occult blood test.     Recent Labs     06/26/25  0240 06/27/25  0305 06/28/25  0300   HGB 8.2* 8.2* 7.8*   HCT 23.2* 23.6* 23.2*       Plan  - Monitor serial CBC: Daily  - Transfuse PRBC if patient becomes hemodynamically unstable, symptomatic or H/H drops below 7/21.  - Patient has not received any PRBC transfusions to date  - Patient's anemia is currently stable  - holding anticoaugulation for now will re-evaluate cbc tomorrow   - f/u iron studies with low iron saturation, may benefit from iron infusions as outpatient       Hypovolemic  shock  Vitals:    06/28/25 1936 06/28/25 2300 06/29/25 0538 06/29/25 0820   BP: 113/75 105/68 98/65 105/71    06/29/25 1127 06/29/25 1553 06/29/25 1953 06/29/25 2335   BP: 121/76 98/67 111/71 113/65    06/30/25 0524 06/30/25 0830   BP: 115/68 131/69     Presented with BP  87/55 responding with 2 LR fluid resuscitation. Lactic acid wnl. Likely 2/2 to poor fluid tolerance 2/2 esophageal adenocarcinoma. Previous EGD in April 2025 with partial obstruction. States that placing an esophageal stent with restrictions of types of foods he would be able to eat is not inline with his goals. He is more amendable to PEG placement to provide extra support for fluids and nutrition. Do not suspect septic shock at this time as no findings on CT chest A/P. No fevers. Wbcs wnl. BC NGTD. Improving after several days of IVF resucitation    - GI unable to place PEG 2/2 partial obstruction of the esophagus.  - IR unable to advance NGT for procedure 2/2 partial obstruction.   - Will consult surgical oncology for consultation of PEG tube   - will attempt following resolution of acute renal failure and if BP wnl    - Surgical oncology consulted [facility]      - IVF maintenance fluids with IVF bolus prn when hypotensive      Esophageal dysphagia  See esophageal carcinoma    Obstruction of esophagus  See esophageal adenocarcinoma      Esophageal adenocarcinoma  69M with esophageal adenocarcinoma initially presenting with dysphagia (improved post-EUS dilation). Biopsy confirmed poorly differentiated adenocarcinoma, pMMR, HER2+, PD-L1 0. Received neoadjuvant carboplatin/paclitaxel + radiation (non-surgical). Imaging showed stable disease. Recurrent tumor confirmed on EGD (12/2024); started FOLFOX + trastuzumab, with stable imaging after 4 cycles. After 6 cycles, developed worsening dysphagia due to local tumor progression. Declined stent/PEG at last hospitalization. Switched to trastuzumab deruxtecan (TDxD). Prior admission presented for cycle 4 with  significant dysphagia, poor PO intake, Cr 7.7, hypotension, weakness. Treatment held, sent to ED. Previous EGD in April 2025 with partial obstruction. States that placing an esophageal stent with restrictions of types of foods he would be able to eat is not inline with his goals. He is more amendable to PEG placement to provide extra support for fluids and nutrition.     - likely causing poor po intake > hypovolemic shock > acute renal failure  - Surgical oncology recommending strict NPO due to his report that he cannot tolerate anything by mouth due to the aspiration risk. We would not consider any intervention until his hypovolemia and acute renal failure are addressed.     - Started TPN on 6/26  - Maintenance IVF @100 cc/hr    Esophageal stent placed by [facility] on 7/2                   Genesis Sarah DO  Hematology/Oncology  Anupam Alcantara - Oncology ([facility])

## 2025-07-05 NOTE — ASSESSMENT & PLAN NOTE
WBC increased from 12 to 22 s/p esophageal stenting from [facility]. Septic workup ordered with no infective focus found. WBC downtrended to 19 before slightly increasing back up to 21 on 7/5. Will continue to closely monitor

## 2025-07-05 NOTE — ASSESSMENT & PLAN NOTE
ANTOINE is likely due to pre-renal azotemia due to intravascular volume depletion 2/2 poor fluid intake, diarrhea, BP medications. Baseline creatinine is 1.5-1.8. Most recent creatinine and eGFR are listed below. No signs of fluid overload. With metabolic acidosis, bicarb 13 in ED. EUGENIA negative for obstruction.    Recent Labs     07/04/25  1651 07/04/25  2121 07/05/25  0314   CREATININE 1.5* 1.5* 1.4   EGFRNORACEVR 50* 50* 54*     Urine creatinine 142  Urine protein 78  Urine protein/ cr ratio 0.56  Urine urea nitrogen 319  Urine potassium 34     Plan  - Avoid nephrotoxins and renally dose meds for GFR listed above  - Monitor urine output, serial BMP, and adjust therapy as needed  - Nephrology recommends continuing IV fluids at 100cc/hr, replace K with potassium chloride 10 mEq  - holding home gabapentin and amlodipine-benzepril  - IVF resucitation  - f/u nephrology

## 2025-07-05 NOTE — SUBJECTIVE & OBJECTIVE
Interval History: NAEON. Afebrile. Mildly tachycardic. The patient reports that his abdomen is more distended, decreasing his appetite and making it difficult for him to breathe. Discussed medical management vs repeat paracentesis. Had an extended discussion about how a permanent drain would not be placed if chemotherapy is still pursued due to the high risk of infection.       Oncology Treatment Plan:   OP GASTRIC/ ESOPHAGEAL fam-trastuzumab deruxtecan-nxki Q3W    Medications:  Continuous Infusions:  Scheduled Meds:   ([facility]s Solution) 1:1:1:1 diphenhydrAMINE, aluminum-magnesium hydroxide-simethicone (Mylanta), LIDOcaine viscous, nystatin oral solution  10 mL Oral QID    furosemide  20 mg Oral Daily    heparin (porcine)  5,000 Units Subcutaneous Q8H    melatonin  3 mg Oral Nightly    nicotine  1 patch Transdermal Daily    pantoprazole  40 mg Oral BID AC    potassium bicarbonate  20 mEq Oral Once    spironolactone  25 mg Oral Daily     PRN Meds:  Current Facility-Administered Medications:     acetaminophen, 650 mg, Oral, Q4H PRN    dextrose 50%, 12.5 g, Intravenous, PRN    dextrose 50%, 25 g, Intravenous, PRN    glucagon (human recombinant), 1 mg, Intramuscular, PRN    glucose, 16 g, Oral, PRN    glucose, 24 g, Oral, PRN    insulin aspart U-100, 0-10 Units, Subcutaneous, Q4H PRN    naloxone, 0.02 mg, Intravenous, PRN    prochlorperazine, 5 mg, Intravenous, Q6H PRN    sodium chloride 0.9%, 10 mL, Intravenous, Q12H PRN     Review of Systems  Objective:     Vital Signs (Most Recent):  Temp: 97.9 °F (36.6 °C) (07/05/25 0741)  Pulse: (!) 115 (07/05/25 0741)  Resp: (!) 22 (07/05/25 0912)  BP: 108/73 (07/05/25 0741)  SpO2: (!) 93 % (07/05/25 0741) Vital Signs (24h Range):  Temp:  [96.7 °F (35.9 °C)-98.3 °F (36.8 °C)] 97.9 °F (36.6 °C)  Pulse:  [100-115] 115  Resp:  [18-23] 22  SpO2:  [91 %-95 %] 93 %  BP: (108-144)/(64-80) 108/73     Weight: 55.8 kg (123 lb)  Body mass index is 19.26 kg/m².  Body surface area is 1.62  meters squared.      Intake/Output Summary (Last 24 hours) at 7/5/2025 1043  Last data filed at 7/5/2025 0256  Gross per 24 hour   Intake 200 ml   Output 200 ml   Net 0 ml        Physical Exam     Significant Labs:   All pertinent labs from the last 24 hours have been reviewed.    Diagnostic Results:  I have reviewed and interpreted all pertinent imaging results/findings within the past 24 hours.

## 2025-07-05 NOTE — PLAN OF CARE
Pt A&Ox4, VSS, afebrile. The pt received magnesium and potassium this shift. The pt has had no pain, nausea or vomiting this shift. The pt was complaining of abdominal pain and asked if he could get a paracentesis his shift. I explained that in the Mds note that they would evaluate if he required another para on 7/5 and that they may place a drain if needed; NP Sharron also talked to the pt and he seemed to agree with this. The pt has been resting since then, pt care ongoing.

## 2025-07-06 NOTE — PLAN OF CARE
Pt with c/o severe abd pain this morning, PRN IV dilaudid changed to q4 and given once this shift. Para done at bedside this shift. 6.2 L removed. Albumin given after para completed. Continuing spironolactone and lasix PO. Pt voiding small amounts of urine via urinal. Stool cx ordered. No BM this shift. Pt remains afebrile and VSS. WCTM.

## 2025-07-06 NOTE — CONSULTS
Hospital Medicine consulted to assist with paracentesis. Paracentesis was performed at bedside.    Kindly see procedure note (dated 7/6/2025) for more details.

## 2025-07-06 NOTE — ASSESSMENT & PLAN NOTE
CT 6/25 on admission with moderate volume abdominopelvic ascites and mesenteric congestion that was not appreciated on initial exam. 6/30 with distension concerning for worsening ascites 2/2 hypoalbuminemia (1.5) causing discomfort. Concern that increasing pressure is altering renal perfusion. Surgery unable to perform PEG w/ ascites. S/p drain (5L).     - Peritoneal labs clear    Repeat ascites on 7/3 due to increasing abdominal distention. 4.2 L removed  7/5- Patient's abdomen is distended. Will attempt to medically manage with spironolactone 25 mg and furosemide 20 mg and reevaluate.   07/06- Repeat paracentesis, 6.2 L of peritoneal fluid removed. Will consider increasing spironolactone from 25 mg to 50 mg. If fluid re-accumulates in 2-3 days will further discuss permanent drain. Albumin repleated

## 2025-07-06 NOTE — PLAN OF CARE
Pt A&ox4, VSS< afebrile. The pt has had no pain, nausea or vomiting this shift. The pt agreed to turn and barrier cream was applied to the pt sacral area. The pt has had 2 Bms this shift; soft but not liquid. The pt is currently resting, pt care ongoing.

## 2025-07-06 NOTE — ASSESSMENT & PLAN NOTE
Reports onset of loose watery diarrhea since previous chemotherapy treatment. Possibly related to chemotherapy. Likely contributed to hypovolemic shock and acute renal failure.   Improving. Negative stool studies/c.diff ON 06/26    Reported 2 more episodes of diarrhea, repeating stool studies.    7/6- Patient reports 3 more episodes of diarrhea. Per C. Diff flow chart, if has one more episode of diarrhea within 24 hours, will repeat C. Diff studies. Ordering stool culture.

## 2025-07-06 NOTE — PROGRESS NOTES
Anupam Alcantara - Oncology ([facility])  Hematology/Oncology  Progress Note    Patient Name: Jone Lugo  Admission Date: 6/25/2025  Hospital Length of Stay: 11 days  Code Status: Full Code     Subjective:     HPI:  68 yo M esophageal adenocarcinoma stage III, smoking presenting with poor PO intake.     In the ED initially afebrile 87/55 improving to 146 systolic after 2 LR resuscitation on RA. Wbcs 13.51, Hb 8.8 ( BL 11.2), Cr 8.0 ( BL 1.5-1.8), bicarb 13.  (123 BL) . Procal 1.63. Lactate 1.8. A CT chest A/P significant for moderate volume abdominopelvic ascites and mesenteric congestion. UA not collected. He was treated with vanc/cefepime and fluid resucitated with 2L of LR.     6/25 oncology appointment, pt reported worsening dysphagia with poor PO intake with weakness.   In the ED he notes poor food/water intake he attributes to stomach pain after swallowing food. He also notes loose watery stools that has not improved with immodium. Pt reports symptoms started after he most recent chemotherapy. Associated with lightheadedness worse with standing, fatigue. Has had decreased urine output. Denies chest pain, shortness of breath, melena, hematochezia. Otherwise noted an episode severe dysphagia recently with swallowing water and pills.     Interval History: NAEON. Afebrile. /71, mildy tachycardic at 108. The patient complains of abdominal discomfort due to his abdominal fluid accumulation. The patient denies difficulty swallowing, but claims his abdominal discomfort is reducing his appetite. The patient reports having three episodes of diarrhea last night. The patient is currently taking spironolactone tablet 25 mg and furosemide 20 mg for medical management of his ascites. Will consider increasing to spironolactone 50 mg tomorrow. Paracentesis performed on 7/6 produced 6.2 L of peritoneal fluid. If the patient's fluid re-accumulates in 2-3 days will further discuss the need for permanent  drainage.    Oncology Treatment Plan:   OP GASTRIC/ ESOPHAGEAL fam-trastuzumab deruxtecan-nxki Q3W    Medications:  Continuous Infusions:  Scheduled Meds:   ([facility]s Solution) 1:1:1:1 diphenhydrAMINE, aluminum-magnesium hydroxide-simethicone (Mylanta), LIDOcaine viscous, nystatin oral solution  10 mL Oral QID    albumin human 25%  12.5 g Intravenous Once    furosemide  20 mg Oral Daily    heparin (porcine)  5,000 Units Subcutaneous Q8H    melatonin  3 mg Oral Nightly    nicotine  1 patch Transdermal Daily    pantoprazole  40 mg Oral BID AC    potassium bicarbonate  20 mEq Oral Once    spironolactone  25 mg Oral Daily     PRN Meds:  Current Facility-Administered Medications:     acetaminophen, 650 mg, Oral, Q4H PRN    dextrose 50%, 12.5 g, Intravenous, PRN    dextrose 50%, 25 g, Intravenous, PRN    glucagon (human recombinant), 1 mg, Intramuscular, PRN    glucose, 16 g, Oral, PRN    glucose, 24 g, Oral, PRN    HYDROmorphone, 0.5 mg, Intravenous, Q4H PRN    naloxone, 0.02 mg, Intravenous, PRN    prochlorperazine, 5 mg, Intravenous, Q6H PRN    sodium chloride 0.9%, 10 mL, Intravenous, Q12H PRN     Review of Systems  Objective:     Vital Signs (Most Recent):  Temp: 97.4 °F (36.3 °C) (07/06/25 1457)  Pulse: 105 (07/06/25 1457)  Resp: 19 (07/06/25 1457)  BP: 131/83 (07/06/25 1457)  SpO2: 95 % (07/06/25 1457) Vital Signs (24h Range):  Temp:  [97.1 °F (36.2 °C)-98.4 °F (36.9 °C)] 97.4 °F (36.3 °C)  Pulse:  [105-125] 105  Resp:  [16-20] 19  SpO2:  [90 %-96 %] 95 %  BP: (112-142)/(66-91) 131/83     Weight: 55.8 kg (123 lb)  Body mass index is 19.26 kg/m².  Body surface area is 1.62 meters squared.      Intake/Output Summary (Last 24 hours) at 7/6/2025 1547  Last data filed at 7/6/2025 0639  Gross per 24 hour   Intake 100 ml   Output 300 ml   Net -200 ml        Physical Exam  HENT:      Head: Normocephalic.   Eyes:      Pupils: Pupils are equal, round, and reactive to light.   Cardiovascular:      Pulses: Normal pulses.    Pulmonary:      Effort: Pulmonary effort is normal. No respiratory distress.   Abdominal:      General: There is distension (moderate).      Palpations: Abdomen is soft.      Tenderness: There is no abdominal tenderness.   Musculoskeletal:      Cervical back: Normal range of motion.   Neurological:      Mental Status: He is alert.          Significant Labs:   All pertinent labs from the last 24 hours have been reviewed.    Diagnostic Results:  I have reviewed and interpreted all pertinent imaging results/findings within the past 24 hours.  Assessment/Plan:     * ANTOINE (acute kidney injury)  ANTOINE is likely due to pre-renal azotemia due to intravascular volume depletion 2/2 poor fluid intake, diarrhea, BP medications. Baseline creatinine is 1.5-1.8. Most recent creatinine and eGFR are listed below. No signs of fluid overload. With metabolic acidosis, bicarb 13 in ED. EUGENIA negative for obstruction.    Recent Labs     07/04/25  1651 07/04/25  2121 07/05/25  0314   CREATININE 1.5* 1.5* 1.4   EGFRNORACEVR 50* 50* 54*     Urine creatinine 142  Urine protein 78  Urine protein/ cr ratio 0.56  Urine urea nitrogen 319  Urine potassium 34     Plan  - Avoid nephrotoxins and renally dose meds for GFR listed above  - Monitor urine output, serial BMP, and adjust therapy as needed  - Nephrology recommends continuing IV fluids at 100cc/hr, replace K with potassium chloride 10 mEq  - holding home gabapentin and amlodipine-benzepril  - IVF resucitation  - f/u nephrology      Malignant ascites  CT 6/25 on admission with moderate volume abdominopelvic ascites and mesenteric congestion that was not appreciated on initial exam. 6/30 with distension concerning for worsening ascites 2/2 hypoalbuminemia (1.5) causing discomfort. Concern that increasing pressure is altering renal perfusion. Surgery unable to perform PEG w/ ascites. S/p drain (5L).     - Peritoneal labs clear    Repeat ascites on 7/3 due to increasing abdominal distention. 4.2 L  removed  7/5- Patient's abdomen is distended. Will attempt to medically manage with spironolactone 25 mg and furosemide 20 mg and reevaluate.   07/06- Repeat paracentesis, 6.2 L of peritoneal fluid removed. Will consider increasing spironolactone from 25 mg to 50 mg. If fluid re-accumulates in 2-3 days will further discuss permanent drain. Albumin repleated      Leukocytosis  WBC increased from 12 to 22 s/p esophageal stenting from [facility]. Septic workup ordered with no infective focus found. WBC downtrended to 19 before slightly increasing back up to 21 on 7/5. Will continue to closely monitor      Chronic heart failure with preserved ejection fraction  Results for orders placed during the hospital encounter of 04/22/25    Echo    Interpretation Summary    Left Ventricle: The left ventricle is normal in size. Mildly increased wall thickness. There is normal systolic function with a visually estimated ejection fraction of 55 - 60%. Global longitudinal strain is -17.1%. Global longitudinal strain is normal. Grade I diastolic dysfunction.    Right Ventricle: The right ventricle is normal in size. Wall thickness is normal. Systolic function is normal.    IVC/SVC: Normal venous pressure at 3 mmHg.     Developing tachycardia starting 6/29 possibly related to increased fluid load vs pain/discomfort. Less likely PE as has been on heparin  - Echo technically flawed though with preserved ejection fraction       Hyperglycemia  2/2 TPN    Last A1c reviewed-   Lab Results   Component Value Date    HGBA1C 5.8 (H) 07/01/2024     Most recent fingerstick glucose reviewed-   Recent Labs   Lab 06/28/25  2036 06/29/25  0045 06/29/25  0426 06/29/25  0735   POCTGLUCOSE 201* 195* 217* 209*     Current correctional scale  Medium  Maintain anti-hyperglycemic dose as follows-   Antihyperglycemics (From admission, onward)      Start     Stop Route Frequency Ordered    06/29/25 0907  insulin aspart U-100 pen 0-10 Units         -- SubQ Every 4  hours PRN 06/29/25 0908              Acute hypoxemic respiratory failure  Patient with Hypoxic Respiratory failure which is Acute.  he is not on home oxygen. Supplemental oxygen was provided and noted-    Ddx: pulmonary edema from IVF resuscitation vs aspiration/micro aspiration 2/2 esophageal cancer. Noted history of grade I diastolic function on echo 4/2025. Oxygen requirements around the time of being cleared for clear liquid diet. He is asymptomatic.     CXR with evidence of atelectasis, unlikely pulmonary edema. JVP noted 6/30   - f/u echo   - incentive spirometry   - wean o2 >90%  - possibly related to undiagnosed COPD 2/2 smoking history, had plans for formal PFT but was not done    At risk for malnutrition  RD consulted  TPN ordered and possible PEG tube placement. Pt is NPO currently    Transaminitis  S/p kaleb. No findings supportive of liver/biliary pathology on imaging. GGT elevated as well. Likely shock liver. Will continue to monitor CMP.    - improving with IVF     Diarrhea      Reports onset of loose watery diarrhea since previous chemotherapy treatment. Possibly related to chemotherapy. Likely contributed to hypovolemic shock and acute renal failure.   Improving. Negative stool studies/c.diff ON 06/26    Reported 2 more episodes of diarrhea, repeating stool studies.    7/6- Patient reports 3 more episodes of diarrhea. Per C. Diff flow chart, if has one more episode of diarrhea within 24 hours, will repeat C. Diff studies. Ordering stool culture.    Hyperphosphatemia  Patient's most recent phosphorus results are listed below.   Recent Labs     06/25/25  1100   PHOS 5.7*       Plan  - Patient's hyperphosphatemia is stable  - see acute renal failure     Weakness  2/2 poor po intake, hypovolemia.      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 Most recent  hemoglobin and hematocrit are listed below.  Baseline 11.2. Likely 2/2 chemo. Less likely GIB. He has noted ongoing abdominal pain with associated belching. No hematochezia/melena. ED performed MONTRELL negative negative guaiac fecal occult blood test.     Recent Labs     06/26/25  0240 06/27/25  0305 06/28/25  0300   HGB 8.2* 8.2* 7.8*   HCT 23.2* 23.6* 23.2*       Plan  - Monitor serial CBC: Daily  - Transfuse PRBC if patient becomes hemodynamically unstable, symptomatic or H/H drops below 7/21.  - Patient has not received any PRBC transfusions to date  - Patient's anemia is currently stable  - holding anticoaugulation for now will re-evaluate cbc tomorrow   - f/u iron studies with low iron saturation, may benefit from iron infusions as outpatient       Hypovolemic shock  Vitals:    06/28/25 1936 06/28/25 2300 06/29/25 0538 06/29/25 0820   BP: 113/75 105/68 98/65 105/71    06/29/25 1127 06/29/25 1553 06/29/25 1953 06/29/25 2335   BP: 121/76 98/67 111/71 113/65    06/30/25 0524 06/30/25 0830   BP: 115/68 131/69     Presented with BP  87/55 responding with 2 LR fluid resuscitation. Lactic acid wnl. Likely 2/2 to poor fluid tolerance 2/2 esophageal adenocarcinoma. Previous EGD in April 2025 with partial obstruction. States that placing an esophageal stent with restrictions of types of foods he would be able to eat is not inline with his goals. He is more amendable to PEG placement to provide extra support for fluids and nutrition. Do not suspect septic shock at this time as no findings on CT chest A/P. No fevers. Wbcs wnl. BC NGTD. Improving after several days of IVF resucitation    - GI unable to place PEG 2/2 partial obstruction of the esophagus.  - IR unable to advance NGT for procedure 2/2 partial obstruction.   - Will consult surgical oncology for consultation of PEG tube   - will attempt following resolution of acute renal failure and if BP wnl    - Surgical oncology consulted [facility]      - IVF maintenance fluids with  IVF bolus prn when hypotensive      Esophageal dysphagia  See esophageal carcinoma    Obstruction of esophagus  See esophageal adenocarcinoma      Esophageal adenocarcinoma  69M with esophageal adenocarcinoma initially presenting with dysphagia (improved post-EUS dilation). Biopsy confirmed poorly differentiated adenocarcinoma, pMMR, HER2+, PD-L1 0. Received neoadjuvant carboplatin/paclitaxel + radiation (non-surgical). Imaging showed stable disease. Recurrent tumor confirmed on EGD (12/2024); started FOLFOX + trastuzumab, with stable imaging after 4 cycles. After 6 cycles, developed worsening dysphagia due to local tumor progression. Declined stent/PEG at last hospitalization. Switched to trastuzumab deruxtecan (TDxD). Prior admission presented for cycle 4 with significant dysphagia, poor PO intake, Cr 7.7, hypotension, weakness. Treatment held, sent to ED. Previous EGD in April 2025 with partial obstruction. States that placing an esophageal stent with restrictions of types of foods he would be able to eat is not inline with his goals. He is more amendable to PEG placement to provide extra support for fluids and nutrition.     - likely causing poor po intake > hypovolemic shock > acute renal failure  - Surgical oncology recommending strict NPO due to his report that he cannot tolerate anything by mouth due to the aspiration risk. We would not consider any intervention until his hypovolemia and acute renal failure are addressed.     - Started TPN on 6/26  - Maintenance IVF @100 cc/hr    Esophageal stent placed by [facility] on 7/2                   Genesis Sarah DO  Hematology/Oncology  Anupam Alcantara - Oncology ([facility])

## 2025-07-06 NOTE — SUBJECTIVE & OBJECTIVE
Interval History: NAEON. Afebrile. /71, mildy tachycardic at 108. The patient complains of abdominal discomfort due to his abdominal fluid accumulation. The patient denies difficulty swallowing, but claims his abdominal discomfort is reducing his appetite. The patient reports having three episodes of diarrhea last night. The patient is currently taking spironolactone tablet 25 mg and furosemide 20 mg for medical management of his ascites. Will consider increasing to spironolactone 50 mg tomorrow. Paracentesis performed on 7/6 produced 6.2 L of peritoneal fluid. If the patient's fluid re-accumulates in 2-3 days will further discuss the need for permanent drainage.    Oncology Treatment Plan:   OP GASTRIC/ ESOPHAGEAL fam-trastuzumab deruxtecan-nxki Q3W    Medications:  Continuous Infusions:  Scheduled Meds:   ([facility]s Solution) 1:1:1:1 diphenhydrAMINE, aluminum-magnesium hydroxide-simethicone (Mylanta), LIDOcaine viscous, nystatin oral solution  10 mL Oral QID    albumin human 25%  12.5 g Intravenous Once    furosemide  20 mg Oral Daily    heparin (porcine)  5,000 Units Subcutaneous Q8H    melatonin  3 mg Oral Nightly    nicotine  1 patch Transdermal Daily    pantoprazole  40 mg Oral BID AC    potassium bicarbonate  20 mEq Oral Once    spironolactone  25 mg Oral Daily     PRN Meds:  Current Facility-Administered Medications:     acetaminophen, 650 mg, Oral, Q4H PRN    dextrose 50%, 12.5 g, Intravenous, PRN    dextrose 50%, 25 g, Intravenous, PRN    glucagon (human recombinant), 1 mg, Intramuscular, PRN    glucose, 16 g, Oral, PRN    glucose, 24 g, Oral, PRN    HYDROmorphone, 0.5 mg, Intravenous, Q4H PRN    naloxone, 0.02 mg, Intravenous, PRN    prochlorperazine, 5 mg, Intravenous, Q6H PRN    sodium chloride 0.9%, 10 mL, Intravenous, Q12H PRN     Review of Systems  Objective:     Vital Signs (Most Recent):  Temp: 97.4 °F (36.3 °C) (07/06/25 1457)  Pulse: 105 (07/06/25 1457)  Resp: 19 (07/06/25 1457)  BP: 131/83  (07/06/25 1457)  SpO2: 95 % (07/06/25 1457) Vital Signs (24h Range):  Temp:  [97.1 °F (36.2 °C)-98.4 °F (36.9 °C)] 97.4 °F (36.3 °C)  Pulse:  [105-125] 105  Resp:  [16-20] 19  SpO2:  [90 %-96 %] 95 %  BP: (112-142)/(66-91) 131/83     Weight: 55.8 kg (123 lb)  Body mass index is 19.26 kg/m².  Body surface area is 1.62 meters squared.      Intake/Output Summary (Last 24 hours) at 7/6/2025 1547  Last data filed at 7/6/2025 0639  Gross per 24 hour   Intake 100 ml   Output 300 ml   Net -200 ml        Physical Exam  HENT:      Head: Normocephalic.   Eyes:      Pupils: Pupils are equal, round, and reactive to light.   Cardiovascular:      Pulses: Normal pulses.   Pulmonary:      Effort: Pulmonary effort is normal. No respiratory distress.   Abdominal:      General: There is distension (moderate).      Palpations: Abdomen is soft.      Tenderness: There is no abdominal tenderness.   Musculoskeletal:      Cervical back: Normal range of motion.   Neurological:      Mental Status: He is alert.          Significant Labs:   All pertinent labs from the last 24 hours have been reviewed.    Diagnostic Results:  I have reviewed and interpreted all pertinent imaging results/findings within the past 24 hours.

## 2025-07-06 NOTE — PROCEDURES
"Jone Lugo is a 69 y.o. male patient.    Temp: 97.4 °F (36.3 °C) (25)  Pulse: 105 (25)  Resp: 19 (25)  BP: 131/83 (25)  SpO2: 95 % (25)  Weight: 55.8 kg (123 lb) (25)  Height: 5' 7" (170.2 cm) (25)       Paracentesis    Date/Time: 2025 2:20 PM  Location procedure was performed: [facility] HEMATOLOGY/ONCOLOGY    Performed by: Sudeep Tello MD  Authorized by: Sarah Chapin MD  Assisting provider: Sarah Chapin MD  Pre-operative diagnosis: Malignant Ascites  Post-operative diagnosis: Malignant Ascites  Consent Done: Yes  Consent: Verbal consent obtained. Written consent obtained  Consent given by: patient  Patient understanding: patient states understanding of the procedure being performed  Patient consent: the patient's understanding of the procedure matches consent given  Procedure consent: procedure consent matches procedure scheduled  Relevant documents: relevant documents present and verified  Test results: test results available and properly labeled  Site marked: the operative site was marked  Imaging studies: imaging studies not available  Patient identity confirmed: , MRN and verbally with patient  Time out: Immediately prior to procedure a "time out" was called to verify the correct patient, procedure, equipment, support staff and site/side marked as required.  Initial or subsequent exam: subsequent  Procedure purpose: diagnostic and therapeutic  Indications: abdominal discomfort secondary to ascites and secondary bacterial peritonitis  Anesthesia: local infiltration    Anesthesia:  Local Anesthetic: lidocaine 1% without epinephrine  Anesthetic total: 10 mL    Patient sedated: no  Preparation: Patient was prepped and draped in the usual sterile fashion.  Description of findings: Dark yellow ascitic fluid. No blood clots seen.   Needle gauge: 20  Ultrasound guidance: yes  Puncture site: left lower " quadrant  Fluid removed: 6200(ml)  Fluid appearance: serous  Significant surgical tasks conducted by the assistant(s): None  Complications: No  Estimated blood loss (mL): 0  Specimens: Yes  Implants: No  Patient tolerance: Patient tolerated the procedure well with no immediate complications  Comments: Approximately 6.2L of dark yellow serous fluid removed. Patient tolerated procedure well. Specimens sent to lab for analysis.          7/6/2025

## 2025-07-07 NOTE — PLAN OF CARE
Plan of care reviewed with patient and family.  Fall precautions maintained, side rails up x2, call light in reach, bed in low position and locked, nonskid socks on.  Requesting pain medication during the shift and getting relief.  Received k-riders for k of 2.9.  Received magnesium for mag of 1.5,  Voiding without difficulty.  Instructed to call for assistance , verbalized understanding.  His brother visited today and spoke with the doctors.

## 2025-07-07 NOTE — PROGRESS NOTES
Per Rona Gonsales, [facility], doesn't send patients out for taps while in inpt rehab.  Sw'er will proceed with alternate plan once discuss with interdisciplinary team.      YANELIS Hopson, LMSW  [facility] - Oncology Dept  Email:[email or web address]  Office ph# [phone]

## 2025-07-07 NOTE — CONSULTS
"DTR attempted to provide IDDSI Level 4 Pureed Nutrition Therapy for esophageal stent. Pt and pt's brother at bedside declined the discussion/education, both stating that it is not necessary or desired. Pt endorses no difficulty chewing, swallowing, or tolerating regular food, but notes he is not hungry/eating due to "fluid in his stomach". Pt expressed desire for MD to change the current diet order. Education materials left at bedside.    Follow-up: Yes  Please re-consult as needed.    Thanks!  "

## 2025-07-07 NOTE — PT/OT/SLP PROGRESS
"Speech Language Pathology Treatment    Patient Name:  Jone Lugo   MRN:  [id number]  Admitting Diagnosis: ANTOINE (acute kidney injury)    Recommendations:                 General Recommendations:  Dysphagia therapy  Diet recommendations:   IDDSI Level 4: Puree diet, IDDSI Level 0: Thin liquids    Aspiration Precautions: Small bites/sips and Standard aspiration precautions   General Precautions: Standard, fall  Communication strategies:  none  Discharge recommendations:  Low Intensity Therapy   Barriers to Discharge:  None    Assessment:     Jone Lugo is a 69 y.o. male with an SLP diagnosis of Dysphagia. Per chart review, GI recommended a full liquid diet s/p stent. Per GI, advance diet as tolerated, not to exceed a puree/thin diet indefinitely. Pt advanced to puree/thin diet on 7/4. Unable to assess tolerance of pureed solids this date 2/2 pt politely declined PO trials of puree. SLP will continue to follow.     Subjective     Spoke with RN prior to SLP session.     Pt awake/alert. Family present at bedside toward the end of the session.     "My belly is full."     Pain/Comfort:  Pain Rating 1: 0/10  Pain Rating Post-Intervention 1: 0/10    Respiratory Status: Room air    Objective:     Has the patient been evaluated by SLP for swallowing?   Yes  Keep patient NPO? No       Pt seen for ongoing swallow assessment. Per chart review, GI recommended a full liquid diet s/p stent. Per GI, advance diet as tolerated, not to exceed a puree/thin diet indefinitely.  Pt advanced to puree/thin diet on 7/4. Since diet upgraded to puree/thin, pt denies consuming pureed solids 2/2 abdominal fluid and abdominal fullness. PCT and SLP repositioned pt in bed and elevated HOB for safety precautions. Pt presented small sips of water (~2 oz) via straw sips. Adequate bolus prep noted. No overt signs of airway threat observed with thin liquids. Despite providing pt with a variety of pureed options, pt politely declined PO trials " of pureed solids. Recommend continuation of puree/thin liquid diet as tolerated given implementation of safe swallow precautions. SLP provided education to pt and family re: role of the SLP, diet recs, signs of airway threat, aspiration precautions, and SLP POC. Pt and family in agreement. All questions posed outside of the SLP's scope of practice were deferred to the medical team. Pt with all needs met prior to SLP exit.        Goals:   Multidisciplinary Problems       SLP Goals          Problem: SLP    Goal Priority Disciplines Outcome   SLP Goal     SLP    Description: Speech Language Pathology Goals  Goals expected to be met by 7/3:  1. Pt will tolerate full liquid diet and thin liquids without s/s of aspiration.   2. Pt will participate in ongoing swallowing assessment to determine if able to tolerate more advanced textures.                                Plan:     Patient to be seen:  3 x/week   Plan of Care expires:  07/27/25  Plan of Care reviewed with:  patient, family   SLP Follow-Up:  Yes       Time Tracking:     SLP Treatment Date:   07/07/25  Speech Start Time:  0946  Speech Stop Time:  1002     Speech Total Time (min):  16 min    Billable Minutes: Treatment Swallowing Dysfunction 8 and Self Care/Home Management Training 8    07/07/2025

## 2025-07-07 NOTE — PROGRESS NOTES
Per rounds, patient had a esophageal stent placed and tolerating liquids.  Paracentesis yesterday, 6 liters of fluid removed.  Nutritionist consult placed for stent friendly diet.  Palliative consulted to determine GOC.  [facility] to [facility] inpt rehab to see if they can do a tap twice a week as well as inpt rehab. Patient would possibly be interested in rehab closest to [place], [place] while brother lives in [facility].  Ayaz Lugo, brother/ph# [phone], lives in [place], [place].  Logistics need to be work out with patient and brother before in pt rehab placement can be determine.      [facility]u with patient living arrangements.      YANELIS Hopson, SW  [facility] - Oncology Dept  Email:[email or web address]  Office ph# [phone]

## 2025-07-07 NOTE — SUBJECTIVE & OBJECTIVE
Interval History: NAEON. Afebrile. VSS.  Patient is feeling much improved after repeat paracentesis on 7/6. He denies any episodes of diarrhea overnight. Stopped the patient's spironolactone and lasix due to the increased likelihood that his ascites is a product of his malignancy progression. Discussed the importance of attempting puree diet now that his abdominal discomfort has resolve s/p paracentesis. Stressed the importance of physical therapy in the hospital to increase patient's strength. The patient expresses desire to continue to pursue chemotherapy as outpatient. Had a long discussion on how he must regain his strength before going to outpatient oncology to resume therapy. Patient will have a discussion with his brother pertaining to where he will live after discharge. Patient refused esophageal stent diet education.    Oncology Treatment Plan:   OP GASTRIC/ ESOPHAGEAL fam-trastuzumab deruxtecan-nxki Q3W    Medications:  Continuous Infusions:  Scheduled Meds:   ([facility]s Solution) 1:1:1:1 diphenhydrAMINE, aluminum-magnesium hydroxide-simethicone (Mylanta), LIDOcaine viscous, nystatin oral solution  10 mL Oral QID    heparin (porcine)  5,000 Units Subcutaneous Q8H    melatonin  3 mg Oral Nightly    nicotine  1 patch Transdermal Daily    pantoprazole  40 mg Oral BID AC    potassium bicarbonate  40 mEq Oral Q4H    potassium chloride  10 mEq Intravenous Q1H     PRN Meds:  Current Facility-Administered Medications:     acetaminophen, 650 mg, Oral, Q4H PRN    dextrose 50%, 12.5 g, Intravenous, PRN    dextrose 50%, 25 g, Intravenous, PRN    glucagon (human recombinant), 1 mg, Intramuscular, PRN    glucose, 16 g, Oral, PRN    glucose, 24 g, Oral, PRN    HYDROmorphone, 0.5 mg, Intravenous, Q4H PRN    naloxone, 0.02 mg, Intravenous, PRN    prochlorperazine, 5 mg, Intravenous, Q6H PRN    sodium chloride 0.9%, 10 mL, Intravenous, Q12H PRN     Review of Systems  Objective:     Vital Signs (Most Recent):  Temp: 97.8 °F  (36.6 °C) (07/07/25 1131)  Pulse: 105 (07/07/25 1131)  Resp: 18 (07/07/25 1131)  BP: 129/69 (07/07/25 1131)  SpO2: (!) 92 % (07/07/25 1131) Vital Signs (24h Range):  Temp:  [97.3 °F (36.3 °C)-98.4 °F (36.9 °C)] 97.8 °F (36.6 °C)  Pulse:  [] 105  Resp:  [16-19] 18  SpO2:  [91 %-97 %] 92 %  BP: (113-131)/(55-83) 129/69     Weight: 55.8 kg (123 lb)  Body mass index is 19.26 kg/m².  Body surface area is 1.62 meters squared.      Intake/Output Summary (Last 24 hours) at 7/7/2025 1342  Last data filed at 7/7/2025 1256  Gross per 24 hour   Intake 680 ml   Output 7100 ml   Net -6420 ml        Physical Exam  HENT:      Head: Normocephalic.   Eyes:      Pupils: Pupils are equal, round, and reactive to light.   Cardiovascular:      Pulses: Normal pulses.   Pulmonary:      Effort: Pulmonary effort is normal. No respiratory distress.   Abdominal:      General: There is no distension (moderate).      Palpations: Abdomen is soft.      Tenderness: There is no abdominal tenderness.   Musculoskeletal:      Cervical back: Normal range of motion.   Neurological:      Mental Status: He is alert.          Significant Labs:   All pertinent labs from the last 24 hours have been reviewed.    Diagnostic Results:  I have reviewed and interpreted all pertinent imaging results/findings within the past 24 hours.

## 2025-07-07 NOTE — PLAN OF CARE
Pt A&Ox4, VSS, afebrile. The pt received his PRN IV hydromorphone this shift, it was effective in treating his pain. The pt has had no nausea or vomiting this shift. The pt is currently resting, pt currently resting.

## 2025-07-07 NOTE — PROGRESS NOTES
Palliative Medicine  Progress Note     Patient Name: Jone Lugo   MRN: [id number]     Admission Date: 6/25/2025   Hospital Length of Stay: 12     Attending Provider: Maddison Weston MD   Consulting Provider: Michael Loyd MD  Primary Care Physician: [place], Primary Doctor     Principal Problem: ANTOINE (acute kidney injury)     Patient information was obtained from patient, past medical records, and primary team.    Assessment/Plan:   Jone Lugo is a 69 y.o. male on whom Palliative Care is consulted for assistance with clarification of goals of care and pain as it pertains to their diagnoses of esophageal cancer in the setting of recent stent placement 2/2 malignant obstruction.    Impression:  Patient notably frustrated and minimally engaging during evaluation. Goals are to have more direct communication regarding treatment plan. Pain adequately managed on current regimen. Per primary team, plan to bring up Hospice as recommendation however this was not discussed by me.     Billing:  Complexity Data to Review Risk   1 acute / chronic illness posing a threat to life or bodily function including  Esophageal cancer Reviewed prior external notes from Hematology/Oncology, PM&R, and Wound Care, and Nutrition    Reviewed investigative results including CBC and CMP    Discussed management of patient with primary team Drug therapy requiring intensive monitoring for toxicity (ex. Opioids)     We did not engage in GoC/ACP discussion during today's encounter    Recommendations:  #Cancer-related pain  START Oxycodone liquid solution 5mg PO q4H PRN  Continue Dilaudid 0.5mg IV q4H PRN   reviewed on: 07/07/2025; no opioid use documented    Code status: Full Code     Recommendations communicated directly to primary team on 07/07/2025 via in-person handoff and EPIC chat.    Subjective:   Brief HPI:   Jone Lugo is a 69 y.o. 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 Found to have esophageal obstruction now s/p esophageal stent placement which leaves him unable to tolerate solid food again. He has expressed that he would not want a feeding tube. Has had recurrent paracentesis for rapidly accumulating malignant ascites. Palliative care consulted for assistance with GoC.      Today's Update:   Patient sitting up in bed with  visiting; permission obtained for guest to be present during discussion. Patient frustrated with lack off communication from teams and clarity regarding treatment plan. Also wishes that his OP Oncologist be involved in coming up with treatment plan. He reports abdominal distention that he finds painful, but unable to describe it in more detail. States that IV opioids alleviate pain, but not fullness sensation. He feels very "dry" and is constantly wanting ice water. Reports sleeping well at night. Not interested in talking much today.     [place] Symptom Assessment (ESAS):   Pain: Mild  Dyspnea: None  Anxiety: Not assessed  Nausea: None  Depression: Not assessed  Anorexia: Severe  Fatigue: None  Insomnia: None  Restlessness: None  Agitation: None    Karnofsky Performance Scale: 20% - Very sick, hospitalization necessary: active treatment necessary    Objective:   Vitals: Temp: 98.2 °F (36.8 °C) (07/07/25 1543)  Pulse: 100 (07/07/25 1543)  Resp: 16 (07/07/25 1543)  BP: 123/82 (07/07/25 1543)  SpO2: (!) 94 % (07/07/25 1543)    Medications:  Continuous Infusions: None    Scheduled Meds:    ([facility]s Solution) 1:1:1:1 diphenhydrAMINE, aluminum-magnesium hydroxide-simethicone (Mylanta), LIDOcaine viscous, nystatin oral solution  10 mL Oral QID    heparin (porcine)  5,000 Units Subcutaneous Q8H    melatonin  3 mg Oral Nightly    nicotine  1 patch Transdermal Daily    pantoprazole  40 mg Oral BID AC       PRN Meds:  Current Facility-Administered Medications:     " "acetaminophen, 650 mg, Oral, Q4H PRN    dextrose 50%, 12.5 g, Intravenous, PRN    dextrose 50%, 25 g, Intravenous, PRN    glucagon (human recombinant), 1 mg, Intramuscular, PRN    glucose, 16 g, Oral, PRN    glucose, 24 g, Oral, PRN    HYDROmorphone, 0.5 mg, Intravenous, Q4H PRN    naloxone, 0.02 mg, Intravenous, PRN    prochlorperazine, 5 mg, Intravenous, Q6H PRN    sodium chloride 0.9%, 10 mL, Intravenous, Q12H PRN     Physical Exam:  Constitutional:       General: NAD. They appear comfortable.      Appearance: They are ill-appearing. They are not diaphoretic.   HENT:      Head: Normocephalic and atraumatic.   Pulmonary:      Effort: No respiratory distress. No increased work of breathing.      RR 18; nasal cannula in place  Abdominal:      General: There is distention.     Palpation: Diffuse tenderness, most prominent in suprapubic area.    Skin:     General: Skin is warm and dry.      Coloration: Skin is not jaundiced.      Findings: Bruising present on extremities.   Neurological:      General: Patient is alert. No focal neurological deficits. AAOx3.  Psychiatric:      Mood: "not good"     Affect: blunted; mood-congruent     Behaviour: Irritable, oppositional, minimal engagement.      Labs:   Creatinine   Date Value Ref Range Status   07/07/2025 1.5 (H) 0.5 - 1.4 mg/dL Final     POC Creatinine   Date Value Ref Range Status   04/24/2024 1.2 0.5 - 1.4 mg/dL Final      Hemoglobin   Date Value Ref Range Status   03/21/2025 12.8 (L) 14.0 - 18.0 g/dL Final     HGB   Date Value Ref Range Status   07/07/2025 7.1 (L) 14.0 - 18.0 gm/dL Final      Albumin   Date Value Ref Range Status   07/07/2025 1.5 (L) 3.5 - 5.2 g/dL Final   07/06/2025 1.8 (L) 3.5 - 5.2 g/dL Final   07/05/2025 1.8 (L) 3.5 - 5.2 g/dL Final   03/21/2025 3.8 3.5 - 5.2 g/dL Final   03/11/2025 3.4 (L) 3.5 - 5.2 g/dL Final   02/26/2025 3.3 (L) 3.5 - 5.2 g/dL Final        Imaging & Investigations: reviewed on 07/07/2025    Thank you for your consult. I will " follow-up with patient. Please contact us if you have any additional questions.    Signed,  Michael Loyd MD  Palliative Medicine & Psychiatry  [facility]

## 2025-07-07 NOTE — PROGRESS NOTES
Sw'er met with patient and elma Lugo to assist with MPOA.  MPOA has been completed and witnessed by bedside RN and Sw'er.  Original has been placed in chart and copies given to patient's brother.      Per Dr. Weston, warren plan changed from inpt rehab to hospice care.     YANELIS Hopson, SW  [facility] - Oncology Dept  Email:[email or web address]  Office ph# [phone]

## 2025-07-07 NOTE — PROGRESS NOTES
Anupam Alcantara - Oncology ([facility])  Hematology/Oncology  Progress Note    Patient Name: Jone Lugo  Admission Date: 6/25/2025  Hospital Length of Stay: 12 days  Code Status: Full Code     Subjective:     HPI:  70 yo M esophageal adenocarcinoma stage III, smoking presenting with poor PO intake.     In the ED initially afebrile 87/55 improving to 146 systolic after 2 LR resuscitation on RA. Wbcs 13.51, Hb 8.8 ( BL 11.2), Cr 8.0 ( BL 1.5-1.8), bicarb 13.  (123 BL) . Procal 1.63. Lactate 1.8. A CT chest A/P significant for moderate volume abdominopelvic ascites and mesenteric congestion. UA not collected. He was treated with vanc/cefepime and fluid resucitated with 2L of LR.     6/25 oncology appointment, pt reported worsening dysphagia with poor PO intake with weakness.   In the ED he notes poor food/water intake he attributes to stomach pain after swallowing food. He also notes loose watery stools that has not improved with immodium. Pt reports symptoms started after he most recent chemotherapy. Associated with lightheadedness worse with standing, fatigue. Has had decreased urine output. Denies chest pain, shortness of breath, melena, hematochezia. Otherwise noted an episode severe dysphagia recently with swallowing water and pills.     Interval History: NAEON. Afebrile. VSS.  Patient is feeling much improved after repeat paracentesis on 7/6. He denies any episodes of diarrhea overnight. Stopped the patient's spironolactone and lasix due to the increased likelihood that his ascites is a product of his malignancy progression. Discussed the importance of attempting puree diet now that his abdominal discomfort has resolve s/p paracentesis. Stressed the importance of physical therapy in the hospital to increase patient's strength. The patient expresses desire to continue to pursue chemotherapy as outpatient. Had a long discussion on how he must regain his strength before going to outpatient oncology to resume  therapy. Patient will have a discussion with his brother pertaining to where he will live after discharge. Patient refused esophageal stent diet education.    Oncology Treatment Plan:   OP GASTRIC/ ESOPHAGEAL fam-trastuzumab deruxtecan-nxki Q3W    Medications:  Continuous Infusions:  Scheduled Meds:   ([facility]s Solution) 1:1:1:1 diphenhydrAMINE, aluminum-magnesium hydroxide-simethicone (Mylanta), LIDOcaine viscous, nystatin oral solution  10 mL Oral QID    heparin (porcine)  5,000 Units Subcutaneous Q8H    melatonin  3 mg Oral Nightly    nicotine  1 patch Transdermal Daily    pantoprazole  40 mg Oral BID AC    potassium bicarbonate  40 mEq Oral Q4H    potassium chloride  10 mEq Intravenous Q1H     PRN Meds:  Current Facility-Administered Medications:     acetaminophen, 650 mg, Oral, Q4H PRN    dextrose 50%, 12.5 g, Intravenous, PRN    dextrose 50%, 25 g, Intravenous, PRN    glucagon (human recombinant), 1 mg, Intramuscular, PRN    glucose, 16 g, Oral, PRN    glucose, 24 g, Oral, PRN    HYDROmorphone, 0.5 mg, Intravenous, Q4H PRN    naloxone, 0.02 mg, Intravenous, PRN    prochlorperazine, 5 mg, Intravenous, Q6H PRN    sodium chloride 0.9%, 10 mL, Intravenous, Q12H PRN     Review of Systems  Objective:     Vital Signs (Most Recent):  Temp: 97.8 °F (36.6 °C) (07/07/25 1131)  Pulse: 105 (07/07/25 1131)  Resp: 18 (07/07/25 1131)  BP: 129/69 (07/07/25 1131)  SpO2: (!) 92 % (07/07/25 1131) Vital Signs (24h Range):  Temp:  [97.3 °F (36.3 °C)-98.4 °F (36.9 °C)] 97.8 °F (36.6 °C)  Pulse:  [] 105  Resp:  [16-19] 18  SpO2:  [91 %-97 %] 92 %  BP: (113-131)/(55-83) 129/69     Weight: 55.8 kg (123 lb)  Body mass index is 19.26 kg/m².  Body surface area is 1.62 meters squared.      Intake/Output Summary (Last 24 hours) at 7/7/2025 1342  Last data filed at 7/7/2025 1256  Gross per 24 hour   Intake 680 ml   Output 7100 ml   Net -6420 ml        Physical Exam  HENT:      Head: Normocephalic.   Eyes:      Pupils: Pupils are equal,  round, and reactive to light.   Cardiovascular:      Pulses: Normal pulses.   Pulmonary:      Effort: Pulmonary effort is normal. No respiratory distress.   Abdominal:      General: There is no distension (moderate).      Palpations: Abdomen is soft.      Tenderness: There is no abdominal tenderness.   Musculoskeletal:      Cervical back: Normal range of motion.   Neurological:      Mental Status: He is alert.          Significant Labs:   All pertinent labs from the last 24 hours have been reviewed.    Diagnostic Results:  I have reviewed and interpreted all pertinent imaging results/findings within the past 24 hours.  Assessment/Plan:     * ANTOINE (acute kidney injury)  ANTOINE is likely due to pre-renal azotemia due to intravascular volume depletion 2/2 poor fluid intake, diarrhea, BP medications. Baseline creatinine is 1.5-1.8. Most recent creatinine and eGFR are listed below. No signs of fluid overload. With metabolic acidosis, bicarb 13 in ED. EUGENIA negative for obstruction.    Recent Labs     07/04/25  1651 07/04/25  2121 07/05/25  0314   CREATININE 1.5* 1.5* 1.4   EGFRNORACEVR 50* 50* 54*     Urine creatinine 142  Urine protein 78  Urine protein/ cr ratio 0.56  Urine urea nitrogen 319  Urine potassium 34     Plan  - Avoid nephrotoxins and renally dose meds for GFR listed above  - Monitor urine output, serial BMP, and adjust therapy as needed  - Nephrology recommends continuing IV fluids at 100cc/hr, replace K with potassium chloride 10 mEq  - holding home gabapentin and amlodipine-benzepril  - IVF resucitation  - f/u nephrology      Malignant ascites  CT 6/25 on admission with moderate volume abdominopelvic ascites and mesenteric congestion that was not appreciated on initial exam. 6/30 with distension concerning for worsening ascites 2/2 hypoalbuminemia (1.5) causing discomfort. Concern that increasing pressure is altering renal perfusion. Surgery unable to perform PEG w/ ascites. S/p drain (5L).     - Peritoneal  labs clear    Repeat ascites on 7/3 due to increasing abdominal distention. 4.2 L removed  7/5- Patient's abdomen is distended. Will attempt to medically manage with spironolactone 25 mg and furosemide 20 mg and reevaluate.   07/06- Repeat paracentesis, 6.2 L of peritoneal fluid removed. Will consider increasing spironolactone from 25 mg to 50 mg. If fluid re-accumulates in 2-3 days will further discuss permanent drain. Albumin repleated      Leukocytosis  WBC increased from 12 to 22 s/p esophageal stenting from [facility]. Septic workup ordered with no infective focus found. WBC downtrended to 19 before slightly increasing back up to 21 on 7/5. Will continue to closely monitor      Chronic heart failure with preserved ejection fraction  Results for orders placed during the hospital encounter of 04/22/25    Echo    Interpretation Summary    Left Ventricle: The left ventricle is normal in size. Mildly increased wall thickness. There is normal systolic function with a visually estimated ejection fraction of 55 - 60%. Global longitudinal strain is -17.1%. Global longitudinal strain is normal. Grade I diastolic dysfunction.    Right Ventricle: The right ventricle is normal in size. Wall thickness is normal. Systolic function is normal.    IVC/SVC: Normal venous pressure at 3 mmHg.     Developing tachycardia starting 6/29 possibly related to increased fluid load vs pain/discomfort. Less likely PE as has been on heparin  - Echo technically flawed though with preserved ejection fraction       Hyperglycemia  2/2 TPN    Last A1c reviewed-   Lab Results   Component Value Date    HGBA1C 5.8 (H) 07/01/2024     Most recent fingerstick glucose reviewed-   Recent Labs   Lab 06/28/25  2036 06/29/25  0045 06/29/25  0426 06/29/25  0735   POCTGLUCOSE 201* 195* 217* 209*     Current correctional scale  Medium  Maintain anti-hyperglycemic dose as follows-   Antihyperglycemics (From admission, onward)      Start     Stop Route Frequency Ordered     06/29/25 0907  insulin aspart U-100 pen 0-10 Units         -- SubQ Every 4 hours PRN 06/29/25 0908              Acute hypoxemic respiratory failure  Patient with Hypoxic Respiratory failure which is Acute.  he is not on home oxygen. Supplemental oxygen was provided and noted-    Ddx: pulmonary edema from IVF resuscitation vs aspiration/micro aspiration 2/2 esophageal cancer. Noted history of grade I diastolic function on echo 4/2025. Oxygen requirements around the time of being cleared for clear liquid diet. He is asymptomatic.     CXR with evidence of atelectasis, unlikely pulmonary edema. JVP noted 6/30   - f/u echo   - incentive spirometry   - wean o2 >90%  - possibly related to undiagnosed COPD 2/2 smoking history, had plans for formal PFT but was not done    At risk for malnutrition  RD consulted  TPN ordered and possible PEG tube placement. Pt is NPO currently    Transaminitis  S/p kaleb. No findings supportive of liver/biliary pathology on imaging. GGT elevated as well. Likely shock liver. Will continue to monitor CMP.    - improving with IVF     Diarrhea      Reports onset of loose watery diarrhea since previous chemotherapy treatment. Possibly related to chemotherapy. Likely contributed to hypovolemic shock and acute renal failure.   Improving. Negative stool studies/c.diff ON 06/26    Reported 2 more episodes of diarrhea, repeating stool studies.    7/6- Patient reports 3 more episodes of diarrhea. Per C. Diff flow chart, if has one more episode of diarrhea within 24 hours, will repeat C. Diff studies. Ordering stool culture.    Hyperphosphatemia  Patient's most recent phosphorus results are listed below.   Recent Labs     06/25/25  1100   PHOS 5.7*       Plan  - Patient's hyperphosphatemia is stable  - see acute renal failure     Weakness  2/2 poor po intake, hypovolemia.      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 Most recent hemoglobin and hematocrit are listed below.  Baseline 11.2. Likely 2/2 chemo. Less likely GIB. He has noted ongoing abdominal pain with associated belching. No hematochezia/melena. ED performed MONTRELL negative negative guaiac fecal occult blood test.     Recent Labs     06/26/25  0240 06/27/25  0305 06/28/25  0300   HGB 8.2* 8.2* 7.8*   HCT 23.2* 23.6* 23.2*       Plan  - Monitor serial CBC: Daily  - Transfuse PRBC if patient becomes hemodynamically unstable, symptomatic or H/H drops below 7/21.  - Patient has not received any PRBC transfusions to date  - Patient's anemia is currently stable  - holding anticoaugulation for now will re-evaluate cbc tomorrow   - f/u iron studies with low iron saturation, may benefit from iron infusions as outpatient       Hypovolemic shock  Vitals:    06/28/25 1936 06/28/25 2300 06/29/25 0538 06/29/25 0820   BP: 113/75 105/68 98/65 105/71    06/29/25 1127 06/29/25 1553 06/29/25 1953 06/29/25 2335   BP: 121/76 98/67 111/71 113/65    06/30/25 0524 06/30/25 0830   BP: 115/68 131/69     Presented with BP  87/55 responding with 2 LR fluid resuscitation. Lactic acid wnl. Likely 2/2 to poor fluid tolerance 2/2 esophageal adenocarcinoma. Previous EGD in April 2025 with partial obstruction. States that placing an esophageal stent with restrictions of types of foods he would be able to eat is not inline with his goals. He is more amendable to PEG placement to provide extra support for fluids and nutrition. Do not suspect septic shock at this time as no findings on CT chest A/P. No fevers. Wbcs wnl. BC NGTD. Improving after several days of IVF resucitation    - GI unable to place PEG 2/2 partial obstruction of the esophagus.  - IR unable to advance NGT for procedure 2/2 partial obstruction.   - Will consult surgical oncology for consultation of PEG tube   - will attempt following resolution of acute renal failure and if BP  wnl    - Surgical oncology consulted [facility]      - IVF maintenance fluids with IVF bolus prn when hypotensive      Esophageal dysphagia  See esophageal carcinoma    Obstruction of esophagus  See esophageal adenocarcinoma      Esophageal adenocarcinoma  69M with esophageal adenocarcinoma initially presenting with dysphagia (improved post-EUS dilation). Biopsy confirmed poorly differentiated adenocarcinoma, pMMR, HER2+, PD-L1 0. Received neoadjuvant carboplatin/paclitaxel + radiation (non-surgical). Imaging showed stable disease. Recurrent tumor confirmed on EGD (12/2024); started FOLFOX + trastuzumab, with stable imaging after 4 cycles. After 6 cycles, developed worsening dysphagia due to local tumor progression. Declined stent/PEG at last hospitalization. Switched to trastuzumab deruxtecan (TDxD). Prior admission presented for cycle 4 with significant dysphagia, poor PO intake, Cr 7.7, hypotension, weakness. Treatment held, sent to ED. Previous EGD in April 2025 with partial obstruction. States that placing an esophageal stent with restrictions of types of foods he would be able to eat is not inline with his goals. He is more amendable to PEG placement to provide extra support for fluids and nutrition.     - likely causing poor po intake > hypovolemic shock > acute renal failure  - Surgical oncology recommending strict NPO due to his report that he cannot tolerate anything by mouth due to the aspiration risk. We would not consider any intervention until his hypovolemia and acute renal failure are addressed.     - Started TPN on 6/26  - Maintenance IVF @100 cc/hr    Esophageal stent placed by [facility] on 7/2                   Genesis Sarah DO  Hematology/Oncology  Anupam Alcantara - Oncology ([facility])

## 2025-07-07 NOTE — CONSULTS
Inpatient consult to Physical Medicine Rehab  Consult performed by: Apolonia Soto NP  Consult ordered by: Maddison Weston MD  Reason for consult: Rehab      Consult received.     NISREEN Ledezma, FNP-C  Physical Medicine & Rehabilitation   07/07/2025

## 2025-07-07 NOTE — PROGRESS NOTES
"Anupam Alcantara - Medical / Clinical  Nephrology  Progress Note      Patient Name: Jone Lugo   MRN: [id number]   Current Provider: Maddison Weston MD  Primary Care Provider: [place] Primary [facility]   Admission Date: 6/25/2025   Hospital Day: 12  Bed: [facility]/855 A  Principal Problem: ANTOINE (acute kidney injury)       SUBJECTIVE    Jone Lugo is a 69 y.o. male ,is admitted on 6/25/2025  with past medical history of   esophageal adenocarcinoma stage III, smoking    Admitted with abnormal vitals including hypotension from the chemotherapy center.     Last chemo was 3 weeks ago - since then not feeling well. Has low oral intake and not drinking enough. He has watery stools, passed 5 times yesterday. Took imodium. No stool today. Stool sample sent to RO C-diff.     This morning while  went for chemo he had low BP so he was sent for sepsis evaluation. In ED his BP was low (86) was given 2L IVF bolus and bladder scan showed 400mL. Sepsis workup done and was given vanco on 6/25. Blood Cx sent    Nephrology consulted for  ANTOINE 3 in setting of low PO intake, hypotension (lowest SBP 86), diarrhea. Also he received vancomycine on 6/26 and had urine retension of 400 mL with out feeling of passing urine.       OBJECTIVE     Vitals:  /70   Pulse 104   Temp 97.9 °F (36.6 °C) (Oral)   Resp 19   Ht 5' 7" (1.702 m)   Wt 55.8 kg (123 lb)   SpO2 97%   BMI 19.26 kg/m²    I/O last 3 completed shifts:  In: 200 [P.O.:200]  Out: 6600 [Urine:600; Other:6000]   Net IO Since Admission: 4,946.93 mL [07/07/25 0942]    Physical Examination:  HEENT: NC.   Neck: atrumatic  Heart: audible heart sounds, no edema  Lungs: CTAB, no w/r/r    MEDICATIONS & LABS           ([facility]s Solution) 1:1:1:1 diphenhydrAMINE, aluminum-magnesium hydroxide-simethicone (Mylanta), LIDOcaine viscous, nystatin oral solution  10 mL Oral QID    furosemide  20 mg Oral Daily    heparin (porcine)  5,000 Units Subcutaneous Q8H    magnesium sulfate 2 g IVPB  2 g " "Intravenous Once    melatonin  3 mg Oral Nightly    nicotine  1 patch Transdermal Daily    pantoprazole  40 mg Oral BID AC    potassium bicarbonate  40 mEq Oral Q4H    potassium chloride  10 mEq Intravenous Q1H    spironolactone  25 mg Oral Daily     Current Outpatient Medications   Medication Instructions    amlodipine-benazepril 10-20mg (LOTREL) 10-20 mg per capsule 1 capsule, Daily    azelastine (ASTELIN) 137 mcg (0.1 %) nasal spray 2 sprays    dexAMETHasone (DECADRON) 8 mg, Oral, Daily, On days 2-4 of each chemotherapy cycle.    etodolac (LODINE) 400 MG tablet 1 tablet, 2 times daily    fluticasone propionate (FLONASE) 50 mcg/actuation nasal spray 2 sprays    gabapentin (NEURONTIN) 300 mg    LIDOcaine-prilocaine (EMLA) cream Topical (Top), As needed (PRN)    meclizine (ANTIVERT) 25 mg, Oral, 3 times daily PRN    multivitamin with minerals tablet 1 tablet, Daily    OLANZapine (ZYPREXA) 5 MG tablet Take 1 tab at nighttime on nights 1 thru 3 of each chemotherapy cycle.    omeprazole (PRILOSEC) 40 MG capsule 1 capsule, Every morning    ondansetron (ZOFRAN) 8 mg, Oral, Every 8 hours PRN    pravastatin (PRAVACHOL) 40 mg    TURMERIC ORAL Take by mouth.       Relevant Data:  Trend: No results found for: "CYSTATINCSER"  Trend:   Lab Results   Component Value Date    BUN 54 (H) 07/07/2025    BUN 56 (H) 07/06/2025    BUN 50 (H) 07/05/2025     Trend:     POC HCO3   Date Value Ref Range Status   06/25/2025 18.6 (L) 24.0 - 28.0 mmol/l Final     Comment:     Value below reference range     Trend:   Vitals:    07/06/25 2245 07/06/25 2342 07/07/25 0417 07/07/25 0928   BP:  116/71 113/70    BP Location:       Patient Position:       Pulse:  96 97 104   Resp: 16 18 18 19   Temp:  97.3 °F (36.3 °C) 97.9 °F (36.6 °C)    TempSrc:  Oral Oral    SpO2:  95% 95% 97%   Weight:       Height:           I/O last 3 completed shifts:  In: 200 [P.O.:200]  Out: 6600 [Urine:600; Other:6000]   Net IO Since Admission: 4,946.93 mL [07/07/25 " "0942]  Trend: No results found for: "PHOSPHORUS"  Trend:   Lab Results   Component Value Date    HGB 7.1 (L) 07/07/2025    HGB 8.3 (L) 07/06/2025    HGB 7.6 (L) 07/05/2025         Trend:   Calcium   Date Value Ref Range Status   07/07/2025 7.3 (L) 8.7 - 10.5 mg/dL Final   07/06/2025 9.3 8.7 - 10.5 mg/dL Final   07/05/2025 8.9 8.7 - 10.5 mg/dL Final     Albumin   Date Value Ref Range Status   07/07/2025 1.5 (L) 3.5 - 5.2 g/dL Final   07/06/2025 1.8 (L) 3.5 - 5.2 g/dL Final       MEDICAL HISTORY    Past Medical History:  Past Medical History:   Diagnosis Date    HTN (hypertension)        Past Surgical History:  Past Surgical History:   Procedure Laterality Date    ESOPHAGOGASTRODUODENOSCOPY N/A 12/19/2024    Procedure: EGD (ESOPHAGOGASTRODUODENOSCOPY);  Surgeon: Pete Buenrostro MD;  Location: [facility] (4TH FLR);  Service: Endoscopy;  Laterality: N/A;  Medically Urgent      12/16 ref by Sahil Singer MD, portal-st  12/18 - pre-call complete; MB    ESOPHAGOGASTRODUODENOSCOPY N/A 4/11/2025    Procedure: EGD (ESOPHAGOGASTRODUODENOSCOPY);  Surgeon: Paxton Jurado MD;  Location: [facility] (2ND FLR);  Service: Endoscopy;  Laterality: N/A;  ref by  Sahil Singer MD, 2nd floor due to availability- portal -ml    ESOPHAGOGASTRODUODENOSCOPY N/A 7/2/2025    Procedure: EGD (ESOPHAGOGASTRODUODENOSCOPY);  Surgeon: Derick Juares MD;  Location: [facility] (2ND FLR);  Service: Endoscopy;  Laterality: N/A;    INSERTION OF TUNNELED CENTRAL VENOUS CATHETER (CVC) WITH SUBCUTANEOUS PORT Right 1/27/2025    Procedure: INSERTION, SINGLE LUMEN CATHETER WITH PORT, WITH FLUOROSCOPIC GUIDANCE, left poss right;  Surgeon: Chadd Velez MD;  Location: [facility] OR [address] [place] [address];  Service: General;  Laterality: Right;    LAPAROSCOPIC REPAIR OF INGUINAL HERNIA Right        Family History:   No family history on file.   Review of patient's allergies indicates:  No Known Allergies  Social History[1]         ASSESSMENT AND " PLAN:    Jone Lugo is a 69 y.o. male ,is admitted on 6/25/2025  with past medical history of    esophageal adenocarcinoma stage III, smoking.    Admitted with hypotension from chemotherapy unit.    Nephrology  is consulted for ANTOINE 3 in setting of low PO intake, hypotension (lowest SBP 86), diarrhea. Also he received vancomycine on 6/26 and had urine retension of 400 mL with out feeling of passing urine. Sepsis work up sent. He received 2L of IVF bolus      Impression:  ANTOINE KDIGO stage 3  Baseline Creatinine: 1.1-1.3  Creatinine at time of consult: 8  Etiology of ANTOINE: Likely pre-renal from low PO intake, diarrhea leading to hypotension. Secondary factor could be Vancomycin    6/26: Cr improved to 4.6 from 8 with IV fluid replacement.  6/30: Cr improving  7/7: Cr improved to 1.7 - will continue to monitor    Recommendations :   - Cr improving - will monitor for now - if further improvement noted then we may consider signing off.  - No urgent indications for dialysis at this time - Consent for dialysis taken  Monitor serum chemistries daily, strict intake and output Qshift , daily weights if able.  Renal protective measures: Please adjust medications for reduced clearance  Avoid nephrotoxic medications (NSAID, IV contrast)  Avoid ACEi and ARBs in the setting of ANTOINE  Maintain MAP > 65    Transfuse for Hb <7    Thank you for allowing us to participate in the care of this patient.  Plan discussed with attending. Please call with any questions or concerns.     Jay Juarez MD.  Clinical Nephrology Fellow, PGY-4  [facility] [address]          [1]   Social History  Socioeconomic History    Marital status: Single   Tobacco Use    Smoking status: Every Day     Current packs/day: 1.00     Types: Cigarettes    Smokeless tobacco: Never   Substance and Sexual Activity    Alcohol use: Yes     Alcohol/week: 1.0 standard drink of alcohol     Types: 1 Glasses of wine per week     Comment: rarely    Drug  use: Never     Social Drivers of Health     Financial Resource Strain: Patient Declined (6/25/2025)    Overall Financial Resource Strain (CARDIA)     Difficulty of Paying Living Expenses: Patient declined   Food Insecurity: Patient Declined (6/25/2025)    Hunger Vital Sign     Worried About Running Out of Food in the Last Year: Patient declined     Ran Out of Food in the Last Year: Patient declined   Transportation Needs: Patient Declined (6/25/2025)    PRAPARE - Transportation     Lack of Transportation (Medical): Patient declined     Lack of Transportation (Non-Medical): Patient declined   Physical Activity: Unknown (4/7/2025)    Received from [facility] [employer]    Exercise Vital Sign     Days of Exercise per Week: Patient declined   Recent Concern: Physical Activity - Insufficiently Active (3/17/2025)    Exercise Vital Sign     Days of Exercise per Week: 2 days     Minutes of Exercise per Session: 20 min   Stress: Patient Declined (6/25/2025)    [employer] - Occupational Stress Questionnaire     Feeling of Stress : Patient declined   Housing Stability: Patient Declined (6/25/2025)    Housing Stability Vital Sign     Unable to Pay for Housing in the Last Year: Patient declined     Number of Times Moved in the Last Year: 0     Homeless in the Last Year: Patient declined

## 2025-07-08 PROBLEM — E87.20 METABOLIC ACIDOSIS WITH RESPIRATORY ALKALOSIS: Status: ACTIVE | Noted: 2025-01-01

## 2025-07-08 PROBLEM — Z71.89 ACP (ADVANCE CARE PLANNING): Status: ACTIVE | Noted: 2025-01-01

## 2025-07-08 PROBLEM — E87.3 METABOLIC ACIDOSIS WITH RESPIRATORY ALKALOSIS: Status: ACTIVE | Noted: 2025-01-01

## 2025-07-08 NOTE — PLAN OF CARE
Recommendations    1. Continue pureed diet as tolerated     - please continue to document PO % intake via flowsheets       2. Encourage good intake    3. RD to monitor weight, labs, meds, intake, tolerance    Goals:   1. % nutritional needs met with diet during admission     2. Maintain weight during admission    Nutrition Goal Status: new  Communication of RD Recs:  (POC)    Nutrition Discharge Planning    Nutrition Discharge Planning: Diet texture per SLP

## 2025-07-08 NOTE — DISCHARGE SUMMARY
Anupam Alcantara - Oncology ([facility])  Hematology/Oncology  Discharge Summary      Patient Name: Jone Lugo  MRN: [id number]  Admission Date: 6/25/2025  Hospital Length of Stay: 13 days  Discharge Date and Time: 07/08/2025 1:38 PM  Attending Physician: Maddison Weston MD   Discharging Provider: Genesis Sarah DO  Primary Care Provider: [place], Primary Doctor    HPI: 70 yo M esophageal adenocarcinoma stage III, smoking presenting with poor PO intake.     In the ED initially afebrile 87/55 improving to 146 systolic after 2 LR resuscitation on RA. Wbcs 13.51, Hb 8.8 ( BL 11.2), Cr 8.0 ( BL 1.5-1.8), bicarb 13.  (123 BL) . Procal 1.63. Lactate 1.8. A CT chest A/P significant for moderate volume abdominopelvic ascites and mesenteric congestion. UA not collected. He was treated with vanc/cefepime and fluid resucitated with 2L of LR.     6/25 oncology appointment, pt reported worsening dysphagia with poor PO intake with weakness.   In the ED he notes poor food/water intake he attributes to stomach pain after swallowing food. He also notes loose watery stools that has not improved with immodium. Pt reports symptoms started after he most recent chemotherapy. Associated with lightheadedness worse with standing, fatigue. Has had decreased urine output. Denies chest pain, shortness of breath, melena, hematochezia. Otherwise noted an episode severe dysphagia recently with swallowing water and pills.     Procedure(s) (LRB):  EGD (ESOPHAGOGASTRODUODENOSCOPY) (N/A)     Hospital Course: BP initially tenuous MAPS <65 but responding to IVF bolus. Cr with significant improvement following IVF. Was started on TPN via port. Metabolic acidemia treated with bicarb infusion. Had mild elevation after reducing aggressive IVF resuscitation. He intermittently required oxygen for asymptomatic hypoxia, possibly related to COPD. CXR with evidence of atelectasis for which incentive spirometry was given. He developed worsening ascites 2/2  "hypoalbuminemia along with tachycardia. Paracentesis 6/30 drained 5L and given IV albumin. Ascitic fluid sent to labs.     On 07/08-The patient stated that he had a HA, confusion, dizziness, abdominal pain, SOB, and an impending sense of doom. BP could not be measured on several machines. Patient had increased confusion, solumence, pallor, diffuse crackles in lungs, 3+ pitting edema in BLE. Was 87% O2 on 3 L, increased with 5 L O2 NC. Rapid response was called. ABG: pH 7.02, pCO2 33.4, HCO3 8.6. Dr. Weston had an extensive discussion with patient's brother who has taken over power of care. The patient's brother was called and he requested the patient be placed on DNR. Comfort care initiated. Time of death 0930.      Goals of Care Treatment Preferences:  Code Status: DNR          What is most important right now is to focus on curative/life-prolongation (regardless of treatment burdens).  Accordingly, we have decided that the best plan to meet the patient's goals includes continuing with treatment.      Consults:   Consults (From admission, onward)          Status Ordering Provider     Pharmacy to dose Vancomycin consult  Once        Provider:  (Not yet assigned)   Placed in "And" Linked Group    Acknowledged YARON TERRY     Inpatient consult to Physical Medicine Rehab  Once        Provider:  (Not yet assigned)    Completed KIM WESTON     Inpatient consult to Registered Dietitian/Nutritionist  Once        Provider:  (Not yet assigned)    Completed YARON TERRY     Inpatient consult to [facility]  Once        Provider:  (Not yet assigned)    Completed YARON TERRY     Inpatient consult to Skin Integrity  Practitioner  Once        Provider:  Selin Cottrell, NP    Acknowledged ETHEL CRENSHAW     Inpatient consult to [facility]  Once        Provider:  (Not yet assigned)    Completed YARON TERRY     Inpatient consult to Registered Dietitian/Nutritionist  Once "        Provider:  (Not yet assigned)    Completed YARON TERRY     Inpatient consult to Registered Dietitian/Nutritionist  Once        Provider:  (Not yet assigned)    Completed HILDA ALCALA     Inpatient consult to Palliative Care  Once        Provider:  (Not yet assigned)    Completed JIGAR DAVIS     Inpatient consult to Hospital Medicine-General  Once        Provider:  (Not yet assigned)    Completed RYAN JIGAR     Inpatient consult to Midline team  Once        Provider:  (Not yet assigned)    Completed LIVIER CRENSHAWEYA     Inpatient consult to Registered Dietitian/Nutritionist  Once        Provider:  (Not yet assigned)    Completed LIVIER CRENSHAWEYA     Inpatient consult to [facility]  Once        Provider:  (Not yet assigned)    Completed NENITA SALDANA     Inpatient consult to Registered Dietitian/Nutritionist  Once        Provider:  (Not yet assigned)    Completed MINA FATIMA     Inpatient consult to Surgical Oncology  Once        Provider:  (Not yet assigned)    Completed JIGAR DAVIS     Inpatient consult to Nephrology  Once        Provider:  (Not yet assigned)    Completed JIGAR DAVIS            Significant Diagnostic Studies: Labs: All labs within the past 24 hours have been reviewed    Pending Diagnostic Studies:       Procedure Component Value Units Date/Time    ADENOVIRUS AG DETECTION, GASTROENTERITIS, EIA [[id number]]     Order Status: Sent Lab Status: No result     Specimen: Stool     Albumin, Body Fluid (Reference Lab or [facility]) [facility]; Peritoneal Fluid [[id number]] Collected: 07/06/25 1420    Order Status: Sent Lab Status: In process Updated: 07/06/25 1453    Specimen: Body Fluid from Ascites     EKG 12-lead [[id number]]     Order Status: Sent Lab Status: No result     Renal Function Panel [[id number]] Collected: 07/01/25 0823    Order Status: Sent Lab Status: No result     Specimen: Blood     Sodium, Random Urine [[id number]] Collected: 06/25/25      Order Status: Sent Lab Status: No result     Specimen: Urine, Clean Catch           Final Active Diagnoses:    Diagnosis Date Noted POA    PRINCIPAL PROBLEM:  ATNOINE (acute kidney injury) [N17.9] 2025 Yes    Malignant ascites [R18.0] 2025 Yes    Metabolic acidosis with respiratory alkalosis [E87.20, E87.3] 2025 Yes    Leukocytosis [D72.829] 2025 Unknown    Palliative care encounter [Z51.5] 2025 Not Applicable    Acute metabolic acidosis [E87.21] 2025 Yes    Chronic heart failure with preserved ejection fraction [I50.32] 2025 Yes    Hyperglycemia [R73.9] 2025 Yes    Acute hypoxemic respiratory failure [J96.01] 2025 Yes    Immunocompromised state [D84.9] 2025 Yes    Hypercoagulable state [D68.59] 2025 Yes    Hypokalemia [E87.6] 2025 Yes    Hypomagnesemia [E83.42] 2025 Yes    Neoplastic (malignant) related fatigue [R53.0] 2025 Yes    At risk for malnutrition [Z91.89] 2025 Yes    Hypovolemic shock [R57.1] 2025 Yes    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] 2025 Yes    Weakness [R53.1] 2025 Yes    Hyperphosphatemia [E83.39] 2025 Yes    Diarrhea [R19.7] 2025 Yes    Transaminitis [R74.01] 2025 Yes    Esophageal dysphagia [R13.19] 04/15/2025 Yes    Tobacco dependency [F17.200] 2025 Yes    Obstruction of esophagus [K22.2] 2025 Yes    Esophageal adenocarcinoma [C15.9] 2023 Yes      Problems Resolved During this Admission:      Discharged Condition:     Disposition:     Follow Up:   Follow-up Information       ProviderJoe    Specialty: Internal Medicine  Contact information:  [address] [facility]  Noah [address] [place] [id number]                           Patient Instructions:      Ambulatory referral/consult to [facility]   Standing Status: Future   Referral Priority: Routine Referral Type: Consultation   Referred to Provider: JOE  PROVIDER Requested Specialty: Internal Medicine   Number of Visits Requested: 1     Medications:  None    Genesis Sarah DO  Hematology/Oncology  Anupam y - Oncology ([facility])

## 2025-07-08 NOTE — CARE UPDATE
Late entry: At 8:45am, notified by team and nursing that pt was hypoxic and had altered mentation. Rapid response at bedside to examine pt. He was unable to follow my commands or participate in the conversation. Breathing continued to deteriorate and he became hypotensive. I called his MPOA, his brother Ayaz Lugo to update him on the pt's decline. We discussed code status and his brother indicated that he wanted the pt to be comfortable and was agreeable to change to DNR status with no further escalation of care.     Advance Care Planning     Date: 2025    Code Status  In light of the patients advanced and life limiting illness,I engaged the the healthcare power of   in a voluntary conversation about the patient's preferences for care  at the very end of life. The patient's brother Ayaz Lugo wishes his brother to have a natural, peaceful death.  Along those lines, he does not wish to have CPR or other invasive treatments performed when his heart and/or breathing stops. I communicated to the healthcare power of   that a DNR order would be placed in his medical record to reflect this preference.    A total of 10 min was spent on advance care planning, goals of care discussion, emotional support, formulating and communicating prognosis and exploring burden/benefit of various approaches of treatment. This discussion occurred on a fully voluntary basis with the verbal consent of the patient and/or family.       Care update:     Informed by nursing staff that pt . On exam, no heart sounds or breath sounds auscultated. Pupils fixed and dilated. No withdrawal from noxious stimuli.   Time of death noted to be 9:37am.  notified.

## 2025-07-08 NOTE — RESPIRATORY THERAPY
RAPID RESPONSE RESPIRATORY THERAPY NOTE             Code Status: DNR   : 1955  Bed: [address] [place] [address] A:   MRN: [id number]  Time page Received: 904   Time Rapid Response RT at Bedside: 906  Time Rapid Response RT left Bedside: 935    SITUATION    Evaluated patient for: Respiratory    BACKGROUND    Why is the patient in the hospital?: ANTOINE (acute kidney injury)    Patient has a past medical history of HTN (hypertension).    24 Hours Vitals Range:  Temp:  [96.9 °F (36.1 °C)-98.8 °F (37.1 °C)]   Pulse:  []   Resp:  [16-24]   BP: ()/(59-86)   SpO2:  [92 %-95 %]     Labs:    Recent Labs     25  0433 25  0707 25  0402    136 130*   K 4.0 2.9* 4.3    108 99   CO2 22* 19* 18*   BUN 56* 54* 65*   CREATININE 1.7* 1.5* 1.8*   * 127* 108   PHOS 3.9 2.8 3.6   MG 2.0 1.5* 2.3        Recent Labs     25  0920   PH 7.020*   PCO2 33.4*   PO2 62*   HCO3 8.6*   POCSATURATED 78   BE -22*       ASSESSMENT/INTERVENTIONS  Pt in bed with increased WOB soft BP.      Last Vitals: Temp: 96.9 °F (36.1 °C) (900)  Pulse: 55 (900)  Resp: 24 (900)  BP: 120/86 (900)  SpO2: 94 % ( 0402)  Level of Consciousness: Level of Consciousness (AVPU): alert  Respiratory Effort: Respiratory Effort: Normal  Expansion/Accessory Muscle Usage: Expansion/Accessory Muscles/Retractions: no use of accessory muscles  All Lung Field Breath Sounds: All Lung Fields Breath Sounds: diminished  O2 Device/Concentration: 5LPM  NIPPV: No Surgical airway: No Vent: No  ETCO2 monitored:    Ambu at bedside: y    Active Orders   Respiratory Care    Incentive spirometry     Frequency: Q4H WAKE     Number of Occurrences: Until Specified    Oxygen Continuous     Frequency: Continuous     Number of Occurrences: Until Specified     Order Comments: Discontinue when Sp02 is greater than or equal to 95% of equal to Preop Sp02       Order Questions:      Device type: Low flow      Device: Simple Face Mask       Titrate O2 per Oxygen Titration Protocol: Yes      Notify MD of: Inability to achieve desired SpO2    Oxygen PRN     Frequency: PRN     Number of Occurrences: Until Specified     Order Questions:      Device type: Low flow      Device: Nasal Cannula (1- 5 Liters)      LPM: 3      Titrate O2 per Oxygen Titration Protocol: Yes      To maintain SpO2 goal of: >= 88% (Comment: or symptomatic/dyspnea)    SMOKING CESSATION EDUCATION PER RESPIRATORY Once     Frequency: Once     Number of Occurrences: 1 Occurrences       RECOMMENDATIONS  ?  We recommend: RRT Recs: ABG and CXR obtained.  Nursing and Attending at bedside.      ESCALATION        FOLLOW-UP    Disposition: Pt     Please call back the Rapid Response RTJimmie RRT at x [phone] for any questions or concerns.

## 2025-07-08 NOTE — PROGRESS NOTES
Patient is not expected to survive this hospitalization.    Sw'er notified by Dr. KING Weston MD that patient is dying .  Sw'er spoke with brother about next steps.  Sw'er linked in  (Elif) to contact Ayaz, brother, about guidance for contacting  home.      YANELIS Hopson, LMSW [facility] - Oncology Dept  Email:[email or web address]  Office ph# [phone]

## 2025-07-08 NOTE — ASSESSMENT & PLAN NOTE
- Encourage mobility, OOB in chair at least 3 hours per day, and early ambulation as appropriate  - PT & OT recommending high intensity  - PM&R recommending inpatient rehab   - Monitor for and prevent skin breakdown and pressure ulcers

## 2025-07-08 NOTE — CONSULTS
Anupam Alcantara - Oncology ([facility])  Physical Medicine & Rehab  Consult Note    Patient Name: Jone Lugo  MRN: [id number]  Admission Date: 6/25/2025  Hospital Length of Stay: 13 days  Attending Physician: Maddison Weston MD     Inpatient consult to Physical Medicine & Rehabilitation  Consult performed by: Apolonia Soto NP  Consult requested by:  Maddison Weston MD    Collaborating Physician: Ashley Forde MD  Reason for Consult:  Assess rehabilitation needs      Consults  Subjective:     Principal Problem: ANTOINE (acute kidney injury)    HPI: Jone Lugo is a 69-year-old male with PMHx of esophageal adenocarcinoma now found with persistent/local recurrence and s/p definitive chemoradiotherapy. He is established with Dr. Singer and is planning for initiation of FOLFOX + trastuzumab. Patient presented on 6/25/25 from heme/onc outpatient. He was severely dizzy and found to be hypotensive. Also, with worsening dysphagia. He was sent to the ED and found to be in acute renal failure likely due to dehydration from poor PO intake. CT 6/25 on admission with moderate volume abdominopelvic ascites and mesenteric congestion that was not appreciated on initial exam. S/p esophageal stenting from [facility]. Hospital course complicated by rapid re-accumulation of ascites. Repeat ascites on 7/3 due to increasing abdominal distention and 4.2 L removed. Repeat paracentesis on 7/6/25 and 6.2 L of peritoneal fluid removed. Discussion of a permanent drain if fluid re-accumulates in 2-3 days.      Functional History: Patient lives alone in a 2 story home with 6 steps to enter. Prior to admission, I. DME: none.     Hospital Course:   7/3/25: Participated with PT. Sit to Stand:  from EOB with min A for hip elevation x2 trials with rolling walker  Gait: RW min/mod A for balance and mgt of AD 3-4 minimal steps laterally to the R.  Vc's for upright posture, placement of AD, directional guidance, step length, sequencing, and safety.     Past  Medical History:   Diagnosis Date    HTN (hypertension)      Past Surgical History:   Procedure Laterality Date    ESOPHAGOGASTRODUODENOSCOPY N/A 12/19/2024    Procedure: EGD (ESOPHAGOGASTRODUODENOSCOPY);  Surgeon: Pete Buenrostro MD;  Location: [facility] ENDO (4TH FLR);  Service: Endoscopy;  Laterality: N/A;  Medically Urgent      12/16 ref by Sahil Singer MD, portal-st  12/18 - pre-call complete; MB    ESOPHAGOGASTRODUODENOSCOPY N/A 4/11/2025    Procedure: EGD (ESOPHAGOGASTRODUODENOSCOPY);  Surgeon: Paxton Jurado MD;  Location: [facility] ENDO (2ND FLR);  Service: Endoscopy;  Laterality: N/A;  ref by  Sahil Singer MD, 2nd floor due to availability- portal -ml    ESOPHAGOGASTRODUODENOSCOPY N/A 7/2/2025    Procedure: EGD (ESOPHAGOGASTRODUODENOSCOPY);  Surgeon: Derick Juares MD;  Location: [facility] ENDO (2ND FLR);  Service: Endoscopy;  Laterality: N/A;    INSERTION OF TUNNELED CENTRAL VENOUS CATHETER (CVC) WITH SUBCUTANEOUS PORT Right 1/27/2025    Procedure: INSERTION, SINGLE LUMEN CATHETER WITH PORT, WITH FLUOROSCOPIC GUIDANCE, left poss right;  Surgeon: Chadd Velez MD;  Location: [facility] OR 2ND FLR;  Service: General;  Laterality: Right;    LAPAROSCOPIC REPAIR OF INGUINAL HERNIA Right      Review of patient's allergies indicates:  No Known Allergies    Scheduled Medications:    ([facility]s Solution) 1:1:1:1 diphenhydrAMINE, aluminum-magnesium hydroxide-simethicone (Mylanta), LIDOcaine viscous, nystatin oral solution  10 mL Oral QID    furosemide        furosemide (LASIX) injection  40 mg Intravenous Once    heparin (porcine)  5,000 Units Subcutaneous Q8H    melatonin  3 mg Oral Nightly    nicotine  1 patch Transdermal Daily    ondansetron  4 mg Intravenous Once    pantoprazole  40 mg Oral BID AC    piperacillin-tazobactam (Zosyn) IV (PEDS and ADULTS) (extended infusion is not appropriate)  4.5 g Intravenous Q8H    vancomycin (VANCOCIN) IV (PEDS and ADULTS)  1,250 mg Intravenous Once     PRN  Medications:   Current Facility-Administered Medications:     acetaminophen, 650 mg, Oral, Q4H PRN    dextrose 50%, 12.5 g, Intravenous, PRN    dextrose 50%, 25 g, Intravenous, PRN    furosemide, , ,     glucagon (human recombinant), 1 mg, Intramuscular, PRN    glucose, 16 g, Oral, PRN    glucose, 24 g, Oral, PRN    HYDROmorphone, 0.5 mg, Intravenous, Q4H PRN    naloxone, 0.02 mg, Intravenous, PRN    oxyCODONE, 5 mg, Oral, Q4H PRN    prochlorperazine, 5 mg, Intravenous, Q6H PRN    sodium chloride 0.9%, 10 mL, Intravenous, Q12H PRN    Pharmacy to dose Vancomycin consult, , , Once **AND** vancomycin - pharmacy to dose, , Intravenous, pharmacy to manage frequency    Family History    None       Tobacco Use    Smoking status: Every Day     Current packs/day: 1.00     Types: Cigarettes    Smokeless tobacco: Never   Substance and Sexual Activity    Alcohol use: Yes     Alcohol/week: 1.0 standard drink of alcohol     Types: 1 Glasses of wine per week     Comment: rarely    Drug use: Never    Sexual activity: Not on file     Review of Systems   Constitutional:  Positive for activity change. Negative for fatigue.   HENT:  Positive for trouble swallowing.    Musculoskeletal:  Positive for gait problem.   Neurological:  Positive for weakness.     Objective:     Vital Signs (Most Recent):  Temp: 96.9 °F (36.1 °C) (07/08/25 0900)  Pulse: (!) 55 (07/08/25 0900)  Resp: (!) 24 (07/08/25 0900)  BP: 120/86 (07/08/25 0900)  SpO2: (!) 94 % (07/08/25 0402)    Vital Signs (24h Range):  Temp:  [96.9 °F (36.1 °C)-98.8 °F (37.1 °C)] 96.9 °F (36.1 °C)  Pulse:  [] 55  Resp:  [16-24] 24  SpO2:  [92 %-95 %] 94 %  BP: ()/(59-86) 120/86     Body mass index is 19.26 kg/m².     Physical Exam  Vitals and nursing note reviewed.   Constitutional:       Appearance: He is well-developed.   HENT:      Head: Normocephalic and atraumatic.   Eyes:      General:         Right eye: No discharge.         Left eye: No discharge.   Pulmonary:       Effort: Pulmonary effort is normal. No respiratory distress.   Abdominal:      General: There is distension.      Tenderness: There is no abdominal tenderness.   Musculoskeletal:         General: No deformity.      Cervical back: Neck supple.   Deconditioned   Skin:     General: Skin is warm and dry.   Neurological:      Mental Status: Mental status is at baseline.      Sensory: No sensory deficit.      Motor: Weakness present. No abnormal muscle tone.      Gait: Gait abnormal.   Psychiatric:         Mood and Affect: Mood normal.         Behavior: Behavior normal.     Diagnostic Results:   Labs: Reviewed  ECG: Reviewed  US: Reviewed   CT: Reviewed    Assessment/Plan:     ANTOINE     - Cr 1.5 7/7    Malignant ascites  - CT 6/25 on admission with moderate volume abdominopelvic ascites and mesenteric congestion that was not appreciated on initial exam.   - Repeat ascites on 7/3 due to increasing abdominal distention and 4.2 L removed.  - Repeat paracentesis on 7/6/25 and 6.2 L of peritoneal fluid removed. Discussion of a permanent drain if fluid re-accumulates in 2-3 days.     Weakness  - Encourage mobility, OOB in chair at least 3 hours per day, and early ambulation as appropriate  - PT & OT recommending high intensity  - PM&R recommending inpatient rehab   - Monitor for and prevent skin breakdown and pressure ulcers     Obstruction of esophagus  - S/p esophageal stenting from [facility].    Esophageal adenocarcinoma  - esophageal adenocarcinoma now found with persistent/local recurrence and s/p definitive chemoradiotherapy.   - he is established with Dr. Singer and is planning for initiation of FOLFOX + trastuzumab.        The PM&R team has reviewed this patient's ongoing medical case including inpatient diagnosis, medical history, clinical examination, labs, vitals, current social and functional history. We will continue to follow for discharge plan.     Thank you for your consult.     Apolonia Soto NP  Department of  Physical Medicine & Rehab  Anupam Alcantara - Oncology ([facility])

## 2025-07-08 NOTE — ASSESSMENT & PLAN NOTE
- CT 6/25 on admission with moderate volume abdominopelvic ascites and mesenteric congestion that was not appreciated on initial exam.   - Repeat ascites on 7/3 due to increasing abdominal distention and 4.2 L removed.  - Repeat paracentesis on 7/6/25 and 6.2 L of peritoneal fluid removed. Discussion of a permanent drain if fluid re-accumulates in 2-3 days.

## 2025-07-08 NOTE — CODE/ RAPID DOCUMENTATION
RAPID RESPONSE NURSE NOTE        Admit Date: 2025  LOS: 13  Code Status: Full Code   Date of Consult: 2025  : 1955  Age: 69 y.o.  Weight:   Wt Readings from Last 1 Encounters:   25 55.8 kg (123 lb)     Sex: male  Race: White   Bed: [address] [place] [address] A:   MRN: [id number]  Time Rapid Response Team page Received: 8:56 AM  Time Rapid Response Team at Bedside: 8:56 AM  Time Rapid Response Team left Bedside: 9:38 AM  Was the patient discharged from an ICU this admission? No   Was the patient discharged from a PACU within last 24 hours? No   Did the patient receive conscious sedation/general anesthesia in last 24 hours? No  Was the patient in the ED within the past 24 hours? No  Was the patient on NIPPV within the past 24 hours? No   Did this progress into an ARC or CPA: No  Attending Physician: Maddison Weston MD  Primary Service: [facility] MEDICAL ONCOLOGY       SITUATION    Notified by Bedside RN via phone call.  Reason for alert: Respiratory Distress  Called to evaluate the patient for Respiratory.    BACKGROUND     Why is the patient in the hospital?: ANTOINE (acute kidney injury)    Patient has a past medical history of HTN (hypertension).    Last Vitals:  Temp: 96.9 °F (36.1 °C) (0900)  Pulse: 55 (0900)  Resp: 24 (0900)  BP: 120/86 (900)  SpO2: 94 % (402)    24 Hours Vitals Range:  Temp:  [96.9 °F (36.1 °C)-98.8 °F (37.1 °C)]   Pulse:  []   Resp:  [16-24]   BP: ()/(59-86)   SpO2:  [92 %-97 %]     Labs:  Recent Labs     25  0433 25  0417 25  0402   WBC 29.42* 20.22* 27.09*   HGB 8.3* 7.1* 8.4*   HCT 25.8* 21.8* 25.3*   * 402 565*       Recent Labs     25  0433 25  0707 25  0402    136 130*   K 4.0 2.9* 4.3    108 99   CO2 22* 19* 18*   BUN 56* 54* 65*   CREATININE 1.7* 1.5* 1.8*   * 127* 108   PHOS 3.9 2.8 3.6   MG 2.0 1.5* 2.3       Latest Reference Range & Units 25 09:20   POC PH 7.35 - 7.45  7.020  (LL)   POC PCO2 35 - 45 mmHg 33.4 (L)   POC PO2 80 - 100 mmHg 62 (L)   POC HCO3 24 - 28 mmol/L 8.6 (L)   POC Sodium 136 - 145 mmol/L 125 (L)   POC Potassium 3.5 - 5.1 mmol/L 6.0 (H)   POC SATURATED O2 95 - 100 % 78   Sample  ARTERIAL   POC TCO2 23 - 27 mmol/L 10 (L)   POC Ionized Calcium 1.06 - 1.42 mmol/L 1.30   POC Hematocrit 36 - 54 %PCV 25 (L)   POC BE -2 to 2 mmol/L -22 (L)   Flow  5   SoundFits  Nasal Can   Site  LR   Mode  SPONT   (LL): Data is critically low  (L): Data is abnormally low  (H): Data is abnormally high    ASSESSMENT    Physical Exam  Constitutional:       General: He is in acute distress.      Appearance: He is ill-appearing.      Interventions: Nasal cannula in place.   HENT:      Mouth/Throat:      Mouth: Mucous membranes are dry and cyanotic.   Eyes:      Pupils: Pupils are equal.      Right eye: Pupil is sluggish. Pupil is round and reactive.      Left eye: Pupil is sluggish. Pupil is round and reactive.   Cardiovascular:      Rate and Rhythm: Bradycardia present.      Pulses: Decreased pulses.   Pulmonary:      Effort: Accessory muscle usage, prolonged expiration and respiratory distress present.   Abdominal:      Palpations: Abdomen is soft.      Tenderness: There is no abdominal tenderness.   Musculoskeletal:      Right lower le+ Pitting Edema present.      Left lower le+ Pitting Edema present.   Skin:     General: Skin is cool and dry.      Coloration: Skin is pale.   Neurological:      Mental Status: He is alert.      GCS: GCS eye subscore is 4. GCS verbal subscore is 5. GCS motor subscore is 6.   Psychiatric:         Attention and Perception: He is inattentive.        25 0900   Vital Signs   Temp 96.9 °F (36.1 °C)   Temp Source Axillary   Pulse (!) 55   Resp (!) 24   Flow (L/min) (Oxygen Therapy) 5   Device (Oxygen Therapy) nasal cannula   /86     RRT called to bedside to assess pt for increased WOB. Upon initial assessment, pt awake, alert, and oriented. Breathing  labored. Pt only able to speak in short sentences. VS as noted above. BUE and BLE cool to touch. Nursing staff unable to obtain SpO2 measurement via pulse oximetry probe. RRT RT called and POCT ABG requested. Radiology called and STAT CXR ordered. Dr. Sarah at bedside and called Dr. Weston to bedside for additional support. Orders for IV lasix received from primary team.    STAT POCT ABG and CXR obtained. 100% non-rebreather mask applied to pt. CCM called to bedside. Pt then became somnolent and unresponsive to pain. Agonal breathing evident. Femoral pulse palpable but thready and weak. Dr. Weston discussing goals of care w/pt's family. Decision made to change code status to DNR and transition pt to comfort care. Supplemental oxygen removed and pt prepared for family to come.     INTERVENTIONS    The patient was seen for Respiratory problem. Staff concerns included increased WOB. The following interventions were performed: supplemental oxygen, POCT arterial blood gas , portable chest x-ray, and assisted in facilitating GOC conversations.    RECOMMENDATIONS    We recommend: Comfort care per next of kin's wishes and Dr. Weston's verbal orders    PROVIDER ESCALATION    Orders received and case discussed with Dr. Weston, Karis Ross, NP.    Primary team arrival time: 09:07 AM    Disposition: Remain in room 855.    FOLLOW UP    Bedside nurse, Fatimah, updated on plan of care. Instructed to call the Rapid Response Nurse, Zhanna Ramirez RN at [phone] for additional questions or concerns.

## 2025-07-08 NOTE — ASSESSMENT & PLAN NOTE
- esophageal adenocarcinoma now found with persistent/local recurrence and s/p definitive chemoradiotherapy.   - he is established with Dr. Singer and is planning for initiation of FOLFOX + trastuzumab.

## 2025-07-08 NOTE — PROGRESS NOTES
[facility] - Oncology ([facility])  Hematology/Oncology  Progress Note    Patient Name: Jone Lugo  Admission Date: 6/25/2025  Hospital Length of Stay: 13 days  Code Status: DNR     Subjective:     HPI:  70 yo M esophageal adenocarcinoma stage III, smoking presenting with poor PO intake.     In the ED initially afebrile 87/55 improving to 146 systolic after 2 LR resuscitation on RA. Wbcs 13.51, Hb 8.8 ( BL 11.2), Cr 8.0 ( BL 1.5-1.8), bicarb 13.  (123 BL) . Procal 1.63. Lactate 1.8. A CT chest A/P significant for moderate volume abdominopelvic ascites and mesenteric congestion. UA not collected. He was treated with vanc/cefepime and fluid resucitated with 2L of LR.     6/25 oncology appointment, pt reported worsening dysphagia with poor PO intake with weakness.   In the ED he notes poor food/water intake he attributes to stomach pain after swallowing food. He also notes loose watery stools that has not improved with immodium. Pt reports symptoms started after he most recent chemotherapy. Associated with lightheadedness worse with standing, fatigue. Has had decreased urine output. Denies chest pain, shortness of breath, melena, hematochezia. Otherwise noted an episode severe dysphagia recently with swallowing water and pills.     No new subjective & objective note has been filed under this hospital service since the last note was generated.    Assessment/Plan:     * ANTOINE (acute kidney injury)  ANTOINE is likely due to pre-renal azotemia due to intravascular volume depletion 2/2 poor fluid intake, diarrhea, BP medications. Baseline creatinine is 1.5-1.8. Most recent creatinine and eGFR are listed below. No signs of fluid overload. With metabolic acidosis, bicarb 13 in ED. EUGENIA negative for obstruction.    Recent Labs     07/04/25  1651 07/04/25  2121 07/05/25  0314   CREATININE 1.5* 1.5* 1.4   EGFRNORACEVR 50* 50* 54*     Urine creatinine 142  Urine protein 78  Urine protein/ cr ratio 0.56  Urine urea nitrogen  319  Urine potassium 34     Plan  - Avoid nephrotoxins and renally dose meds for GFR listed above  - Monitor urine output, serial BMP, and adjust therapy as needed  - Nephrology recommends continuing IV fluids at 100cc/hr, replace K with potassium chloride 10 mEq  - holding home gabapentin and amlodipine-benzepril  - IVF resucitation  - f/u nephrology      Malignant ascites  CT 6/25 on admission with moderate volume abdominopelvic ascites and mesenteric congestion that was not appreciated on initial exam. 6/30 with distension concerning for worsening ascites 2/2 hypoalbuminemia (1.5) causing discomfort. Concern that increasing pressure is altering renal perfusion. Surgery unable to perform PEG w/ ascites. S/p drain (5L).     - Peritoneal labs clear    Repeat ascites on 7/3 due to increasing abdominal distention. 4.2 L removed  7/5- Patient's abdomen is distended. Will attempt to medically manage with spironolactone 25 mg and furosemide 20 mg and reevaluate.   07/06- Repeat paracentesis, 6.2 L of peritoneal fluid removed. Will consider increasing spironolactone from 25 mg to 50 mg. If fluid re-accumulates in 2-3 days will further discuss permanent drain. Albumin repleated      Metabolic acidosis with respiratory alkalosis  Time of Death: 0930 07/08/2025    Leukocytosis  WBC increased from 12 to 22 s/p esophageal stenting from [facility]. Septic workup ordered with no infective focus found. WBC downtrended to 19 before slightly increasing back up to 21 on 7/5. Will continue to closely monitor      Chronic heart failure with preserved ejection fraction  Results for orders placed during the hospital encounter of 04/22/25    Echo    Interpretation Summary    Left Ventricle: The left ventricle is normal in size. Mildly increased wall thickness. There is normal systolic function with a visually estimated ejection fraction of 55 - 60%. Global longitudinal strain is -17.1%. Global longitudinal strain is normal. Grade I diastolic  dysfunction.    Right Ventricle: The right ventricle is normal in size. Wall thickness is normal. Systolic function is normal.    IVC/SVC: Normal venous pressure at 3 mmHg.     Developing tachycardia starting 6/29 possibly related to increased fluid load vs pain/discomfort. Less likely PE as has been on heparin  - Echo technically flawed though with preserved ejection fraction       Hyperglycemia  2/2 TPN    Last A1c reviewed-   Lab Results   Component Value Date    HGBA1C 5.8 (H) 07/01/2024     Most recent fingerstick glucose reviewed-   Recent Labs   Lab 06/28/25  2036 06/29/25  0045 06/29/25  0426 06/29/25  0735   POCTGLUCOSE 201* 195* 217* 209*     Current correctional scale  Medium  Maintain anti-hyperglycemic dose as follows-   Antihyperglycemics (From admission, onward)      Start     Stop Route Frequency Ordered    06/29/25 0907  insulin aspart U-100 pen 0-10 Units         -- SubQ Every 4 hours PRN 06/29/25 0908              Acute hypoxemic respiratory failure  Patient with Hypoxic Respiratory failure which is Acute.  he is not on home oxygen. Supplemental oxygen was provided and noted-    Ddx: pulmonary edema from IVF resuscitation vs aspiration/micro aspiration 2/2 esophageal cancer. Noted history of grade I diastolic function on echo 4/2025. Oxygen requirements around the time of being cleared for clear liquid diet. He is asymptomatic.     CXR with evidence of atelectasis, unlikely pulmonary edema. JVP noted 6/30   - f/u echo   - incentive spirometry   - wean o2 >90%  - possibly related to undiagnosed COPD 2/2 smoking history, had plans for formal PFT but was not done    At risk for malnutrition  RD consulted  TPN ordered and possible PEG tube placement. Pt is NPO currently    Transaminitis  S/p kaleb. No findings supportive of liver/biliary pathology on imaging. GGT elevated as well. Likely shock liver. Will continue to monitor CMP.    - improving with IVF     Diarrhea      Reports onset of loose watery  diarrhea since previous chemotherapy treatment. Possibly related to chemotherapy. Likely contributed to hypovolemic shock and acute renal failure.   Improving. Negative stool studies/c.diff ON 06/26    Reported 2 more episodes of diarrhea, repeating stool studies.    7/6- Patient reports 3 more episodes of diarrhea. Per C. Diff flow chart, if has one more episode of diarrhea within 24 hours, will repeat C. Diff studies. Ordering stool culture.    Hyperphosphatemia  Patient's most recent phosphorus results are listed below.   Recent Labs     06/25/25  1100   PHOS 5.7*       Plan  - Patient's hyperphosphatemia is stable  - see acute renal failure     Weakness  2/2 poor po intake, hypovolemia.      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 Most recent hemoglobin and hematocrit are listed below.  Baseline 11.2. Likely 2/2 chemo. Less likely GIB. He has noted ongoing abdominal pain with associated belching. No hematochezia/melena. ED performed MONTRELL negative negative guaiac fecal occult blood test.     Recent Labs     06/26/25  0240 06/27/25  0305 06/28/25  0300   HGB 8.2* 8.2* 7.8*   HCT 23.2* 23.6* 23.2*       Plan  - Monitor serial CBC: Daily  - Transfuse PRBC if patient becomes hemodynamically unstable, symptomatic or H/H drops below 7/21.  - Patient has not received any PRBC transfusions to date  - Patient's anemia is currently stable  - holding anticoaugulation for now will re-evaluate cbc tomorrow   - f/u iron studies with low iron saturation, may benefit from iron infusions as outpatient       Hypovolemic shock  Vitals:    06/28/25 1936 06/28/25 2300 06/29/25 0538 06/29/25 0820   BP: 113/75 105/68 98/65 105/71    06/29/25 1127 06/29/25 1553 06/29/25 1953 06/29/25 2335   BP: 121/76 98/67 111/71 113/65    06/30/25 0524 06/30/25 0830   BP: 115/68 131/69     Presented with  BP  87/55 responding with 2 LR fluid resuscitation. Lactic acid wnl. Likely 2/2 to poor fluid tolerance 2/2 esophageal adenocarcinoma. Previous EGD in April 2025 with partial obstruction. States that placing an esophageal stent with restrictions of types of foods he would be able to eat is not inline with his goals. He is more amendable to PEG placement to provide extra support for fluids and nutrition. Do not suspect septic shock at this time as no findings on CT chest A/P. No fevers. Wbcs wnl. BC NGTD. Improving after several days of IVF resucitation    - GI unable to place PEG 2/2 partial obstruction of the esophagus.  - IR unable to advance NGT for procedure 2/2 partial obstruction.   - Will consult surgical oncology for consultation of PEG tube   - will attempt following resolution of acute renal failure and if BP wnl    - Surgical oncology consulted [facility]      - IVF maintenance fluids with IVF bolus prn when hypotensive      Esophageal dysphagia  See esophageal carcinoma    Obstruction of esophagus  See esophageal adenocarcinoma      Esophageal adenocarcinoma  69M with esophageal adenocarcinoma initially presenting with dysphagia (improved post-EUS dilation). Biopsy confirmed poorly differentiated adenocarcinoma, pMMR, HER2+, PD-L1 0. Received neoadjuvant carboplatin/paclitaxel + radiation (non-surgical). Imaging showed stable disease. Recurrent tumor confirmed on EGD (12/2024); started FOLFOX + trastuzumab, with stable imaging after 4 cycles. After 6 cycles, developed worsening dysphagia due to local tumor progression. Declined stent/PEG at last hospitalization. Switched to trastuzumab deruxtecan (TDxD). Prior admission presented for cycle 4 with significant dysphagia, poor PO intake, Cr 7.7, hypotension, weakness. Treatment held, sent to ED. Previous EGD in April 2025 with partial obstruction. States that placing an esophageal stent with restrictions of types of foods he would be able to eat is not inline with  his goals. He is more amendable to PEG placement to provide extra support for fluids and nutrition.     - likely causing poor po intake > hypovolemic shock > acute renal failure  - Surgical oncology recommending strict NPO due to his report that he cannot tolerate anything by mouth due to the aspiration risk. We would not consider any intervention until his hypovolemia and acute renal failure are addressed.     - Started TPN on 6/26  - Maintenance IVF @100 cc/hr    Esophageal stent placed by [facility] on 7/2                   Genesis Sarah DO  Hematology/Oncology  [facility] - Oncology ([facility])

## 2025-07-08 NOTE — SUBJECTIVE & OBJECTIVE
Past Medical History:   Diagnosis Date    HTN (hypertension)      Past Surgical History:   Procedure Laterality Date    ESOPHAGOGASTRODUODENOSCOPY N/A 12/19/2024    Procedure: EGD (ESOPHAGOGASTRODUODENOSCOPY);  Surgeon: Pete Buenrostro MD;  Location: [facility] ENDO (4TH FLR);  Service: Endoscopy;  Laterality: N/A;  Medically Urgent      12/16 ref by Sahil Singer MD, portal-st  12/18 - pre-call complete; MB    ESOPHAGOGASTRODUODENOSCOPY N/A 4/11/2025    Procedure: EGD (ESOPHAGOGASTRODUODENOSCOPY);  Surgeon: Paxton Jurado MD;  Location: [facility] ENDO (2ND FLR);  Service: Endoscopy;  Laterality: N/A;  ref by  Sahil Singer MD, 2nd floor due to availability- portal -ml    ESOPHAGOGASTRODUODENOSCOPY N/A 7/2/2025    Procedure: EGD (ESOPHAGOGASTRODUODENOSCOPY);  Surgeon: Derick Juares MD;  Location: [facility] ENDO (2ND FLR);  Service: Endoscopy;  Laterality: N/A;    INSERTION OF TUNNELED CENTRAL VENOUS CATHETER (CVC) WITH SUBCUTANEOUS PORT Right 1/27/2025    Procedure: INSERTION, SINGLE LUMEN CATHETER WITH PORT, WITH FLUOROSCOPIC GUIDANCE, left poss right;  Surgeon: Chadd Velez MD;  Location: [facility] OR 2ND FLR;  Service: General;  Laterality: Right;    LAPAROSCOPIC REPAIR OF INGUINAL HERNIA Right      Review of patient's allergies indicates:  No Known Allergies    Scheduled Medications:    (Dukes Solution) 1:1:1:1 diphenhydrAMINE, aluminum-magnesium hydroxide-simethicone (Mylanta), LIDOcaine viscous, nystatin oral solution  10 mL Oral QID    furosemide        furosemide (LASIX) injection  40 mg Intravenous Once    heparin (porcine)  5,000 Units Subcutaneous Q8H    melatonin  3 mg Oral Nightly    nicotine  1 patch Transdermal Daily    ondansetron  4 mg Intravenous Once    pantoprazole  40 mg Oral BID AC    piperacillin-tazobactam (Zosyn) IV (PEDS and ADULTS) (extended infusion is not appropriate)  4.5 g Intravenous Q8H    vancomycin (VANCOCIN) IV (PEDS and ADULTS)  1,250 mg Intravenous Once        PRN Medications:   Current Facility-Administered Medications:     acetaminophen, 650 mg, Oral, Q4H PRN    dextrose 50%, 12.5 g, Intravenous, PRN    dextrose 50%, 25 g, Intravenous, PRN    furosemide, , ,     glucagon (human recombinant), 1 mg, Intramuscular, PRN    glucose, 16 g, Oral, PRN    glucose, 24 g, Oral, PRN    HYDROmorphone, 0.5 mg, Intravenous, Q4H PRN    naloxone, 0.02 mg, Intravenous, PRN    oxyCODONE, 5 mg, Oral, Q4H PRN    prochlorperazine, 5 mg, Intravenous, Q6H PRN    sodium chloride 0.9%, 10 mL, Intravenous, Q12H PRN    Pharmacy to dose Vancomycin consult, , , Once **AND** vancomycin - pharmacy to dose, , Intravenous, pharmacy to manage frequency    Family History    None       Tobacco Use    Smoking status: Every Day     Current packs/day: 1.00     Types: Cigarettes    Smokeless tobacco: Never   Substance and Sexual Activity    Alcohol use: Yes     Alcohol/week: 1.0 standard drink of alcohol     Types: 1 Glasses of wine per week     Comment: rarely    Drug use: Never    Sexual activity: Not on file     Review of Systems   Constitutional:  Positive for activity change. Negative for fatigue.   HENT:  Positive for trouble swallowing.    Musculoskeletal:  Positive for gait problem.   Neurological:  Positive for weakness.     Objective:     Vital Signs (Most Recent):  Temp: 96.9 °F (36.1 °C) (07/08/25 0900)  Pulse: (!) 55 (07/08/25 0900)  Resp: (!) 24 (07/08/25 0900)  BP: 120/86 (07/08/25 0900)  SpO2: (!) 94 % (07/08/25 0402)    Vital Signs (24h Range):  Temp:  [96.9 °F (36.1 °C)-98.8 °F (37.1 °C)] 96.9 °F (36.1 °C)  Pulse:  [] 55  Resp:  [16-24] 24  SpO2:  [92 %-95 %] 94 %  BP: ()/(59-86) 120/86     Body mass index is 19.26 kg/m².     Physical Exam  Vitals and nursing note reviewed.   Constitutional:       Appearance: He is well-developed.   HENT:      Head: Normocephalic and atraumatic.   Eyes:      General:         Right eye: No discharge.         Left eye: No discharge.    Pulmonary:      Effort: Pulmonary effort is normal. No respiratory distress.   Abdominal:      General: There is distension.      Tenderness: There is no abdominal tenderness.   Musculoskeletal:         General: No deformity.      Cervical back: Neck supple.      Comments: Deconditioned   Skin:     General: Skin is warm and dry.   Neurological:      Mental Status: Mental status is at baseline.      Sensory: No sensory deficit.      Motor: Weakness present. No abnormal muscle tone.      Gait: Gait abnormal.   Psychiatric:         Mood and Affect: Mood normal.         Behavior: Behavior normal.               Diagnostic Results:   Labs: Reviewed  ECG: Reviewed  US: Reviewed   CT: Reviewed

## 2025-07-08 NOTE — HOSPITAL COURSE
7/3/25: Participated with PT. Sit to Stand:  from EOB with min A for hip elevation x2 trials with rolling walker  Gait: RW min/mod A for balance and mgt of AD 3-4 minimal steps laterally to the R.  Vc's for upright posture, placement of AD, directional guidance, step length, sequencing, and safety.

## 2025-07-08 NOTE — TELEPHONE ENCOUNTER
Copied from CRM #[id number]. Topic: General Inquiry - Patient Advice  >> 2025 12:58 PM Yadi wrote:     Consult/Advisory     Name Of Caller: Edward @ [facility]        Contact Preference:[phone]     Nature of call:dEward is calling for doctor to sign death certificate. Requesting a call back

## 2025-07-08 NOTE — PLAN OF CARE
No acute events, pt involved in plan of care and communicated needs, VSS, afebrile, pt is tachycardic, medications tolerated, Duke's solution refused, 1L of 02 in use, extended abdomen r/t ascites, puree diet with poor appetite, no BM this shift, labs collected, no c/o pain, pt denies vomiting, urinals within reach, bed in low locked position, pt in bed resting, call light in reach, pt instructed to call for assistance, NAD, safety maintained.

## 2025-07-09 LAB
ALBUMIN FLD-MCNC: 1.2 G/DL
BODY FLD TYPE: NORMAL

## 2025-07-10 LAB
ESTROGEN SERPL-MCNC: ABNORMAL PG/ML
INSULIN SERPL-ACNC: ABNORMAL U[IU]/ML
LAB AP GROSS DESCRIPTION: ABNORMAL
LAB AP NON-GYN INTERPRETATION SPECIMEN 1: ABNORMAL
LAB AP PERFORMING LOCATION(S): ABNORMAL

## 2025-07-12 LAB — BACTERIA FLD CULT: NORMAL

## 2025-07-14 PROBLEM — E87.70 HYPERVOLEMIA: Status: ACTIVE | Noted: 2025-07-14

## (undated) DEVICE — ADHESIVE DERMABOND ADVANCED

## (undated) DEVICE — SYR DISP LL 5CC

## (undated) DEVICE — SUT PROLENE 2-0 30 SH

## (undated) DEVICE — TIP YANKAUERS BULB NO VENT

## (undated) DEVICE — SUT VICRYL CTD 2-0 GI 27 SH

## (undated) DEVICE — SUT MCRYL PLUS 4-0 PS2 27IN

## (undated) DEVICE — DRESSING ANTIMICROBIAL 1 INCH

## (undated) DEVICE — SYR ONLY LUER LOCK 20CC

## (undated) DEVICE — SOL NACL 0.9% INJ PF/50151

## (undated) DEVICE — APPLICATOR CHLORAPREP ORN 26ML

## (undated) DEVICE — CONTAINER SPECIMEN OR STER 4OZ

## (undated) DEVICE — BARDPORT LOW-PROFILE DUAL-LUMEN IMPLANTABLE PORT WITH ATTACHABLE 7F OPEN-ENDED DUAL-LUMEN VENOUS CATHETER. INTERMEDIATE KIT
Type: IMPLANTABLE DEVICE | Site: CHEST | Status: NON-FUNCTIONAL
Brand: BARDPORT

## (undated) DEVICE — TRAY MINOR GEN SURG OMC

## (undated) DEVICE — DECANTER FLUID TRNSF WHITE 9IN

## (undated) DEVICE — DRAPE C-ARM ELAS CLIP 42X120IN

## (undated) DEVICE — SUT VICRYL 3-0 27 SH

## (undated) DEVICE — ELECTRODE REM PLYHSV RETURN 9

## (undated) DEVICE — BLADE SURG CARBON STEEL SZ11

## (undated) DEVICE — NDL HYPO REG 25G X 1 1/2

## (undated) DEVICE — DRAPE T THYROID STERILE